# Patient Record
Sex: FEMALE | Race: BLACK OR AFRICAN AMERICAN | Employment: UNEMPLOYED | URBAN - METROPOLITAN AREA
[De-identification: names, ages, dates, MRNs, and addresses within clinical notes are randomized per-mention and may not be internally consistent; named-entity substitution may affect disease eponyms.]

---

## 2017-01-09 ENCOUNTER — TELEPHONE (OUTPATIENT)
Dept: PHARMACY | Facility: CLINIC | Age: 72
End: 2017-01-09

## 2017-01-09 RX ORDER — ESOMEPRAZOLE MAGNESIUM 40 MG/1
40 FOR SUSPENSION ORAL DAILY
COMMUNITY
End: 2017-07-20 | Stop reason: CLARIF

## 2017-01-12 ENCOUNTER — OFFICE VISIT (OUTPATIENT)
Dept: FAMILY MEDICINE CLINIC | Facility: CLINIC | Age: 72
End: 2017-01-12

## 2017-01-12 ENCOUNTER — TELEPHONE (OUTPATIENT)
Dept: FAMILY MEDICINE CLINIC | Facility: CLINIC | Age: 72
End: 2017-01-12

## 2017-01-12 VITALS
HEART RATE: 83 BPM | TEMPERATURE: 99.2 F | WEIGHT: 153 LBS | SYSTOLIC BLOOD PRESSURE: 153 MMHG | BODY MASS INDEX: 24.69 KG/M2 | DIASTOLIC BLOOD PRESSURE: 67 MMHG

## 2017-01-12 DIAGNOSIS — N18.6 ESRD ON DIALYSIS (HCC): ICD-10-CM

## 2017-01-12 DIAGNOSIS — I10 HTN, GOAL BELOW 130/80: ICD-10-CM

## 2017-01-12 DIAGNOSIS — Z99.2 ESRD ON DIALYSIS (HCC): ICD-10-CM

## 2017-01-12 DIAGNOSIS — D63.8 ANEMIA OF CHRONIC DISEASE: Primary | ICD-10-CM

## 2017-01-12 DIAGNOSIS — F17.200 SMOKER: ICD-10-CM

## 2017-01-12 PROCEDURE — 99213 OFFICE O/P EST LOW 20 MIN: CPT | Performed by: STUDENT IN AN ORGANIZED HEALTH CARE EDUCATION/TRAINING PROGRAM

## 2017-01-12 ASSESSMENT — ENCOUNTER SYMPTOMS
VOMITING: 0
NAUSEA: 0
SHORTNESS OF BREATH: 0
ABDOMINAL PAIN: 0
BLOOD IN STOOL: 0
DIARRHEA: 0
COUGH: 1
PHOTOPHOBIA: 0

## 2017-01-24 ENCOUNTER — OFFICE VISIT (OUTPATIENT)
Dept: GASTROENTEROLOGY | Facility: CLINIC | Age: 72
End: 2017-01-24

## 2017-01-24 VITALS
HEIGHT: 66 IN | OXYGEN SATURATION: 100 % | DIASTOLIC BLOOD PRESSURE: 60 MMHG | RESPIRATION RATE: 14 BRPM | TEMPERATURE: 97.9 F | HEART RATE: 77 BPM | SYSTOLIC BLOOD PRESSURE: 140 MMHG | WEIGHT: 153.6 LBS | BODY MASS INDEX: 24.68 KG/M2

## 2017-01-24 DIAGNOSIS — D63.8 ANEMIA OF CHRONIC DISEASE: Primary | ICD-10-CM

## 2017-01-24 DIAGNOSIS — K92.2 GASTROINTESTINAL HEMORRHAGE, UNSPECIFIED GASTROINTESTINAL HEMORRHAGE TYPE: ICD-10-CM

## 2017-01-24 DIAGNOSIS — K29.60 EROSIVE GASTRITIS: ICD-10-CM

## 2017-01-24 PROCEDURE — 99214 OFFICE O/P EST MOD 30 MIN: CPT | Performed by: INTERNAL MEDICINE

## 2017-01-24 ASSESSMENT — ENCOUNTER SYMPTOMS
ABDOMINAL PAIN: 0
TROUBLE SWALLOWING: 0
DIARRHEA: 0
ABDOMINAL DISTENTION: 0
VOMITING: 0
SHORTNESS OF BREATH: 1
ALLERGIC/IMMUNOLOGIC NEGATIVE: 1
NAUSEA: 0
RECTAL PAIN: 0
BACK PAIN: 1
BLOOD IN STOOL: 0
ANAL BLEEDING: 0
GASTROINTESTINAL NEGATIVE: 1
SINUS PRESSURE: 1
COUGH: 1
CONSTIPATION: 0
SORE THROAT: 0

## 2017-01-26 ENCOUNTER — TELEPHONE (OUTPATIENT)
Dept: GASTROENTEROLOGY | Facility: CLINIC | Age: 72
End: 2017-01-26

## 2017-02-14 ENCOUNTER — APPOINTMENT (OUTPATIENT)
Dept: GENERAL RADIOLOGY | Age: 72
DRG: 811 | End: 2017-02-14
Payer: COMMERCIAL

## 2017-02-14 ENCOUNTER — HOSPITAL ENCOUNTER (INPATIENT)
Age: 72
LOS: 9 days | Discharge: HOME | DRG: 811 | End: 2017-02-23
Attending: EMERGENCY MEDICINE | Admitting: FAMILY MEDICINE
Payer: COMMERCIAL

## 2017-02-14 DIAGNOSIS — D64.9 ANEMIA, UNSPECIFIED TYPE: Primary | ICD-10-CM

## 2017-02-14 DIAGNOSIS — R50.9 FEVER, UNSPECIFIED FEVER CAUSE: ICD-10-CM

## 2017-02-14 LAB
-: ABNORMAL
ABSOLUTE EOS #: 0 K/UL (ref 0–0.4)
ABSOLUTE LYMPH #: 0.75 K/UL (ref 1–4.8)
ABSOLUTE MONO #: 0.75 K/UL (ref 0.1–0.8)
AMORPHOUS: ABNORMAL
ANION GAP SERPL CALCULATED.3IONS-SCNC: 13 MMOL/L (ref 9–17)
BACTERIA: ABNORMAL
BASOPHILS # BLD: 0 % (ref 0–2)
BASOPHILS ABSOLUTE: 0 K/UL (ref 0–0.2)
BILIRUBIN URINE: NEGATIVE
BUN BLDV-MCNC: 15 MG/DL (ref 8–23)
BUN/CREAT BLD: ABNORMAL (ref 9–20)
CALCIUM SERPL-MCNC: 9.6 MG/DL (ref 8.6–10.4)
CASTS UA: ABNORMAL /LPF (ref 0–8)
CHLORIDE BLD-SCNC: 93 MMOL/L (ref 98–107)
CO2: 36 MMOL/L (ref 20–31)
COLOR: YELLOW
COMMENT UA: ABNORMAL
CREAT SERPL-MCNC: 2.24 MG/DL (ref 0.5–0.9)
CRYSTALS, UA: ABNORMAL /HPF
DIFFERENTIAL TYPE: ABNORMAL
DIRECT EXAM: NORMAL
EOSINOPHILS RELATIVE PERCENT: 0 % (ref 1–4)
EPITHELIAL CELLS UA: ABNORMAL /HPF (ref 0–5)
GFR AFRICAN AMERICAN: 26 ML/MIN
GFR NON-AFRICAN AMERICAN: 22 ML/MIN
GFR SERPL CREATININE-BSD FRML MDRD: ABNORMAL ML/MIN/{1.73_M2}
GFR SERPL CREATININE-BSD FRML MDRD: ABNORMAL ML/MIN/{1.73_M2}
GLUCOSE BLD-MCNC: 138 MG/DL (ref 70–99)
GLUCOSE URINE: NEGATIVE
HCT VFR BLD CALC: 22.4 % (ref 36–46)
HEMOGLOBIN: 7.1 G/DL (ref 12–16)
HEMOGLOBIN: 7.9 G/DL (ref 12–16)
KETONES, URINE: NEGATIVE
LACTIC ACID, WHOLE BLOOD: 1.5 MMOL/L (ref 0.7–2.1)
LACTIC ACID: NORMAL MMOL/L
LEUKOCYTE ESTERASE, URINE: NEGATIVE
LYMPHOCYTES # BLD: 6 % (ref 24–44)
Lab: NORMAL
MCH RBC QN AUTO: 28.1 PG (ref 26–34)
MCHC RBC AUTO-ENTMCNC: 31.5 G/DL (ref 31–37)
MCV RBC AUTO: 89.3 FL (ref 80–100)
MONOCYTES # BLD: 6 % (ref 1–7)
MORPHOLOGY: ABNORMAL
MUCUS: ABNORMAL
NITRITE, URINE: NEGATIVE
OTHER OBSERVATIONS UA: ABNORMAL
PDW BLD-RTO: 19.5 % (ref 12.5–15.4)
PH UA: >9 (ref 5–8)
PLATELET # BLD: 414 K/UL (ref 140–450)
PLATELET ESTIMATE: ABNORMAL
PMV BLD AUTO: 9.3 FL (ref 6–12)
POC TROPONIN I: 0 NG/ML (ref 0–0.1)
POC TROPONIN I: 0.01 NG/ML (ref 0–0.1)
POC TROPONIN INTERP: NORMAL
POC TROPONIN INTERP: NORMAL
POTASSIUM SERPL-SCNC: 4.2 MMOL/L (ref 3.7–5.3)
PROTEIN UA: ABNORMAL
RBC # BLD: 2.51 M/UL (ref 4–5.2)
RBC # BLD: ABNORMAL 10*6/UL
RBC UA: ABNORMAL /HPF (ref 0–4)
RENAL EPITHELIAL, UA: ABNORMAL /HPF
SEG NEUTROPHILS: 88 % (ref 36–66)
SEGMENTED NEUTROPHILS ABSOLUTE COUNT: 11 K/UL (ref 1.8–7.7)
SODIUM BLD-SCNC: 142 MMOL/L (ref 135–144)
SPECIFIC GRAVITY UA: 1.01 (ref 1–1.03)
SPECIMEN DESCRIPTION: NORMAL
STATUS: NORMAL
TRICHOMONAS: ABNORMAL
TURBIDITY: CLEAR
URINE HGB: NEGATIVE
UROBILINOGEN, URINE: NORMAL
WBC # BLD: 12.5 K/UL (ref 3.5–11)
WBC # BLD: ABNORMAL 10*3/UL
WBC UA: ABNORMAL /HPF (ref 0–5)
YEAST: ABNORMAL

## 2017-02-14 PROCEDURE — 99284 EMERGENCY DEPT VISIT MOD MDM: CPT

## 2017-02-14 PROCEDURE — 86900 BLOOD TYPING SEROLOGIC ABO: CPT

## 2017-02-14 PROCEDURE — 36415 COLL VENOUS BLD VENIPUNCTURE: CPT

## 2017-02-14 PROCEDURE — 2580000003 HC RX 258: Performed by: EMERGENCY MEDICINE

## 2017-02-14 PROCEDURE — 87070 CULTURE OTHR SPECIMN AEROBIC: CPT

## 2017-02-14 PROCEDURE — 6370000000 HC RX 637 (ALT 250 FOR IP): Performed by: EMERGENCY MEDICINE

## 2017-02-14 PROCEDURE — P9040 RBC LEUKOREDUCED IRRADIATED: HCPCS

## 2017-02-14 PROCEDURE — 86880 COOMBS TEST DIRECT: CPT

## 2017-02-14 PROCEDURE — 86850 RBC ANTIBODY SCREEN: CPT

## 2017-02-14 PROCEDURE — 71020 XR CHEST STANDARD TWO VW: CPT

## 2017-02-14 PROCEDURE — 6360000002 HC RX W HCPCS: Performed by: FAMILY MEDICINE

## 2017-02-14 PROCEDURE — 36430 TRANSFUSION BLD/BLD COMPNT: CPT

## 2017-02-14 PROCEDURE — 86920 COMPATIBILITY TEST SPIN: CPT

## 2017-02-14 PROCEDURE — 86902 BLOOD TYPE ANTIGEN DONOR EA: CPT

## 2017-02-14 PROCEDURE — 2060000000 HC ICU INTERMEDIATE R&B

## 2017-02-14 PROCEDURE — 87040 BLOOD CULTURE FOR BACTERIA: CPT

## 2017-02-14 PROCEDURE — 6360000002 HC RX W HCPCS: Performed by: EMERGENCY MEDICINE

## 2017-02-14 PROCEDURE — 80048 BASIC METABOLIC PNL TOTAL CA: CPT

## 2017-02-14 PROCEDURE — 2500000003 HC RX 250 WO HCPCS: Performed by: EMERGENCY MEDICINE

## 2017-02-14 PROCEDURE — 6370000000 HC RX 637 (ALT 250 FOR IP): Performed by: FAMILY MEDICINE

## 2017-02-14 PROCEDURE — 84484 ASSAY OF TROPONIN QUANT: CPT

## 2017-02-14 PROCEDURE — 86901 BLOOD TYPING SEROLOGIC RH(D): CPT

## 2017-02-14 PROCEDURE — 2580000003 HC RX 258: Performed by: FAMILY MEDICINE

## 2017-02-14 PROCEDURE — 87804 INFLUENZA ASSAY W/OPTIC: CPT

## 2017-02-14 PROCEDURE — 85025 COMPLETE CBC W/AUTO DIFF WBC: CPT

## 2017-02-14 PROCEDURE — 83605 ASSAY OF LACTIC ACID: CPT

## 2017-02-14 PROCEDURE — 81001 URINALYSIS AUTO W/SCOPE: CPT

## 2017-02-14 PROCEDURE — 87205 SMEAR GRAM STAIN: CPT

## 2017-02-14 PROCEDURE — 86870 RBC ANTIBODY IDENTIFICATION: CPT

## 2017-02-14 PROCEDURE — 85018 HEMOGLOBIN: CPT

## 2017-02-14 PROCEDURE — 93005 ELECTROCARDIOGRAM TRACING: CPT

## 2017-02-14 PROCEDURE — C9113 INJ PANTOPRAZOLE SODIUM, VIA: HCPCS | Performed by: FAMILY MEDICINE

## 2017-02-14 RX ORDER — ACETAMINOPHEN 325 MG/1
650 TABLET ORAL EVERY 4 HOURS PRN
Status: DISCONTINUED | OUTPATIENT
Start: 2017-02-14 | End: 2017-02-14

## 2017-02-14 RX ORDER — LEVOFLOXACIN 5 MG/ML
500 INJECTION, SOLUTION INTRAVENOUS EVERY 24 HOURS
Status: COMPLETED | OUTPATIENT
Start: 2017-02-14 | End: 2017-02-14

## 2017-02-14 RX ORDER — LEVOFLOXACIN 5 MG/ML
250 INJECTION, SOLUTION INTRAVENOUS EVERY 24 HOURS
Status: DISCONTINUED | OUTPATIENT
Start: 2017-02-15 | End: 2017-02-16

## 2017-02-14 RX ORDER — ACETAMINOPHEN 325 MG/1
650 TABLET ORAL EVERY 4 HOURS PRN
Status: DISCONTINUED | OUTPATIENT
Start: 2017-02-14 | End: 2017-02-23 | Stop reason: HOSPADM

## 2017-02-14 RX ORDER — PANTOPRAZOLE SODIUM 40 MG/10ML
40 INJECTION, POWDER, LYOPHILIZED, FOR SOLUTION INTRAVENOUS DAILY
Status: DISCONTINUED | OUTPATIENT
Start: 2017-02-14 | End: 2017-02-16

## 2017-02-14 RX ORDER — SODIUM CHLORIDE 0.9 % (FLUSH) 0.9 %
10 SYRINGE (ML) INJECTION PRN
Status: DISCONTINUED | OUTPATIENT
Start: 2017-02-14 | End: 2017-02-14

## 2017-02-14 RX ORDER — DIPHENHYDRAMINE HCL 25 MG
25 TABLET ORAL ONCE
Status: COMPLETED | OUTPATIENT
Start: 2017-02-14 | End: 2017-02-14

## 2017-02-14 RX ORDER — 0.9 % SODIUM CHLORIDE 0.9 %
10 VIAL (ML) INJECTION DAILY
Status: DISCONTINUED | OUTPATIENT
Start: 2017-02-14 | End: 2017-02-16

## 2017-02-14 RX ORDER — SODIUM CHLORIDE 0.9 % (FLUSH) 0.9 %
10 SYRINGE (ML) INJECTION PRN
Status: DISCONTINUED | OUTPATIENT
Start: 2017-02-14 | End: 2017-02-23 | Stop reason: HOSPADM

## 2017-02-14 RX ORDER — LANOLIN ALCOHOL/MO/W.PET/CERES
325 CREAM (GRAM) TOPICAL
Status: DISCONTINUED | OUTPATIENT
Start: 2017-02-14 | End: 2017-02-20

## 2017-02-14 RX ORDER — VANCOMYCIN HYDROCHLORIDE 1 G/200ML
1000 INJECTION, SOLUTION INTRAVENOUS ONCE
Status: COMPLETED | OUTPATIENT
Start: 2017-02-14 | End: 2017-02-14

## 2017-02-14 RX ORDER — BISACODYL 10 MG
10 SUPPOSITORY, RECTAL RECTAL DAILY PRN
Status: DISCONTINUED | OUTPATIENT
Start: 2017-02-14 | End: 2017-02-23 | Stop reason: HOSPADM

## 2017-02-14 RX ORDER — CLONIDINE HYDROCHLORIDE 0.2 MG/1
0.4 TABLET ORAL 2 TIMES DAILY
Status: DISCONTINUED | OUTPATIENT
Start: 2017-02-14 | End: 2017-02-15

## 2017-02-14 RX ORDER — 0.9 % SODIUM CHLORIDE 0.9 %
250 INTRAVENOUS SOLUTION INTRAVENOUS ONCE
Status: DISCONTINUED | OUTPATIENT
Start: 2017-02-14 | End: 2017-02-23 | Stop reason: HOSPADM

## 2017-02-14 RX ORDER — LOSARTAN POTASSIUM 50 MG/1
50 TABLET ORAL 2 TIMES DAILY
Status: DISCONTINUED | OUTPATIENT
Start: 2017-02-14 | End: 2017-02-23 | Stop reason: HOSPADM

## 2017-02-14 RX ORDER — ATORVASTATIN CALCIUM 20 MG/1
20 TABLET, FILM COATED ORAL NIGHTLY
Status: DISCONTINUED | OUTPATIENT
Start: 2017-02-14 | End: 2017-02-23 | Stop reason: HOSPADM

## 2017-02-14 RX ORDER — ACETAMINOPHEN 500 MG
1000 TABLET ORAL ONCE
Status: COMPLETED | OUTPATIENT
Start: 2017-02-14 | End: 2017-02-14

## 2017-02-14 RX ORDER — ONDANSETRON 2 MG/ML
4 INJECTION INTRAMUSCULAR; INTRAVENOUS EVERY 6 HOURS PRN
Status: DISCONTINUED | OUTPATIENT
Start: 2017-02-14 | End: 2017-02-23 | Stop reason: HOSPADM

## 2017-02-14 RX ORDER — SODIUM CHLORIDE 0.9 % (FLUSH) 0.9 %
10 SYRINGE (ML) INJECTION EVERY 12 HOURS SCHEDULED
Status: DISCONTINUED | OUTPATIENT
Start: 2017-02-14 | End: 2017-02-23 | Stop reason: HOSPADM

## 2017-02-14 RX ORDER — SODIUM CHLORIDE 0.9 % (FLUSH) 0.9 %
10 SYRINGE (ML) INJECTION EVERY 12 HOURS SCHEDULED
Status: DISCONTINUED | OUTPATIENT
Start: 2017-02-14 | End: 2017-02-14

## 2017-02-14 RX ADMIN — ATORVASTATIN CALCIUM 20 MG: 20 TABLET, FILM COATED ORAL at 20:17

## 2017-02-14 RX ADMIN — Medication 10 ML: at 21:00

## 2017-02-14 RX ADMIN — ACETAMINOPHEN 650 MG: 325 TABLET ORAL at 21:04

## 2017-02-14 RX ADMIN — AZTREONAM 2 G: 2 INJECTION, POWDER, LYOPHILIZED, FOR SOLUTION INTRAMUSCULAR; INTRAVENOUS at 15:13

## 2017-02-14 RX ADMIN — LOSARTAN POTASSIUM 50 MG: 50 TABLET, FILM COATED ORAL at 20:17

## 2017-02-14 RX ADMIN — PANTOPRAZOLE SODIUM 40 MG: 40 INJECTION, POWDER, FOR SOLUTION INTRAVENOUS at 18:35

## 2017-02-14 RX ADMIN — VANCOMYCIN HYDROCHLORIDE 1000 MG: 1 INJECTION, SOLUTION INTRAVENOUS at 15:54

## 2017-02-14 RX ADMIN — MOMETASONE FUROATE AND FORMOTEROL FUMARATE DIHYDRATE 2 PUFF: 100; 5 AEROSOL RESPIRATORY (INHALATION) at 18:50

## 2017-02-14 RX ADMIN — DIPHENHYDRAMINE HCL 25 MG: 25 TABLET ORAL at 20:17

## 2017-02-14 RX ADMIN — LEVOFLOXACIN 500 MG: 5 INJECTION, SOLUTION INTRAVENOUS at 18:35

## 2017-02-14 RX ADMIN — CLONIDINE HYDROCHLORIDE 0.4 MG: 0.2 TABLET ORAL at 20:17

## 2017-02-14 RX ADMIN — ACETAMINOPHEN 1000 MG: 500 TABLET ORAL at 15:12

## 2017-02-14 RX ADMIN — FERROUS SULFATE TAB EC 325 MG (65 MG FE EQUIVALENT) 325 MG: 325 (65 FE) TABLET DELAYED RESPONSE at 18:36

## 2017-02-14 ASSESSMENT — PAIN DESCRIPTION - PAIN TYPE
TYPE: ACUTE PAIN
TYPE: ACUTE PAIN

## 2017-02-14 ASSESSMENT — PAIN SCALES - GENERAL
PAINLEVEL_OUTOF10: 2
PAINLEVEL_OUTOF10: 8
PAINLEVEL_OUTOF10: 0
PAINLEVEL_OUTOF10: 0

## 2017-02-14 ASSESSMENT — PAIN DESCRIPTION - FREQUENCY: FREQUENCY: CONTINUOUS

## 2017-02-14 ASSESSMENT — ENCOUNTER SYMPTOMS
SHORTNESS OF BREATH: 0
EYE DISCHARGE: 0
CONSTIPATION: 0
SORE THROAT: 0
NAUSEA: 0
COUGH: 1
VOMITING: 0
COUGH: 0
ABDOMINAL PAIN: 0
BACK PAIN: 0
SPUTUM PRODUCTION: 0
COLOR CHANGE: 0
EYE PAIN: 0
RHINORRHEA: 0
DIARRHEA: 0

## 2017-02-14 ASSESSMENT — PAIN DESCRIPTION - ORIENTATION: ORIENTATION: RIGHT;MID

## 2017-02-14 ASSESSMENT — PAIN DESCRIPTION - LOCATION
LOCATION: CHEST
LOCATION: GENERALIZED

## 2017-02-14 ASSESSMENT — PAIN DESCRIPTION - DESCRIPTORS
DESCRIPTORS: ACHING
DESCRIPTORS: PRESSURE;TIGHTNESS

## 2017-02-15 LAB
ABSOLUTE EOS #: 0 K/UL (ref 0–0.4)
ABSOLUTE LYMPH #: 1.21 K/UL (ref 1–4.8)
ABSOLUTE MONO #: 0.65 K/UL (ref 0.1–0.8)
ANION GAP SERPL CALCULATED.3IONS-SCNC: 12 MMOL/L (ref 9–17)
BASOPHILS # BLD: 0 % (ref 0–2)
BASOPHILS ABSOLUTE: 0 K/UL (ref 0–0.2)
BUN BLDV-MCNC: 25 MG/DL (ref 8–23)
BUN/CREAT BLD: ABNORMAL (ref 9–20)
CALCIUM SERPL-MCNC: 9.7 MG/DL (ref 8.6–10.4)
CHLORIDE BLD-SCNC: 95 MMOL/L (ref 98–107)
CO2: 32 MMOL/L (ref 20–31)
CREAT SERPL-MCNC: 3.61 MG/DL (ref 0.5–0.9)
CULTURE: ABNORMAL
DIFFERENTIAL TYPE: ABNORMAL
DIRECT EXAM: ABNORMAL
EKG ATRIAL RATE: 92 BPM
EKG P AXIS: 76 DEGREES
EKG P-R INTERVAL: 126 MS
EKG Q-T INTERVAL: 364 MS
EKG QRS DURATION: 74 MS
EKG QTC CALCULATION (BAZETT): 450 MS
EKG R AXIS: 56 DEGREES
EKG T AXIS: 111 DEGREES
EKG VENTRICULAR RATE: 92 BPM
EOSINOPHILS RELATIVE PERCENT: 0 % (ref 1–4)
FERRITIN: 2422 UG/L (ref 13–150)
GFR AFRICAN AMERICAN: 15 ML/MIN
GFR NON-AFRICAN AMERICAN: 12 ML/MIN
GFR SERPL CREATININE-BSD FRML MDRD: ABNORMAL ML/MIN/{1.73_M2}
GFR SERPL CREATININE-BSD FRML MDRD: ABNORMAL ML/MIN/{1.73_M2}
GLUCOSE BLD-MCNC: 90 MG/DL (ref 70–99)
HAPTOGLOBIN: 136 MG/DL (ref 30–200)
HCT VFR BLD CALC: 20.2 % (ref 36–46)
HCT VFR BLD CALC: 21.2 % (ref 36–46)
HCT VFR BLD CALC: 26.3 % (ref 36–46)
HEMOGLOBIN: 6.5 G/DL (ref 12–16)
HEMOGLOBIN: 7.1 G/DL (ref 12–16)
HEMOGLOBIN: 8.7 G/DL (ref 12–16)
INR BLD: 1.1
IRON SATURATION: 35 % (ref 20–55)
IRON: 38 UG/DL (ref 37–145)
LACTATE DEHYDROGENASE: 313 U/L (ref 135–214)
LYMPHOCYTES # BLD: 13 % (ref 24–44)
Lab: ABNORMAL
MCH RBC QN AUTO: 29.2 PG (ref 26–34)
MCHC RBC AUTO-ENTMCNC: 33.3 G/DL (ref 31–37)
MCV RBC AUTO: 87.5 FL (ref 80–100)
MONOCYTES # BLD: 7 % (ref 1–7)
MORPHOLOGY: ABNORMAL
PARTIAL THROMBOPLASTIN TIME: 30.1 SEC (ref 21.3–31.3)
PDW BLD-RTO: 19.1 % (ref 12.5–15.4)
PLATELET # BLD: 430 K/UL (ref 140–450)
PLATELET ESTIMATE: ABNORMAL
PMV BLD AUTO: 9.5 FL (ref 6–12)
POTASSIUM SERPL-SCNC: 4.5 MMOL/L (ref 3.7–5.3)
PROTHROMBIN TIME: 12.1 SEC (ref 9.4–12.6)
RBC # BLD: 2.42 M/UL (ref 4–5.2)
RBC # BLD: ABNORMAL 10*6/UL
SEG NEUTROPHILS: 80 % (ref 36–66)
SEGMENTED NEUTROPHILS ABSOLUTE COUNT: 7.44 K/UL (ref 1.8–7.7)
SODIUM BLD-SCNC: 139 MMOL/L (ref 135–144)
SPECIMEN DESCRIPTION: ABNORMAL
STATUS: ABNORMAL
TOTAL IRON BINDING CAPACITY: 110 UG/DL (ref 250–450)
UNSATURATED IRON BINDING CAPACITY: 72 UG/DL (ref 112–347)
WBC # BLD: 9.3 K/UL (ref 3.5–11)
WBC # BLD: ABNORMAL 10*3/UL

## 2017-02-15 PROCEDURE — 85018 HEMOGLOBIN: CPT

## 2017-02-15 PROCEDURE — P9016 RBC LEUKOCYTES REDUCED: HCPCS

## 2017-02-15 PROCEDURE — 80048 BASIC METABOLIC PNL TOTAL CA: CPT

## 2017-02-15 PROCEDURE — 36416 COLLJ CAPILLARY BLOOD SPEC: CPT

## 2017-02-15 PROCEDURE — 83010 ASSAY OF HAPTOGLOBIN QUANT: CPT

## 2017-02-15 PROCEDURE — 94640 AIRWAY INHALATION TREATMENT: CPT

## 2017-02-15 PROCEDURE — 85014 HEMATOCRIT: CPT

## 2017-02-15 PROCEDURE — 6360000002 HC RX W HCPCS: Performed by: FAMILY MEDICINE

## 2017-02-15 PROCEDURE — 97530 THERAPEUTIC ACTIVITIES: CPT

## 2017-02-15 PROCEDURE — 83540 ASSAY OF IRON: CPT

## 2017-02-15 PROCEDURE — 36415 COLL VENOUS BLD VENIPUNCTURE: CPT

## 2017-02-15 PROCEDURE — 97161 PT EVAL LOW COMPLEX 20 MIN: CPT

## 2017-02-15 PROCEDURE — 85730 THROMBOPLASTIN TIME PARTIAL: CPT

## 2017-02-15 PROCEDURE — C9113 INJ PANTOPRAZOLE SODIUM, VIA: HCPCS | Performed by: FAMILY MEDICINE

## 2017-02-15 PROCEDURE — 85610 PROTHROMBIN TIME: CPT

## 2017-02-15 PROCEDURE — 6370000000 HC RX 637 (ALT 250 FOR IP): Performed by: FAMILY MEDICINE

## 2017-02-15 PROCEDURE — 2060000000 HC ICU INTERMEDIATE R&B

## 2017-02-15 PROCEDURE — 85025 COMPLETE CBC W/AUTO DIFF WBC: CPT

## 2017-02-15 PROCEDURE — 86900 BLOOD TYPING SEROLOGIC ABO: CPT

## 2017-02-15 PROCEDURE — 2580000003 HC RX 258: Performed by: FAMILY MEDICINE

## 2017-02-15 PROCEDURE — 83550 IRON BINDING TEST: CPT

## 2017-02-15 PROCEDURE — G8978 MOBILITY CURRENT STATUS: HCPCS

## 2017-02-15 PROCEDURE — G8979 MOBILITY GOAL STATUS: HCPCS

## 2017-02-15 PROCEDURE — 99223 1ST HOSP IP/OBS HIGH 75: CPT | Performed by: FAMILY MEDICINE

## 2017-02-15 PROCEDURE — 82728 ASSAY OF FERRITIN: CPT

## 2017-02-15 PROCEDURE — 83615 LACTATE (LD) (LDH) ENZYME: CPT

## 2017-02-15 RX ORDER — LACTULOSE 10 G/15ML
20 SOLUTION ORAL 3 TIMES DAILY
Status: DISCONTINUED | OUTPATIENT
Start: 2017-02-15 | End: 2017-02-23 | Stop reason: HOSPADM

## 2017-02-15 RX ORDER — CLONIDINE HYDROCHLORIDE 0.2 MG/1
0.2 TABLET ORAL 2 TIMES DAILY
Status: DISCONTINUED | OUTPATIENT
Start: 2017-02-15 | End: 2017-02-21

## 2017-02-15 RX ADMIN — Medication 10 ML: at 08:37

## 2017-02-15 RX ADMIN — LACTULOSE 20 G: 10 SOLUTION ORAL at 20:17

## 2017-02-15 RX ADMIN — LOSARTAN POTASSIUM 50 MG: 50 TABLET, FILM COATED ORAL at 08:37

## 2017-02-15 RX ADMIN — LACTULOSE 20 G: 10 SOLUTION ORAL at 10:08

## 2017-02-15 RX ADMIN — CLONIDINE HYDROCHLORIDE 0.2 MG: 0.2 TABLET ORAL at 08:36

## 2017-02-15 RX ADMIN — LEVOFLOXACIN 250 MG: 5 INJECTION, SOLUTION INTRAVENOUS at 20:15

## 2017-02-15 RX ADMIN — ACETAMINOPHEN 650 MG: 325 TABLET ORAL at 19:42

## 2017-02-15 RX ADMIN — MOMETASONE FUROATE AND FORMOTEROL FUMARATE DIHYDRATE 2 PUFF: 100; 5 AEROSOL RESPIRATORY (INHALATION) at 18:38

## 2017-02-15 RX ADMIN — ATORVASTATIN CALCIUM 20 MG: 20 TABLET, FILM COATED ORAL at 20:17

## 2017-02-15 RX ADMIN — PANTOPRAZOLE SODIUM 40 MG: 40 INJECTION, POWDER, FOR SOLUTION INTRAVENOUS at 08:36

## 2017-02-15 RX ADMIN — LOSARTAN POTASSIUM 50 MG: 50 TABLET, FILM COATED ORAL at 20:17

## 2017-02-15 RX ADMIN — FERROUS SULFATE TAB EC 325 MG (65 MG FE EQUIVALENT) 325 MG: 325 (65 FE) TABLET DELAYED RESPONSE at 06:47

## 2017-02-15 RX ADMIN — MOMETASONE FUROATE AND FORMOTEROL FUMARATE DIHYDRATE 2 PUFF: 100; 5 AEROSOL RESPIRATORY (INHALATION) at 08:33

## 2017-02-15 RX ADMIN — CLONIDINE HYDROCHLORIDE 0.2 MG: 0.2 TABLET ORAL at 20:17

## 2017-02-15 ASSESSMENT — PAIN SCALES - GENERAL
PAINLEVEL_OUTOF10: 3
PAINLEVEL_OUTOF10: 2
PAINLEVEL_OUTOF10: 0
PAINLEVEL_OUTOF10: 4
PAINLEVEL_OUTOF10: 0
PAINLEVEL_OUTOF10: 0
PAINLEVEL_OUTOF10: 4

## 2017-02-15 ASSESSMENT — PAIN DESCRIPTION - PROGRESSION: CLINICAL_PROGRESSION: GRADUALLY IMPROVING

## 2017-02-15 ASSESSMENT — PAIN DESCRIPTION - LOCATION
LOCATION: SHOULDER
LOCATION: NECK
LOCATION: GENERALIZED

## 2017-02-15 ASSESSMENT — PAIN DESCRIPTION - DESCRIPTORS
DESCRIPTORS: SORE
DESCRIPTORS: ACHING
DESCRIPTORS: ACHING

## 2017-02-15 ASSESSMENT — PAIN DESCRIPTION - PAIN TYPE: TYPE: ACUTE PAIN

## 2017-02-15 ASSESSMENT — ENCOUNTER SYMPTOMS
NAUSEA: 0
SHORTNESS OF BREATH: 0

## 2017-02-15 ASSESSMENT — PAIN DESCRIPTION - ORIENTATION
ORIENTATION: RIGHT
ORIENTATION: RIGHT;LEFT

## 2017-02-16 ENCOUNTER — CARE COORDINATOR VISIT (OUTPATIENT)
Dept: CASE MANAGEMENT | Age: 72
End: 2017-02-16

## 2017-02-16 LAB
ABO/RH: NORMAL
ABSOLUTE EOS #: 0 K/UL (ref 0–0.4)
ABSOLUTE LYMPH #: 1.06 K/UL (ref 1–4.8)
ABSOLUTE MONO #: 0.32 K/UL (ref 0.1–0.8)
ANION GAP SERPL CALCULATED.3IONS-SCNC: 12 MMOL/L (ref 9–17)
ANTIBODY IDENTIFICATION: NORMAL
ANTIBODY SCREEN: NEGATIVE
ARM BAND NUMBER: NORMAL
BASOPHILS # BLD: 1 % (ref 0–2)
BASOPHILS ABSOLUTE: 0.11 K/UL (ref 0–0.2)
BLD PROD TYP BPU: NORMAL
BLD PROD TYP BPU: NORMAL
BUN BLDV-MCNC: 33 MG/DL (ref 8–23)
BUN/CREAT BLD: ABNORMAL (ref 9–20)
CALCIUM SERPL-MCNC: 9.7 MG/DL (ref 8.6–10.4)
CHLORIDE BLD-SCNC: 97 MMOL/L (ref 98–107)
CO2: 27 MMOL/L (ref 20–31)
CREAT SERPL-MCNC: 4.51 MG/DL (ref 0.5–0.9)
CROSSMATCH RESULT: NORMAL
CROSSMATCH RESULT: NORMAL
DAT IGG: NEGATIVE
DATE, STOOL #1: ABNORMAL
DATE, STOOL #2: ABNORMAL
DATE, STOOL #3: ABNORMAL
DIFFERENTIAL TYPE: ABNORMAL
DISPENSE STATUS BLOOD BANK: NORMAL
DISPENSE STATUS BLOOD BANK: NORMAL
EOSINOPHILS RELATIVE PERCENT: 0 % (ref 1–4)
EXPIRATION DATE: NORMAL
GFR AFRICAN AMERICAN: 12 ML/MIN
GFR NON-AFRICAN AMERICAN: 10 ML/MIN
GFR SERPL CREATININE-BSD FRML MDRD: ABNORMAL ML/MIN/{1.73_M2}
GFR SERPL CREATININE-BSD FRML MDRD: ABNORMAL ML/MIN/{1.73_M2}
GLUCOSE BLD-MCNC: 101 MG/DL (ref 70–99)
HCT VFR BLD CALC: 25.5 % (ref 36–46)
HCT VFR BLD CALC: 27.4 % (ref 36–46)
HEMOCCULT SP1 STL QL: POSITIVE
HEMOCCULT SP2 STL QL: ABNORMAL
HEMOCCULT SP3 STL QL: ABNORMAL
HEMOGLOBIN: 8.3 G/DL (ref 12–16)
HEMOGLOBIN: 9 G/DL (ref 12–16)
LYMPHOCYTES # BLD: 10 % (ref 24–44)
MCH RBC QN AUTO: 28.5 PG (ref 26–34)
MCHC RBC AUTO-ENTMCNC: 32.5 G/DL (ref 31–37)
MCV RBC AUTO: 87.7 FL (ref 80–100)
MONOCYTES # BLD: 3 % (ref 1–7)
MORPHOLOGY: ABNORMAL
PDW BLD-RTO: 18.8 % (ref 12.5–15.4)
PLATELET # BLD: 370 K/UL (ref 140–450)
PLATELET ESTIMATE: ABNORMAL
PMV BLD AUTO: 9.5 FL (ref 6–12)
POTASSIUM SERPL-SCNC: 4.5 MMOL/L (ref 3.7–5.3)
RBC # BLD: 2.91 M/UL (ref 4–5.2)
RBC # BLD: ABNORMAL 10*6/UL
SEG NEUTROPHILS: 86 % (ref 36–66)
SEGMENTED NEUTROPHILS ABSOLUTE COUNT: 9.11 K/UL (ref 1.8–7.7)
SODIUM BLD-SCNC: 136 MMOL/L (ref 135–144)
TIME, STOOL #1: ABNORMAL
TIME, STOOL #2: ABNORMAL
TIME, STOOL #3: ABNORMAL
TRANSFUSION STATUS: NORMAL
TRANSFUSION STATUS: NORMAL
UNIT DIVISION: 0
UNIT DIVISION: 0
UNIT NUMBER: NORMAL
UNIT NUMBER: NORMAL
WBC # BLD: 10.6 K/UL (ref 3.5–11)
WBC # BLD: ABNORMAL 10*3/UL

## 2017-02-16 PROCEDURE — 6370000000 HC RX 637 (ALT 250 FOR IP): Performed by: FAMILY MEDICINE

## 2017-02-16 PROCEDURE — 80048 BASIC METABOLIC PNL TOTAL CA: CPT

## 2017-02-16 PROCEDURE — 99232 SBSQ HOSP IP/OBS MODERATE 35: CPT | Performed by: FAMILY MEDICINE

## 2017-02-16 PROCEDURE — 36415 COLL VENOUS BLD VENIPUNCTURE: CPT

## 2017-02-16 PROCEDURE — 85025 COMPLETE CBC W/AUTO DIFF WBC: CPT

## 2017-02-16 PROCEDURE — 94640 AIRWAY INHALATION TREATMENT: CPT

## 2017-02-16 PROCEDURE — 97110 THERAPEUTIC EXERCISES: CPT

## 2017-02-16 PROCEDURE — 85014 HEMATOCRIT: CPT

## 2017-02-16 PROCEDURE — C9113 INJ PANTOPRAZOLE SODIUM, VIA: HCPCS | Performed by: FAMILY MEDICINE

## 2017-02-16 PROCEDURE — 2580000003 HC RX 258: Performed by: FAMILY MEDICINE

## 2017-02-16 PROCEDURE — 97530 THERAPEUTIC ACTIVITIES: CPT

## 2017-02-16 PROCEDURE — G0328 FECAL BLOOD SCRN IMMUNOASSAY: HCPCS

## 2017-02-16 PROCEDURE — 6360000002 HC RX W HCPCS: Performed by: FAMILY MEDICINE

## 2017-02-16 PROCEDURE — 2060000000 HC ICU INTERMEDIATE R&B

## 2017-02-16 PROCEDURE — 85018 HEMOGLOBIN: CPT

## 2017-02-16 RX ORDER — 0.9 % SODIUM CHLORIDE 0.9 %
10 VIAL (ML) INJECTION 2 TIMES DAILY
Status: DISCONTINUED | OUTPATIENT
Start: 2017-02-16 | End: 2017-02-23 | Stop reason: HOSPADM

## 2017-02-16 RX ORDER — PANTOPRAZOLE SODIUM 40 MG/10ML
40 INJECTION, POWDER, LYOPHILIZED, FOR SOLUTION INTRAVENOUS 2 TIMES DAILY
Status: DISCONTINUED | OUTPATIENT
Start: 2017-02-16 | End: 2017-02-23 | Stop reason: HOSPADM

## 2017-02-16 RX ADMIN — ATORVASTATIN CALCIUM 20 MG: 20 TABLET, FILM COATED ORAL at 20:46

## 2017-02-16 RX ADMIN — ACETAMINOPHEN 650 MG: 325 TABLET ORAL at 20:46

## 2017-02-16 RX ADMIN — MOMETASONE FUROATE AND FORMOTEROL FUMARATE DIHYDRATE 2 PUFF: 100; 5 AEROSOL RESPIRATORY (INHALATION) at 20:25

## 2017-02-16 RX ADMIN — Medication 10 ML: at 20:48

## 2017-02-16 RX ADMIN — MOMETASONE FUROATE AND FORMOTEROL FUMARATE DIHYDRATE 2 PUFF: 100; 5 AEROSOL RESPIRATORY (INHALATION) at 07:55

## 2017-02-16 RX ADMIN — ACETAMINOPHEN 650 MG: 325 TABLET ORAL at 01:22

## 2017-02-16 RX ADMIN — FERROUS SULFATE TAB EC 325 MG (65 MG FE EQUIVALENT) 325 MG: 325 (65 FE) TABLET DELAYED RESPONSE at 17:41

## 2017-02-16 RX ADMIN — LOSARTAN POTASSIUM 50 MG: 50 TABLET, FILM COATED ORAL at 09:41

## 2017-02-16 RX ADMIN — LACTULOSE 20 G: 10 SOLUTION ORAL at 09:41

## 2017-02-16 RX ADMIN — CLONIDINE HYDROCHLORIDE 0.2 MG: 0.2 TABLET ORAL at 09:41

## 2017-02-16 RX ADMIN — PANTOPRAZOLE SODIUM 40 MG: 40 INJECTION, POWDER, FOR SOLUTION INTRAVENOUS at 10:26

## 2017-02-16 RX ADMIN — FERROUS SULFATE TAB EC 325 MG (65 MG FE EQUIVALENT) 325 MG: 325 (65 FE) TABLET DELAYED RESPONSE at 06:00

## 2017-02-16 RX ADMIN — CLONIDINE HYDROCHLORIDE 0.2 MG: 0.2 TABLET ORAL at 20:46

## 2017-02-16 RX ADMIN — LACTULOSE 20 G: 10 SOLUTION ORAL at 13:55

## 2017-02-16 RX ADMIN — PANTOPRAZOLE SODIUM 40 MG: 40 INJECTION, POWDER, FOR SOLUTION INTRAVENOUS at 20:46

## 2017-02-16 RX ADMIN — ACETAMINOPHEN 650 MG: 325 TABLET ORAL at 06:00

## 2017-02-16 RX ADMIN — Medication 10 ML: at 09:42

## 2017-02-16 ASSESSMENT — PAIN SCALES - GENERAL
PAINLEVEL_OUTOF10: 2
PAINLEVEL_OUTOF10: 3
PAINLEVEL_OUTOF10: 5
PAINLEVEL_OUTOF10: 4
PAINLEVEL_OUTOF10: 2
PAINLEVEL_OUTOF10: 0
PAINLEVEL_OUTOF10: 4
PAINLEVEL_OUTOF10: 0

## 2017-02-16 ASSESSMENT — ENCOUNTER SYMPTOMS: SHORTNESS OF BREATH: 0

## 2017-02-17 ENCOUNTER — APPOINTMENT (OUTPATIENT)
Dept: CT IMAGING | Age: 72
DRG: 811 | End: 2017-02-17
Payer: COMMERCIAL

## 2017-02-17 ENCOUNTER — APPOINTMENT (OUTPATIENT)
Dept: NUCLEAR MEDICINE | Age: 72
DRG: 811 | End: 2017-02-17
Payer: COMMERCIAL

## 2017-02-17 ENCOUNTER — APPOINTMENT (OUTPATIENT)
Dept: GENERAL RADIOLOGY | Age: 72
DRG: 811 | End: 2017-02-17
Payer: COMMERCIAL

## 2017-02-17 LAB
AMPHETAMINE SCREEN URINE: NEGATIVE
ANION GAP SERPL CALCULATED.3IONS-SCNC: 18 MMOL/L (ref 9–17)
BARBITURATE SCREEN URINE: NEGATIVE
BENZODIAZEPINE SCREEN, URINE: NEGATIVE
BUN BLDV-MCNC: 43 MG/DL (ref 8–23)
BUN/CREAT BLD: ABNORMAL (ref 9–20)
BUPRENORPHINE URINE: ABNORMAL
CALCIUM SERPL-MCNC: 10.3 MG/DL (ref 8.6–10.4)
CANNABINOID SCREEN URINE: NEGATIVE
CHLORIDE BLD-SCNC: 95 MMOL/L (ref 98–107)
CO2: 18 MMOL/L (ref 20–31)
COCAINE METABOLITE, URINE: POSITIVE
CREAT SERPL-MCNC: 4.82 MG/DL (ref 0.5–0.9)
EKG ATRIAL RATE: 88 BPM
EKG P AXIS: 69 DEGREES
EKG P-R INTERVAL: 130 MS
EKG Q-T INTERVAL: 372 MS
EKG QRS DURATION: 80 MS
EKG QTC CALCULATION (BAZETT): 450 MS
EKG R AXIS: 39 DEGREES
EKG T AXIS: 106 DEGREES
EKG VENTRICULAR RATE: 88 BPM
GFR AFRICAN AMERICAN: 11 ML/MIN
GFR NON-AFRICAN AMERICAN: 9 ML/MIN
GFR SERPL CREATININE-BSD FRML MDRD: ABNORMAL ML/MIN/{1.73_M2}
GFR SERPL CREATININE-BSD FRML MDRD: ABNORMAL ML/MIN/{1.73_M2}
GLUCOSE BLD-MCNC: 113 MG/DL (ref 70–99)
HCT VFR BLD CALC: 24.8 % (ref 36–46)
HCT VFR BLD CALC: 27.3 % (ref 36–46)
HCT VFR BLD CALC: 29.3 % (ref 36–46)
HEMOGLOBIN: 8.1 G/DL (ref 12–16)
HEMOGLOBIN: 8.9 G/DL (ref 12–16)
HEMOGLOBIN: 9.3 G/DL (ref 12–16)
LV EF: 65 %
LVEF MODALITY: NORMAL
MAGNESIUM: 2.4 MG/DL (ref 1.6–2.6)
MCH RBC QN AUTO: 28.3 PG (ref 26–34)
MCHC RBC AUTO-ENTMCNC: 31.6 G/DL (ref 31–37)
MCV RBC AUTO: 89.5 FL (ref 80–100)
MDMA URINE: ABNORMAL
METHADONE SCREEN, URINE: NEGATIVE
METHAMPHETAMINE, URINE: ABNORMAL
MYOGLOBIN: 63 NG/ML (ref 25–58)
MYOGLOBIN: 69 NG/ML (ref 25–58)
OPIATES, URINE: POSITIVE
OXYCODONE SCREEN URINE: POSITIVE
PDW BLD-RTO: 18.6 % (ref 12.5–15.4)
PHENCYCLIDINE, URINE: NEGATIVE
PHOSPHORUS: 3.2 MG/DL (ref 2.6–4.5)
PLATELET # BLD: 415 K/UL (ref 140–450)
PMV BLD AUTO: 8.7 FL (ref 6–12)
POTASSIUM SERPL-SCNC: 5 MMOL/L (ref 3.7–5.3)
PROPOXYPHENE, URINE: ABNORMAL
RBC # BLD: 3.27 M/UL (ref 4–5.2)
SODIUM BLD-SCNC: 131 MMOL/L (ref 135–144)
TEST INFORMATION: ABNORMAL
TRICYCLIC ANTIDEPRESSANTS, UR: ABNORMAL
TROPONIN INTERP: ABNORMAL
TROPONIN INTERP: ABNORMAL
TROPONIN INTERP: NORMAL
TROPONIN INTERP: NORMAL
TROPONIN T: <0.03 NG/ML
WBC # BLD: 15.3 K/UL (ref 3.5–11)

## 2017-02-17 PROCEDURE — 36415 COLL VENOUS BLD VENIPUNCTURE: CPT

## 2017-02-17 PROCEDURE — 71010 XR CHEST PORTABLE: CPT | Performed by: RADIOLOGY

## 2017-02-17 PROCEDURE — 85027 COMPLETE CBC AUTOMATED: CPT

## 2017-02-17 PROCEDURE — 71275 CT ANGIOGRAPHY CHEST: CPT

## 2017-02-17 PROCEDURE — 85014 HEMATOCRIT: CPT

## 2017-02-17 PROCEDURE — 80048 BASIC METABOLIC PNL TOTAL CA: CPT

## 2017-02-17 PROCEDURE — 2060000000 HC ICU INTERMEDIATE R&B

## 2017-02-17 PROCEDURE — 2500000003 HC RX 250 WO HCPCS: Performed by: INTERNAL MEDICINE

## 2017-02-17 PROCEDURE — 94640 AIRWAY INHALATION TREATMENT: CPT

## 2017-02-17 PROCEDURE — 83735 ASSAY OF MAGNESIUM: CPT

## 2017-02-17 PROCEDURE — 6370000000 HC RX 637 (ALT 250 FOR IP): Performed by: FAMILY MEDICINE

## 2017-02-17 PROCEDURE — 6360000004 HC RX CONTRAST MEDICATION: Performed by: INTERNAL MEDICINE

## 2017-02-17 PROCEDURE — 78452 HT MUSCLE IMAGE SPECT MULT: CPT | Performed by: RADIOLOGY

## 2017-02-17 PROCEDURE — 84484 ASSAY OF TROPONIN QUANT: CPT

## 2017-02-17 PROCEDURE — 6360000002 HC RX W HCPCS

## 2017-02-17 PROCEDURE — 71010 XR CHEST PORTABLE: CPT

## 2017-02-17 PROCEDURE — 6360000002 HC RX W HCPCS: Performed by: INTERNAL MEDICINE

## 2017-02-17 PROCEDURE — 80307 DRUG TEST PRSMV CHEM ANLYZR: CPT

## 2017-02-17 PROCEDURE — 6360000002 HC RX W HCPCS: Performed by: FAMILY MEDICINE

## 2017-02-17 PROCEDURE — 83874 ASSAY OF MYOGLOBIN: CPT

## 2017-02-17 PROCEDURE — 2580000003 HC RX 258: Performed by: FAMILY MEDICINE

## 2017-02-17 PROCEDURE — 6370000000 HC RX 637 (ALT 250 FOR IP): Performed by: HOSPITALIST

## 2017-02-17 PROCEDURE — 99232 SBSQ HOSP IP/OBS MODERATE 35: CPT | Performed by: FAMILY MEDICINE

## 2017-02-17 PROCEDURE — 93306 TTE W/DOPPLER COMPLETE: CPT

## 2017-02-17 PROCEDURE — 93005 ELECTROCARDIOGRAM TRACING: CPT

## 2017-02-17 PROCEDURE — 85018 HEMOGLOBIN: CPT

## 2017-02-17 PROCEDURE — C9113 INJ PANTOPRAZOLE SODIUM, VIA: HCPCS | Performed by: FAMILY MEDICINE

## 2017-02-17 PROCEDURE — 6370000000 HC RX 637 (ALT 250 FOR IP): Performed by: INTERNAL MEDICINE

## 2017-02-17 PROCEDURE — 2580000003 HC RX 258: Performed by: INTERNAL MEDICINE

## 2017-02-17 PROCEDURE — 84100 ASSAY OF PHOSPHORUS: CPT

## 2017-02-17 PROCEDURE — 71275 CT ANGIOGRAPHY CHEST: CPT | Performed by: RADIOLOGY

## 2017-02-17 PROCEDURE — 78452 HT MUSCLE IMAGE SPECT MULT: CPT

## 2017-02-17 RX ORDER — LABETALOL HYDROCHLORIDE 5 MG/ML
10 INJECTION, SOLUTION INTRAVENOUS ONCE
Status: COMPLETED | OUTPATIENT
Start: 2017-02-17 | End: 2017-02-17

## 2017-02-17 RX ORDER — SODIUM CHLORIDE 0.9 % (FLUSH) 0.9 %
10 SYRINGE (ML) INJECTION PRN
Status: ACTIVE | OUTPATIENT
Start: 2017-02-17 | End: 2017-02-17

## 2017-02-17 RX ORDER — NITROGLYCERIN 0.4 MG/1
0.4 TABLET SUBLINGUAL EVERY 5 MIN PRN
Status: ACTIVE | OUTPATIENT
Start: 2017-02-17 | End: 2017-02-17

## 2017-02-17 RX ORDER — HYDRALAZINE HYDROCHLORIDE 20 MG/ML
INJECTION INTRAMUSCULAR; INTRAVENOUS
Status: COMPLETED
Start: 2017-02-17 | End: 2017-02-17

## 2017-02-17 RX ORDER — AMLODIPINE BESYLATE 10 MG/1
10 TABLET ORAL DAILY
Status: DISCONTINUED | OUTPATIENT
Start: 2017-02-17 | End: 2017-02-23 | Stop reason: HOSPADM

## 2017-02-17 RX ORDER — MORPHINE SULFATE 2 MG/ML
INJECTION, SOLUTION INTRAMUSCULAR; INTRAVENOUS
Status: DISPENSED
Start: 2017-02-17 | End: 2017-02-17

## 2017-02-17 RX ORDER — MORPHINE SULFATE 2 MG/ML
2 INJECTION, SOLUTION INTRAMUSCULAR; INTRAVENOUS ONCE
Status: COMPLETED | OUTPATIENT
Start: 2017-02-17 | End: 2017-02-17

## 2017-02-17 RX ORDER — ISOSORBIDE MONONITRATE 30 MG/1
30 TABLET, EXTENDED RELEASE ORAL DAILY
Status: DISCONTINUED | OUTPATIENT
Start: 2017-02-17 | End: 2017-02-23 | Stop reason: HOSPADM

## 2017-02-17 RX ORDER — SODIUM CHLORIDE 9 MG/ML
INJECTION, SOLUTION INTRAVENOUS ONCE
Status: DISCONTINUED | OUTPATIENT
Start: 2017-02-17 | End: 2017-02-18 | Stop reason: SDUPTHER

## 2017-02-17 RX ORDER — OXYCODONE HYDROCHLORIDE AND ACETAMINOPHEN 5; 325 MG/1; MG/1
1 TABLET ORAL EVERY 8 HOURS PRN
Status: DISCONTINUED | OUTPATIENT
Start: 2017-02-17 | End: 2017-02-23 | Stop reason: HOSPADM

## 2017-02-17 RX ORDER — AMINOPHYLLINE DIHYDRATE 25 MG/ML
100 INJECTION, SOLUTION INTRAVENOUS
Status: DISPENSED | OUTPATIENT
Start: 2017-02-17 | End: 2017-02-17

## 2017-02-17 RX ORDER — ASPIRIN 81 MG/1
81 TABLET, CHEWABLE ORAL DAILY
Status: DISCONTINUED | OUTPATIENT
Start: 2017-02-17 | End: 2017-02-23 | Stop reason: HOSPADM

## 2017-02-17 RX ORDER — HYDRALAZINE HYDROCHLORIDE 20 MG/ML
5 INJECTION INTRAMUSCULAR; INTRAVENOUS ONCE
Status: COMPLETED | OUTPATIENT
Start: 2017-02-17 | End: 2017-02-17

## 2017-02-17 RX ORDER — ALBUTEROL SULFATE 2.5 MG/3ML
2.5 SOLUTION RESPIRATORY (INHALATION) EVERY 6 HOURS PRN
Status: DISCONTINUED | OUTPATIENT
Start: 2017-02-17 | End: 2017-02-23 | Stop reason: HOSPADM

## 2017-02-17 RX ADMIN — LACTULOSE 20 G: 10 SOLUTION ORAL at 15:13

## 2017-02-17 RX ADMIN — NICARDIPINE HYDROCHLORIDE 12.5 MG/HR: 0.1 INJECTION, SOLUTION INTRAVENOUS at 20:07

## 2017-02-17 RX ADMIN — LACTULOSE 20 G: 10 SOLUTION ORAL at 20:08

## 2017-02-17 RX ADMIN — LABETALOL HYDROCHLORIDE 10 MG: 5 INJECTION, SOLUTION INTRAVENOUS at 06:25

## 2017-02-17 RX ADMIN — OXYCODONE HYDROCHLORIDE AND ACETAMINOPHEN 1 TABLET: 5; 325 TABLET ORAL at 00:41

## 2017-02-17 RX ADMIN — NICARDIPINE HYDROCHLORIDE 12.5 MG/HR: 0.1 INJECTION, SOLUTION INTRAVENOUS at 21:53

## 2017-02-17 RX ADMIN — MORPHINE SULFATE 2 MG: 2 INJECTION, SOLUTION INTRAMUSCULAR; INTRAVENOUS at 22:40

## 2017-02-17 RX ADMIN — CLONIDINE HYDROCHLORIDE 0.2 MG: 0.2 TABLET ORAL at 08:47

## 2017-02-17 RX ADMIN — NICARDIPINE HYDROCHLORIDE 7.5 MG/HR: 0.1 INJECTION, SOLUTION INTRAVENOUS at 10:20

## 2017-02-17 RX ADMIN — HYDRALAZINE HYDROCHLORIDE 5 MG: 20 INJECTION INTRAMUSCULAR; INTRAVENOUS at 05:55

## 2017-02-17 RX ADMIN — IOHEXOL 100 ML: 350 INJECTION, SOLUTION INTRAVENOUS at 15:51

## 2017-02-17 RX ADMIN — ASPIRIN 81 MG: 81 TABLET, CHEWABLE ORAL at 08:54

## 2017-02-17 RX ADMIN — PANTOPRAZOLE SODIUM 40 MG: 40 INJECTION, POWDER, FOR SOLUTION INTRAVENOUS at 21:24

## 2017-02-17 RX ADMIN — FERROUS SULFATE TAB EC 325 MG (65 MG FE EQUIVALENT) 325 MG: 325 (65 FE) TABLET DELAYED RESPONSE at 08:49

## 2017-02-17 RX ADMIN — FERROUS SULFATE TAB EC 325 MG (65 MG FE EQUIVALENT) 325 MG: 325 (65 FE) TABLET DELAYED RESPONSE at 15:13

## 2017-02-17 RX ADMIN — ONDANSETRON 4 MG: 2 INJECTION, SOLUTION INTRAMUSCULAR; INTRAVENOUS at 21:57

## 2017-02-17 RX ADMIN — AMLODIPINE BESYLATE 10 MG: 10 TABLET ORAL at 15:13

## 2017-02-17 RX ADMIN — LOSARTAN POTASSIUM 50 MG: 50 TABLET, FILM COATED ORAL at 08:48

## 2017-02-17 RX ADMIN — Medication 10 ML: at 08:57

## 2017-02-17 RX ADMIN — ATORVASTATIN CALCIUM 20 MG: 20 TABLET, FILM COATED ORAL at 20:07

## 2017-02-17 RX ADMIN — LACTULOSE 20 G: 10 SOLUTION ORAL at 08:50

## 2017-02-17 RX ADMIN — Medication 2 MG: at 06:30

## 2017-02-17 RX ADMIN — MOMETASONE FUROATE AND FORMOTEROL FUMARATE DIHYDRATE 2 PUFF: 100; 5 AEROSOL RESPIRATORY (INHALATION) at 08:22

## 2017-02-17 RX ADMIN — NICARDIPINE HYDROCHLORIDE 5 MG/HR: 0.1 INJECTION, SOLUTION INTRAVENOUS at 06:51

## 2017-02-17 RX ADMIN — CLONIDINE HYDROCHLORIDE 0.2 MG: 0.2 TABLET ORAL at 20:07

## 2017-02-17 RX ADMIN — Medication 10 ML: at 20:08

## 2017-02-17 RX ADMIN — PANTOPRAZOLE SODIUM 40 MG: 40 INJECTION, POWDER, FOR SOLUTION INTRAVENOUS at 08:50

## 2017-02-17 RX ADMIN — MOMETASONE FUROATE AND FORMOTEROL FUMARATE DIHYDRATE 2 PUFF: 100; 5 AEROSOL RESPIRATORY (INHALATION) at 20:33

## 2017-02-17 RX ADMIN — Medication 10 ML: at 08:54

## 2017-02-17 RX ADMIN — DILTIAZEM HYDROCHLORIDE 5 MG/HR: 5 INJECTION INTRAVENOUS at 23:20

## 2017-02-17 RX ADMIN — NICARDIPINE HYDROCHLORIDE 15 MG/HR: 0.1 INJECTION, SOLUTION INTRAVENOUS at 22:17

## 2017-02-17 RX ADMIN — ISOSORBIDE MONONITRATE 30 MG: 30 TABLET ORAL at 12:38

## 2017-02-17 RX ADMIN — LOSARTAN POTASSIUM 50 MG: 50 TABLET, FILM COATED ORAL at 20:07

## 2017-02-17 ASSESSMENT — PAIN DESCRIPTION - LOCATION: LOCATION: CHEST

## 2017-02-17 ASSESSMENT — PAIN SCALES - GENERAL
PAINLEVEL_OUTOF10: 7
PAINLEVEL_OUTOF10: 6
PAINLEVEL_OUTOF10: 5

## 2017-02-18 ENCOUNTER — APPOINTMENT (OUTPATIENT)
Dept: GENERAL RADIOLOGY | Age: 72
DRG: 811 | End: 2017-02-18
Payer: COMMERCIAL

## 2017-02-18 ENCOUNTER — HOSPITAL ENCOUNTER (INPATIENT)
Dept: DIALYSIS | Age: 72
Discharge: HOME | DRG: 811 | End: 2017-02-18
Payer: COMMERCIAL

## 2017-02-18 VITALS
TEMPERATURE: 97.3 F | SYSTOLIC BLOOD PRESSURE: 131 MMHG | WEIGHT: 150.13 LBS | BODY MASS INDEX: 24.23 KG/M2 | DIASTOLIC BLOOD PRESSURE: 83 MMHG | OXYGEN SATURATION: 99 % | HEART RATE: 113 BPM

## 2017-02-18 PROBLEM — I48.91 ATRIAL FIBRILLATION (HCC): Status: ACTIVE | Noted: 2017-02-18

## 2017-02-18 LAB
ANION GAP SERPL CALCULATED.3IONS-SCNC: 16 MMOL/L (ref 9–17)
BUN BLDV-MCNC: 45 MG/DL (ref 8–23)
BUN/CREAT BLD: ABNORMAL (ref 9–20)
CALCIUM SERPL-MCNC: 10.4 MG/DL (ref 8.6–10.4)
CHLORIDE BLD-SCNC: 104 MMOL/L (ref 98–107)
CO2: 18 MMOL/L (ref 20–31)
CREAT SERPL-MCNC: 4.99 MG/DL (ref 0.5–0.9)
EKG ATRIAL RATE: 115 BPM
EKG ATRIAL RATE: 300 BPM
EKG Q-T INTERVAL: 316 MS
EKG Q-T INTERVAL: 368 MS
EKG QRS DURATION: 84 MS
EKG QRS DURATION: 94 MS
EKG QTC CALCULATION (BAZETT): 467 MS
EKG QTC CALCULATION (BAZETT): 470 MS
EKG R AXIS: 48 DEGREES
EKG R AXIS: 57 DEGREES
EKG T AXIS: -166 DEGREES
EKG T AXIS: 145 DEGREES
EKG VENTRICULAR RATE: 133 BPM
EKG VENTRICULAR RATE: 97 BPM
GFR AFRICAN AMERICAN: 10 ML/MIN
GFR NON-AFRICAN AMERICAN: 9 ML/MIN
GFR SERPL CREATININE-BSD FRML MDRD: ABNORMAL ML/MIN/{1.73_M2}
GFR SERPL CREATININE-BSD FRML MDRD: ABNORMAL ML/MIN/{1.73_M2}
GLUCOSE BLD-MCNC: 114 MG/DL (ref 70–99)
HCT VFR BLD CALC: 25.4 % (ref 36–46)
HCT VFR BLD CALC: 25.6 % (ref 36–46)
HCT VFR BLD CALC: 26.6 % (ref 36–46)
HEMOGLOBIN: 8 G/DL (ref 12–16)
HEMOGLOBIN: 8.3 G/DL (ref 12–16)
HEMOGLOBIN: 8.4 G/DL (ref 12–16)
MYOGLOBIN: 75 NG/ML (ref 25–58)
POTASSIUM SERPL-SCNC: 5 MMOL/L (ref 3.7–5.3)
SODIUM BLD-SCNC: 138 MMOL/L (ref 135–144)
TROPONIN INTERP: ABNORMAL
TROPONIN T: <0.03 NG/ML
TSH SERPL DL<=0.05 MIU/L-ACNC: 3.07 MIU/L (ref 0.3–5)

## 2017-02-18 PROCEDURE — 2580000003 HC RX 258: Performed by: FAMILY MEDICINE

## 2017-02-18 PROCEDURE — 71010 XR CHEST PORTABLE: CPT | Performed by: RADIOLOGY

## 2017-02-18 PROCEDURE — 2500000003 HC RX 250 WO HCPCS: Performed by: INTERNAL MEDICINE

## 2017-02-18 PROCEDURE — 6360000002 HC RX W HCPCS: Performed by: FAMILY MEDICINE

## 2017-02-18 PROCEDURE — 99232 SBSQ HOSP IP/OBS MODERATE 35: CPT | Performed by: FAMILY MEDICINE

## 2017-02-18 PROCEDURE — 3430000000 HC RX DIAGNOSTIC RADIOPHARMACEUTICAL: Performed by: INTERNAL MEDICINE

## 2017-02-18 PROCEDURE — 90937 HEMODIALYSIS REPEATED EVAL: CPT

## 2017-02-18 PROCEDURE — 36415 COLL VENOUS BLD VENIPUNCTURE: CPT

## 2017-02-18 PROCEDURE — 71010 XR CHEST PORTABLE: CPT

## 2017-02-18 PROCEDURE — 5A1D60Z PERFORMANCE OF URINARY FILTRATION, MULTIPLE: ICD-10-PCS | Performed by: INTERNAL MEDICINE

## 2017-02-18 PROCEDURE — 2580000003 HC RX 258: Performed by: INTERNAL MEDICINE

## 2017-02-18 PROCEDURE — A9500 TC99M SESTAMIBI: HCPCS | Performed by: INTERNAL MEDICINE

## 2017-02-18 PROCEDURE — 6370000000 HC RX 637 (ALT 250 FOR IP): Performed by: FAMILY MEDICINE

## 2017-02-18 PROCEDURE — C9113 INJ PANTOPRAZOLE SODIUM, VIA: HCPCS | Performed by: FAMILY MEDICINE

## 2017-02-18 PROCEDURE — 84443 ASSAY THYROID STIM HORMONE: CPT

## 2017-02-18 PROCEDURE — 6370000000 HC RX 637 (ALT 250 FOR IP): Performed by: HOSPITALIST

## 2017-02-18 PROCEDURE — 6370000000 HC RX 637 (ALT 250 FOR IP): Performed by: INTERNAL MEDICINE

## 2017-02-18 PROCEDURE — 85014 HEMATOCRIT: CPT

## 2017-02-18 PROCEDURE — 85018 HEMOGLOBIN: CPT

## 2017-02-18 PROCEDURE — 93005 ELECTROCARDIOGRAM TRACING: CPT

## 2017-02-18 PROCEDURE — 80048 BASIC METABOLIC PNL TOTAL CA: CPT

## 2017-02-18 PROCEDURE — 94640 AIRWAY INHALATION TREATMENT: CPT

## 2017-02-18 PROCEDURE — 2060000000 HC ICU INTERMEDIATE R&B

## 2017-02-18 RX ORDER — 0.9 % SODIUM CHLORIDE 0.9 %
250 INTRAVENOUS SOLUTION INTRAVENOUS PRN
Status: DISCONTINUED | OUTPATIENT
Start: 2017-02-18 | End: 2017-02-23 | Stop reason: HOSPADM

## 2017-02-18 RX ORDER — 0.9 % SODIUM CHLORIDE 0.9 %
150 INTRAVENOUS SOLUTION INTRAVENOUS PRN
Status: DISCONTINUED | OUTPATIENT
Start: 2017-02-18 | End: 2017-02-23 | Stop reason: HOSPADM

## 2017-02-18 RX ADMIN — LOSARTAN POTASSIUM 50 MG: 50 TABLET, FILM COATED ORAL at 20:44

## 2017-02-18 RX ADMIN — METOPROLOL TARTRATE 25 MG: 25 TABLET ORAL at 20:44

## 2017-02-18 RX ADMIN — DILTIAZEM HYDROCHLORIDE 15 MG/HR: 5 INJECTION INTRAVENOUS at 14:59

## 2017-02-18 RX ADMIN — METOPROLOL TARTRATE 25 MG: 25 TABLET ORAL at 13:23

## 2017-02-18 RX ADMIN — Medication 10 ML: at 07:57

## 2017-02-18 RX ADMIN — Medication 10 ML: at 20:45

## 2017-02-18 RX ADMIN — OXYCODONE HYDROCHLORIDE AND ACETAMINOPHEN 1 TABLET: 5; 325 TABLET ORAL at 21:02

## 2017-02-18 RX ADMIN — PANTOPRAZOLE SODIUM 40 MG: 40 INJECTION, POWDER, FOR SOLUTION INTRAVENOUS at 20:44

## 2017-02-18 RX ADMIN — FERROUS SULFATE TAB EC 325 MG (65 MG FE EQUIVALENT) 325 MG: 325 (65 FE) TABLET DELAYED RESPONSE at 15:25

## 2017-02-18 RX ADMIN — OXYCODONE HYDROCHLORIDE AND ACETAMINOPHEN 1 TABLET: 5; 325 TABLET ORAL at 14:07

## 2017-02-18 RX ADMIN — LACTULOSE 20 G: 10 SOLUTION ORAL at 14:10

## 2017-02-18 RX ADMIN — OXYCODONE HYDROCHLORIDE AND ACETAMINOPHEN 1 TABLET: 5; 325 TABLET ORAL at 00:33

## 2017-02-18 RX ADMIN — Medication 10 ML: at 20:44

## 2017-02-18 RX ADMIN — TIOTROPIUM BROMIDE 18 MCG: 18 CAPSULE ORAL; RESPIRATORY (INHALATION) at 12:01

## 2017-02-18 RX ADMIN — MOMETASONE FUROATE AND FORMOTEROL FUMARATE DIHYDRATE 2 PUFF: 100; 5 AEROSOL RESPIRATORY (INHALATION) at 12:01

## 2017-02-18 RX ADMIN — TETRAKIS(2-METHOXYISOBUTYLISOCYANIDE)COPPER(I) TETRAFLUOROBORATE 30.9 MILLICURIE: 1 INJECTION, POWDER, LYOPHILIZED, FOR SOLUTION INTRAVENOUS at 12:15

## 2017-02-18 RX ADMIN — ATORVASTATIN CALCIUM 20 MG: 20 TABLET, FILM COATED ORAL at 20:44

## 2017-02-18 RX ADMIN — CLONIDINE HYDROCHLORIDE 0.2 MG: 0.2 TABLET ORAL at 20:44

## 2017-02-18 RX ADMIN — DILTIAZEM HYDROCHLORIDE 15 MG/HR: 5 INJECTION INTRAVENOUS at 06:56

## 2017-02-18 RX ADMIN — ASPIRIN 81 MG: 81 TABLET, CHEWABLE ORAL at 07:36

## 2017-02-18 RX ADMIN — LACTULOSE 20 G: 10 SOLUTION ORAL at 20:44

## 2017-02-18 ASSESSMENT — PAIN SCALES - GENERAL
PAINLEVEL_OUTOF10: 2
PAINLEVEL_OUTOF10: 6
PAINLEVEL_OUTOF10: 6
PAINLEVEL_OUTOF10: 8
PAINLEVEL_OUTOF10: 0
PAINLEVEL_OUTOF10: 6
PAINLEVEL_OUTOF10: 3

## 2017-02-18 ASSESSMENT — PAIN SCALES - WONG BAKER: WONGBAKER_NUMERICALRESPONSE: 2

## 2017-02-19 LAB
ANION GAP SERPL CALCULATED.3IONS-SCNC: 12 MMOL/L (ref 9–17)
BUN BLDV-MCNC: 30 MG/DL (ref 8–23)
BUN/CREAT BLD: ABNORMAL (ref 9–20)
CALCIUM SERPL-MCNC: 10.2 MG/DL (ref 8.6–10.4)
CHLORIDE BLD-SCNC: 96 MMOL/L (ref 98–107)
CO2: 27 MMOL/L (ref 20–31)
CREAT SERPL-MCNC: 3.39 MG/DL (ref 0.5–0.9)
GFR AFRICAN AMERICAN: 16 ML/MIN
GFR NON-AFRICAN AMERICAN: 13 ML/MIN
GFR SERPL CREATININE-BSD FRML MDRD: ABNORMAL ML/MIN/{1.73_M2}
GFR SERPL CREATININE-BSD FRML MDRD: ABNORMAL ML/MIN/{1.73_M2}
GLUCOSE BLD-MCNC: 96 MG/DL (ref 70–99)
HCT VFR BLD CALC: 23.9 % (ref 36–46)
HCT VFR BLD CALC: 25.4 % (ref 36–46)
HEMOGLOBIN: 7.8 G/DL (ref 12–16)
HEMOGLOBIN: 8.5 G/DL (ref 12–16)
POTASSIUM SERPL-SCNC: 4.7 MMOL/L (ref 3.7–5.3)
SODIUM BLD-SCNC: 135 MMOL/L (ref 135–144)

## 2017-02-19 PROCEDURE — C9113 INJ PANTOPRAZOLE SODIUM, VIA: HCPCS | Performed by: FAMILY MEDICINE

## 2017-02-19 PROCEDURE — 6360000002 HC RX W HCPCS: Performed by: FAMILY MEDICINE

## 2017-02-19 PROCEDURE — 6370000000 HC RX 637 (ALT 250 FOR IP): Performed by: FAMILY MEDICINE

## 2017-02-19 PROCEDURE — 2060000000 HC ICU INTERMEDIATE R&B

## 2017-02-19 PROCEDURE — 99232 SBSQ HOSP IP/OBS MODERATE 35: CPT | Performed by: FAMILY MEDICINE

## 2017-02-19 PROCEDURE — 6370000000 HC RX 637 (ALT 250 FOR IP): Performed by: INTERNAL MEDICINE

## 2017-02-19 PROCEDURE — 94640 AIRWAY INHALATION TREATMENT: CPT

## 2017-02-19 PROCEDURE — 85018 HEMOGLOBIN: CPT

## 2017-02-19 PROCEDURE — 85014 HEMATOCRIT: CPT

## 2017-02-19 PROCEDURE — 80048 BASIC METABOLIC PNL TOTAL CA: CPT

## 2017-02-19 PROCEDURE — 2580000003 HC RX 258: Performed by: FAMILY MEDICINE

## 2017-02-19 PROCEDURE — 6370000000 HC RX 637 (ALT 250 FOR IP): Performed by: HOSPITALIST

## 2017-02-19 PROCEDURE — 36415 COLL VENOUS BLD VENIPUNCTURE: CPT

## 2017-02-19 RX ADMIN — LACTULOSE 20 G: 10 SOLUTION ORAL at 20:32

## 2017-02-19 RX ADMIN — LACTULOSE 20 G: 10 SOLUTION ORAL at 14:09

## 2017-02-19 RX ADMIN — CLONIDINE HYDROCHLORIDE 0.2 MG: 0.2 TABLET ORAL at 20:26

## 2017-02-19 RX ADMIN — METOPROLOL TARTRATE 25 MG: 25 TABLET ORAL at 08:25

## 2017-02-19 RX ADMIN — ISOSORBIDE MONONITRATE 30 MG: 30 TABLET ORAL at 08:25

## 2017-02-19 RX ADMIN — ASPIRIN 81 MG: 81 TABLET, CHEWABLE ORAL at 08:26

## 2017-02-19 RX ADMIN — Medication 10 ML: at 08:26

## 2017-02-19 RX ADMIN — FERROUS SULFATE TAB EC 325 MG (65 MG FE EQUIVALENT) 325 MG: 325 (65 FE) TABLET DELAYED RESPONSE at 16:16

## 2017-02-19 RX ADMIN — ATORVASTATIN CALCIUM 20 MG: 20 TABLET, FILM COATED ORAL at 20:26

## 2017-02-19 RX ADMIN — MOMETASONE FUROATE AND FORMOTEROL FUMARATE DIHYDRATE 2 PUFF: 100; 5 AEROSOL RESPIRATORY (INHALATION) at 08:20

## 2017-02-19 RX ADMIN — PANTOPRAZOLE SODIUM 40 MG: 40 INJECTION, POWDER, FOR SOLUTION INTRAVENOUS at 08:26

## 2017-02-19 RX ADMIN — LOSARTAN POTASSIUM 50 MG: 50 TABLET, FILM COATED ORAL at 08:26

## 2017-02-19 RX ADMIN — LACTULOSE 20 G: 10 SOLUTION ORAL at 08:25

## 2017-02-19 RX ADMIN — LOSARTAN POTASSIUM 50 MG: 50 TABLET, FILM COATED ORAL at 20:26

## 2017-02-19 RX ADMIN — PANTOPRAZOLE SODIUM 40 MG: 40 INJECTION, POWDER, FOR SOLUTION INTRAVENOUS at 20:27

## 2017-02-19 RX ADMIN — OXYCODONE HYDROCHLORIDE AND ACETAMINOPHEN 1 TABLET: 5; 325 TABLET ORAL at 20:39

## 2017-02-19 RX ADMIN — AMLODIPINE BESYLATE 10 MG: 10 TABLET ORAL at 08:25

## 2017-02-19 RX ADMIN — TIOTROPIUM BROMIDE 18 MCG: 18 CAPSULE ORAL; RESPIRATORY (INHALATION) at 08:20

## 2017-02-19 RX ADMIN — CLONIDINE HYDROCHLORIDE 0.2 MG: 0.2 TABLET ORAL at 08:26

## 2017-02-19 RX ADMIN — Medication 10 ML: at 21:00

## 2017-02-19 RX ADMIN — METOPROLOL TARTRATE 25 MG: 25 TABLET ORAL at 20:26

## 2017-02-19 RX ADMIN — Medication 10 ML: at 20:32

## 2017-02-19 RX ADMIN — MOMETASONE FUROATE AND FORMOTEROL FUMARATE DIHYDRATE 2 PUFF: 100; 5 AEROSOL RESPIRATORY (INHALATION) at 18:07

## 2017-02-19 RX ADMIN — FERROUS SULFATE TAB EC 325 MG (65 MG FE EQUIVALENT) 325 MG: 325 (65 FE) TABLET DELAYED RESPONSE at 08:26

## 2017-02-19 ASSESSMENT — PAIN DESCRIPTION - PAIN TYPE
TYPE: CHRONIC PAIN
TYPE: CHRONIC PAIN

## 2017-02-19 ASSESSMENT — PAIN SCALES - GENERAL
PAINLEVEL_OUTOF10: 2
PAINLEVEL_OUTOF10: 3
PAINLEVEL_OUTOF10: 5

## 2017-02-19 ASSESSMENT — PAIN DESCRIPTION - LOCATION
LOCATION: ARM
LOCATION: ARM

## 2017-02-20 LAB
ABSOLUTE EOS #: 0.1 K/UL (ref 0–0.4)
ABSOLUTE LYMPH #: 1.1 K/UL (ref 1–4.8)
ABSOLUTE MONO #: 0.6 K/UL (ref 0.1–1.2)
ANION GAP SERPL CALCULATED.3IONS-SCNC: 16 MMOL/L (ref 9–17)
BASOPHILS # BLD: 0 % (ref 0–2)
BASOPHILS ABSOLUTE: 0 K/UL (ref 0–0.2)
BUN BLDV-MCNC: 42 MG/DL (ref 8–23)
BUN/CREAT BLD: ABNORMAL (ref 9–20)
CALCIUM SERPL-MCNC: 10.3 MG/DL (ref 8.6–10.4)
CHLORIDE BLD-SCNC: 98 MMOL/L (ref 98–107)
CO2: 26 MMOL/L (ref 20–31)
CREAT SERPL-MCNC: 4.29 MG/DL (ref 0.5–0.9)
CULTURE: NORMAL
DIFFERENTIAL TYPE: ABNORMAL
EOSINOPHILS RELATIVE PERCENT: 1 % (ref 1–4)
GFR AFRICAN AMERICAN: 12 ML/MIN
GFR NON-AFRICAN AMERICAN: 10 ML/MIN
GFR SERPL CREATININE-BSD FRML MDRD: ABNORMAL ML/MIN/{1.73_M2}
GFR SERPL CREATININE-BSD FRML MDRD: ABNORMAL ML/MIN/{1.73_M2}
GLUCOSE BLD-MCNC: 94 MG/DL (ref 70–99)
HCT VFR BLD CALC: 25.6 % (ref 36–46)
HCT VFR BLD CALC: 25.9 % (ref 36–46)
HCT VFR BLD CALC: 26 % (ref 36–46)
HEMOGLOBIN: 8.4 G/DL (ref 12–16)
HEMOGLOBIN: 8.5 G/DL (ref 12–16)
HEMOGLOBIN: 8.5 G/DL (ref 12–16)
LV EF: 63 %
LVEF MODALITY: NORMAL
LYMPHOCYTES # BLD: 10 % (ref 24–44)
Lab: NORMAL
Lab: NORMAL
MCH RBC QN AUTO: 28.7 PG (ref 26–34)
MCHC RBC AUTO-ENTMCNC: 33.1 G/DL (ref 31–37)
MCV RBC AUTO: 86.9 FL (ref 80–100)
MONOCYTES # BLD: 6 % (ref 2–11)
PDW BLD-RTO: 18.4 % (ref 12.5–15.4)
PLATELET # BLD: 379 K/UL (ref 140–450)
PLATELET ESTIMATE: ABNORMAL
PMV BLD AUTO: 9 FL (ref 6–12)
POTASSIUM SERPL-SCNC: 4.5 MMOL/L (ref 3.7–5.3)
RBC # BLD: 2.95 M/UL (ref 4–5.2)
RBC # BLD: ABNORMAL 10*6/UL
SEG NEUTROPHILS: 83 % (ref 36–66)
SEGMENTED NEUTROPHILS ABSOLUTE COUNT: 8.8 K/UL (ref 1.8–7.7)
SODIUM BLD-SCNC: 140 MMOL/L (ref 135–144)
SPECIMEN DESCRIPTION: NORMAL
SPECIMEN DESCRIPTION: NORMAL
STATUS: NORMAL
STATUS: NORMAL
WBC # BLD: 10.6 K/UL (ref 3.5–11)
WBC # BLD: ABNORMAL 10*3/UL

## 2017-02-20 PROCEDURE — 97116 GAIT TRAINING THERAPY: CPT

## 2017-02-20 PROCEDURE — 80048 BASIC METABOLIC PNL TOTAL CA: CPT

## 2017-02-20 PROCEDURE — 36415 COLL VENOUS BLD VENIPUNCTURE: CPT

## 2017-02-20 PROCEDURE — 99232 SBSQ HOSP IP/OBS MODERATE 35: CPT | Performed by: FAMILY MEDICINE

## 2017-02-20 PROCEDURE — 6370000000 HC RX 637 (ALT 250 FOR IP): Performed by: HOSPITALIST

## 2017-02-20 PROCEDURE — 93017 CV STRESS TEST TRACING ONLY: CPT

## 2017-02-20 PROCEDURE — 97110 THERAPEUTIC EXERCISES: CPT

## 2017-02-20 PROCEDURE — 85018 HEMOGLOBIN: CPT

## 2017-02-20 PROCEDURE — 2580000003 HC RX 258: Performed by: FAMILY MEDICINE

## 2017-02-20 PROCEDURE — 6370000000 HC RX 637 (ALT 250 FOR IP): Performed by: FAMILY MEDICINE

## 2017-02-20 PROCEDURE — 85025 COMPLETE CBC W/AUTO DIFF WBC: CPT

## 2017-02-20 PROCEDURE — 3430000000 HC RX DIAGNOSTIC RADIOPHARMACEUTICAL: Performed by: INTERNAL MEDICINE

## 2017-02-20 PROCEDURE — 85014 HEMATOCRIT: CPT

## 2017-02-20 PROCEDURE — 6370000000 HC RX 637 (ALT 250 FOR IP): Performed by: INTERNAL MEDICINE

## 2017-02-20 PROCEDURE — 6360000002 HC RX W HCPCS: Performed by: INTERNAL MEDICINE

## 2017-02-20 PROCEDURE — C9113 INJ PANTOPRAZOLE SODIUM, VIA: HCPCS | Performed by: FAMILY MEDICINE

## 2017-02-20 PROCEDURE — A9500 TC99M SESTAMIBI: HCPCS | Performed by: INTERNAL MEDICINE

## 2017-02-20 PROCEDURE — 97530 THERAPEUTIC ACTIVITIES: CPT

## 2017-02-20 PROCEDURE — 94640 AIRWAY INHALATION TREATMENT: CPT

## 2017-02-20 PROCEDURE — 2060000000 HC ICU INTERMEDIATE R&B

## 2017-02-20 PROCEDURE — 6360000002 HC RX W HCPCS: Performed by: FAMILY MEDICINE

## 2017-02-20 RX ORDER — SODIUM CHLORIDE 9 MG/ML
INJECTION, SOLUTION INTRAVENOUS ONCE
Status: COMPLETED | OUTPATIENT
Start: 2017-02-20 | End: 2017-02-20

## 2017-02-20 RX ORDER — NITROGLYCERIN 0.4 MG/1
0.4 TABLET SUBLINGUAL EVERY 5 MIN PRN
Status: DISCONTINUED | OUTPATIENT
Start: 2017-02-20 | End: 2017-02-20 | Stop reason: ALTCHOICE

## 2017-02-20 RX ORDER — SODIUM CHLORIDE 0.9 % (FLUSH) 0.9 %
10 SYRINGE (ML) INJECTION PRN
Status: DISCONTINUED | OUTPATIENT
Start: 2017-02-20 | End: 2017-02-20 | Stop reason: ALTCHOICE

## 2017-02-20 RX ORDER — AMINOPHYLLINE DIHYDRATE 25 MG/ML
100 INJECTION, SOLUTION INTRAVENOUS
Status: COMPLETED | OUTPATIENT
Start: 2017-02-20 | End: 2017-02-20

## 2017-02-20 RX ORDER — HYDRALAZINE HYDROCHLORIDE 20 MG/ML
5 INJECTION INTRAMUSCULAR; INTRAVENOUS ONCE
Status: DISCONTINUED | OUTPATIENT
Start: 2017-02-20 | End: 2017-02-20

## 2017-02-20 RX ADMIN — Medication 10 ML: at 11:53

## 2017-02-20 RX ADMIN — SODIUM CHLORIDE: 9 INJECTION, SOLUTION INTRAVENOUS at 11:53

## 2017-02-20 RX ADMIN — TETRAKIS(2-METHOXYISOBUTYLISOCYANIDE)COPPER(I) TETRAFLUOROBORATE 20.8 MILLICURIE: 1 INJECTION, POWDER, LYOPHILIZED, FOR SOLUTION INTRAVENOUS at 11:58

## 2017-02-20 RX ADMIN — REGADENOSON 0.4 MG: 0.08 INJECTION, SOLUTION INTRAVENOUS at 11:57

## 2017-02-20 RX ADMIN — LACTULOSE 20 G: 10 SOLUTION ORAL at 14:17

## 2017-02-20 RX ADMIN — Medication 10 ML: at 08:49

## 2017-02-20 RX ADMIN — ATORVASTATIN CALCIUM 20 MG: 20 TABLET, FILM COATED ORAL at 20:11

## 2017-02-20 RX ADMIN — LOSARTAN POTASSIUM 50 MG: 50 TABLET, FILM COATED ORAL at 20:11

## 2017-02-20 RX ADMIN — FERROUS SULFATE TAB EC 325 MG (65 MG FE EQUIVALENT) 325 MG: 325 (65 FE) TABLET DELAYED RESPONSE at 16:03

## 2017-02-20 RX ADMIN — MOMETASONE FUROATE AND FORMOTEROL FUMARATE DIHYDRATE 2 PUFF: 100; 5 AEROSOL RESPIRATORY (INHALATION) at 08:11

## 2017-02-20 RX ADMIN — CLONIDINE HYDROCHLORIDE 0.2 MG: 0.2 TABLET ORAL at 20:11

## 2017-02-20 RX ADMIN — METOPROLOL TARTRATE 25 MG: 25 TABLET ORAL at 20:11

## 2017-02-20 RX ADMIN — Medication 10 ML: at 20:13

## 2017-02-20 RX ADMIN — Medication 10 ML: at 20:11

## 2017-02-20 RX ADMIN — TIOTROPIUM BROMIDE 18 MCG: 18 CAPSULE ORAL; RESPIRATORY (INHALATION) at 08:11

## 2017-02-20 RX ADMIN — ACETAMINOPHEN 650 MG: 325 TABLET ORAL at 19:56

## 2017-02-20 RX ADMIN — PANTOPRAZOLE SODIUM 40 MG: 40 INJECTION, POWDER, FOR SOLUTION INTRAVENOUS at 08:49

## 2017-02-20 RX ADMIN — PANTOPRAZOLE SODIUM 40 MG: 40 INJECTION, POWDER, FOR SOLUTION INTRAVENOUS at 20:11

## 2017-02-20 RX ADMIN — AMINOPHYLLINE 100 MG: 25 INJECTION, SOLUTION INTRAVENOUS at 11:59

## 2017-02-20 RX ADMIN — MOMETASONE FUROATE AND FORMOTEROL FUMARATE DIHYDRATE 2 PUFF: 100; 5 AEROSOL RESPIRATORY (INHALATION) at 16:04

## 2017-02-20 ASSESSMENT — PAIN SCALES - GENERAL: PAINLEVEL_OUTOF10: 3

## 2017-02-21 ENCOUNTER — HOSPITAL ENCOUNTER (INPATIENT)
Dept: DIALYSIS | Age: 72
Discharge: HOME | DRG: 811 | End: 2017-02-21
Payer: COMMERCIAL

## 2017-02-21 LAB
ABSOLUTE EOS #: 0 K/UL (ref 0–0.4)
ABSOLUTE LYMPH #: 0.7 K/UL (ref 1–4.8)
ABSOLUTE MONO #: 0.6 K/UL (ref 0.1–1.2)
ANION GAP SERPL CALCULATED.3IONS-SCNC: 16 MMOL/L (ref 9–17)
BASOPHILS # BLD: 0 % (ref 0–2)
BASOPHILS ABSOLUTE: 0 K/UL (ref 0–0.2)
BUN BLDV-MCNC: 48 MG/DL (ref 8–23)
BUN/CREAT BLD: ABNORMAL (ref 9–20)
CALCIUM SERPL-MCNC: 10.2 MG/DL (ref 8.6–10.4)
CHLORIDE BLD-SCNC: 100 MMOL/L (ref 98–107)
CO2: 20 MMOL/L (ref 20–31)
CREAT SERPL-MCNC: 4.45 MG/DL (ref 0.5–0.9)
DIFFERENTIAL TYPE: ABNORMAL
EOSINOPHILS RELATIVE PERCENT: 0 % (ref 1–4)
GFR AFRICAN AMERICAN: 12 ML/MIN
GFR NON-AFRICAN AMERICAN: 10 ML/MIN
GFR SERPL CREATININE-BSD FRML MDRD: ABNORMAL ML/MIN/{1.73_M2}
GFR SERPL CREATININE-BSD FRML MDRD: ABNORMAL ML/MIN/{1.73_M2}
GLUCOSE BLD-MCNC: 97 MG/DL (ref 70–99)
HCT VFR BLD CALC: 25.8 % (ref 36–46)
HCT VFR BLD CALC: 25.9 % (ref 36–46)
HEMOGLOBIN: 8.2 G/DL (ref 12–16)
HEMOGLOBIN: 8.3 G/DL (ref 12–16)
LYMPHOCYTES # BLD: 6 % (ref 24–44)
MCH RBC QN AUTO: 28.8 PG (ref 26–34)
MCHC RBC AUTO-ENTMCNC: 32.2 G/DL (ref 31–37)
MCV RBC AUTO: 89.2 FL (ref 80–100)
MONOCYTES # BLD: 5 % (ref 2–11)
PDW BLD-RTO: 18.9 % (ref 12.5–15.4)
PLATELET # BLD: 397 K/UL (ref 140–450)
PLATELET ESTIMATE: ABNORMAL
PMV BLD AUTO: 8.6 FL (ref 6–12)
POTASSIUM SERPL-SCNC: 4.2 MMOL/L (ref 3.7–5.3)
RBC # BLD: 2.9 M/UL (ref 4–5.2)
RBC # BLD: ABNORMAL 10*6/UL
SEG NEUTROPHILS: 89 % (ref 36–66)
SEGMENTED NEUTROPHILS ABSOLUTE COUNT: 10.2 K/UL (ref 1.8–7.7)
SODIUM BLD-SCNC: 136 MMOL/L (ref 135–144)
WBC # BLD: 11.6 K/UL (ref 3.5–11)
WBC # BLD: ABNORMAL 10*3/UL

## 2017-02-21 PROCEDURE — 6360000002 HC RX W HCPCS: Performed by: INTERNAL MEDICINE

## 2017-02-21 PROCEDURE — 6370000000 HC RX 637 (ALT 250 FOR IP): Performed by: INTERNAL MEDICINE

## 2017-02-21 PROCEDURE — 85014 HEMATOCRIT: CPT

## 2017-02-21 PROCEDURE — 85018 HEMOGLOBIN: CPT

## 2017-02-21 PROCEDURE — 6360000002 HC RX W HCPCS: Performed by: FAMILY MEDICINE

## 2017-02-21 PROCEDURE — 36415 COLL VENOUS BLD VENIPUNCTURE: CPT

## 2017-02-21 PROCEDURE — 99232 SBSQ HOSP IP/OBS MODERATE 35: CPT | Performed by: FAMILY MEDICINE

## 2017-02-21 PROCEDURE — 80048 BASIC METABOLIC PNL TOTAL CA: CPT

## 2017-02-21 PROCEDURE — 2060000000 HC ICU INTERMEDIATE R&B

## 2017-02-21 PROCEDURE — 94640 AIRWAY INHALATION TREATMENT: CPT

## 2017-02-21 PROCEDURE — 6370000000 HC RX 637 (ALT 250 FOR IP): Performed by: FAMILY MEDICINE

## 2017-02-21 PROCEDURE — 6370000000 HC RX 637 (ALT 250 FOR IP): Performed by: HOSPITALIST

## 2017-02-21 PROCEDURE — 90937 HEMODIALYSIS REPEATED EVAL: CPT

## 2017-02-21 PROCEDURE — C9113 INJ PANTOPRAZOLE SODIUM, VIA: HCPCS | Performed by: FAMILY MEDICINE

## 2017-02-21 PROCEDURE — 2580000003 HC RX 258: Performed by: FAMILY MEDICINE

## 2017-02-21 PROCEDURE — 85025 COMPLETE CBC W/AUTO DIFF WBC: CPT

## 2017-02-21 RX ORDER — HYDRALAZINE HYDROCHLORIDE 20 MG/ML
10 INJECTION INTRAMUSCULAR; INTRAVENOUS EVERY 6 HOURS PRN
Status: DISCONTINUED | OUTPATIENT
Start: 2017-02-21 | End: 2017-02-23 | Stop reason: HOSPADM

## 2017-02-21 RX ORDER — CLONIDINE HYDROCHLORIDE 0.2 MG/1
0.4 TABLET ORAL 2 TIMES DAILY
Status: DISCONTINUED | OUTPATIENT
Start: 2017-02-21 | End: 2017-02-23 | Stop reason: HOSPADM

## 2017-02-21 RX ADMIN — LOSARTAN POTASSIUM 50 MG: 50 TABLET, FILM COATED ORAL at 12:51

## 2017-02-21 RX ADMIN — OXYCODONE HYDROCHLORIDE AND ACETAMINOPHEN 1 TABLET: 5; 325 TABLET ORAL at 07:16

## 2017-02-21 RX ADMIN — AMLODIPINE BESYLATE 10 MG: 10 TABLET ORAL at 12:52

## 2017-02-21 RX ADMIN — LOSARTAN POTASSIUM 50 MG: 50 TABLET, FILM COATED ORAL at 22:05

## 2017-02-21 RX ADMIN — LACTULOSE 20 G: 10 SOLUTION ORAL at 12:53

## 2017-02-21 RX ADMIN — Medication 10 ML: at 22:04

## 2017-02-21 RX ADMIN — OXYCODONE HYDROCHLORIDE AND ACETAMINOPHEN 1 TABLET: 5; 325 TABLET ORAL at 16:24

## 2017-02-21 RX ADMIN — METOPROLOL TARTRATE 25 MG: 25 TABLET ORAL at 12:51

## 2017-02-21 RX ADMIN — METOPROLOL TARTRATE 25 MG: 25 TABLET ORAL at 22:05

## 2017-02-21 RX ADMIN — CLONIDINE HYDROCHLORIDE 0.4 MG: 0.2 TABLET ORAL at 22:18

## 2017-02-21 RX ADMIN — ACETAMINOPHEN 650 MG: 325 TABLET ORAL at 02:29

## 2017-02-21 RX ADMIN — MOMETASONE FUROATE AND FORMOTEROL FUMARATE DIHYDRATE 2 PUFF: 100; 5 AEROSOL RESPIRATORY (INHALATION) at 18:16

## 2017-02-21 RX ADMIN — PANTOPRAZOLE SODIUM 40 MG: 40 INJECTION, POWDER, FOR SOLUTION INTRAVENOUS at 22:04

## 2017-02-21 RX ADMIN — HYDRALAZINE HYDROCHLORIDE 10 MG: 20 INJECTION INTRAMUSCULAR; INTRAVENOUS at 03:13

## 2017-02-21 RX ADMIN — PANTOPRAZOLE SODIUM 40 MG: 40 INJECTION, POWDER, FOR SOLUTION INTRAVENOUS at 12:51

## 2017-02-21 RX ADMIN — DOXERCALCIFEROL 2 MCG: 2 INJECTION, SOLUTION INTRAVENOUS at 11:30

## 2017-02-21 RX ADMIN — Medication 10 ML: at 12:48

## 2017-02-21 RX ADMIN — ATORVASTATIN CALCIUM 20 MG: 20 TABLET, FILM COATED ORAL at 22:05

## 2017-02-21 RX ADMIN — POLYETHYLENE GLYCOL 3350, SODIUM SULFATE ANHYDROUS, SODIUM BICARBONATE, SODIUM CHLORIDE, POTASSIUM CHLORIDE 2000 ML: 236; 22.74; 6.74; 5.86; 2.97 POWDER, FOR SOLUTION ORAL at 18:13

## 2017-02-21 RX ADMIN — LACTULOSE 20 G: 10 SOLUTION ORAL at 22:05

## 2017-02-21 RX ADMIN — Medication 10 ML: at 12:51

## 2017-02-21 RX ADMIN — CLONIDINE HYDROCHLORIDE 0.4 MG: 0.2 TABLET ORAL at 12:52

## 2017-02-21 RX ADMIN — ISOSORBIDE MONONITRATE 30 MG: 30 TABLET ORAL at 12:52

## 2017-02-21 ASSESSMENT — PAIN SCALES - GENERAL
PAINLEVEL_OUTOF10: 2
PAINLEVEL_OUTOF10: 3
PAINLEVEL_OUTOF10: 0
PAINLEVEL_OUTOF10: 3
PAINLEVEL_OUTOF10: 8
PAINLEVEL_OUTOF10: 6
PAINLEVEL_OUTOF10: 5
PAINLEVEL_OUTOF10: 6
PAINLEVEL_OUTOF10: 6

## 2017-02-21 ASSESSMENT — PAIN DESCRIPTION - PAIN TYPE
TYPE: ACUTE PAIN
TYPE: ACUTE PAIN

## 2017-02-21 ASSESSMENT — PAIN DESCRIPTION - LOCATION
LOCATION: BREAST
LOCATION: CHEST

## 2017-02-21 ASSESSMENT — PAIN DESCRIPTION - ORIENTATION: ORIENTATION: LEFT

## 2017-02-21 ASSESSMENT — PAIN DESCRIPTION - DESCRIPTORS: DESCRIPTORS: ACHING

## 2017-02-21 ASSESSMENT — PAIN DESCRIPTION - FREQUENCY: FREQUENCY: INTERMITTENT

## 2017-02-22 ENCOUNTER — ANESTHESIA EVENT (OUTPATIENT)
Dept: ENDOSCOPY | Age: 72
DRG: 811 | End: 2017-02-22
Payer: COMMERCIAL

## 2017-02-22 ENCOUNTER — ANESTHESIA (OUTPATIENT)
Dept: ENDOSCOPY | Age: 72
DRG: 811 | End: 2017-02-22
Payer: COMMERCIAL

## 2017-02-22 VITALS
DIASTOLIC BLOOD PRESSURE: 42 MMHG | OXYGEN SATURATION: 100 % | RESPIRATION RATE: 17 BRPM | SYSTOLIC BLOOD PRESSURE: 92 MMHG

## 2017-02-22 LAB
ABSOLUTE EOS #: 0.08 K/UL (ref 0–0.4)
ABSOLUTE LYMPH #: 0.82 K/UL (ref 1–4.8)
ABSOLUTE MONO #: 0.57 K/UL (ref 0.1–1.2)
ANION GAP SERPL CALCULATED.3IONS-SCNC: 17 MMOL/L (ref 9–17)
BASOPHILS # BLD: 0 % (ref 0–2)
BASOPHILS ABSOLUTE: 0 K/UL (ref 0–0.2)
BLOOD BANK SPECIMEN: NORMAL
BUN BLDV-MCNC: 25 MG/DL (ref 8–23)
BUN/CREAT BLD: ABNORMAL (ref 9–20)
CALCIUM SERPL-MCNC: 9.5 MG/DL (ref 8.6–10.4)
CHLORIDE BLD-SCNC: 94 MMOL/L (ref 98–107)
CO2: 27 MMOL/L (ref 20–31)
CREAT SERPL-MCNC: 3.44 MG/DL (ref 0.5–0.9)
DIFFERENTIAL TYPE: ABNORMAL
EOSINOPHILS RELATIVE PERCENT: 1 % (ref 1–4)
GFR AFRICAN AMERICAN: 16 ML/MIN
GFR NON-AFRICAN AMERICAN: 13 ML/MIN
GFR SERPL CREATININE-BSD FRML MDRD: ABNORMAL ML/MIN/{1.73_M2}
GFR SERPL CREATININE-BSD FRML MDRD: ABNORMAL ML/MIN/{1.73_M2}
GLUCOSE BLD-MCNC: 101 MG/DL (ref 70–99)
HCT VFR BLD CALC: 20.3 % (ref 36–46)
HCT VFR BLD CALC: 24.9 % (ref 36–46)
HEMOGLOBIN: 6.6 G/DL (ref 12–16)
HEMOGLOBIN: 8.4 G/DL (ref 12–16)
LYMPHOCYTES # BLD: 10 % (ref 24–44)
MCH RBC QN AUTO: 28.2 PG (ref 26–34)
MCHC RBC AUTO-ENTMCNC: 32.2 G/DL (ref 31–37)
MCV RBC AUTO: 87.5 FL (ref 80–100)
MONOCYTES # BLD: 7 % (ref 2–11)
MORPHOLOGY: NORMAL
PDW BLD-RTO: 18.2 % (ref 12.5–15.4)
PLATELET # BLD: 331 K/UL (ref 140–450)
PLATELET ESTIMATE: ABNORMAL
PMV BLD AUTO: 8.6 FL (ref 6–12)
POTASSIUM SERPL-SCNC: 3.6 MMOL/L (ref 3.7–5.3)
RBC # BLD: 2.32 M/UL (ref 4–5.2)
RBC # BLD: ABNORMAL 10*6/UL
SEG NEUTROPHILS: 82 % (ref 36–66)
SEGMENTED NEUTROPHILS ABSOLUTE COUNT: 6.73 K/UL (ref 1.8–7.7)
SODIUM BLD-SCNC: 138 MMOL/L (ref 135–144)
WBC # BLD: 8.2 K/UL (ref 3.5–11)
WBC # BLD: ABNORMAL 10*3/UL

## 2017-02-22 PROCEDURE — 3609017100 HC EGD: Performed by: INTERNAL MEDICINE

## 2017-02-22 PROCEDURE — 76937 US GUIDE VASCULAR ACCESS: CPT

## 2017-02-22 PROCEDURE — 36415 COLL VENOUS BLD VENIPUNCTURE: CPT

## 2017-02-22 PROCEDURE — 86850 RBC ANTIBODY SCREEN: CPT

## 2017-02-22 PROCEDURE — 2500000003 HC RX 250 WO HCPCS: Performed by: NURSE ANESTHETIST, CERTIFIED REGISTERED

## 2017-02-22 PROCEDURE — 2580000003 HC RX 258: Performed by: FAMILY MEDICINE

## 2017-02-22 PROCEDURE — 1200000000 HC SEMI PRIVATE

## 2017-02-22 PROCEDURE — 97110 THERAPEUTIC EXERCISES: CPT

## 2017-02-22 PROCEDURE — 85018 HEMOGLOBIN: CPT

## 2017-02-22 PROCEDURE — 0DBN8ZZ EXCISION OF SIGMOID COLON, VIA NATURAL OR ARTIFICIAL OPENING ENDOSCOPIC: ICD-10-PCS | Performed by: INTERNAL MEDICINE

## 2017-02-22 PROCEDURE — 3700000000 HC ANESTHESIA ATTENDED CARE: Performed by: INTERNAL MEDICINE

## 2017-02-22 PROCEDURE — 6370000000 HC RX 637 (ALT 250 FOR IP): Performed by: HOSPITALIST

## 2017-02-22 PROCEDURE — 88305 TISSUE EXAM BY PATHOLOGIST: CPT

## 2017-02-22 PROCEDURE — 6370000000 HC RX 637 (ALT 250 FOR IP): Performed by: FAMILY MEDICINE

## 2017-02-22 PROCEDURE — 97530 THERAPEUTIC ACTIVITIES: CPT

## 2017-02-22 PROCEDURE — P9016 RBC LEUKOCYTES REDUCED: HCPCS

## 2017-02-22 PROCEDURE — 6370000000 HC RX 637 (ALT 250 FOR IP): Performed by: INTERNAL MEDICINE

## 2017-02-22 PROCEDURE — 86902 BLOOD TYPE ANTIGEN DONOR EA: CPT

## 2017-02-22 PROCEDURE — 6360000002 HC RX W HCPCS: Performed by: NURSE ANESTHETIST, CERTIFIED REGISTERED

## 2017-02-22 PROCEDURE — C9113 INJ PANTOPRAZOLE SODIUM, VIA: HCPCS | Performed by: FAMILY MEDICINE

## 2017-02-22 PROCEDURE — 86901 BLOOD TYPING SEROLOGIC RH(D): CPT

## 2017-02-22 PROCEDURE — 85025 COMPLETE CBC W/AUTO DIFF WBC: CPT

## 2017-02-22 PROCEDURE — 88342 IMHCHEM/IMCYTCHM 1ST ANTB: CPT

## 2017-02-22 PROCEDURE — 86880 COOMBS TEST DIRECT: CPT

## 2017-02-22 PROCEDURE — 85014 HEMATOCRIT: CPT

## 2017-02-22 PROCEDURE — 7100000010 HC PHASE II RECOVERY - FIRST 15 MIN: Performed by: INTERNAL MEDICINE

## 2017-02-22 PROCEDURE — 2060000000 HC ICU INTERMEDIATE R&B

## 2017-02-22 PROCEDURE — 0DB98ZX EXCISION OF DUODENUM, VIA NATURAL OR ARTIFICIAL OPENING ENDOSCOPIC, DIAGNOSTIC: ICD-10-PCS | Performed by: INTERNAL MEDICINE

## 2017-02-22 PROCEDURE — 7100000011 HC PHASE II RECOVERY - ADDTL 15 MIN: Performed by: INTERNAL MEDICINE

## 2017-02-22 PROCEDURE — 94640 AIRWAY INHALATION TREATMENT: CPT

## 2017-02-22 PROCEDURE — 3609027000 HC COLONOSCOPY: Performed by: INTERNAL MEDICINE

## 2017-02-22 PROCEDURE — 80048 BASIC METABOLIC PNL TOTAL CA: CPT

## 2017-02-22 PROCEDURE — 6360000002 HC RX W HCPCS: Performed by: FAMILY MEDICINE

## 2017-02-22 PROCEDURE — 86900 BLOOD TYPING SEROLOGIC ABO: CPT

## 2017-02-22 PROCEDURE — 99232 SBSQ HOSP IP/OBS MODERATE 35: CPT | Performed by: FAMILY MEDICINE

## 2017-02-22 PROCEDURE — 3700000001 HC ADD 15 MINUTES (ANESTHESIA): Performed by: INTERNAL MEDICINE

## 2017-02-22 PROCEDURE — 86920 COMPATIBILITY TEST SPIN: CPT

## 2017-02-22 PROCEDURE — 36600 WITHDRAWAL OF ARTERIAL BLOOD: CPT

## 2017-02-22 PROCEDURE — 2580000003 HC RX 258: Performed by: NURSE ANESTHETIST, CERTIFIED REGISTERED

## 2017-02-22 RX ORDER — 0.9 % SODIUM CHLORIDE 0.9 %
250 INTRAVENOUS SOLUTION INTRAVENOUS ONCE
Status: DISCONTINUED | OUTPATIENT
Start: 2017-02-22 | End: 2017-02-23 | Stop reason: HOSPADM

## 2017-02-22 RX ORDER — PROPOFOL 10 MG/ML
INJECTION, EMULSION INTRAVENOUS PRN
Status: DISCONTINUED | OUTPATIENT
Start: 2017-02-22 | End: 2017-02-22 | Stop reason: SDUPTHER

## 2017-02-22 RX ORDER — LIDOCAINE HYDROCHLORIDE 10 MG/ML
INJECTION, SOLUTION INFILTRATION; PERINEURAL PRN
Status: DISCONTINUED | OUTPATIENT
Start: 2017-02-22 | End: 2017-02-22 | Stop reason: SDUPTHER

## 2017-02-22 RX ORDER — SODIUM CHLORIDE 9 MG/ML
INJECTION, SOLUTION INTRAVENOUS CONTINUOUS PRN
Status: DISCONTINUED | OUTPATIENT
Start: 2017-02-22 | End: 2017-02-22 | Stop reason: SDUPTHER

## 2017-02-22 RX ADMIN — OXYCODONE HYDROCHLORIDE AND ACETAMINOPHEN 1 TABLET: 5; 325 TABLET ORAL at 18:42

## 2017-02-22 RX ADMIN — CLONIDINE HYDROCHLORIDE 0.4 MG: 0.2 TABLET ORAL at 20:48

## 2017-02-22 RX ADMIN — OXYCODONE HYDROCHLORIDE AND ACETAMINOPHEN 1 TABLET: 5; 325 TABLET ORAL at 09:19

## 2017-02-22 RX ADMIN — METOPROLOL TARTRATE 25 MG: 25 TABLET ORAL at 20:48

## 2017-02-22 RX ADMIN — PANTOPRAZOLE SODIUM 40 MG: 40 INJECTION, POWDER, FOR SOLUTION INTRAVENOUS at 20:48

## 2017-02-22 RX ADMIN — METOPROLOL TARTRATE 25 MG: 25 TABLET ORAL at 09:20

## 2017-02-22 RX ADMIN — PROPOFOL 50 MG: 10 INJECTION, EMULSION INTRAVENOUS at 16:30

## 2017-02-22 RX ADMIN — MOMETASONE FUROATE AND FORMOTEROL FUMARATE DIHYDRATE 2 PUFF: 100; 5 AEROSOL RESPIRATORY (INHALATION) at 08:15

## 2017-02-22 RX ADMIN — SODIUM CHLORIDE: 9 INJECTION, SOLUTION INTRAVENOUS at 16:15

## 2017-02-22 RX ADMIN — LOSARTAN POTASSIUM 50 MG: 50 TABLET, FILM COATED ORAL at 09:20

## 2017-02-22 RX ADMIN — LIDOCAINE HYDROCHLORIDE 20 MG: 10 INJECTION, SOLUTION INFILTRATION; PERINEURAL at 16:20

## 2017-02-22 RX ADMIN — LOSARTAN POTASSIUM 50 MG: 50 TABLET, FILM COATED ORAL at 20:48

## 2017-02-22 RX ADMIN — Medication 10 ML: at 20:49

## 2017-02-22 RX ADMIN — ISOSORBIDE MONONITRATE 30 MG: 30 TABLET ORAL at 09:20

## 2017-02-22 RX ADMIN — TIOTROPIUM BROMIDE 18 MCG: 18 CAPSULE ORAL; RESPIRATORY (INHALATION) at 08:15

## 2017-02-22 RX ADMIN — POLYETHYLENE GLYCOL 3350, SODIUM SULFATE ANHYDROUS, SODIUM BICARBONATE, SODIUM CHLORIDE, POTASSIUM CHLORIDE 2000 ML: 236; 22.74; 6.74; 5.86; 2.97 POWDER, FOR SOLUTION ORAL at 05:07

## 2017-02-22 RX ADMIN — Medication 10 ML: at 09:22

## 2017-02-22 RX ADMIN — PANTOPRAZOLE SODIUM 40 MG: 40 INJECTION, POWDER, FOR SOLUTION INTRAVENOUS at 09:21

## 2017-02-22 RX ADMIN — PROPOFOL 100 MG: 10 INJECTION, EMULSION INTRAVENOUS at 16:20

## 2017-02-22 RX ADMIN — ATORVASTATIN CALCIUM 20 MG: 20 TABLET, FILM COATED ORAL at 20:48

## 2017-02-22 RX ADMIN — PROPOFOL 50 MG: 10 INJECTION, EMULSION INTRAVENOUS at 16:45

## 2017-02-22 RX ADMIN — PROPOFOL 50 MG: 10 INJECTION, EMULSION INTRAVENOUS at 16:32

## 2017-02-22 RX ADMIN — AMLODIPINE BESYLATE 10 MG: 10 TABLET ORAL at 09:20

## 2017-02-22 RX ADMIN — PROPOFOL 50 MG: 10 INJECTION, EMULSION INTRAVENOUS at 16:53

## 2017-02-22 RX ADMIN — CLONIDINE HYDROCHLORIDE 0.4 MG: 0.2 TABLET ORAL at 09:20

## 2017-02-22 ASSESSMENT — PAIN SCALES - GENERAL
PAINLEVEL_OUTOF10: 0
PAINLEVEL_OUTOF10: 6
PAINLEVEL_OUTOF10: 6
PAINLEVEL_OUTOF10: 0
PAINLEVEL_OUTOF10: 0
PAINLEVEL_OUTOF10: 7
PAINLEVEL_OUTOF10: 2
PAINLEVEL_OUTOF10: 0
PAINLEVEL_OUTOF10: 0

## 2017-02-22 ASSESSMENT — PAIN DESCRIPTION - DESCRIPTORS
DESCRIPTORS: NAGGING
DESCRIPTORS: ACHING

## 2017-02-22 ASSESSMENT — PAIN DESCRIPTION - ORIENTATION
ORIENTATION: LEFT

## 2017-02-22 ASSESSMENT — PAIN DESCRIPTION - PAIN TYPE
TYPE: ACUTE PAIN

## 2017-02-22 ASSESSMENT — PAIN DESCRIPTION - LOCATION
LOCATION: ABDOMEN
LOCATION: CHEST
LOCATION: CHEST

## 2017-02-22 ASSESSMENT — PAIN - FUNCTIONAL ASSESSMENT: PAIN_FUNCTIONAL_ASSESSMENT: 0-10

## 2017-02-22 ASSESSMENT — PAIN DESCRIPTION - FREQUENCY
FREQUENCY: INTERMITTENT
FREQUENCY: INTERMITTENT

## 2017-02-22 ASSESSMENT — PAIN DESCRIPTION - ONSET
ONSET: GRADUAL
ONSET: GRADUAL

## 2017-02-23 VITALS
OXYGEN SATURATION: 97 % | SYSTOLIC BLOOD PRESSURE: 143 MMHG | TEMPERATURE: 98.3 F | HEIGHT: 66 IN | BODY MASS INDEX: 23.31 KG/M2 | DIASTOLIC BLOOD PRESSURE: 58 MMHG | RESPIRATION RATE: 18 BRPM | HEART RATE: 63 BPM | WEIGHT: 145.06 LBS

## 2017-02-23 LAB
ABO/RH: NORMAL
ABSOLUTE EOS #: 0.1 K/UL (ref 0–0.4)
ABSOLUTE LYMPH #: 1 K/UL (ref 1–4.8)
ABSOLUTE MONO #: 0.7 K/UL (ref 0.1–1.2)
ANION GAP SERPL CALCULATED.3IONS-SCNC: 19 MMOL/L (ref 9–17)
ANTIBODY SCREEN: NEGATIVE
ARM BAND NUMBER: NORMAL
BASOPHILS # BLD: 0 % (ref 0–2)
BASOPHILS ABSOLUTE: 0 K/UL (ref 0–0.2)
BLD PROD TYP BPU: NORMAL
BLD PROD TYP BPU: NORMAL
BUN BLDV-MCNC: 30 MG/DL (ref 8–23)
BUN/CREAT BLD: ABNORMAL (ref 9–20)
CALCIUM SERPL-MCNC: 10 MG/DL (ref 8.6–10.4)
CHLORIDE BLD-SCNC: 93 MMOL/L (ref 98–107)
CO2: 26 MMOL/L (ref 20–31)
CREAT SERPL-MCNC: 3.93 MG/DL (ref 0.5–0.9)
CROSSMATCH RESULT: NORMAL
CROSSMATCH RESULT: NORMAL
DAT IGG: NEGATIVE
DIFFERENTIAL TYPE: ABNORMAL
DISPENSE STATUS BLOOD BANK: NORMAL
DISPENSE STATUS BLOOD BANK: NORMAL
EOSINOPHILS RELATIVE PERCENT: 1 % (ref 1–4)
EXPIRATION DATE: NORMAL
GFR AFRICAN AMERICAN: 14 ML/MIN
GFR NON-AFRICAN AMERICAN: 11 ML/MIN
GFR SERPL CREATININE-BSD FRML MDRD: ABNORMAL ML/MIN/{1.73_M2}
GFR SERPL CREATININE-BSD FRML MDRD: ABNORMAL ML/MIN/{1.73_M2}
GLUCOSE BLD-MCNC: 89 MG/DL (ref 70–99)
HCT VFR BLD CALC: 24.6 % (ref 36–46)
HCT VFR BLD CALC: 26 % (ref 36–46)
HEMOGLOBIN: 8 G/DL (ref 12–16)
HEMOGLOBIN: 8.9 G/DL (ref 12–16)
LYMPHOCYTES # BLD: 10 % (ref 24–44)
MCH RBC QN AUTO: 28.4 PG (ref 26–34)
MCHC RBC AUTO-ENTMCNC: 32.7 G/DL (ref 31–37)
MCV RBC AUTO: 87 FL (ref 80–100)
MONOCYTES # BLD: 7 % (ref 2–11)
PDW BLD-RTO: 17.5 % (ref 12.5–15.4)
PLATELET # BLD: 348 K/UL (ref 140–450)
PLATELET ESTIMATE: ABNORMAL
PMV BLD AUTO: 8.7 FL (ref 6–12)
POTASSIUM SERPL-SCNC: 3.6 MMOL/L (ref 3.7–5.3)
RBC # BLD: 2.83 M/UL (ref 4–5.2)
RBC # BLD: ABNORMAL 10*6/UL
SEG NEUTROPHILS: 82 % (ref 36–66)
SEGMENTED NEUTROPHILS ABSOLUTE COUNT: 8.2 K/UL (ref 1.8–7.7)
SODIUM BLD-SCNC: 138 MMOL/L (ref 135–144)
TRANSFUSION STATUS: NORMAL
TRANSFUSION STATUS: NORMAL
UNIT DIVISION: 0
UNIT DIVISION: 0
UNIT NUMBER: NORMAL
UNIT NUMBER: NORMAL
WBC # BLD: 10 K/UL (ref 3.5–11)
WBC # BLD: ABNORMAL 10*3/UL

## 2017-02-23 PROCEDURE — 36415 COLL VENOUS BLD VENIPUNCTURE: CPT

## 2017-02-23 PROCEDURE — 6370000000 HC RX 637 (ALT 250 FOR IP): Performed by: HOSPITALIST

## 2017-02-23 PROCEDURE — 2580000003 HC RX 258: Performed by: FAMILY MEDICINE

## 2017-02-23 PROCEDURE — 6370000000 HC RX 637 (ALT 250 FOR IP): Performed by: INTERNAL MEDICINE

## 2017-02-23 PROCEDURE — 80048 BASIC METABOLIC PNL TOTAL CA: CPT

## 2017-02-23 PROCEDURE — C9113 INJ PANTOPRAZOLE SODIUM, VIA: HCPCS | Performed by: FAMILY MEDICINE

## 2017-02-23 PROCEDURE — 94640 AIRWAY INHALATION TREATMENT: CPT

## 2017-02-23 PROCEDURE — 6370000000 HC RX 637 (ALT 250 FOR IP): Performed by: FAMILY MEDICINE

## 2017-02-23 PROCEDURE — 85025 COMPLETE CBC W/AUTO DIFF WBC: CPT

## 2017-02-23 PROCEDURE — 6360000002 HC RX W HCPCS: Performed by: FAMILY MEDICINE

## 2017-02-23 PROCEDURE — 85018 HEMOGLOBIN: CPT

## 2017-02-23 PROCEDURE — 99239 HOSP IP/OBS DSCHRG MGMT >30: CPT | Performed by: FAMILY MEDICINE

## 2017-02-23 PROCEDURE — 85014 HEMATOCRIT: CPT

## 2017-02-23 RX ORDER — ASPIRIN 81 MG/1
81 TABLET, CHEWABLE ORAL DAILY
Qty: 30 TABLET | Refills: 3 | Status: ON HOLD | OUTPATIENT
Start: 2017-02-23 | End: 2017-03-09

## 2017-02-23 RX ORDER — CLONIDINE HYDROCHLORIDE 0.2 MG/1
0.4 TABLET ORAL 2 TIMES DAILY
Qty: 60 TABLET | Refills: 3 | Status: SHIPPED | OUTPATIENT
Start: 2017-02-23 | End: 2017-11-25 | Stop reason: SDUPTHER

## 2017-02-23 RX ORDER — ISOSORBIDE MONONITRATE 30 MG/1
30 TABLET, EXTENDED RELEASE ORAL DAILY
Qty: 30 TABLET | Refills: 3 | Status: SHIPPED | OUTPATIENT
Start: 2017-02-23 | End: 2018-06-28

## 2017-02-23 RX ADMIN — ISOSORBIDE MONONITRATE 30 MG: 30 TABLET ORAL at 09:09

## 2017-02-23 RX ADMIN — Medication 10 ML: at 09:38

## 2017-02-23 RX ADMIN — METOPROLOL TARTRATE 25 MG: 25 TABLET ORAL at 09:09

## 2017-02-23 RX ADMIN — LACTULOSE 20 G: 10 SOLUTION ORAL at 09:10

## 2017-02-23 RX ADMIN — CLONIDINE HYDROCHLORIDE 0.4 MG: 0.2 TABLET ORAL at 09:09

## 2017-02-23 RX ADMIN — MOMETASONE FUROATE AND FORMOTEROL FUMARATE DIHYDRATE 2 PUFF: 100; 5 AEROSOL RESPIRATORY (INHALATION) at 09:12

## 2017-02-23 RX ADMIN — OXYCODONE HYDROCHLORIDE AND ACETAMINOPHEN 1 TABLET: 5; 325 TABLET ORAL at 04:15

## 2017-02-23 RX ADMIN — Medication 10 ML: at 09:12

## 2017-02-23 RX ADMIN — AMLODIPINE BESYLATE 10 MG: 10 TABLET ORAL at 09:10

## 2017-02-23 RX ADMIN — PANTOPRAZOLE SODIUM 40 MG: 40 INJECTION, POWDER, FOR SOLUTION INTRAVENOUS at 09:10

## 2017-02-23 RX ADMIN — LOSARTAN POTASSIUM 50 MG: 50 TABLET, FILM COATED ORAL at 09:09

## 2017-02-23 RX ADMIN — ASPIRIN 81 MG: 81 TABLET, CHEWABLE ORAL at 09:09

## 2017-02-23 RX ADMIN — TIOTROPIUM BROMIDE 18 MCG: 18 CAPSULE ORAL; RESPIRATORY (INHALATION) at 09:12

## 2017-02-23 ASSESSMENT — PAIN SCALES - GENERAL
PAINLEVEL_OUTOF10: 0
PAINLEVEL_OUTOF10: 6

## 2017-02-24 ENCOUNTER — TELEPHONE (OUTPATIENT)
Dept: PHARMACY | Age: 72
End: 2017-02-24

## 2017-02-24 ENCOUNTER — CARE COORDINATION (OUTPATIENT)
Dept: CASE MANAGEMENT | Age: 72
End: 2017-02-24

## 2017-02-24 LAB — SURGICAL PATHOLOGY REPORT: NORMAL

## 2017-03-02 ENCOUNTER — OFFICE VISIT (OUTPATIENT)
Dept: FAMILY MEDICINE CLINIC | Facility: CLINIC | Age: 72
End: 2017-03-02

## 2017-03-02 VITALS
BODY MASS INDEX: 24.21 KG/M2 | HEART RATE: 64 BPM | TEMPERATURE: 98.2 F | DIASTOLIC BLOOD PRESSURE: 48 MMHG | WEIGHT: 150 LBS | SYSTOLIC BLOOD PRESSURE: 101 MMHG

## 2017-03-02 DIAGNOSIS — I48.91 ATRIAL FIBRILLATION, UNSPECIFIED TYPE (HCC): ICD-10-CM

## 2017-03-02 DIAGNOSIS — I15.1 HYPERTENSION SECONDARY TO OTHER RENAL DISORDERS: ICD-10-CM

## 2017-03-02 DIAGNOSIS — I10 HTN, GOAL BELOW 130/80: ICD-10-CM

## 2017-03-02 DIAGNOSIS — Z99.2 ESRD ON DIALYSIS (HCC): ICD-10-CM

## 2017-03-02 DIAGNOSIS — D63.8 ANEMIA OF CHRONIC DISEASE: Primary | ICD-10-CM

## 2017-03-02 DIAGNOSIS — N28.89 HYPERTENSION SECONDARY TO OTHER RENAL DISORDERS: ICD-10-CM

## 2017-03-02 DIAGNOSIS — N18.6 ESRD ON DIALYSIS (HCC): ICD-10-CM

## 2017-03-02 PROCEDURE — 99214 OFFICE O/P EST MOD 30 MIN: CPT | Performed by: FAMILY MEDICINE

## 2017-03-02 ASSESSMENT — ENCOUNTER SYMPTOMS
NAUSEA: 0
ABDOMINAL PAIN: 0
SHORTNESS OF BREATH: 0
SORE THROAT: 0
VOMITING: 0
WHEEZING: 0

## 2017-03-03 ENCOUNTER — CARE COORDINATION (OUTPATIENT)
Dept: CASE MANAGEMENT | Age: 72
End: 2017-03-03

## 2017-03-11 ENCOUNTER — TELEPHONE (OUTPATIENT)
Dept: PHARMACY | Age: 72
End: 2017-03-11

## 2017-03-13 ENCOUNTER — CARE COORDINATION (OUTPATIENT)
Dept: CASE MANAGEMENT | Age: 72
End: 2017-03-13

## 2017-03-16 ENCOUNTER — TELEPHONE (OUTPATIENT)
Dept: FAMILY MEDICINE CLINIC | Age: 72
End: 2017-03-16

## 2017-03-16 ENCOUNTER — OFFICE VISIT (OUTPATIENT)
Dept: FAMILY MEDICINE CLINIC | Age: 72
End: 2017-03-16
Payer: COMMERCIAL

## 2017-03-16 VITALS
WEIGHT: 145 LBS | HEART RATE: 60 BPM | TEMPERATURE: 97.1 F | SYSTOLIC BLOOD PRESSURE: 113 MMHG | BODY MASS INDEX: 23.4 KG/M2 | DIASTOLIC BLOOD PRESSURE: 60 MMHG

## 2017-03-16 DIAGNOSIS — D64.9 ANEMIA, UNSPECIFIED TYPE: Primary | ICD-10-CM

## 2017-03-16 DIAGNOSIS — N18.6 END STAGE RENAL DISEASE (HCC): ICD-10-CM

## 2017-03-16 DIAGNOSIS — Z12.31 ENCOUNTER FOR SCREENING MAMMOGRAM FOR MALIGNANT NEOPLASM OF BREAST: ICD-10-CM

## 2017-03-16 PROCEDURE — 99214 OFFICE O/P EST MOD 30 MIN: CPT | Performed by: STUDENT IN AN ORGANIZED HEALTH CARE EDUCATION/TRAINING PROGRAM

## 2017-03-17 ENCOUNTER — CARE COORDINATION (OUTPATIENT)
Dept: CASE MANAGEMENT | Age: 72
End: 2017-03-17

## 2017-03-20 ENCOUNTER — APPOINTMENT (OUTPATIENT)
Dept: GENERAL RADIOLOGY | Age: 72
DRG: 202 | End: 2017-03-20
Payer: COMMERCIAL

## 2017-03-20 ENCOUNTER — HOSPITAL ENCOUNTER (OUTPATIENT)
Age: 72
Setting detail: OBSERVATION
Discharge: HOME HEALTH | DRG: 202 | End: 2017-03-21
Attending: EMERGENCY MEDICINE | Admitting: INTERNAL MEDICINE
Payer: COMMERCIAL

## 2017-03-20 ENCOUNTER — CARE COORDINATION (OUTPATIENT)
Dept: CASE MANAGEMENT | Age: 72
End: 2017-03-20

## 2017-03-20 ENCOUNTER — APPOINTMENT (OUTPATIENT)
Dept: CT IMAGING | Age: 72
DRG: 202 | End: 2017-03-20
Payer: COMMERCIAL

## 2017-03-20 DIAGNOSIS — Z99.2 DIALYSIS PATIENT (HCC): ICD-10-CM

## 2017-03-20 DIAGNOSIS — R50.9 FEBRILE ILLNESS, ACUTE: Primary | ICD-10-CM

## 2017-03-20 LAB
-: NORMAL
ABSOLUTE EOS #: 0 K/UL (ref 0–0.4)
ABSOLUTE LYMPH #: 0.4 K/UL (ref 1–4.8)
ABSOLUTE MONO #: 0.9 K/UL (ref 0.2–0.8)
ALBUMIN SERPL-MCNC: 3.5 G/DL (ref 3.5–5.2)
ALBUMIN/GLOBULIN RATIO: ABNORMAL (ref 1–2.5)
ALP BLD-CCNC: 95 U/L (ref 35–104)
ALT SERPL-CCNC: 22 U/L (ref 5–33)
AMMONIA: 33 UMOL/L (ref 11–51)
AMORPHOUS: NORMAL
ANION GAP SERPL CALCULATED.3IONS-SCNC: 15 MMOL/L (ref 9–17)
AST SERPL-CCNC: 26 U/L
BACTERIA: NORMAL
BASOPHILS # BLD: 1 % (ref 0–2)
BASOPHILS ABSOLUTE: 0.1 K/UL (ref 0–0.2)
BILIRUB SERPL-MCNC: 0.24 MG/DL (ref 0.3–1.2)
BILIRUBIN DIRECT: <0.08 MG/DL
BILIRUBIN URINE: NEGATIVE
BILIRUBIN, INDIRECT: ABNORMAL MG/DL (ref 0–1)
BUN BLDV-MCNC: 35 MG/DL (ref 8–23)
BUN/CREAT BLD: 8 (ref 9–20)
CALCIUM SERPL-MCNC: 9.4 MG/DL (ref 8.6–10.4)
CASTS UA: NORMAL /LPF
CHLORIDE BLD-SCNC: 99 MMOL/L (ref 98–107)
CO2: 25 MMOL/L (ref 20–31)
COLOR: YELLOW
COMMENT UA: ABNORMAL
CREAT SERPL-MCNC: 4.52 MG/DL (ref 0.5–0.9)
CRYSTALS, UA: NORMAL /HPF
DIFFERENTIAL TYPE: ABNORMAL
DIRECT EXAM: NORMAL
EOSINOPHILS RELATIVE PERCENT: 0 % (ref 1–4)
EPITHELIAL CELLS UA: NORMAL /HPF
GFR AFRICAN AMERICAN: 12 ML/MIN
GFR NON-AFRICAN AMERICAN: 10 ML/MIN
GFR SERPL CREATININE-BSD FRML MDRD: ABNORMAL ML/MIN/{1.73_M2}
GFR SERPL CREATININE-BSD FRML MDRD: ABNORMAL ML/MIN/{1.73_M2}
GLOBULIN: ABNORMAL G/DL (ref 1.5–3.8)
GLUCOSE BLD-MCNC: 101 MG/DL (ref 70–99)
GLUCOSE URINE: NEGATIVE
HCT VFR BLD CALC: 27.5 % (ref 36–46)
HEMOGLOBIN: 9 G/DL (ref 12–16)
KETONES, URINE: NEGATIVE
LACTIC ACID: 0.9 MMOL/L (ref 0.5–2.2)
LEUKOCYTE ESTERASE, URINE: NEGATIVE
LYMPHOCYTES # BLD: 6 % (ref 24–44)
Lab: NORMAL
MAGNESIUM: 2.1 MG/DL (ref 1.6–2.6)
MCH RBC QN AUTO: 30.7 PG (ref 26–34)
MCHC RBC AUTO-ENTMCNC: 32.8 G/DL (ref 31–37)
MCV RBC AUTO: 93.7 FL (ref 80–100)
MONOCYTES # BLD: 12 % (ref 1–7)
MUCUS: NORMAL
MYOGLOBIN: 158 NG/ML (ref 25–58)
NITRITE, URINE: NEGATIVE
OTHER OBSERVATIONS UA: NORMAL
PDW BLD-RTO: 19.6 % (ref 11.5–14.5)
PH UA: 8.5 (ref 5–8)
PHOSPHORUS: 1.8 MG/DL (ref 2.6–4.5)
PLATELET # BLD: 194 K/UL (ref 130–400)
PLATELET ESTIMATE: ABNORMAL
PMV BLD AUTO: ABNORMAL FL (ref 6–12)
POTASSIUM SERPL-SCNC: 4.4 MMOL/L (ref 3.7–5.3)
PROTEIN UA: ABNORMAL
RBC # BLD: 2.94 M/UL (ref 4–5.2)
RBC # BLD: ABNORMAL 10*6/UL
RBC UA: NORMAL /HPF (ref 0–2)
RENAL EPITHELIAL, UA: NORMAL /HPF
SEG NEUTROPHILS: 81 % (ref 36–66)
SEGMENTED NEUTROPHILS ABSOLUTE COUNT: 6.1 K/UL (ref 1.8–7.7)
SODIUM BLD-SCNC: 139 MMOL/L (ref 135–144)
SPECIFIC GRAVITY UA: 1.01 (ref 1–1.03)
SPECIMEN DESCRIPTION: NORMAL
STATUS: NORMAL
TOTAL PROTEIN: 7.7 G/DL (ref 6.4–8.3)
TRICHOMONAS: NORMAL
TROPONIN INTERP: ABNORMAL
TROPONIN T: 0.07 NG/ML
TURBIDITY: CLEAR
URINE HGB: NEGATIVE
UROBILINOGEN, URINE: NORMAL
WBC # BLD: 7.5 K/UL (ref 3.5–11)
WBC # BLD: ABNORMAL 10*3/UL
WBC UA: NORMAL /HPF (ref 0–5)
YEAST: NORMAL

## 2017-03-20 PROCEDURE — 85025 COMPLETE CBC W/AUTO DIFF WBC: CPT

## 2017-03-20 PROCEDURE — 80048 BASIC METABOLIC PNL TOTAL CA: CPT

## 2017-03-20 PROCEDURE — 83874 ASSAY OF MYOGLOBIN: CPT

## 2017-03-20 PROCEDURE — 82140 ASSAY OF AMMONIA: CPT

## 2017-03-20 PROCEDURE — G0378 HOSPITAL OBSERVATION PER HR: HCPCS

## 2017-03-20 PROCEDURE — 94640 AIRWAY INHALATION TREATMENT: CPT

## 2017-03-20 PROCEDURE — 96365 THER/PROPH/DIAG IV INF INIT: CPT

## 2017-03-20 PROCEDURE — 84100 ASSAY OF PHOSPHORUS: CPT

## 2017-03-20 PROCEDURE — 81001 URINALYSIS AUTO W/SCOPE: CPT

## 2017-03-20 PROCEDURE — 6360000002 HC RX W HCPCS: Performed by: PHYSICIAN ASSISTANT

## 2017-03-20 PROCEDURE — 83605 ASSAY OF LACTIC ACID: CPT

## 2017-03-20 PROCEDURE — 6360000002 HC RX W HCPCS: Performed by: EMERGENCY MEDICINE

## 2017-03-20 PROCEDURE — 94760 N-INVAS EAR/PLS OXIMETRY 1: CPT

## 2017-03-20 PROCEDURE — 2060000000 HC ICU INTERMEDIATE R&B

## 2017-03-20 PROCEDURE — 71020 XR CHEST STANDARD TWO VW: CPT

## 2017-03-20 PROCEDURE — 87040 BLOOD CULTURE FOR BACTERIA: CPT

## 2017-03-20 PROCEDURE — 84484 ASSAY OF TROPONIN QUANT: CPT

## 2017-03-20 PROCEDURE — 83735 ASSAY OF MAGNESIUM: CPT

## 2017-03-20 PROCEDURE — 6370000000 HC RX 637 (ALT 250 FOR IP): Performed by: PHYSICIAN ASSISTANT

## 2017-03-20 PROCEDURE — 2580000003 HC RX 258: Performed by: EMERGENCY MEDICINE

## 2017-03-20 PROCEDURE — 80076 HEPATIC FUNCTION PANEL: CPT

## 2017-03-20 PROCEDURE — 87086 URINE CULTURE/COLONY COUNT: CPT

## 2017-03-20 PROCEDURE — 71250 CT THORAX DX C-: CPT

## 2017-03-20 PROCEDURE — 93005 ELECTROCARDIOGRAM TRACING: CPT

## 2017-03-20 PROCEDURE — 87804 INFLUENZA ASSAY W/OPTIC: CPT

## 2017-03-20 PROCEDURE — 99285 EMERGENCY DEPT VISIT HI MDM: CPT

## 2017-03-20 RX ORDER — IPRATROPIUM BROMIDE AND ALBUTEROL SULFATE 2.5; .5 MG/3ML; MG/3ML
1 SOLUTION RESPIRATORY (INHALATION)
Status: DISCONTINUED | OUTPATIENT
Start: 2017-03-20 | End: 2017-03-20

## 2017-03-20 RX ORDER — ALBUTEROL SULFATE 2.5 MG/3ML
5 SOLUTION RESPIRATORY (INHALATION)
Status: DISCONTINUED | OUTPATIENT
Start: 2017-03-20 | End: 2017-03-21

## 2017-03-20 RX ORDER — ACETAMINOPHEN 500 MG
1000 TABLET ORAL ONCE
Status: COMPLETED | OUTPATIENT
Start: 2017-03-20 | End: 2017-03-20

## 2017-03-20 RX ORDER — ALBUTEROL SULFATE 90 UG/1
2 AEROSOL, METERED RESPIRATORY (INHALATION)
Status: DISCONTINUED | OUTPATIENT
Start: 2017-03-20 | End: 2017-03-20

## 2017-03-20 RX ORDER — IBUPROFEN 800 MG/1
800 TABLET ORAL ONCE
Status: COMPLETED | OUTPATIENT
Start: 2017-03-20 | End: 2017-03-20

## 2017-03-20 RX ADMIN — IBUPROFEN 800 MG: 800 TABLET, FILM COATED ORAL at 19:28

## 2017-03-20 RX ADMIN — VANCOMYCIN HYDROCHLORIDE 1200 MG: 1 INJECTION, POWDER, LYOPHILIZED, FOR SOLUTION INTRAVENOUS at 23:20

## 2017-03-20 RX ADMIN — ALBUTEROL SULFATE 2.5 MG: 2.5 SOLUTION RESPIRATORY (INHALATION) at 19:39

## 2017-03-20 RX ADMIN — ACETAMINOPHEN 1000 MG: 500 TABLET ORAL at 23:20

## 2017-03-20 RX ADMIN — ALBUTEROL SULFATE 2.5 MG: 2.5 SOLUTION RESPIRATORY (INHALATION) at 19:46

## 2017-03-20 ASSESSMENT — PAIN SCALES - GENERAL
PAINLEVEL_OUTOF10: 1
PAINLEVEL_OUTOF10: 0
PAINLEVEL_OUTOF10: 0

## 2017-03-21 VITALS
SYSTOLIC BLOOD PRESSURE: 148 MMHG | DIASTOLIC BLOOD PRESSURE: 47 MMHG | RESPIRATION RATE: 18 BRPM | WEIGHT: 135.58 LBS | HEART RATE: 94 BPM | HEIGHT: 68 IN | TEMPERATURE: 98.4 F | BODY MASS INDEX: 20.55 KG/M2 | OXYGEN SATURATION: 99 %

## 2017-03-21 PROBLEM — J20.9 ACUTE BRONCHITIS DUE TO INFECTION: Status: ACTIVE | Noted: 2017-03-21

## 2017-03-21 LAB
ANION GAP SERPL CALCULATED.3IONS-SCNC: 17 MMOL/L (ref 9–17)
BUN BLDV-MCNC: 42 MG/DL (ref 8–23)
BUN/CREAT BLD: 9 (ref 9–20)
CALCIUM SERPL-MCNC: 8.6 MG/DL (ref 8.6–10.4)
CHLORIDE BLD-SCNC: 100 MMOL/L (ref 98–107)
CO2: 22 MMOL/L (ref 20–31)
CREAT SERPL-MCNC: 4.93 MG/DL (ref 0.5–0.9)
CULTURE: NORMAL
CULTURE: NORMAL
GFR AFRICAN AMERICAN: 11 ML/MIN
GFR NON-AFRICAN AMERICAN: 9 ML/MIN
GFR SERPL CREATININE-BSD FRML MDRD: ABNORMAL ML/MIN/{1.73_M2}
GFR SERPL CREATININE-BSD FRML MDRD: ABNORMAL ML/MIN/{1.73_M2}
GLUCOSE BLD-MCNC: 112 MG/DL (ref 65–105)
GLUCOSE BLD-MCNC: 118 MG/DL (ref 70–99)
GLUCOSE BLD-MCNC: 177 MG/DL (ref 65–105)
GLUCOSE BLD-MCNC: 228 MG/DL (ref 65–105)
GLUCOSE BLD-MCNC: 50 MG/DL (ref 65–105)
GLUCOSE BLD-MCNC: 76 MG/DL (ref 65–105)
GLUCOSE BLD-MCNC: 98 MG/DL (ref 65–105)
Lab: NORMAL
MAGNESIUM: 2.1 MG/DL (ref 1.6–2.6)
POTASSIUM SERPL-SCNC: 4.3 MMOL/L (ref 3.7–5.3)
SODIUM BLD-SCNC: 139 MMOL/L (ref 135–144)
SPECIMEN DESCRIPTION: NORMAL
SPECIMEN DESCRIPTION: NORMAL
STATUS: NORMAL

## 2017-03-21 PROCEDURE — 96366 THER/PROPH/DIAG IV INF ADDON: CPT

## 2017-03-21 PROCEDURE — 6360000002 HC RX W HCPCS: Performed by: INTERNAL MEDICINE

## 2017-03-21 PROCEDURE — 96375 TX/PRO/DX INJ NEW DRUG ADDON: CPT

## 2017-03-21 PROCEDURE — 6370000000 HC RX 637 (ALT 250 FOR IP): Performed by: INTERNAL MEDICINE

## 2017-03-21 PROCEDURE — 83735 ASSAY OF MAGNESIUM: CPT

## 2017-03-21 PROCEDURE — 94640 AIRWAY INHALATION TREATMENT: CPT

## 2017-03-21 PROCEDURE — 96376 TX/PRO/DX INJ SAME DRUG ADON: CPT

## 2017-03-21 PROCEDURE — G0378 HOSPITAL OBSERVATION PER HR: HCPCS

## 2017-03-21 PROCEDURE — 2580000003 HC RX 258: Performed by: INTERNAL MEDICINE

## 2017-03-21 PROCEDURE — 96367 TX/PROPH/DG ADDL SEQ IV INF: CPT

## 2017-03-21 PROCEDURE — 2500000003 HC RX 250 WO HCPCS: Performed by: INTERNAL MEDICINE

## 2017-03-21 PROCEDURE — 94761 N-INVAS EAR/PLS OXIMETRY MLT: CPT

## 2017-03-21 PROCEDURE — 2700000000 HC OXYGEN THERAPY PER DAY

## 2017-03-21 PROCEDURE — 82947 ASSAY GLUCOSE BLOOD QUANT: CPT

## 2017-03-21 PROCEDURE — 99222 1ST HOSP IP/OBS MODERATE 55: CPT | Performed by: INTERNAL MEDICINE

## 2017-03-21 PROCEDURE — 80048 BASIC METABOLIC PNL TOTAL CA: CPT

## 2017-03-21 PROCEDURE — 8010000000 HC HEMODIALYSIS ACUTE INPT

## 2017-03-21 PROCEDURE — 2060000000 HC ICU INTERMEDIATE R&B

## 2017-03-21 RX ORDER — SODIUM CHLORIDE 0.9 % (FLUSH) 0.9 %
10 SYRINGE (ML) INJECTION EVERY 12 HOURS SCHEDULED
Status: DISCONTINUED | OUTPATIENT
Start: 2017-03-21 | End: 2017-03-22 | Stop reason: HOSPADM

## 2017-03-21 RX ORDER — SODIUM BICARBONATE 650 MG/1
1300 TABLET ORAL 3 TIMES DAILY
Status: DISCONTINUED | OUTPATIENT
Start: 2017-03-21 | End: 2017-03-22 | Stop reason: HOSPADM

## 2017-03-21 RX ORDER — FAMOTIDINE 20 MG/1
20 TABLET, FILM COATED ORAL NIGHTLY
Status: DISCONTINUED | OUTPATIENT
Start: 2017-03-21 | End: 2017-03-22 | Stop reason: HOSPADM

## 2017-03-21 RX ORDER — ISOSORBIDE MONONITRATE 30 MG/1
30 TABLET, EXTENDED RELEASE ORAL
Status: DISCONTINUED | OUTPATIENT
Start: 2017-03-21 | End: 2017-03-22 | Stop reason: HOSPADM

## 2017-03-21 RX ORDER — ONDANSETRON 2 MG/ML
4 INJECTION INTRAMUSCULAR; INTRAVENOUS EVERY 6 HOURS PRN
Status: DISCONTINUED | OUTPATIENT
Start: 2017-03-21 | End: 2017-03-22 | Stop reason: HOSPADM

## 2017-03-21 RX ORDER — IPRATROPIUM BROMIDE AND ALBUTEROL SULFATE 2.5; .5 MG/3ML; MG/3ML
1 SOLUTION RESPIRATORY (INHALATION)
Status: DISCONTINUED | OUTPATIENT
Start: 2017-03-21 | End: 2017-03-22 | Stop reason: HOSPADM

## 2017-03-21 RX ORDER — ALBUTEROL SULFATE 2.5 MG/3ML
2.5 SOLUTION RESPIRATORY (INHALATION) EVERY 4 HOURS
Status: DISCONTINUED | OUTPATIENT
Start: 2017-03-21 | End: 2017-03-21

## 2017-03-21 RX ORDER — MORPHINE SULFATE 4 MG/ML
4 INJECTION, SOLUTION INTRAMUSCULAR; INTRAVENOUS
Status: DISCONTINUED | OUTPATIENT
Start: 2017-03-21 | End: 2017-03-22 | Stop reason: HOSPADM

## 2017-03-21 RX ORDER — ACETAMINOPHEN 325 MG/1
650 TABLET ORAL EVERY 6 HOURS PRN
Status: DISCONTINUED | OUTPATIENT
Start: 2017-03-21 | End: 2017-03-22 | Stop reason: HOSPADM

## 2017-03-21 RX ORDER — NICOTINE POLACRILEX 4 MG
15 LOZENGE BUCCAL PRN
Status: CANCELLED | OUTPATIENT
Start: 2017-03-21

## 2017-03-21 RX ORDER — DEXTROSE MONOHYDRATE 50 MG/ML
100 INJECTION, SOLUTION INTRAVENOUS PRN
Status: CANCELLED | OUTPATIENT
Start: 2017-03-21

## 2017-03-21 RX ORDER — AMLODIPINE BESYLATE 10 MG/1
10 TABLET ORAL DAILY
Status: DISCONTINUED | OUTPATIENT
Start: 2017-03-21 | End: 2017-03-22 | Stop reason: HOSPADM

## 2017-03-21 RX ORDER — CLONIDINE HYDROCHLORIDE 0.2 MG/1
0.4 TABLET ORAL 2 TIMES DAILY
Status: DISCONTINUED | OUTPATIENT
Start: 2017-03-21 | End: 2017-03-22 | Stop reason: HOSPADM

## 2017-03-21 RX ORDER — FOLIC ACID/VIT B COMPLEX AND C 0.8 MG
1 TABLET ORAL DAILY
Status: DISCONTINUED | OUTPATIENT
Start: 2017-03-21 | End: 2017-03-22 | Stop reason: HOSPADM

## 2017-03-21 RX ORDER — LEVOFLOXACIN 250 MG/1
250 TABLET ORAL EVERY OTHER DAY
Qty: 4 TABLET | Refills: 0 | Status: SHIPPED | OUTPATIENT
Start: 2017-03-21 | End: 2017-03-28

## 2017-03-21 RX ORDER — MORPHINE SULFATE 2 MG/ML
2 INJECTION, SOLUTION INTRAMUSCULAR; INTRAVENOUS
Status: DISCONTINUED | OUTPATIENT
Start: 2017-03-21 | End: 2017-03-22 | Stop reason: HOSPADM

## 2017-03-21 RX ORDER — SODIUM CHLORIDE 0.9 % (FLUSH) 0.9 %
10 SYRINGE (ML) INJECTION PRN
Status: DISCONTINUED | OUTPATIENT
Start: 2017-03-21 | End: 2017-03-22 | Stop reason: HOSPADM

## 2017-03-21 RX ORDER — CINACALCET 30 MG/1
120 TABLET, FILM COATED ORAL DAILY
Status: DISCONTINUED | OUTPATIENT
Start: 2017-03-21 | End: 2017-03-22 | Stop reason: HOSPADM

## 2017-03-21 RX ORDER — ALBUTEROL SULFATE 2.5 MG/3ML
2.5 SOLUTION RESPIRATORY (INHALATION) 2 TIMES DAILY
Status: DISCONTINUED | OUTPATIENT
Start: 2017-03-22 | End: 2017-03-22 | Stop reason: HOSPADM

## 2017-03-21 RX ORDER — DEXTROSE MONOHYDRATE 25 G/50ML
12.5 INJECTION, SOLUTION INTRAVENOUS PRN
Status: CANCELLED | OUTPATIENT
Start: 2017-03-21

## 2017-03-21 RX ORDER — ISOSORBIDE MONONITRATE 30 MG/1
30 TABLET, EXTENDED RELEASE ORAL
Status: DISCONTINUED | OUTPATIENT
Start: 2017-03-22 | End: 2017-03-22 | Stop reason: HOSPADM

## 2017-03-21 RX ORDER — HEPARIN SODIUM 5000 [USP'U]/ML
5000 INJECTION, SOLUTION INTRAVENOUS; SUBCUTANEOUS EVERY 8 HOURS SCHEDULED
Status: DISCONTINUED | OUTPATIENT
Start: 2017-03-21 | End: 2017-03-22 | Stop reason: HOSPADM

## 2017-03-21 RX ORDER — ATORVASTATIN CALCIUM 20 MG/1
20 TABLET, FILM COATED ORAL NIGHTLY
Status: DISCONTINUED | OUTPATIENT
Start: 2017-03-21 | End: 2017-03-22 | Stop reason: HOSPADM

## 2017-03-21 RX ORDER — LOSARTAN POTASSIUM 50 MG/1
50 TABLET ORAL 2 TIMES DAILY
Status: DISCONTINUED | OUTPATIENT
Start: 2017-03-21 | End: 2017-03-22 | Stop reason: HOSPADM

## 2017-03-21 RX ADMIN — AZTREONAM 1 G: 1 INJECTION, POWDER, LYOPHILIZED, FOR SOLUTION INTRAMUSCULAR; INTRAVENOUS at 01:26

## 2017-03-21 RX ADMIN — ASPIRIN 325 MG: 325 TABLET, DELAYED RELEASE ORAL at 09:31

## 2017-03-21 RX ADMIN — ATORVASTATIN CALCIUM 20 MG: 20 TABLET, FILM COATED ORAL at 20:49

## 2017-03-21 RX ADMIN — CLONIDINE HYDROCHLORIDE 0.4 MG: 0.2 TABLET ORAL at 01:28

## 2017-03-21 RX ADMIN — AZTREONAM 1 G: 1 INJECTION, POWDER, LYOPHILIZED, FOR SOLUTION INTRAMUSCULAR; INTRAVENOUS at 18:11

## 2017-03-21 RX ADMIN — FAMOTIDINE 20 MG: 20 TABLET ORAL at 01:28

## 2017-03-21 RX ADMIN — SODIUM BICARBONATE 1300 MG: 650 TABLET, ORALLY DISINTEGRATING ORAL at 20:49

## 2017-03-21 RX ADMIN — LOSARTAN POTASSIUM 50 MG: 50 TABLET, FILM COATED ORAL at 01:28

## 2017-03-21 RX ADMIN — AZTREONAM 1 G: 1 INJECTION, POWDER, LYOPHILIZED, FOR SOLUTION INTRAMUSCULAR; INTRAVENOUS at 09:45

## 2017-03-21 RX ADMIN — SODIUM BICARBONATE 1300 MG: 650 TABLET, ORALLY DISINTEGRATING ORAL at 09:32

## 2017-03-21 RX ADMIN — METOPROLOL TARTRATE 25 MG: 25 TABLET ORAL at 01:28

## 2017-03-21 RX ADMIN — INSULIN LISPRO 2 UNITS: 100 INJECTION, SOLUTION INTRAVENOUS; SUBCUTANEOUS at 12:51

## 2017-03-21 RX ADMIN — FAMOTIDINE 20 MG: 20 TABLET ORAL at 20:49

## 2017-03-21 RX ADMIN — HEPARIN SODIUM 5000 UNITS: 5000 INJECTION, SOLUTION INTRAVENOUS; SUBCUTANEOUS at 01:29

## 2017-03-21 RX ADMIN — LOSARTAN POTASSIUM 50 MG: 50 TABLET, FILM COATED ORAL at 20:49

## 2017-03-21 RX ADMIN — Medication 1 TABLET: at 09:32

## 2017-03-21 RX ADMIN — Medication 10 ML: at 09:29

## 2017-03-21 RX ADMIN — IPRATROPIUM BROMIDE AND ALBUTEROL SULFATE 1 AMPULE: .5; 3 SOLUTION RESPIRATORY (INHALATION) at 11:20

## 2017-03-21 RX ADMIN — SODIUM BICARBONATE 1300 MG: 650 TABLET, ORALLY DISINTEGRATING ORAL at 13:27

## 2017-03-21 RX ADMIN — METOPROLOL TARTRATE 25 MG: 25 TABLET ORAL at 20:49

## 2017-03-21 RX ADMIN — ACETAMINOPHEN 650 MG: 325 TABLET ORAL at 18:18

## 2017-03-21 RX ADMIN — CLONIDINE HYDROCHLORIDE 0.4 MG: 0.2 TABLET ORAL at 20:49

## 2017-03-21 RX ADMIN — CINACALCET HYDROCHLORIDE 120 MG: 30 TABLET, COATED ORAL at 09:32

## 2017-03-21 RX ADMIN — ISOSORBIDE MONONITRATE 30 MG: 30 TABLET ORAL at 18:12

## 2017-03-21 RX ADMIN — HEPARIN SODIUM 5000 UNITS: 5000 INJECTION, SOLUTION INTRAVENOUS; SUBCUTANEOUS at 09:31

## 2017-03-21 ASSESSMENT — PAIN SCALES - GENERAL
PAINLEVEL_OUTOF10: 6
PAINLEVEL_OUTOF10: 5

## 2017-03-22 ENCOUNTER — EPISODE CHANGES (OUTPATIENT)
Dept: CASE MANAGEMENT | Age: 72
End: 2017-03-22

## 2017-03-22 ENCOUNTER — CARE COORDINATION (OUTPATIENT)
Dept: CASE MANAGEMENT | Age: 72
End: 2017-03-22

## 2017-03-22 ENCOUNTER — HOSPITAL ENCOUNTER (OUTPATIENT)
Facility: MEDICAL CENTER | Age: 72
End: 2017-03-22
Payer: COMMERCIAL

## 2017-03-23 ENCOUNTER — HOSPITAL ENCOUNTER (OUTPATIENT)
Age: 72
Discharge: HOME | End: 2017-03-23
Payer: COMMERCIAL

## 2017-03-23 ENCOUNTER — OFFICE VISIT (OUTPATIENT)
Dept: ONCOLOGY | Age: 72
End: 2017-03-23
Payer: COMMERCIAL

## 2017-03-23 ENCOUNTER — TELEPHONE (OUTPATIENT)
Dept: ONCOLOGY | Age: 72
End: 2017-03-23

## 2017-03-23 ENCOUNTER — TELEPHONE (OUTPATIENT)
Dept: PHARMACY | Age: 72
End: 2017-03-23

## 2017-03-23 VITALS
BODY MASS INDEX: 21.9 KG/M2 | TEMPERATURE: 98 F | WEIGHT: 144 LBS | RESPIRATION RATE: 18 BRPM | HEART RATE: 58 BPM | SYSTOLIC BLOOD PRESSURE: 112 MMHG | DIASTOLIC BLOOD PRESSURE: 61 MMHG

## 2017-03-23 DIAGNOSIS — D50.9 IRON DEFICIENCY ANEMIA, UNSPECIFIED IRON DEFICIENCY ANEMIA TYPE: ICD-10-CM

## 2017-03-23 DIAGNOSIS — D63.8 ANEMIA OF CHRONIC DISEASE: Primary | ICD-10-CM

## 2017-03-23 LAB
EKG ATRIAL RATE: 102 BPM
EKG P AXIS: 74 DEGREES
EKG P-R INTERVAL: 130 MS
EKG Q-T INTERVAL: 410 MS
EKG QRS DURATION: 70 MS
EKG QTC CALCULATION (BAZETT): 534 MS
EKG R AXIS: 50 DEGREES
EKG T AXIS: 82 DEGREES
EKG VENTRICULAR RATE: 102 BPM

## 2017-03-23 PROCEDURE — 99211 OFF/OP EST MAY X REQ PHY/QHP: CPT | Performed by: INTERNAL MEDICINE

## 2017-03-23 PROCEDURE — 99214 OFFICE O/P EST MOD 30 MIN: CPT | Performed by: INTERNAL MEDICINE

## 2017-03-24 ENCOUNTER — HOSPITAL ENCOUNTER (OUTPATIENT)
Age: 72
Discharge: HOME | End: 2017-03-24
Payer: COMMERCIAL

## 2017-03-24 ENCOUNTER — HOSPITAL ENCOUNTER (OUTPATIENT)
Dept: MAMMOGRAPHY | Age: 72
Discharge: HOME | End: 2017-03-24
Payer: COMMERCIAL

## 2017-03-24 DIAGNOSIS — Z12.31 ENCOUNTER FOR SCREENING MAMMOGRAM FOR MALIGNANT NEOPLASM OF BREAST: ICD-10-CM

## 2017-03-24 DIAGNOSIS — D63.8 ANEMIA OF CHRONIC DISEASE: ICD-10-CM

## 2017-03-24 DIAGNOSIS — D50.9 IRON DEFICIENCY ANEMIA, UNSPECIFIED IRON DEFICIENCY ANEMIA TYPE: ICD-10-CM

## 2017-03-24 LAB
ABSOLUTE EOS #: 0.1 K/UL (ref 0–0.4)
ABSOLUTE LYMPH #: 0.9 K/UL (ref 1–4.8)
ABSOLUTE MONO #: 0.4 K/UL (ref 0.2–0.8)
BASOPHILS # BLD: 0 % (ref 0–2)
BASOPHILS ABSOLUTE: 0 K/UL (ref 0–0.2)
DIFFERENTIAL TYPE: ABNORMAL
EOSINOPHILS RELATIVE PERCENT: 1 % (ref 1–4)
HCT VFR BLD CALC: 27 % (ref 36–46)
HEMOGLOBIN: 8.8 G/DL (ref 12–16)
IRON SATURATION: 23 % (ref 20–55)
IRON: 40 UG/DL (ref 37–145)
LYMPHOCYTES # BLD: 16 % (ref 24–44)
MCH RBC QN AUTO: 30.7 PG (ref 26–34)
MCHC RBC AUTO-ENTMCNC: 32.5 G/DL (ref 31–37)
MCV RBC AUTO: 94.4 FL (ref 80–100)
MONOCYTES # BLD: 6 % (ref 1–7)
PDW BLD-RTO: 22.1 % (ref 11.5–14.5)
PLATELET # BLD: 185 K/UL (ref 130–400)
PLATELET ESTIMATE: ABNORMAL
PMV BLD AUTO: 9 FL (ref 6–12)
RBC # BLD: 2.86 M/UL (ref 4–5.2)
RBC # BLD: ABNORMAL 10*6/UL
SEG NEUTROPHILS: 77 % (ref 36–66)
SEGMENTED NEUTROPHILS ABSOLUTE COUNT: 4.7 K/UL (ref 1.8–7.7)
TOTAL IRON BINDING CAPACITY: 176 UG/DL (ref 250–450)
UNSATURATED IRON BINDING CAPACITY: 136 UG/DL (ref 112–347)
WBC # BLD: 6.1 K/UL (ref 3.5–11)
WBC # BLD: ABNORMAL 10*3/UL

## 2017-03-24 PROCEDURE — 85025 COMPLETE CBC W/AUTO DIFF WBC: CPT

## 2017-03-24 PROCEDURE — 83550 IRON BINDING TEST: CPT

## 2017-03-24 PROCEDURE — 36415 COLL VENOUS BLD VENIPUNCTURE: CPT

## 2017-03-24 PROCEDURE — 83540 ASSAY OF IRON: CPT

## 2017-03-24 PROCEDURE — G0202 SCR MAMMO BI INCL CAD: HCPCS

## 2017-03-26 LAB
CULTURE: NORMAL
Lab: NORMAL
Lab: NORMAL
SPECIMEN DESCRIPTION: NORMAL
STATUS: NORMAL
STATUS: NORMAL

## 2017-03-27 ENCOUNTER — CARE COORDINATION (OUTPATIENT)
Dept: CASE MANAGEMENT | Age: 72
End: 2017-03-27

## 2017-03-31 ENCOUNTER — OFFICE VISIT (OUTPATIENT)
Dept: FAMILY MEDICINE CLINIC | Age: 72
End: 2017-03-31
Payer: COMMERCIAL

## 2017-03-31 ENCOUNTER — CARE COORDINATION (OUTPATIENT)
Dept: CASE MANAGEMENT | Age: 72
End: 2017-03-31

## 2017-03-31 VITALS
TEMPERATURE: 98.4 F | HEIGHT: 68 IN | HEART RATE: 85 BPM | WEIGHT: 140.2 LBS | BODY MASS INDEX: 21.25 KG/M2 | SYSTOLIC BLOOD PRESSURE: 134 MMHG | DIASTOLIC BLOOD PRESSURE: 63 MMHG

## 2017-03-31 DIAGNOSIS — Z71.6 TOBACCO ABUSE COUNSELING: Primary | ICD-10-CM

## 2017-03-31 PROCEDURE — 99495 TRANSJ CARE MGMT MOD F2F 14D: CPT | Performed by: STUDENT IN AN ORGANIZED HEALTH CARE EDUCATION/TRAINING PROGRAM

## 2017-03-31 RX ORDER — ZINC SULFATE 50(220)MG
220 CAPSULE ORAL DAILY
COMMUNITY
End: 2018-04-10

## 2017-03-31 ASSESSMENT — ENCOUNTER SYMPTOMS
RHINORRHEA: 0
DIARRHEA: 0
SHORTNESS OF BREATH: 0
COUGH: 1
VOMITING: 0
CONSTIPATION: 0
SINUS PRESSURE: 0
ANAL BLEEDING: 0

## 2017-04-03 ENCOUNTER — CARE COORDINATION (OUTPATIENT)
Dept: CASE MANAGEMENT | Age: 72
End: 2017-04-03

## 2017-04-03 RX ORDER — NICOTINE 21 MG/24HR
1 PATCH, TRANSDERMAL 24 HOURS TRANSDERMAL EVERY 24 HOURS
Qty: 14 PATCH | Refills: 0 | Status: SHIPPED | OUTPATIENT
Start: 2017-04-03 | End: 2018-02-01

## 2017-04-07 ENCOUNTER — CARE COORDINATION (OUTPATIENT)
Dept: CASE MANAGEMENT | Age: 72
End: 2017-04-07

## 2017-04-10 ENCOUNTER — OFFICE VISIT (OUTPATIENT)
Dept: GASTROENTEROLOGY | Age: 72
End: 2017-04-10
Payer: COMMERCIAL

## 2017-04-10 ENCOUNTER — TELEPHONE (OUTPATIENT)
Dept: GASTROENTEROLOGY | Age: 72
End: 2017-04-10

## 2017-04-10 VITALS
HEIGHT: 66 IN | OXYGEN SATURATION: 96 % | WEIGHT: 137.6 LBS | TEMPERATURE: 98.1 F | BODY MASS INDEX: 22.11 KG/M2 | RESPIRATION RATE: 12 BRPM | DIASTOLIC BLOOD PRESSURE: 67 MMHG | SYSTOLIC BLOOD PRESSURE: 105 MMHG | HEART RATE: 67 BPM

## 2017-04-10 DIAGNOSIS — D50.8 OTHER IRON DEFICIENCY ANEMIA: Primary | ICD-10-CM

## 2017-04-10 DIAGNOSIS — K29.00 OTHER ACUTE GASTRITIS: ICD-10-CM

## 2017-04-10 DIAGNOSIS — K63.5 COLON POLYP: ICD-10-CM

## 2017-04-10 PROCEDURE — 99214 OFFICE O/P EST MOD 30 MIN: CPT | Performed by: INTERNAL MEDICINE

## 2017-04-10 RX ORDER — OMEPRAZOLE 20 MG/1
20 CAPSULE, DELAYED RELEASE ORAL DAILY
COMMUNITY
End: 2017-05-30 | Stop reason: SDUPTHER

## 2017-04-10 ASSESSMENT — ENCOUNTER SYMPTOMS
BACK PAIN: 1
NAUSEA: 0
CONSTIPATION: 0
SHORTNESS OF BREATH: 0
SORE THROAT: 0
BLOOD IN STOOL: 0
VOMITING: 0
GASTROINTESTINAL NEGATIVE: 1
CHOKING: 0
SINUS PRESSURE: 1
ALLERGIC/IMMUNOLOGIC NEGATIVE: 1
COUGH: 1
RECTAL PAIN: 0
ABDOMINAL PAIN: 0
ANAL BLEEDING: 0
TROUBLE SWALLOWING: 0
ABDOMINAL DISTENTION: 0
DIARRHEA: 0

## 2017-05-02 RX ORDER — ATORVASTATIN CALCIUM 20 MG/1
TABLET, FILM COATED ORAL
Qty: 30 TABLET | Refills: 0 | Status: SHIPPED | OUTPATIENT
Start: 2017-05-02 | End: 2018-01-02 | Stop reason: SDUPTHER

## 2017-05-25 PROBLEM — J20.9 ACUTE BRONCHITIS DUE TO INFECTION: Status: RESOLVED | Noted: 2017-03-21 | Resolved: 2017-05-25

## 2017-05-31 RX ORDER — OMEPRAZOLE 20 MG/1
CAPSULE, DELAYED RELEASE ORAL
Qty: 30 CAPSULE | Refills: 0 | Status: SHIPPED | OUTPATIENT
Start: 2017-05-31 | End: 2017-07-20 | Stop reason: SDUPTHER

## 2017-06-09 ENCOUNTER — OFFICE VISIT (OUTPATIENT)
Dept: FAMILY MEDICINE CLINIC | Age: 72
End: 2017-06-09
Payer: COMMERCIAL

## 2017-06-09 VITALS
TEMPERATURE: 96.3 F | HEART RATE: 60 BPM | OXYGEN SATURATION: 100 % | WEIGHT: 146 LBS | DIASTOLIC BLOOD PRESSURE: 80 MMHG | SYSTOLIC BLOOD PRESSURE: 180 MMHG | BODY MASS INDEX: 23.57 KG/M2

## 2017-06-09 DIAGNOSIS — J43.9 PULMONARY EMPHYSEMA, UNSPECIFIED EMPHYSEMA TYPE (HCC): Primary | ICD-10-CM

## 2017-06-09 DIAGNOSIS — Z72.0 TOBACCO ABUSE: ICD-10-CM

## 2017-06-09 DIAGNOSIS — D50.8 OTHER IRON DEFICIENCY ANEMIA: ICD-10-CM

## 2017-06-09 DIAGNOSIS — I65.22 STENOSIS OF LEFT CAROTID ARTERY: ICD-10-CM

## 2017-06-09 PROCEDURE — 99213 OFFICE O/P EST LOW 20 MIN: CPT | Performed by: STUDENT IN AN ORGANIZED HEALTH CARE EDUCATION/TRAINING PROGRAM

## 2017-06-09 RX ORDER — FLUTICASONE FUROATE AND VILANTEROL 100; 25 UG/1; UG/1
1 POWDER RESPIRATORY (INHALATION) DAILY
Qty: 60 EACH | Refills: 2 | Status: SHIPPED | OUTPATIENT
Start: 2017-06-09 | End: 2017-11-25 | Stop reason: SDUPTHER

## 2017-06-09 ASSESSMENT — ENCOUNTER SYMPTOMS
PHOTOPHOBIA: 0
SHORTNESS OF BREATH: 1
COUGH: 1
NAUSEA: 0
VOMITING: 0
EYE PAIN: 0
WHEEZING: 0

## 2017-06-14 ENCOUNTER — HOSPITAL ENCOUNTER (OUTPATIENT)
Facility: MEDICAL CENTER | Age: 72
End: 2017-06-14
Payer: COMMERCIAL

## 2017-06-15 ENCOUNTER — OFFICE VISIT (OUTPATIENT)
Dept: ONCOLOGY | Age: 72
End: 2017-06-15
Payer: COMMERCIAL

## 2017-06-15 ENCOUNTER — TELEPHONE (OUTPATIENT)
Dept: ONCOLOGY | Age: 72
End: 2017-06-15

## 2017-06-15 VITALS
BODY MASS INDEX: 23.29 KG/M2 | RESPIRATION RATE: 20 BRPM | SYSTOLIC BLOOD PRESSURE: 184 MMHG | WEIGHT: 144.3 LBS | HEART RATE: 60 BPM | DIASTOLIC BLOOD PRESSURE: 88 MMHG | TEMPERATURE: 98.2 F

## 2017-06-15 DIAGNOSIS — D50.9 IRON DEFICIENCY ANEMIA, UNSPECIFIED IRON DEFICIENCY ANEMIA TYPE: Primary | ICD-10-CM

## 2017-06-15 PROCEDURE — 99214 OFFICE O/P EST MOD 30 MIN: CPT | Performed by: INTERNAL MEDICINE

## 2017-06-15 PROCEDURE — 99211 OFF/OP EST MAY X REQ PHY/QHP: CPT

## 2017-06-15 PROCEDURE — G0463 HOSPITAL OUTPT CLINIC VISIT: HCPCS

## 2017-07-20 DIAGNOSIS — M17.0 PRIMARY OSTEOARTHRITIS OF BOTH KNEES: ICD-10-CM

## 2017-07-20 RX ORDER — OMEPRAZOLE 20 MG/1
CAPSULE, DELAYED RELEASE ORAL
Qty: 30 CAPSULE | Refills: 0 | Status: SHIPPED | OUTPATIENT
Start: 2017-07-20 | End: 2017-10-27 | Stop reason: SDUPTHER

## 2017-07-20 RX ORDER — ACETAMINOPHEN 500 MG
TABLET ORAL
Qty: 90 TABLET | Refills: 0 | Status: SHIPPED | OUTPATIENT
Start: 2017-07-20 | End: 2017-08-15 | Stop reason: SDUPTHER

## 2017-08-15 ENCOUNTER — OFFICE VISIT (OUTPATIENT)
Dept: FAMILY MEDICINE CLINIC | Age: 72
End: 2017-08-15
Payer: COMMERCIAL

## 2017-08-15 ENCOUNTER — TELEPHONE (OUTPATIENT)
Dept: PHARMACY | Age: 72
End: 2017-08-15

## 2017-08-15 VITALS
TEMPERATURE: 98 F | WEIGHT: 145 LBS | DIASTOLIC BLOOD PRESSURE: 69 MMHG | BODY MASS INDEX: 23.4 KG/M2 | HEART RATE: 69 BPM | SYSTOLIC BLOOD PRESSURE: 139 MMHG

## 2017-08-15 DIAGNOSIS — I10 ESSENTIAL HYPERTENSION: Primary | ICD-10-CM

## 2017-08-15 DIAGNOSIS — Z76.0 MEDICATION REFILL: ICD-10-CM

## 2017-08-15 DIAGNOSIS — D63.8 ANEMIA OF CHRONIC DISEASE: ICD-10-CM

## 2017-08-15 DIAGNOSIS — J43.9 PULMONARY EMPHYSEMA, UNSPECIFIED EMPHYSEMA TYPE (HCC): ICD-10-CM

## 2017-08-15 PROCEDURE — 99213 OFFICE O/P EST LOW 20 MIN: CPT | Performed by: STUDENT IN AN ORGANIZED HEALTH CARE EDUCATION/TRAINING PROGRAM

## 2017-08-15 RX ORDER — ACETAMINOPHEN 500 MG
TABLET ORAL
Qty: 90 TABLET | Refills: 0 | Status: SHIPPED | OUTPATIENT
Start: 2017-08-15 | End: 2017-10-27 | Stop reason: SDUPTHER

## 2017-08-15 ASSESSMENT — ENCOUNTER SYMPTOMS
NAUSEA: 0
VOMITING: 0
PHOTOPHOBIA: 0
WHEEZING: 0
SHORTNESS OF BREATH: 0
ABDOMINAL PAIN: 0

## 2017-08-15 ASSESSMENT — PATIENT HEALTH QUESTIONNAIRE - PHQ9
2. FEELING DOWN, DEPRESSED OR HOPELESS: 0
1. LITTLE INTEREST OR PLEASURE IN DOING THINGS: 0
SUM OF ALL RESPONSES TO PHQ9 QUESTIONS 1 & 2: 0
SUM OF ALL RESPONSES TO PHQ QUESTIONS 1-9: 0

## 2017-08-17 ENCOUNTER — TELEPHONE (OUTPATIENT)
Dept: PHARMACY | Age: 72
End: 2017-08-17

## 2017-09-26 ENCOUNTER — TELEPHONE (OUTPATIENT)
Dept: ADMINISTRATIVE | Age: 72
End: 2017-09-26

## 2017-10-27 DIAGNOSIS — Z76.0 MEDICATION REFILL: ICD-10-CM

## 2017-10-31 RX ORDER — ACETAMINOPHEN 500 MG
TABLET ORAL
Qty: 90 TABLET | Refills: 0 | Status: SHIPPED | OUTPATIENT
Start: 2017-10-31 | End: 2018-03-02 | Stop reason: SDUPTHER

## 2017-10-31 RX ORDER — OMEPRAZOLE 20 MG/1
CAPSULE, DELAYED RELEASE ORAL
Qty: 30 CAPSULE | Refills: 3 | Status: SHIPPED | OUTPATIENT
Start: 2017-10-31 | End: 2018-03-01 | Stop reason: SDUPTHER

## 2017-11-09 ENCOUNTER — OFFICE VISIT (OUTPATIENT)
Dept: FAMILY MEDICINE CLINIC | Age: 72
End: 2017-11-09
Payer: COMMERCIAL

## 2017-11-09 VITALS
TEMPERATURE: 98.8 F | SYSTOLIC BLOOD PRESSURE: 150 MMHG | BODY MASS INDEX: 23.98 KG/M2 | WEIGHT: 148.6 LBS | HEART RATE: 76 BPM | DIASTOLIC BLOOD PRESSURE: 78 MMHG

## 2017-11-09 DIAGNOSIS — I10 ESSENTIAL HYPERTENSION: Primary | ICD-10-CM

## 2017-11-09 DIAGNOSIS — N18.6 END STAGE RENAL DISEASE (HCC): ICD-10-CM

## 2017-11-09 DIAGNOSIS — D50.8 OTHER IRON DEFICIENCY ANEMIA: ICD-10-CM

## 2017-11-09 DIAGNOSIS — Z72.0 TOBACCO ABUSE: ICD-10-CM

## 2017-11-09 PROCEDURE — 99213 OFFICE O/P EST LOW 20 MIN: CPT | Performed by: STUDENT IN AN ORGANIZED HEALTH CARE EDUCATION/TRAINING PROGRAM

## 2017-11-09 ASSESSMENT — ENCOUNTER SYMPTOMS
BLOOD IN STOOL: 0
COUGH: 0
ABDOMINAL PAIN: 0
SHORTNESS OF BREATH: 0

## 2017-11-09 NOTE — PROGRESS NOTES
Subjective: Amelia Ayers is a 81 y.o. female with  has a past medical history of Anemia; Asthma; Atrial fibrillation ([address] Breast cancer ([address] Bursitis; Carpal tunnel syndrome; Disease of blood and blood forming organ; History of blood transfusion; Hypertension; Irregular heart beat; Osteoarthritis; Pneumonia; Pulmonary emphysema ([address] Renal failure; Stenosis of left carotid artery; and Tobacco abuse. Family History   Problem Relation Age of Onset    Cancer Father     Diabetes Sister        Presented to the office today for:  Chief Complaint   Patient presents with    Hypertension     patient states she take her b/p medication every morning       HPI    HTN: uncontrolled  Patient states compliance with all 5 medications  Follows with nephrology is on dialysis Tuesdays and Saturdays  States that nephrology does not want to change BP medications because she gets hypotensive during dialysis  Asymptomatic    Continues to follow with heme/onc for anemia  Follows with GI for evaluation of possible lower GI bleed history: currently asymptomatic  Follows with vascular surgery: Dr. Braeden Azul for hx of dvt    Review of Systems   Constitutional: Negative for chills, fatigue and fever. Eyes: Negative for visual disturbance. Respiratory: Negative for cough and shortness of breath. Cardiovascular: Negative for chest pain and leg swelling. Gastrointestinal: Negative for abdominal pain and blood in stool. Genitourinary: Negative for dysuria and flank pain. Musculoskeletal: Positive for arthralgias. Neurological: Negative for headaches. Objective:    BP (!) 201/80   Pulse 76   Temp 98.8 °F (37.1 °C) (Temporal)   Wt 148 lb 9.6 oz (67.4 kg)   BMI 23.98 kg/m²    BP Readings from Last 3 Encounters:   11/09/17 (!) 201/80   08/15/17 139/69   06/15/17 (!) 184/88     Physical Exam   Constitutional: She appears well-developed and well-nourished. No distress. HENT:   Head: Normocephalic and atraumatic. Cardiovascular: Normal rate, regular rhythm and normal heart sounds. No murmur heard. Pulses:       Radial pulses are 2+ on the right side, and 2+ on the left side. Femoral pulses are 2+ on the right side, and 2+ on the left side. Dorsalis pedis pulses are 1+ on the right side, and 1+ on the left side. Posterior tibial pulses are 1+ on the right side, and 1+ on the left side. Pulmonary/Chest: Effort normal. She has no wheezes. Abdominal: Soft. There is no tenderness. Musculoskeletal: She exhibits no edema. Lab Results   Component Value Date    WBC 6.1 03/24/2017    HGB 8.8 (L) 03/24/2017    HCT 27.0 (L) 03/24/2017     03/24/2017    CHOL 168 03/30/2015    TRIG 77 03/30/2015    HDL 59 03/30/2015    ALT 22 03/20/2017    AST 26 03/20/2017     03/21/2017    K 4.3 03/21/2017     03/21/2017    CREATININE 4.93 (H) 03/21/2017    BUN 42 (H) 03/21/2017    CO2 22 03/21/2017    TSH 3.07 02/18/2017    INR 1.1 03/08/2017    LABA1C 4.6 08/07/2016     Lab Results   Component Value Date    CALCIUM 8.6 03/21/2017    PHOS 1.8 (L) 03/20/2017     Lab Results   Component Value Date    LDLCHOLESTEROL 94 03/30/2015       Assessment and Plan:    1. Essential hypertension  - Relatively well-controlled  - Management per nephrology    2. Tobacco abuse  - counseled on cessation    3. End stage renal disease ([address]  - Dialysis bi-weekly    4. Other iron deficiency anemia  - Following with heme/onc  - no evidence of active bleed per patient    Received tetanus and flu shot at dialysis      Requested Prescriptions      No prescriptions requested or ordered in this encounter       There are no discontinued medications. No Follow-up on file. Jesusita Carlos received counseling on the following healthy behaviors: nutrition, exercise, medication adherence and tobacco cessation  Reviewed prior labs and health maintenance. Continue current medications, diet and exercise.   Discussed use, benefit, and side effects of prescribed medications. Barriers to medication compliance addressed. Patient given educational materials - see patient instructions. All patient questions answered. Patient voiced understanding.

## 2017-11-25 DIAGNOSIS — J43.9 PULMONARY EMPHYSEMA, UNSPECIFIED EMPHYSEMA TYPE (HCC): ICD-10-CM

## 2017-11-27 NOTE — TELEPHONE ENCOUNTER
Please address the medication refill and close the encounter. If I can be of assistance, please route to the applicable pool. Thank you.   Next Visit Date:  Future Appointments  Date Time Provider Sue Calzada   12/7/2017 11:00 AM SCHEDULE, [facility]   12/14/2017 3:00 PM MD STEVE [facility]   4/12/2018 1:15 PM Nery Moreira MD [facility] Maintenance   Topic Date Due    DTaP/Tdap/Td vaccine (1 - Tdap) 07/15/1966    Flu vaccine (1) 12/29/2017 (Originally 9/1/2017)    Pneumococcal high/highest risk (1 of 2 - PCV13) 12/29/2017 (Originally 12/1/2012)    Hepatitis C screen  08/15/2022 (Originally 1945)    Colon Cancer Screen FIT/FOBT  03/07/2018    Breast cancer screen  03/24/2018    Lipid screen  03/30/2020    Zostavax vaccine  Addressed    DEXA (modify frequency per FRAX score)  Completed       Hemoglobin A1C (%)   Date Value   08/07/2016 4.6   03/30/2015 4.6             ( goal A1C is < 7)   No results found for: LABMICR  LDL Cholesterol (mg/dL)   Date Value   03/30/2015 94       (goal LDL is <100)   AST (U/L)   Date Value   03/20/2017 26     ALT (U/L)   Date Value   03/20/2017 22     BUN (mg/dL)   Date Value   03/21/2017 42 (H)     BP Readings from Last 3 Encounters:   11/09/17 (!) 150/78   08/15/17 139/69   06/15/17 (!) 184/88          (goal 120/80)    All Future Testing planned in [facility]  Lab Frequency Next Occurrence   CBC with Differential Once 01/24/2017   CBC Auto Differential     Iron and TIBC     CBC Auto Differential     Iron and TIBC     Ferritin                 Patient Active Problem List:     End stage renal disease (HCC)     Anemia     Asthma exacerbation     Malignant neoplasm of female breast (HCC)     Tobacco abuse     Elevated troponin     Non-rheumatic mitral regurgitation     Hyperglycemia     Gastrointestinal hemorrhage     NSAID long-term use     Anemia of chronic disease     Erosive gastritis
Anemia of chronic disease     Erosive gastritis     Iron deficiency anemia     Atrial fibrillation (HCC)     Hypertension     Severe anemia     Fatigue     Dark stools     Colon polyp     Pulmonary emphysema (HCC)     Stenosis of left carotid artery     Medication refill

## 2017-11-28 RX ORDER — FLUTICASONE FUROATE AND VILANTEROL TRIFENATATE 100; 25 UG/1; UG/1
POWDER RESPIRATORY (INHALATION)
Qty: 60 EACH | Refills: 3 | Status: SHIPPED | OUTPATIENT
Start: 2017-11-28 | End: 2018-03-30 | Stop reason: SDUPTHER

## 2017-11-28 RX ORDER — CLONIDINE HYDROCHLORIDE 0.2 MG/1
TABLET ORAL
Qty: 60 TABLET | Refills: 11 | Status: SHIPPED | OUTPATIENT
Start: 2017-11-28 | End: 2018-11-30 | Stop reason: SDUPTHER

## 2017-12-07 ENCOUNTER — HOSPITAL ENCOUNTER (OUTPATIENT)
Facility: MEDICAL CENTER | Age: 72
Discharge: HOME | End: 2017-12-07
Payer: COMMERCIAL

## 2017-12-07 DIAGNOSIS — D50.9 IRON DEFICIENCY ANEMIA, UNSPECIFIED IRON DEFICIENCY ANEMIA TYPE: ICD-10-CM

## 2017-12-07 LAB
ABSOLUTE EOS #: 0 K/UL (ref 0–0.4)
ABSOLUTE IMMATURE GRANULOCYTE: ABNORMAL K/UL (ref 0–0.3)
ABSOLUTE LYMPH #: 1 K/UL (ref 1–4.8)
ABSOLUTE MONO #: 0.3 K/UL (ref 0.2–0.8)
BASOPHILS # BLD: 0 % (ref 0–2)
BASOPHILS ABSOLUTE: 0 K/UL (ref 0–0.2)
DIFFERENTIAL TYPE: ABNORMAL
EOSINOPHILS RELATIVE PERCENT: 1 % (ref 1–4)
FERRITIN: 1525 UG/L (ref 13–150)
HCT VFR BLD CALC: 39.1 % (ref 36–46)
HEMOGLOBIN: 12.5 G/DL (ref 12–16)
IMMATURE GRANULOCYTES: ABNORMAL %
IRON SATURATION: 21 % (ref 20–55)
IRON: 41 UG/DL (ref 37–145)
LYMPHOCYTES # BLD: 26 % (ref 24–44)
MCH RBC QN AUTO: 29.5 PG (ref 26–34)
MCHC RBC AUTO-ENTMCNC: 32 G/DL (ref 31–37)
MCV RBC AUTO: 92.3 FL (ref 80–100)
MONOCYTES # BLD: 8 % (ref 1–7)
PDW BLD-RTO: 20.2 % (ref 11.5–14.5)
PLATELET # BLD: 247 K/UL (ref 130–400)
PLATELET ESTIMATE: ABNORMAL
PMV BLD AUTO: 8.2 FL (ref 6–12)
RBC # BLD: 4.23 M/UL (ref 4–5.2)
RBC # BLD: ABNORMAL 10*6/UL
SEG NEUTROPHILS: 65 % (ref 36–66)
SEGMENTED NEUTROPHILS ABSOLUTE COUNT: 2.5 K/UL (ref 1.8–7.7)
TOTAL IRON BINDING CAPACITY: 196 UG/DL (ref 250–450)
UNSATURATED IRON BINDING CAPACITY: 155 UG/DL (ref 112–347)
WBC # BLD: 4 K/UL (ref 3.5–11)
WBC # BLD: ABNORMAL 10*3/UL

## 2017-12-07 PROCEDURE — 83540 ASSAY OF IRON: CPT

## 2017-12-07 PROCEDURE — 36415 COLL VENOUS BLD VENIPUNCTURE: CPT

## 2017-12-07 PROCEDURE — 83550 IRON BINDING TEST: CPT

## 2017-12-07 PROCEDURE — 82728 ASSAY OF FERRITIN: CPT

## 2017-12-07 PROCEDURE — 85025 COMPLETE CBC W/AUTO DIFF WBC: CPT

## 2017-12-13 ENCOUNTER — HOSPITAL ENCOUNTER (OUTPATIENT)
Facility: MEDICAL CENTER | Age: 72
End: 2017-12-13
Payer: COMMERCIAL

## 2017-12-14 ENCOUNTER — TELEPHONE (OUTPATIENT)
Dept: ONCOLOGY | Age: 72
End: 2017-12-14

## 2017-12-14 ENCOUNTER — OFFICE VISIT (OUTPATIENT)
Dept: ONCOLOGY | Age: 72
End: 2017-12-14
Payer: COMMERCIAL

## 2017-12-14 VITALS
RESPIRATION RATE: 20 BRPM | WEIGHT: 153.8 LBS | HEART RATE: 57 BPM | BODY MASS INDEX: 24.82 KG/M2 | TEMPERATURE: 98.1 F | DIASTOLIC BLOOD PRESSURE: 70 MMHG | SYSTOLIC BLOOD PRESSURE: 157 MMHG

## 2017-12-14 DIAGNOSIS — D63.8 ANEMIA OF CHRONIC DISEASE: Primary | ICD-10-CM

## 2017-12-14 DIAGNOSIS — D50.9 IRON DEFICIENCY ANEMIA, UNSPECIFIED IRON DEFICIENCY ANEMIA TYPE: ICD-10-CM

## 2017-12-14 PROCEDURE — 99214 OFFICE O/P EST MOD 30 MIN: CPT | Performed by: INTERNAL MEDICINE

## 2017-12-14 PROCEDURE — 99211 OFF/OP EST MAY X REQ PHY/QHP: CPT

## 2017-12-15 NOTE — PROGRESS NOTES
surgery; Breast surgery; Ectopic pregnancy surgery; Upper gastrointestinal endoscopy (09/30/2016); esophagogastroduodenoscopy transoral diagnostic (N/A, 2/22/2017); colon ca scrn not hi rsk ind (N/A, 2/22/2017); Abdomen surgery; and Colonoscopy (02/22/2017). CURRENT MEDICATIONS:  has a current medication list which includes the following prescription(s): clonidine, breo ellipta, acetaminophen, omeprazole, metoprolol tartrate, atorvastatin, zinc sulfate, aspirin, folbee plus, isosorbide mononitrate, losartan, ethyl chloride, levalbuterol, lidocaine-prilocaine, sensipar, lactulose, sodium bicarbonate, amlodipine, nicotine, and nicotine. ALLERGIES:  is allergic to pcn [penicillins] and sulfa antibiotics. FAMILY HISTORY: Negative for any hematological or oncological conditions. SOCIAL HISTORY:  reports that she has been smoking Cigarettes. She has been smoking about 0.50 packs per day. She has never used smokeless tobacco. She reports that she does not drink alcohol or use drugs. REVIEW OF SYSTEMS:     · General: No weakness no fatigue. No unanticipated weight loss or decreased appetite. No fever or chills. · Eyes: No blurred vision, eye pain or double vision. · Ears: No hearing problems or drainage. No tinnitus. · Throat: No sore throat, problems with swallowing or dysphagia. · Respiratory: No cough, sputum or hemoptysis. No shortness of breath. No pleuritic chest pain. · Cardiovascular: No chest pain, orthopnea or PND. No lower extremity edema. No palpitation. · Gastrointestinal: No problems with swallowing. No abdominal pain or bloating. No nausea or vomiting. No diarrhea or constipation. No GI bleeding. · Genitourinary: No dysuria, hematuria, frequency or urgency. · Musculoskeletal: + muscle aches and pains. No limitation of movement. No back pain. No gait disturbance, No joint complaints. · Dermatologic: No skin rashes or pruritus. No skin lesions or discolorations.

## 2018-01-02 DIAGNOSIS — I10 ESSENTIAL HYPERTENSION: ICD-10-CM

## 2018-01-12 RX ORDER — ATORVASTATIN CALCIUM 20 MG/1
TABLET, FILM COATED ORAL
Qty: 30 TABLET | Refills: 11 | Status: SHIPPED | OUTPATIENT
Start: 2018-01-12 | End: 2018-12-27 | Stop reason: SDUPTHER

## 2018-02-01 ENCOUNTER — OFFICE VISIT (OUTPATIENT)
Dept: FAMILY MEDICINE CLINIC | Age: 73
End: 2018-02-01
Payer: COMMERCIAL

## 2018-02-01 VITALS
BODY MASS INDEX: 25.73 KG/M2 | WEIGHT: 159.4 LBS | HEART RATE: 67 BPM | SYSTOLIC BLOOD PRESSURE: 165 MMHG | DIASTOLIC BLOOD PRESSURE: 75 MMHG | TEMPERATURE: 98.4 F

## 2018-02-01 DIAGNOSIS — N18.6 END STAGE RENAL DISEASE (HCC): ICD-10-CM

## 2018-02-01 DIAGNOSIS — D50.8 OTHER IRON DEFICIENCY ANEMIA: ICD-10-CM

## 2018-02-01 DIAGNOSIS — Z72.0 TOBACCO ABUSE: ICD-10-CM

## 2018-02-01 DIAGNOSIS — I10 ESSENTIAL HYPERTENSION: Primary | ICD-10-CM

## 2018-02-01 DIAGNOSIS — J43.9 PULMONARY EMPHYSEMA, UNSPECIFIED EMPHYSEMA TYPE (HCC): ICD-10-CM

## 2018-02-01 DIAGNOSIS — I48.91 ATRIAL FIBRILLATION, UNSPECIFIED TYPE (HCC): ICD-10-CM

## 2018-02-01 PROCEDURE — 99214 OFFICE O/P EST MOD 30 MIN: CPT | Performed by: STUDENT IN AN ORGANIZED HEALTH CARE EDUCATION/TRAINING PROGRAM

## 2018-02-01 RX ORDER — FOLIC ACID/VIT B COMPLEX AND C 0.8 MG
TABLET ORAL
Qty: 30 TABLET | Refills: 11 | Status: SHIPPED | OUTPATIENT
Start: 2018-02-01 | End: 2019-01-24 | Stop reason: SDUPTHER

## 2018-02-01 RX ORDER — VARENICLINE TARTRATE 25 MG
KIT ORAL
Qty: 1 EACH | Refills: 1 | Status: SHIPPED | OUTPATIENT
Start: 2018-02-01 | End: 2018-05-30 | Stop reason: ALTCHOICE

## 2018-02-01 ASSESSMENT — ENCOUNTER SYMPTOMS
ABDOMINAL PAIN: 0
COUGH: 1
SHORTNESS OF BREATH: 1
WHEEZING: 0

## 2018-02-01 NOTE — PROGRESS NOTES
Subjective: Deven Velázquez is a 81 y.o. female with  has a past medical history of Anemia; Asthma; Atrial fibrillation ([address] Breast cancer ([address] Bursitis; Carpal tunnel syndrome; Disease of blood and blood forming organ; History of blood transfusion; Hypertension; Irregular heart beat; Osteoarthritis; Pneumonia; Pulmonary emphysema ([address] Renal failure; Stenosis of left carotid artery; and Tobacco abuse. Family History   Problem Relation Age of Onset    Cancer Father     Diabetes Sister        Presented to the office today for:  Chief Complaint   Patient presents with    Hypertension     patient statfes she takes her meds everyday       HPI     Anemia  Resolved now on Epogen and Aranesp  ESRD dialysis T,Th,Sat  Following with nephro and hem/onc  HTN management per nephro  Patient gets hypotensive with Dialysis: nephro allowing permissive HTN    Motivated to quit smoking  Currently smoking 1 pack per week  Interested in PO meds  No psych Hx  Pulmonary emphysema  Compliant with breo  Uses albuterol rescue inhaler PRN    Afib  Follows with [facility]  States controlled  Not on A/C because of anemia Hx      Review of Systems   Constitutional: Negative for chills and fever. Eyes: Negative for visual disturbance. Respiratory: Positive for cough and shortness of breath. Negative for wheezing. Cardiovascular: Negative for chest pain, palpitations and leg swelling. Gastrointestinal: Negative for abdominal pain. Genitourinary: Negative for dysuria. Skin: Negative for rash and wound. Neurological: Negative for light-headedness and headaches. Psychiatric/Behavioral: Negative for behavioral problems.        Objective:    BP (!) 165/75 (Site: Left Arm, Position: Sitting, Cuff Size: Medium Adult)   Pulse 67   Temp 98.4 °F (36.9 °C) (Temporal)   Wt 159 lb 6.4 oz (72.3 kg)   BMI 25.73 kg/m²    BP Readings from Last 3 Encounters:   02/01/18 (!) 165/75   12/14/17 (!) 157/70   11/09/17 (!) 150/78     Physical Exam   Constitutional: She appears well-developed and well-nourished. No distress. HENT:   Head: Normocephalic and atraumatic. Eyes: Conjunctivae and EOM are normal.   Neck: Neck supple. Cardiovascular: Normal rate, regular rhythm, normal heart sounds and intact distal pulses. No murmur heard. Pulmonary/Chest: Effort normal. No respiratory distress. She has no wheezes. Abdominal: Soft. She exhibits no distension. Skin: No rash noted. Lab Results   Component Value Date    WBC 4.0 12/07/2017    HGB 12.5 12/07/2017    HCT 39.1 12/07/2017     12/07/2017    CHOL 168 03/30/2015    TRIG 77 03/30/2015    HDL 59 03/30/2015    ALT 22 03/20/2017    AST 26 03/20/2017     03/21/2017    K 4.3 03/21/2017     03/21/2017    CREATININE 4.93 (H) 03/21/2017    BUN 42 (H) 03/21/2017    CO2 22 03/21/2017    TSH 3.07 02/18/2017    INR 1.1 03/08/2017    LABA1C 4.6 08/07/2016     Lab Results   Component Value Date    CALCIUM 8.6 03/21/2017    PHOS 1.8 (L) 03/20/2017     Lab Results   Component Value Date    LDLCHOLESTEROL 94 03/30/2015       Assessment and Plan:    1. Essential hypertension  - uncontrolled  - managed by nephology  - Problem is that patient has hypotensive episodes with dialysis  - Nephro allowing permissive htn    2. Other iron deficiency anemia  - controlled on dual EP therapy    3. Tobacco abuse  - varenicline (CHANTIX LORENA) 0.5 MG X 11 & 1 MG X 42 tablet; Days 1 to 3: 0.5 mg once daily. Days 4 to 7: 0.5 mg twice daily. Day 8 1 mg twice daily for 11 weeks  Dispense: 1 each; Refill: 1    4. End stage renal disease ([address]  - controlled  - following with nephrology  - dialysis T,Th,Sa    5. Atrial fibrillation, unspecified type (HCC)  - rate controlled  - following with [facility]  - not on A/C because of previous bleeding risk    6.  Pulmonary emphysema, unspecified emphysema type ([address]  - smoking cessation  - did not have repeat PFT  - continue maintenance inhalers and will adjust based off of

## 2018-02-01 NOTE — PROGRESS NOTES
Attending Physician Statement  I have discussed the care of Debbie Pritchett, including pertinent history and exam findings,  with the resident. I have seen and examined the patient and the key elements of all parts of the encounter have been performed by me. I agree with the assessment, plan and orders as documented by the resident.   (GC Modifier)

## 2018-02-01 NOTE — PATIENT INSTRUCTIONS
#3  Andrae Chavez MD (Faculty)  Som Smallwood MD (Faculty  Adelaspencer Murdock MD (Resident)  Wilfrid Jaramillo MD (Resident)  Pam Smith MD (Resident)  Sarahi Jesus MD (Resident)  KROY Huffr, ONELIA Hastings, ONELIA  Clintonalie Jalloh ([facility])  Bill Davalos RN, ([address] )  Isabell Mathew, Ph.D., (Behavioral Services)  Robbie Valverde, [address] [place] [address] (Clinical Pharmacist)     Office phone number: [phone]    If you need to get in right away due to illness, please be advised we have \"Same Day\" appointments available Monday-Friday. Please call us at [phone] option #1 to schedule your \"Same Day\" appointment.

## 2018-02-05 ENCOUNTER — TELEPHONE (OUTPATIENT)
Dept: GASTROENTEROLOGY | Age: 73
End: 2018-02-05

## 2018-02-05 NOTE — LETTER
[facility]  [address] [facility] [address] [facility] [address], [address], [address] [facility]  [phone]      2/7/2018      Laurie Marcos [address] [place] [id number]        Dear Skyler Felix,    We have tried to reach you by phone and have not been successful. Your appointment on 4-12-18 has been cancelled because of a change in the providers schedule,  we need you to contact our office at your earliest convenience in order to reschedule. If you have a new phone number or have moved, please update that information with our office when you call. It is our goal to provide on-going care for our patients, both present and past.  Therefore, in the event you are seeing a different physician for follow up care, please inform us so we can record this information in your medical record.     Sincerely,    Dr. Roul[facility] Mello [facility]

## 2018-02-15 ENCOUNTER — HOSPITAL ENCOUNTER (OUTPATIENT)
Dept: NEUROLOGY | Age: 73
Discharge: HOME | End: 2018-02-15
Payer: COMMERCIAL

## 2018-02-15 DIAGNOSIS — J43.9 PULMONARY EMPHYSEMA, UNSPECIFIED EMPHYSEMA TYPE (HCC): ICD-10-CM

## 2018-02-15 PROCEDURE — 94664 DEMO&/EVAL PT USE INHALER: CPT

## 2018-02-15 PROCEDURE — 6370000000 HC RX 637 (ALT 250 FOR IP): Performed by: STUDENT IN AN ORGANIZED HEALTH CARE EDUCATION/TRAINING PROGRAM

## 2018-02-15 PROCEDURE — 94729 DIFFUSING CAPACITY: CPT

## 2018-02-15 PROCEDURE — 94726 PLETHYSMOGRAPHY LUNG VOLUMES: CPT

## 2018-02-15 PROCEDURE — 94060 EVALUATION OF WHEEZING: CPT

## 2018-02-15 RX ORDER — ALBUTEROL SULFATE 90 UG/1
2 AEROSOL, METERED RESPIRATORY (INHALATION) ONCE
Status: COMPLETED | OUTPATIENT
Start: 2018-02-15 | End: 2018-02-15

## 2018-02-15 RX ADMIN — ALBUTEROL SULFATE 2 PUFF: 90 AEROSOL, METERED RESPIRATORY (INHALATION) at 14:49

## 2018-03-02 ENCOUNTER — OFFICE VISIT (OUTPATIENT)
Dept: FAMILY MEDICINE CLINIC | Age: 73
End: 2018-03-02
Payer: COMMERCIAL

## 2018-03-02 VITALS
WEIGHT: 167.2 LBS | HEART RATE: 78 BPM | SYSTOLIC BLOOD PRESSURE: 140 MMHG | BODY MASS INDEX: 26.99 KG/M2 | OXYGEN SATURATION: 98 % | TEMPERATURE: 99.7 F | DIASTOLIC BLOOD PRESSURE: 74 MMHG

## 2018-03-02 DIAGNOSIS — Z76.0 MEDICATION REFILL: ICD-10-CM

## 2018-03-02 DIAGNOSIS — I10 ESSENTIAL HYPERTENSION: ICD-10-CM

## 2018-03-02 DIAGNOSIS — J43.9 PULMONARY EMPHYSEMA, UNSPECIFIED EMPHYSEMA TYPE (HCC): Primary | ICD-10-CM

## 2018-03-02 PROCEDURE — 99213 OFFICE O/P EST LOW 20 MIN: CPT | Performed by: STUDENT IN AN ORGANIZED HEALTH CARE EDUCATION/TRAINING PROGRAM

## 2018-03-02 RX ORDER — HYDRALAZINE HYDROCHLORIDE 100 MG/1
100 TABLET, FILM COATED ORAL 3 TIMES DAILY
Qty: 60 TABLET | Refills: 3 | Status: SHIPPED | OUTPATIENT
Start: 2018-03-02 | End: 2019-05-09 | Stop reason: SDUPTHER

## 2018-03-02 RX ORDER — ACETAMINOPHEN 500 MG
TABLET ORAL
Qty: 90 TABLET | Refills: 0 | Status: SHIPPED | OUTPATIENT
Start: 2018-03-02 | End: 2018-05-02 | Stop reason: SDUPTHER

## 2018-03-02 RX ORDER — OMEPRAZOLE 20 MG/1
CAPSULE, DELAYED RELEASE ORAL
Qty: 30 CAPSULE | Refills: 3 | Status: ON HOLD | OUTPATIENT
Start: 2018-03-02 | End: 2018-09-05 | Stop reason: HOSPADM

## 2018-03-02 ASSESSMENT — ENCOUNTER SYMPTOMS
COUGH: 1
ABDOMINAL PAIN: 0
WHEEZING: 1
SHORTNESS OF BREATH: 1

## 2018-03-02 NOTE — PROGRESS NOTES
Subjective: Radha Dan is a 81 y.o. female with  has a past medical history of Anemia; Asthma; Atrial fibrillation ([address] Breast cancer ([address] Bursitis; Carpal tunnel syndrome; Disease of blood and blood forming organ; History of blood transfusion; Hypertension; Irregular heart beat; Osteoarthritis; Pneumonia; Pulmonary emphysema ([address] Renal failure; Stenosis of left carotid artery; and Tobacco abuse. Family History   Problem Relation Age of Onset    Cancer Father     Diabetes Sister        Presented to the office today for:  Chief Complaint   Patient presents with    COPD     patient states right now her copd is pretty good       HPI     MMRC dyspnea scale:  4 I am too breathless to leave the house or I am breathless when dressing    GOLD:  Group B: Low risk, more symptoms: 0 to 1 exacerbation per year and no prior hospitalization for exacerbation; and CAT score =10 or mMRC grade =2. Using Breo and proair  Still smoking: improved to 1 pack per week  Using rescue inhaler 1-2 times per week  No night time symptoms    PFT: IMPRESSION:  Moderate-to-severe obstructive pulmonary disease with mild  associated restriction and moderate diffusion impairment. Near significant  improvement was noted after bronchodilators in FVC only. Clinical  correlation is recommended. Review of Systems   Constitutional: Negative for chills, fatigue and fever. Eyes: Negative for visual disturbance. Respiratory: Positive for cough, shortness of breath and wheezing. Cardiovascular: Negative for chest pain, palpitations and leg swelling. Gastrointestinal: Negative for abdominal pain. Genitourinary: Negative for dysuria. Musculoskeletal: Positive for arthralgias. Neurological: Negative for light-headedness and headaches.        Objective:    BP (!) 140/74   Pulse 78   Temp 99.7 °F (37.6 °C) (Temporal)   Wt 167 lb 3.2 oz (75.8 kg)   SpO2 98%   BMI 26.99 kg/m²    BP Readings from Last 3 Encounters: by mouth 3 times daily       Medications Discontinued During This Encounter   Medication Reason    acetaminophen (GNP PAIN RELIEF EX-STRENGTH) 500 MG tablet Reorder       Return in about 3 months (around 6/2/2018) for copd. Tivis Prows received counseling on the following healthy behaviors: nutrition, exercise, medication adherence and tobacco cessation  Reviewed prior labs and health maintenance. Continue current medications, diet and exercise. Discussed use, benefit, and side effects of prescribed medications. Barriers to medication compliance addressed. Patient given educational materials - see patient instructions. All patient questions answered. Patient voiced understanding.

## 2018-03-05 RX ORDER — CHLORHEXIDINE GLUCONATE 213 G/1000ML
SOLUTION TOPICAL
Qty: 118 ML | Refills: 5 | Status: ON HOLD | OUTPATIENT
Start: 2018-03-05 | End: 2018-09-05 | Stop reason: HOSPADM

## 2018-03-05 RX ORDER — SODIUM BICARBONATE 650 MG/1
TABLET ORAL
Qty: 180 TABLET | Refills: 11 | Status: SHIPPED | OUTPATIENT
Start: 2018-03-05 | End: 2018-09-13

## 2018-03-07 ENCOUNTER — TELEPHONE (OUTPATIENT)
Dept: FAMILY MEDICINE CLINIC | Age: 73
End: 2018-03-07

## 2018-03-30 DIAGNOSIS — J43.9 PULMONARY EMPHYSEMA, UNSPECIFIED EMPHYSEMA TYPE (HCC): ICD-10-CM

## 2018-04-02 RX ORDER — FLUTICASONE FUROATE AND VILANTEROL TRIFENATATE 100; 25 UG/1; UG/1
POWDER RESPIRATORY (INHALATION)
Qty: 60 EACH | Refills: 1 | Status: SHIPPED | OUTPATIENT
Start: 2018-04-02 | End: 2018-10-22 | Stop reason: ALTCHOICE

## 2018-04-10 ENCOUNTER — OFFICE VISIT (OUTPATIENT)
Dept: GASTROENTEROLOGY | Age: 73
End: 2018-04-10
Payer: COMMERCIAL

## 2018-04-10 VITALS
SYSTOLIC BLOOD PRESSURE: 135 MMHG | DIASTOLIC BLOOD PRESSURE: 66 MMHG | BODY MASS INDEX: 25.82 KG/M2 | HEART RATE: 56 BPM | WEIGHT: 160 LBS

## 2018-04-10 DIAGNOSIS — D63.8 ANEMIA OF CHRONIC DISEASE: Primary | ICD-10-CM

## 2018-04-10 DIAGNOSIS — K21.9 GASTROESOPHAGEAL REFLUX DISEASE, ESOPHAGITIS PRESENCE NOT SPECIFIED: ICD-10-CM

## 2018-04-10 DIAGNOSIS — K63.5 POLYP OF COLON, UNSPECIFIED PART OF COLON, UNSPECIFIED TYPE: ICD-10-CM

## 2018-04-10 PROCEDURE — 99214 OFFICE O/P EST MOD 30 MIN: CPT | Performed by: INTERNAL MEDICINE

## 2018-04-10 ASSESSMENT — ENCOUNTER SYMPTOMS
ANAL BLEEDING: 0
SINUS PRESSURE: 1
TROUBLE SWALLOWING: 0
CHOKING: 0
CONSTIPATION: 0
BACK PAIN: 1
VOMITING: 0
NAUSEA: 0
ALLERGIC/IMMUNOLOGIC NEGATIVE: 1
ABDOMINAL DISTENTION: 0
SHORTNESS OF BREATH: 0
SORE THROAT: 0
BLOOD IN STOOL: 0
COUGH: 1
RECTAL PAIN: 0
ABDOMINAL PAIN: 0
GASTROINTESTINAL NEGATIVE: 1
DIARRHEA: 0

## 2018-05-02 DIAGNOSIS — I10 ESSENTIAL HYPERTENSION: ICD-10-CM

## 2018-05-30 ENCOUNTER — OFFICE VISIT (OUTPATIENT)
Dept: PULMONOLOGY | Age: 73
End: 2018-05-30
Payer: COMMERCIAL

## 2018-05-30 VITALS
DIASTOLIC BLOOD PRESSURE: 68 MMHG | HEIGHT: 66 IN | SYSTOLIC BLOOD PRESSURE: 125 MMHG | WEIGHT: 158 LBS | HEART RATE: 65 BPM | OXYGEN SATURATION: 100 % | BODY MASS INDEX: 25.39 KG/M2

## 2018-05-30 DIAGNOSIS — F17.219 NICOTINE DEPENDENCE, CIGARETTES, WITH UNSPECIFIED NICOTINE-INDUCED DISORDERS: ICD-10-CM

## 2018-05-30 DIAGNOSIS — R91.1 LUNG NODULE: ICD-10-CM

## 2018-05-30 DIAGNOSIS — J44.9 CHRONIC OBSTRUCTIVE PULMONARY DISEASE, UNSPECIFIED COPD TYPE (HCC): Primary | ICD-10-CM

## 2018-05-30 PROCEDURE — 99204 OFFICE O/P NEW MOD 45 MIN: CPT | Performed by: INTERNAL MEDICINE

## 2018-05-30 PROCEDURE — G0296 VISIT TO DETERM LDCT ELIG: HCPCS | Performed by: INTERNAL MEDICINE

## 2018-05-30 RX ORDER — ALBUTEROL SULFATE 90 UG/1
2 AEROSOL, METERED RESPIRATORY (INHALATION) EVERY 6 HOURS PRN
Qty: 1 INHALER | Refills: 11 | Status: ON HOLD | OUTPATIENT
Start: 2018-05-30 | End: 2018-09-05

## 2018-05-31 ENCOUNTER — TELEPHONE (OUTPATIENT)
Dept: ONCOLOGY | Age: 73
End: 2018-05-31

## 2018-06-04 ENCOUNTER — OFFICE VISIT (OUTPATIENT)
Dept: FAMILY MEDICINE CLINIC | Age: 73
End: 2018-06-04
Payer: COMMERCIAL

## 2018-06-04 VITALS
OXYGEN SATURATION: 96 % | HEART RATE: 64 BPM | DIASTOLIC BLOOD PRESSURE: 68 MMHG | BODY MASS INDEX: 25.84 KG/M2 | TEMPERATURE: 98.5 F | SYSTOLIC BLOOD PRESSURE: 138 MMHG | WEIGHT: 160 LBS

## 2018-06-04 DIAGNOSIS — I10 ESSENTIAL HYPERTENSION: Primary | ICD-10-CM

## 2018-06-04 DIAGNOSIS — Z12.39 SCREENING FOR BREAST CANCER: ICD-10-CM

## 2018-06-04 DIAGNOSIS — J43.9 PULMONARY EMPHYSEMA, UNSPECIFIED EMPHYSEMA TYPE (HCC): ICD-10-CM

## 2018-06-04 PROCEDURE — 99213 OFFICE O/P EST LOW 20 MIN: CPT | Performed by: STUDENT IN AN ORGANIZED HEALTH CARE EDUCATION/TRAINING PROGRAM

## 2018-06-04 ASSESSMENT — ENCOUNTER SYMPTOMS
ABDOMINAL PAIN: 0
SHORTNESS OF BREATH: 1

## 2018-06-06 ENCOUNTER — HOSPITAL ENCOUNTER (OUTPATIENT)
Facility: MEDICAL CENTER | Age: 73
Discharge: HOME | End: 2018-06-06
Payer: COMMERCIAL

## 2018-06-13 ENCOUNTER — HOSPITAL ENCOUNTER (OUTPATIENT)
Facility: MEDICAL CENTER | Age: 73
End: 2018-06-13
Payer: COMMERCIAL

## 2018-06-21 ENCOUNTER — HOSPITAL ENCOUNTER (OUTPATIENT)
Facility: MEDICAL CENTER | Age: 73
Discharge: HOME | End: 2018-06-21
Payer: COMMERCIAL

## 2018-06-21 DIAGNOSIS — D50.9 IRON DEFICIENCY ANEMIA, UNSPECIFIED IRON DEFICIENCY ANEMIA TYPE: ICD-10-CM

## 2018-06-22 ENCOUNTER — HOSPITAL ENCOUNTER (OUTPATIENT)
Facility: MEDICAL CENTER | Age: 73
Discharge: HOME | End: 2018-06-22
Payer: COMMERCIAL

## 2018-06-22 ENCOUNTER — HOSPITAL ENCOUNTER (OUTPATIENT)
Dept: CT IMAGING | Age: 73
Discharge: HOME | End: 2018-06-24
Payer: COMMERCIAL

## 2018-06-22 ENCOUNTER — HOSPITAL ENCOUNTER (OUTPATIENT)
Dept: MAMMOGRAPHY | Age: 73
Discharge: HOME | End: 2018-06-24
Payer: COMMERCIAL

## 2018-06-22 DIAGNOSIS — F17.219 NICOTINE DEPENDENCE, CIGARETTES, WITH UNSPECIFIED NICOTINE-INDUCED DISORDERS: ICD-10-CM

## 2018-06-22 DIAGNOSIS — Z12.39 SCREENING FOR BREAST CANCER: ICD-10-CM

## 2018-06-22 LAB
ABSOLUTE EOS #: 0 K/UL (ref 0–0.4)
ABSOLUTE IMMATURE GRANULOCYTE: ABNORMAL K/UL (ref 0–0.3)
ABSOLUTE LYMPH #: 1 K/UL (ref 1–4.8)
ABSOLUTE MONO #: 0.4 K/UL (ref 0.2–0.8)
BASOPHILS # BLD: 0 % (ref 0–2)
BASOPHILS ABSOLUTE: 0 K/UL (ref 0–0.2)
DIFFERENTIAL TYPE: ABNORMAL
EOSINOPHILS RELATIVE PERCENT: 1 % (ref 1–4)
FERRITIN: 1374 UG/L (ref 13–150)
HCT VFR BLD CALC: 37.7 % (ref 36–46)
HEMOGLOBIN: 12.5 G/DL (ref 12–16)
IMMATURE GRANULOCYTES: ABNORMAL %
IRON SATURATION: 20 % (ref 20–55)
IRON: 60 UG/DL (ref 37–145)
LYMPHOCYTES # BLD: 20 % (ref 24–44)
MCH RBC QN AUTO: 29.9 PG (ref 26–34)
MCHC RBC AUTO-ENTMCNC: 33.2 G/DL (ref 31–37)
MCV RBC AUTO: 90.1 FL (ref 80–100)
MONOCYTES # BLD: 8 % (ref 1–7)
NRBC AUTOMATED: ABNORMAL PER 100 WBC
PDW BLD-RTO: 19.9 % (ref 11.5–14.5)
PLATELET # BLD: 201 K/UL (ref 130–400)
PLATELET ESTIMATE: ABNORMAL
PMV BLD AUTO: 10 FL (ref 6–12)
RBC # BLD: 4.18 M/UL (ref 4–5.2)
RBC # BLD: ABNORMAL 10*6/UL
SEG NEUTROPHILS: 71 % (ref 36–66)
SEGMENTED NEUTROPHILS ABSOLUTE COUNT: 3.6 K/UL (ref 1.8–7.7)
TOTAL IRON BINDING CAPACITY: 306 UG/DL (ref 250–450)
UNSATURATED IRON BINDING CAPACITY: 246 UG/DL (ref 112–347)
WBC # BLD: 5 K/UL (ref 3.5–11)
WBC # BLD: ABNORMAL 10*3/UL

## 2018-06-22 PROCEDURE — 77067 SCR MAMMO BI INCL CAD: CPT

## 2018-06-22 PROCEDURE — 83550 IRON BINDING TEST: CPT

## 2018-06-22 PROCEDURE — 83540 ASSAY OF IRON: CPT

## 2018-06-22 PROCEDURE — 36415 COLL VENOUS BLD VENIPUNCTURE: CPT

## 2018-06-22 PROCEDURE — 82728 ASSAY OF FERRITIN: CPT

## 2018-06-22 PROCEDURE — 85025 COMPLETE CBC W/AUTO DIFF WBC: CPT

## 2018-06-22 PROCEDURE — G0297 LDCT FOR LUNG CA SCREEN: HCPCS

## 2018-06-25 DIAGNOSIS — R91.1 LUNG NODULE: Primary | ICD-10-CM

## 2018-06-26 ENCOUNTER — HOSPITAL ENCOUNTER (OUTPATIENT)
Facility: MEDICAL CENTER | Age: 73
End: 2018-06-26
Payer: COMMERCIAL

## 2018-06-27 ENCOUNTER — TELEPHONE (OUTPATIENT)
Dept: CASE MANAGEMENT | Age: 73
End: 2018-06-27

## 2018-06-28 ENCOUNTER — TELEPHONE (OUTPATIENT)
Dept: ONCOLOGY | Age: 73
End: 2018-06-28

## 2018-06-28 ENCOUNTER — OFFICE VISIT (OUTPATIENT)
Dept: ONCOLOGY | Age: 73
End: 2018-06-28
Payer: COMMERCIAL

## 2018-06-28 VITALS
RESPIRATION RATE: 18 BRPM | TEMPERATURE: 98.5 F | HEART RATE: 62 BPM | DIASTOLIC BLOOD PRESSURE: 79 MMHG | BODY MASS INDEX: 26.32 KG/M2 | SYSTOLIC BLOOD PRESSURE: 166 MMHG | WEIGHT: 163 LBS

## 2018-06-28 DIAGNOSIS — N18.6 END STAGE RENAL DISEASE (HCC): ICD-10-CM

## 2018-06-28 DIAGNOSIS — N18.5 ANEMIA OF CHRONIC RENAL FAILURE, STAGE 5 (HCC): Primary | ICD-10-CM

## 2018-06-28 DIAGNOSIS — D63.1 ANEMIA OF CHRONIC RENAL FAILURE, STAGE 5 (HCC): Primary | ICD-10-CM

## 2018-06-28 PROCEDURE — 99214 OFFICE O/P EST MOD 30 MIN: CPT | Performed by: INTERNAL MEDICINE

## 2018-06-28 PROCEDURE — 99211 OFF/OP EST MAY X REQ PHY/QHP: CPT

## 2018-07-05 RX ORDER — LACTULOSE 10 G/15ML
SOLUTION ORAL
Qty: 450 ML | Refills: 3 | Status: SHIPPED | OUTPATIENT
Start: 2018-07-05 | End: 2018-09-13

## 2018-07-08 PROBLEM — N18.5 ANEMIA OF CHRONIC RENAL FAILURE, STAGE 5 (HCC): Status: ACTIVE | Noted: 2018-07-08

## 2018-07-08 PROBLEM — D63.1 ANEMIA OF CHRONIC RENAL FAILURE, STAGE 5 (HCC): Status: ACTIVE | Noted: 2018-07-08

## 2018-07-08 NOTE — PROGRESS NOTES
_           Chief Complaint   Patient presents with    Follow-up     patient would like to review status of disease     DIAGNOSIS:         Severe anemia related to iron deficiency as well as anemia of chronic renal insufficiency     CURRENT THERAPY:         S/P Blood transfusion  S/P IV iron infusion  Aranesp during dialysis    BRIEF CASE HISTORY:      The patient is a 73 y.o.  female who is admitted to the hospital for after syncopal episode in the dialysis. Patient was hospitalized at [facility] last week because of severe anemia. She had GI workup at that time which was negative for active bleeding except for small polyp which was removed. She had multiple blood transfusions. I believe 3 units. Hemoglobin was stabilized and she was discharged. Patient had syncopal episode today in the dialysis. She denies any active bleeding. No hematemesis or melena. She had dark stool but she is on oral iron. No hematochezia. No abdominal pain. No weight loss or decreased appetite. The patient's hemoglobin on admission was 4.7. She is receiving transfusion now. No other complaints. INTERIM HISTORY:   The patient is seen for follow-up for anemia. Patient had end-stage renal disease and she is maintained on Epogen during dialysis. She is doing fine now. No significant complaints at the present time. She has no weakness or fatigue. No active bleeding. No other complaints. PAST MEDICAL HISTORY: has a past medical history of Anemia; Asthma; Atrial fibrillation ([address] Breast cancer ([address] Bursitis; Carpal tunnel syndrome; Disease of blood and blood forming organ; History of blood transfusion; Hypertension; Irregular heart beat; Osteoarthritis; Pneumonia; Pulmonary emphysema ([address] Renal failure; Stenosis of left carotid artery; and Tobacco abuse.     PAST SURGICAL HISTORY: has a past surgical history that discolorations. · Psychiatric: No depression, anxiety, or stress or signs of schizophrenia. No change in mood or affect. · Hematologic: As above. · Infectious disease: No fever, chills or frequent infections. · Endocrine: No problems with opacity. No polydipsia or polyuria. No temperature intolerance. · Neurologic: No headaches or dizziness. No weakness or numbness of the extremities. No changes in balance, coordination, memory, mentation, behavior. · Allergic/Immunologic: No nasal congestion or hives. No repeated infections. PHYSICAL EXAM:  The patient is not in acute distress. Vital signs: Blood pressure (!) 166/79, pulse 62, temperature 98.5 °F (36.9 °C), temperature source Oral, resp. rate 18, weight 163 lb (73.9 kg), not currently breastfeeding.    General appearance - pale, ill appearing, not in pain or distress  Mental status - good mood, alert and oriented  Eyes - pupils equal and reactive, extraocular eye movements intact  Ears - bilateral TM's and external ear canals normal  Nose - normal and patent, no erythema, discharge or polyps  Mouth - mucous membranes moist, pharynx normal without lesions  Neck - supple, no significant adenopathy  Lymphatics - no palpable lymphadenopathy, no hepatosplenomegaly  Chest - clear to auscultation, no wheezes, rales or rhonchi, symmetric air entry  Heart - normal rate, regular rhythm, normal S1, S2, no murmurs, rubs, clicks or gallops  Abdomen - soft, nontender, nondistended, no masses or organomegaly  Neurological - alert, oriented, normal speech, no focal findings or movement disorder noted  Musculoskeletal - no joint tenderness, deformity or swelling  Extremities - peripheral pulses normal, no pedal edema, no clubbing or cyanosis  Skin - normal coloration and turgor, no rashes, no suspicious skin lesions noted       Review of Diagnostic data:   Lab Results   Component Value Date    WBC 5.0 06/22/2018    HGB 12.5 06/22/2018    HCT 37.7 06/22/2018    MCV 90.1 06/22/2018     06/22/2018       Chemistry        Component Value Date/Time     03/21/2017 1414    K 4.3 03/21/2017 1414     03/21/2017 1414    CO2 22 03/21/2017 1414    BUN 42 (H) 03/21/2017 1414    CREATININE 4.93 (H) 03/21/2017 1414        Component Value Date/Time    CALCIUM 8.6 03/21/2017 1414    ALKPHOS 95 03/20/2017 1905    AST 26 03/20/2017 1905    ALT 22 03/20/2017 1905    BILITOT 0.24 (L) 03/20/2017 1905            IMPRESSION:   End-stage renal disease on hemodialysis  Severe anemia. Previous history of iron deficiency. Anemia of end-stage renal disease. History of GI blood loss. PLAN:   Labs were reviewed and explained to the patient. She had severe anemia related to anemia of chronic renal insufficiency and iron deficiency. She received IV iron. She received blood transfusion. She is currently receiving Epogen or Aranesp during dialysis. Repeated lab tests now shows normal hemoglobin. She has normal iron studies. Excellent results. We will repeat labs periodically. We'll give her iron as needed. Would be seen again in about 6 months. Patient's questions were answered to the best of her satisfaction and she verbalized full understanding and agreement.

## 2018-07-09 ENCOUNTER — TELEPHONE (OUTPATIENT)
Dept: CASE MANAGEMENT | Age: 73
End: 2018-07-09

## 2018-07-09 NOTE — TELEPHONE ENCOUNTER
Noted patient did not have PET done that was ordered for 7/6/18. Call placed to patient who states \"I'm not getting that done, last time I did that it swolled my arm up\"  Explained to her that this would not necessarily be the case for future PET scans. Stressed the importance of her having this exam to evaluate the concerning nodule in her lung. She remained adamant that she was not going to have it done. States she will discuss this when she sees Dr. Marcio Henriquez at next visit. Noted f/u appoint with Dr. Marcio Henriquez on 7/11/18. This note routed to Dr. Marcio Henriquez to update.   Will follow

## 2018-07-11 ENCOUNTER — OFFICE VISIT (OUTPATIENT)
Dept: PULMONOLOGY | Age: 73
End: 2018-07-11
Payer: COMMERCIAL

## 2018-07-11 VITALS
SYSTOLIC BLOOD PRESSURE: 154 MMHG | OXYGEN SATURATION: 99 % | HEART RATE: 63 BPM | TEMPERATURE: 97.2 F | DIASTOLIC BLOOD PRESSURE: 69 MMHG | HEIGHT: 66 IN | WEIGHT: 163 LBS | RESPIRATION RATE: 12 BRPM | BODY MASS INDEX: 26.2 KG/M2

## 2018-07-11 DIAGNOSIS — R91.1 LUNG NODULE: Primary | ICD-10-CM

## 2018-07-11 DIAGNOSIS — J44.9 CHRONIC OBSTRUCTIVE PULMONARY DISEASE, UNSPECIFIED COPD TYPE (HCC): ICD-10-CM

## 2018-07-11 PROCEDURE — 99214 OFFICE O/P EST MOD 30 MIN: CPT | Performed by: INTERNAL MEDICINE

## 2018-07-11 NOTE — PROGRESS NOTES
was taking Breo twice daily and on last visit she was told to take once daily   She is on hemodialysis 3 times a week and because of her renal failure is hypertension and she is followed with nephrology and she claimed that she get edema of her legs off and on depending upon her fluid status  She had pulmonary function test done in February 2018 and showed FEV1 58% and DLCO 55%  She had a CT of the chest in 2016 and 2017 which according to radiology report showed a stable small spiculated lung nodule. Long h/o smoking 1 PPD for 55 years and now 3-4 cig a day      Past Medical History:        Diagnosis Date    Anemia     Asthma     Atrial fibrillation ([facility] 2/18/2017    Breast cancer ([facility] 1988     has annual mammograms only, no heme/onc    Bursitis     bilateral    Carpal tunnel syndrome     Disease of blood and blood forming organ     History of blood transfusion     Hypertension     Irregular heart beat     Osteoarthritis     knee and back    Pneumonia     Pulmonary emphysema ([facility] 6/9/2017    Renal failure      end stage renal failure on dialysis    Stenosis of left carotid artery 6/9/2017    Tobacco abuse        Past Surgical History:        Procedure Laterality Date    ABDOMEN SURGERY      BREAST SURGERY      left;  breast cancer    CARPAL TUNNEL RELEASE      right    COLONOSCOPY  02/22/2017    DR Cruzito Souza - adenomatous polyp    ECTOPIC PREGNANCY SURGERY      KNEE SURGERY      left    HI COLON CA SCRN NOT HI RSK IND N/A 2/22/2017    COLONOSCOPY performed by Sheryle Peters, DO at [facility]    HI ESOPHAGOGASTRODUODENOSCOPY TRANSORAL DIAGNOSTIC N/A 2/22/2017    EGD ESOPHAGOGASTRODUODENOSCOPY performed by Sheryle Peters, DO at [address] [place] [address]  09/30/2016       Allergies: Allergies   Allergen Reactions    Pcn [Penicillins] Hives and Itching    Sulfa Antibiotics      difficulty moving???          Home Meds:   Outpatient Encounter Prescriptions as of facility-administered encounter medications on file as of 7/11/2018. Social History:   TOBACCO:   reports that she has been smoking Cigarettes. She has a 55.00 pack-year smoking history. She has never used smokeless tobacco.  ETOH:   reports that she does not drink alcohol.   OCCUPATION:      Family History:   Family History   Problem Relation Age of Onset    Cancer Father     Diabetes Sister        Immunizations:    Immunization History   Administered Date(s) Administered    Influenza Vaccine, unspecified formulation 10/03/2015, 10/01/2016    PPD Test 07/21/2010    Pneumococcal Conjugate 7-valent 03/21/2011    Pneumococcal Polysaccharide ([id number]) 12/01/2011         REVIEW OF SYSTEMS:  CONSTITUTIONAL:  negative for  fevers, chills, sweats, fatigue, malaise, anorexia and weight loss  EYES:  negative for  double vision, blurred vision, dry eyes, eye discharge, visual disturbance, irritation, redness and icterus  HEENT:  negative for  hearing loss, ear drainage, nasal congestion, epistaxis, snoring, sore mouth, sore throat, hoarseness and voice change  RESPIRATORY:  negative for  dry cough, cough with sputum, dyspnea, wheezing, hemoptysis, chest pain, pleuritic pain and cyanosis  CARDIOVASCULAR:  negative for  chest pain, dyspnea, palpitations, orthopnea, PND, exertional chest pressure/discomfort, early saiety, syncope  GASTROINTESTINAL:  negative for vomiting, change in bowel habits, diarrhea, constipation, abdominal pain, abdominal mass, dysphagia, reflux, hematemesis and hemtochezia  GENITOURINARY:  negative  HEMATOLOGIC/LYMPHATIC:  negative for easy bruising, bleeding, lymphadenopathy and petechiae  ALLERGIC/IMMUNOLOGIC:  negative  ENDOCRINE:  negative for heat intolerance, cold intolerance, weight changes and change in bowel habits  MUSCULOSKELETAL:  negative for  myalgias, arthralgias, joint swelling and stiff joints  NEUROLOGICAL:  negative for headaches, dizziness, seizures, memory problems, antiphospholipid syndrome, or myocardial infarction. Performed at [place] [address] [address] [facility] [place], [address] [facility]   [phone]       Thyroid:   TSH   Date Value Ref Range Status   02/18/2017 3.07 0.30 - 5.00 mIU/L Final     Comment:     [employer] [address] [facility], [address] [place] [address] [phone]     Urinalysis:   Bacteria, UA   Date Value Ref Range Status   03/20/2017 NOT REPORTED NONE Final     Color, UA   Date Value Ref Range Status   03/20/2017 YELLOW YEL Final     pH, UA   Date Value Ref Range Status   03/20/2017 8.5 (H) 5.0 - 8.0 Final     Protein, UA   Date Value Ref Range Status   03/20/2017 2+ (A) NEG Final     RBC, UA   Date Value Ref Range Status   03/20/2017 None 0 - 2 /HPF Final     Specific Gravity, UA   Date Value Ref Range Status   03/20/2017 1.015 1.005 - 1.030 Final     Bilirubin Urine   Date Value Ref Range Status   03/20/2017 NEGATIVE NEG Final     Nitrite, Urine   Date Value Ref Range Status   03/20/2017 NEGATIVE NEG Final     WBC, UA   Date Value Ref Range Status   03/20/2017 None 0 - 5 /HPF Final     Leukocyte Esterase, Urine   Date Value Ref Range Status   03/20/2017 NEGATIVE NEG Final     Comment:     Performed at [place] [address] [address] [facility] [place], [address] [facility]   [phone]       Glucose, Ur   Date Value Ref Range Status   03/20/2017 NEGATIVE NEG Final     Cultures:-  -----------------------------------------------------------------    ABGs: No results found for: PHART, PO2ART, YOG4WIA    Pulmonary Functions Testing Results:  DATE OF PROCEDURE:  02/15/2018  RESULTS:  The patient had FEV1 of 1.17, which is moderate to severely  reduced at 58% predicted. FVC was 1.77, which is moderately reduced at 68%  predicted. FEV1/FVC was reduced at 66. The mid flows were reduced at 38%  predicted. Total lung capacity was mildly reduced at 77% predicted. The  residual volume was normal at 85% predicted.   Diffusion was moderately  reduced at 55% continue lung cancer screening effectiveness     Risks associated with radiation from annual LDCT- Radiation exposure is about the same as for a mammogram, which is about 1/3 of the annual background radiation exposure from everyday life. Starting screening at age 54 is not likely to increase cancer risk from radiation exposure. Patients with comorbidities resulting in life expectancy of < 10 years, or that would preclude treatment of an abnormality identified on CT, should not be screened due to lack of benefit. To obtain maximal benefit from this screening, smoking cessation and long-term abstinence from smoking is critical        Please note that this chart was generated using voice recognition Dragon dictation software. Although every effort was made to ensure the accuracy of this automated transcription, some errors in transcription may have occurred.

## 2018-07-12 ENCOUNTER — TELEPHONE (OUTPATIENT)
Dept: CASE MANAGEMENT | Age: 73
End: 2018-07-12

## 2018-08-14 ENCOUNTER — TELEPHONE (OUTPATIENT)
Dept: PULMONOLOGY | Age: 73
End: 2018-08-14

## 2018-08-14 NOTE — TELEPHONE ENCOUNTER
----- Message from Jeremy Knight sent at 7/11/2018  3:10 PM EDT -----  Follow up on if pt wants PET CT scheduling week of 8/13

## 2018-09-03 ENCOUNTER — HOSPITAL ENCOUNTER (INPATIENT)
Age: 73
LOS: 2 days | Discharge: HOME | DRG: 208 | End: 2018-09-05
Attending: EMERGENCY MEDICINE | Admitting: INTERNAL MEDICINE
Payer: COMMERCIAL

## 2018-09-03 ENCOUNTER — APPOINTMENT (OUTPATIENT)
Dept: GENERAL RADIOLOGY | Age: 73
DRG: 208 | End: 2018-09-03
Payer: COMMERCIAL

## 2018-09-03 DIAGNOSIS — J44.1 COPD EXACERBATION (HCC): ICD-10-CM

## 2018-09-03 DIAGNOSIS — I10 ESSENTIAL HYPERTENSION: ICD-10-CM

## 2018-09-03 DIAGNOSIS — J43.9 PULMONARY EMPHYSEMA, UNSPECIFIED EMPHYSEMA TYPE (HCC): ICD-10-CM

## 2018-09-03 DIAGNOSIS — J96.00 ACUTE RESPIRATORY FAILURE, UNSPECIFIED WHETHER WITH HYPOXIA OR HYPERCAPNIA (HCC): Primary | ICD-10-CM

## 2018-09-03 DIAGNOSIS — I10 HYPERTENSION, ISOLATED SYSTOLIC: ICD-10-CM

## 2018-09-03 DIAGNOSIS — N18.9 CHRONIC RENAL FAILURE, UNSPECIFIED CKD STAGE: ICD-10-CM

## 2018-09-03 PROBLEM — J96.90 RESPIRATORY FAILURE (HCC): Status: ACTIVE | Noted: 2018-09-03

## 2018-09-03 LAB
ALBUMIN SERPL-MCNC: 4.4 G/DL (ref 3.9–4.9)
ALP BLD-CCNC: 249 U/L (ref 40–130)
ALT SERPL-CCNC: 16 U/L (ref 0–33)
ANION GAP SERPL CALCULATED.3IONS-SCNC: 25 MEQ/L (ref 7–13)
ANISOCYTOSIS: ABNORMAL
AST SERPL-CCNC: 28 U/L (ref 0–35)
BASE EXCESS ARTERIAL: 2 (ref -3–3)
BASOPHILS ABSOLUTE: 0 K/UL (ref 0–0.2)
BASOPHILS RELATIVE PERCENT: 1.1 %
BILIRUB SERPL-MCNC: 0.3 MG/DL (ref 0–1.2)
BUN BLDV-MCNC: 56 MG/DL (ref 8–23)
CALCIUM IONIZED: 1.05 MMOL/L (ref 1.12–1.32)
CALCIUM SERPL-MCNC: 9.4 MG/DL (ref 8.6–10.2)
CHLORIDE BLD-SCNC: 96 MEQ/L (ref 98–107)
CO2: 21 MEQ/L (ref 22–29)
CREAT SERPL-MCNC: 5.66 MG/DL (ref 0.5–0.9)
EKG ATRIAL RATE: 117 BPM
EKG P AXIS: 77 DEGREES
EKG P-R INTERVAL: 150 MS
EKG Q-T INTERVAL: 360 MS
EKG QRS DURATION: 88 MS
EKG QTC CALCULATION (BAZETT): 491 MS
EKG R AXIS: -39 DEGREES
EKG T AXIS: 113 DEGREES
EKG VENTRICULAR RATE: 112 BPM
EOSINOPHILS ABSOLUTE: 0.2 K/UL (ref 0–0.7)
EOSINOPHILS RELATIVE PERCENT: 2 %
GFR AFRICAN AMERICAN: 8
GFR AFRICAN AMERICAN: 8.9
GFR NON-AFRICAN AMERICAN: 7
GFR NON-AFRICAN AMERICAN: 7.4
GLOBULIN: 4.3 G/DL (ref 2.3–3.5)
GLUCOSE BLD-MCNC: 176 MG/DL (ref 74–109)
GLUCOSE BLD-MCNC: 179 MG/DL (ref 60–115)
HBA1C MFR BLD: 4.3 % (ref 4.8–5.9)
HCO3 ARTERIAL: 28.4 MMOL/L (ref 21–29)
HCT VFR BLD CALC: 38.1 % (ref 37–47)
HEMATOLOGY PATH CONSULT: YES
HEMOGLOBIN: 10.6 GM/DL (ref 12–16)
HEMOGLOBIN: 12.5 G/DL (ref 12–16)
LACTATE: 2.22 MMOL/L (ref 0.4–2)
LACTIC ACID: 1.4 MMOL/L (ref 0.5–2.2)
LYMPHOCYTES ABSOLUTE: 2.8 K/UL (ref 1–4.8)
LYMPHOCYTES RELATIVE PERCENT: 29 %
MCH RBC QN AUTO: 32.3 PG (ref 27–31.3)
MCHC RBC AUTO-ENTMCNC: 32.9 % (ref 33–37)
MCV RBC AUTO: 98.1 FL (ref 82–100)
MONOCYTES ABSOLUTE: 1.5 K/UL (ref 0.2–0.8)
MONOCYTES RELATIVE PERCENT: 10.5 %
NEUTROPHILS ABSOLUTE: 5.1 K/UL (ref 1.4–6.5)
NEUTROPHILS RELATIVE PERCENT: 53 %
O2 SAT, ARTERIAL: 100 % (ref 93–100)
PCO2 ARTERIAL: 59 MM HG (ref 35–45)
PDW BLD-RTO: 18.5 % (ref 11.5–14.5)
PERFORMED ON: ABNORMAL
PH ARTERIAL: 7.29 (ref 7.35–7.45)
PLATELET # BLD: 215 K/UL (ref 130–400)
PLATELET SLIDE REVIEW: ADEQUATE
PO2 ARTERIAL: 222 MM HG (ref 75–108)
POC CHLORIDE: 106 MEQ/L (ref 99–110)
POC CREATININE: 6 MG/DL (ref 0.6–1.2)
POC FIO2: 60
POC HEMATOCRIT: 31 % (ref 36–48)
POC POTASSIUM: 4.2 MEQ/L (ref 3.5–5.1)
POC SAMPLE TYPE: ABNORMAL
POC SODIUM: 140 MEQ/L (ref 136–145)
POLYCHROMASIA: ABNORMAL
POTASSIUM SERPL-SCNC: 4.8 MEQ/L (ref 3.5–5.1)
PROMONOCYTES: 5 %
RBC # BLD: 3.89 M/UL (ref 4.2–5.4)
SLIDE REVIEW: ABNORMAL
SMUDGE CELLS: 1.9
SODIUM BLD-SCNC: 142 MEQ/L (ref 132–144)
TCO2 ARTERIAL: 30 (ref 22–29)
TOTAL PROTEIN: 8.7 G/DL (ref 6.4–8.1)
TROPONIN: 0.02 NG/ML (ref 0–0.01)
TROPONIN: 0.06 NG/ML (ref 0–0.01)
TROPONIN: 0.08 NG/ML (ref 0–0.01)
WBC # BLD: 9.6 K/UL (ref 4.8–10.8)

## 2018-09-03 PROCEDURE — 6370000000 HC RX 637 (ALT 250 FOR IP): Performed by: INTERNAL MEDICINE

## 2018-09-03 PROCEDURE — 96375 TX/PRO/DX INJ NEW DRUG ADDON: CPT

## 2018-09-03 PROCEDURE — 85025 COMPLETE CBC W/AUTO DIFF WBC: CPT

## 2018-09-03 PROCEDURE — 99285 EMERGENCY DEPT VISIT HI MDM: CPT

## 2018-09-03 PROCEDURE — 6360000002 HC RX W HCPCS: Performed by: INTERNAL MEDICINE

## 2018-09-03 PROCEDURE — 93005 ELECTROCARDIOGRAM TRACING: CPT

## 2018-09-03 PROCEDURE — 84484 ASSAY OF TROPONIN QUANT: CPT

## 2018-09-03 PROCEDURE — 87040 BLOOD CULTURE FOR BACTERIA: CPT

## 2018-09-03 PROCEDURE — 84295 ASSAY OF SERUM SODIUM: CPT

## 2018-09-03 PROCEDURE — 80053 COMPREHEN METABOLIC PANEL: CPT

## 2018-09-03 PROCEDURE — 83036 HEMOGLOBIN GLYCOSYLATED A1C: CPT

## 2018-09-03 PROCEDURE — 94640 AIRWAY INHALATION TREATMENT: CPT

## 2018-09-03 PROCEDURE — 84132 ASSAY OF SERUM POTASSIUM: CPT

## 2018-09-03 PROCEDURE — 99223 1ST HOSP IP/OBS HIGH 75: CPT | Performed by: INTERNAL MEDICINE

## 2018-09-03 PROCEDURE — 2500000003 HC RX 250 WO HCPCS: Performed by: INTERNAL MEDICINE

## 2018-09-03 PROCEDURE — 0BH17EZ INSERTION OF ENDOTRACHEAL AIRWAY INTO TRACHEA, VIA NATURAL OR ARTIFICIAL OPENING: ICD-10-PCS | Performed by: EMERGENCY MEDICINE

## 2018-09-03 PROCEDURE — 82435 ASSAY OF BLOOD CHLORIDE: CPT

## 2018-09-03 PROCEDURE — 2500000003 HC RX 250 WO HCPCS: Performed by: EMERGENCY MEDICINE

## 2018-09-03 PROCEDURE — 82565 ASSAY OF CREATININE: CPT

## 2018-09-03 PROCEDURE — 83605 ASSAY OF LACTIC ACID: CPT

## 2018-09-03 PROCEDURE — 36415 COLL VENOUS BLD VENIPUNCTURE: CPT

## 2018-09-03 PROCEDURE — 2000000000 HC ICU R&B

## 2018-09-03 PROCEDURE — 5A1945Z RESPIRATORY VENTILATION, 24-96 CONSECUTIVE HOURS: ICD-10-PCS | Performed by: EMERGENCY MEDICINE

## 2018-09-03 PROCEDURE — 6360000002 HC RX W HCPCS

## 2018-09-03 PROCEDURE — 6360000002 HC RX W HCPCS: Performed by: EMERGENCY MEDICINE

## 2018-09-03 PROCEDURE — 82330 ASSAY OF CALCIUM: CPT

## 2018-09-03 PROCEDURE — 2580000003 HC RX 258: Performed by: INTERNAL MEDICINE

## 2018-09-03 PROCEDURE — 87449 NOS EACH ORGANISM AG IA: CPT

## 2018-09-03 PROCEDURE — 94760 N-INVAS EAR/PLS OXIMETRY 1: CPT

## 2018-09-03 PROCEDURE — 82803 BLOOD GASES ANY COMBINATION: CPT

## 2018-09-03 PROCEDURE — 71045 X-RAY EXAM CHEST 1 VIEW: CPT

## 2018-09-03 PROCEDURE — 94761 N-INVAS EAR/PLS OXIMETRY MLT: CPT

## 2018-09-03 PROCEDURE — 85014 HEMATOCRIT: CPT

## 2018-09-03 PROCEDURE — 96374 THER/PROPH/DIAG INJ IV PUSH: CPT

## 2018-09-03 PROCEDURE — 87899 AGENT NOS ASSAY W/OPTIC: CPT

## 2018-09-03 RX ORDER — METHYLPREDNISOLONE SODIUM SUCCINATE 40 MG/ML
40 INJECTION, POWDER, LYOPHILIZED, FOR SOLUTION INTRAMUSCULAR; INTRAVENOUS EVERY 8 HOURS
Status: DISCONTINUED | OUTPATIENT
Start: 2018-09-03 | End: 2018-09-05

## 2018-09-03 RX ORDER — CLONIDINE HYDROCHLORIDE 0.2 MG/1
0.2 TABLET ORAL 2 TIMES DAILY
Status: DISCONTINUED | OUTPATIENT
Start: 2018-09-03 | End: 2018-09-04

## 2018-09-03 RX ORDER — IPRATROPIUM BROMIDE AND ALBUTEROL SULFATE 2.5; .5 MG/3ML; MG/3ML
1 SOLUTION RESPIRATORY (INHALATION)
Status: DISCONTINUED | OUTPATIENT
Start: 2018-09-03 | End: 2018-09-03

## 2018-09-03 RX ORDER — LORAZEPAM 2 MG/ML
1 INJECTION INTRAMUSCULAR
Status: DISCONTINUED | OUTPATIENT
Start: 2018-09-03 | End: 2018-09-05 | Stop reason: HOSPADM

## 2018-09-03 RX ORDER — PROPOFOL 10 MG/ML
INJECTION, EMULSION INTRAVENOUS
Status: COMPLETED
Start: 2018-09-03 | End: 2018-09-03

## 2018-09-03 RX ORDER — HYDRALAZINE HYDROCHLORIDE 100 MG/1
100 TABLET, FILM COATED ORAL 3 TIMES DAILY
Status: DISCONTINUED | OUTPATIENT
Start: 2018-09-03 | End: 2018-09-05 | Stop reason: HOSPADM

## 2018-09-03 RX ORDER — SODIUM CHLORIDE 0.9 % (FLUSH) 0.9 %
10 SYRINGE (ML) INJECTION EVERY 12 HOURS SCHEDULED
Status: DISCONTINUED | OUTPATIENT
Start: 2018-09-03 | End: 2018-09-05 | Stop reason: HOSPADM

## 2018-09-03 RX ORDER — LORAZEPAM 2 MG/ML
2 INJECTION INTRAMUSCULAR
Status: DISCONTINUED | OUTPATIENT
Start: 2018-09-03 | End: 2018-09-05 | Stop reason: HOSPADM

## 2018-09-03 RX ORDER — METHYLPREDNISOLONE SODIUM SUCCINATE 125 MG/2ML
125 INJECTION, POWDER, LYOPHILIZED, FOR SOLUTION INTRAMUSCULAR; INTRAVENOUS ONCE
Status: COMPLETED | OUTPATIENT
Start: 2018-09-03 | End: 2018-09-03

## 2018-09-03 RX ORDER — ALBUTEROL SULFATE 90 UG/1
4 AEROSOL, METERED RESPIRATORY (INHALATION)
Status: DISCONTINUED | OUTPATIENT
Start: 2018-09-03 | End: 2018-09-04

## 2018-09-03 RX ORDER — CINACALCET 30 MG/1
120 TABLET, FILM COATED ORAL DAILY
Status: DISCONTINUED | OUTPATIENT
Start: 2018-09-03 | End: 2018-09-05 | Stop reason: HOSPADM

## 2018-09-03 RX ORDER — ATORVASTATIN CALCIUM 20 MG/1
20 TABLET, FILM COATED ORAL DAILY
Status: DISCONTINUED | OUTPATIENT
Start: 2018-09-03 | End: 2018-09-05 | Stop reason: HOSPADM

## 2018-09-03 RX ORDER — PROPOFOL 10 MG/ML
10 INJECTION, EMULSION INTRAVENOUS ONCE
Status: COMPLETED | OUTPATIENT
Start: 2018-09-03 | End: 2018-09-03

## 2018-09-03 RX ORDER — SODIUM CHLORIDE 0.9 % (FLUSH) 0.9 %
10 SYRINGE (ML) INJECTION PRN
Status: DISCONTINUED | OUTPATIENT
Start: 2018-09-03 | End: 2018-09-05 | Stop reason: HOSPADM

## 2018-09-03 RX ORDER — IPRATROPIUM BROMIDE AND ALBUTEROL SULFATE 2.5; .5 MG/3ML; MG/3ML
1 SOLUTION RESPIRATORY (INHALATION) PRN
Status: DISCONTINUED | OUTPATIENT
Start: 2018-09-03 | End: 2018-09-03

## 2018-09-03 RX ORDER — ONDANSETRON 2 MG/ML
4 INJECTION INTRAMUSCULAR; INTRAVENOUS EVERY 6 HOURS PRN
Status: DISCONTINUED | OUTPATIENT
Start: 2018-09-03 | End: 2018-09-05 | Stop reason: HOSPADM

## 2018-09-03 RX ORDER — LORATADINE 10 MG/1
10 TABLET ORAL DAILY
Status: ON HOLD | COMMUNITY
End: 2018-09-05 | Stop reason: HOSPADM

## 2018-09-03 RX ORDER — LOSARTAN POTASSIUM 50 MG/1
50 TABLET ORAL 2 TIMES DAILY
Status: DISCONTINUED | OUTPATIENT
Start: 2018-09-03 | End: 2018-09-05 | Stop reason: HOSPADM

## 2018-09-03 RX ORDER — CHLORHEXIDINE GLUCONATE 0.12 MG/ML
15 RINSE ORAL 2 TIMES DAILY
Status: DISCONTINUED | OUTPATIENT
Start: 2018-09-03 | End: 2018-09-04

## 2018-09-03 RX ORDER — AMLODIPINE BESYLATE 10 MG/1
10 TABLET ORAL DAILY
Status: DISCONTINUED | OUTPATIENT
Start: 2018-09-03 | End: 2018-09-05 | Stop reason: HOSPADM

## 2018-09-03 RX ORDER — ETOMIDATE 2 MG/ML
10 INJECTION INTRAVENOUS ONCE
Status: COMPLETED | OUTPATIENT
Start: 2018-09-03 | End: 2018-09-03

## 2018-09-03 RX ORDER — LORAZEPAM 2 MG/ML
1 INJECTION INTRAMUSCULAR ONCE
Status: COMPLETED | OUTPATIENT
Start: 2018-09-03 | End: 2018-09-03

## 2018-09-03 RX ORDER — PROPOFOL 10 MG/ML
10 INJECTION, EMULSION INTRAVENOUS
Status: DISCONTINUED | OUTPATIENT
Start: 2018-09-03 | End: 2018-09-05 | Stop reason: HOSPADM

## 2018-09-03 RX ORDER — FAMOTIDINE 20 MG/1
20 TABLET, FILM COATED ORAL 2 TIMES DAILY
Status: DISCONTINUED | OUTPATIENT
Start: 2018-09-03 | End: 2018-09-04

## 2018-09-03 RX ORDER — SODIUM BICARBONATE 650 MG/1
650 TABLET ORAL 3 TIMES DAILY
Status: DISCONTINUED | OUTPATIENT
Start: 2018-09-03 | End: 2018-09-04

## 2018-09-03 RX ORDER — CHOLECALCIFEROL (VITAMIN D3) 10 MCG
1 TABLET ORAL DAILY
Status: DISCONTINUED | OUTPATIENT
Start: 2018-09-03 | End: 2018-09-05 | Stop reason: HOSPADM

## 2018-09-03 RX ADMIN — PROPOFOL 200 MG: 10 INJECTION, EMULSION INTRAVENOUS at 09:43

## 2018-09-03 RX ADMIN — LORAZEPAM 1 MG: 2 INJECTION INTRAMUSCULAR; INTRAVENOUS at 09:23

## 2018-09-03 RX ADMIN — SODIUM BICARBONATE 650 MG: 650 TABLET ORAL at 16:32

## 2018-09-03 RX ADMIN — HYDRALAZINE HYDROCHLORIDE 100 MG: 100 TABLET, FILM COATED ORAL at 20:42

## 2018-09-03 RX ADMIN — FAMOTIDINE 20 MG: 20 TABLET ORAL at 20:41

## 2018-09-03 RX ADMIN — METHYLPREDNISOLONE SODIUM SUCCINATE 40 MG: 40 INJECTION, POWDER, FOR SOLUTION INTRAMUSCULAR; INTRAVENOUS at 13:45

## 2018-09-03 RX ADMIN — METHYLPREDNISOLONE SODIUM SUCCINATE 40 MG: 40 INJECTION, POWDER, FOR SOLUTION INTRAMUSCULAR; INTRAVENOUS at 20:41

## 2018-09-03 RX ADMIN — PROPOFOL 19.6 MCG/KG/MIN: 10 INJECTION, EMULSION INTRAVENOUS at 14:40

## 2018-09-03 RX ADMIN — DEXTROSE MONOHYDRATE 10 MG/HR: 50 INJECTION, SOLUTION INTRAVENOUS at 22:02

## 2018-09-03 RX ADMIN — METOPROLOL TARTRATE 25 MG: 25 TABLET ORAL at 20:41

## 2018-09-03 RX ADMIN — METHYLPREDNISOLONE SODIUM SUCCINATE 125 MG: 125 INJECTION, POWDER, FOR SOLUTION INTRAMUSCULAR; INTRAVENOUS at 09:23

## 2018-09-03 RX ADMIN — CHLORHEXIDINE GLUCONATE 0.12% ORAL RINSE 15 ML: 1.2 LIQUID ORAL at 13:45

## 2018-09-03 RX ADMIN — CLONIDINE HYDROCHLORIDE 0.2 MG: 0.2 TABLET ORAL at 20:41

## 2018-09-03 RX ADMIN — DEXTROSE MONOHYDRATE 5 MG/HR: 50 INJECTION, SOLUTION INTRAVENOUS at 18:40

## 2018-09-03 RX ADMIN — ETOMIDATE 10 MG: 20 INJECTION, SOLUTION INTRAVENOUS at 09:23

## 2018-09-03 RX ADMIN — Medication 4 PUFF: at 23:21

## 2018-09-03 RX ADMIN — PROPOFOL 50 MCG/KG/MIN: 10 INJECTION, EMULSION INTRAVENOUS at 23:14

## 2018-09-03 RX ADMIN — IPRATROPIUM BROMIDE 4 PUFF: 17 AEROSOL, METERED RESPIRATORY (INHALATION) at 23:21

## 2018-09-03 RX ADMIN — ATORVASTATIN CALCIUM 20 MG: 20 TABLET, FILM COATED ORAL at 20:44

## 2018-09-03 RX ADMIN — PROPOFOL 1000 MG: 10 INJECTION, EMULSION INTRAVENOUS at 09:42

## 2018-09-03 RX ADMIN — PROPOFOL 50 MCG/KG/MIN: 10 INJECTION, EMULSION INTRAVENOUS at 18:33

## 2018-09-03 RX ADMIN — AMLODIPINE BESYLATE 10 MG: 10 TABLET ORAL at 16:32

## 2018-09-03 RX ADMIN — FENTANYL CITRATE 25 MCG/HR: 50 INJECTION, SOLUTION INTRAMUSCULAR; INTRAVENOUS at 20:02

## 2018-09-03 RX ADMIN — HYDRALAZINE HYDROCHLORIDE 100 MG: 100 TABLET, FILM COATED ORAL at 16:32

## 2018-09-03 RX ADMIN — SODIUM BICARBONATE 650 MG: 650 TABLET ORAL at 20:42

## 2018-09-03 RX ADMIN — Medication 10 ML: at 20:43

## 2018-09-03 RX ADMIN — CHLORHEXIDINE GLUCONATE 0.12% ORAL RINSE 15 ML: 1.2 LIQUID ORAL at 20:41

## 2018-09-03 RX ADMIN — FAMOTIDINE 20 MG: 20 TABLET ORAL at 13:45

## 2018-09-03 RX ADMIN — ETOMIDATE 10 MG: 20 INJECTION, SOLUTION INTRAVENOUS at 09:42

## 2018-09-03 RX ADMIN — LOSARTAN POTASSIUM 50 MG: 50 TABLET ORAL at 20:42

## 2018-09-03 ASSESSMENT — PULMONARY FUNCTION TESTS
PIF_VALUE: 20
PIF_VALUE: 20
PIF_VALUE: 25

## 2018-09-03 NOTE — H&P
SPRAYS TO SKIN THREE TIMES A WEEK ON DIALYSIS DAYS  Patient taking differently: APPLY 2 SPRAYS TO SKIN two TIMES A WEEK ON DIALYSIS DAYS (Tu & Th) 9/14/16   Miriam Mejia MD   levalbuterol [facility]) 45 MCG/ACT inhaler Inhale 2 puffs into the lungs 4 times daily as needed for Wheezing    Historical Provider, MD   lidocaine-prilocaine (EMLA) 2.5-2.5 % cream Apply topically as needed for Pain Indications: Applies prior to dialysis Apply topically as needed. Historical Provider, MD   SENSIPAR 60 MG tablet Take 120 mg by mouth daily  12/3/15   Historical Provider, MD   amlodipine (NORVASC) 10 MG tablet Take 10 mg by mouth daily. Historical Provider, MD       Allergies:  Pcn [penicillins] and Sulfa antibiotics    Social History:      The patient currently lives in [facility]     TOBACCO:   reports that she has been smoking Cigarettes. She has a 55.00 pack-year smoking history. She has never used smokeless tobacco.  ETOH:   reports that she does not drink alcohol. Family History:       Reviewed in detail and negative for DM, CAD, Cancer, CVA. Positive as follows:    Family History   Problem Relation Age of Onset    Cancer Father     Diabetes Sister        REVIEW OF SYSTEMS:   Pertinent positives as noted in the HPI. All other systems reviewed and negative. PHYSICAL EXAM:    /70   Pulse 73   Temp 97.6 °F (36.4 °C) (Axillary)   Resp 18   Wt 180 lb (81.6 kg)   SpO2 100%   BMI 29.05 kg/m²     General appearance:  Sedated   HEENT:  Normal cephalic, atraumatic without obvious deformity. Pupils equal, round, and reactive to light. Extra ocular muscles intact. Conjunctivae/corneas clear. Neck: Supple, with full range of motion. No jugular venous distention. Trachea midline. Respiratory:  Vent support, lungs clear to auscultation, bilaterally without Rales/Wheezes/Rhonchi. Cardiovascular:  Regular rate and rhythm with normal S1/S2 without murmurs, rubs or gallops.   Abdomen: Soft, non-tender,

## 2018-09-03 NOTE — ED TRIAGE NOTES
Pt to ER via squad for c/o SOB since last night, per squad pt was 80% RA, they administered duoneb and pt spo2 increased to 90%, squad inserted 18G IV in RAC and obtained labs, pt was working hard to breathe and very confused upon arrival, pt not following directions

## 2018-09-03 NOTE — CONSULTS
or gallop  ABD: Benign. Non-tender. Non-distended. No masses or organmegaly. Normal bowel sounds. EXT: No significant Pitting edema both lower extremities , No Cyanosis No clubbing  Neuro: no focal weakness  Skin: Warm and dry. No erythema or rash on exposed extremities. .      Data Review  Recent Labs      09/03/18 0930 09/03/18   1031   WBC  9.6   --    HGB  12.5  10.6*   HCT  38.1   --    PLT  215   --       Recent Labs      09/03/18 0930 09/03/18   1031   NA  142   --    K  4.8   --    CL  96*   --    CO2  21*   --    BUN  56*   --    CREATININE  5.66*  6.0*   GLUCOSE  176*   --        MV Settings:     Vent Mode: AC/VC  Vt Ordered: 450 mL  Rate Set: 16 bmp  PEEP/CPAP: 5  I:E Ratio: 1:2.9    ABGs:   Recent Labs      09/03/18   1031   PHART  7.291*   [id number]  59*   PO2ART  222*   JTN1JVF  28.4   BEART  2   [id number]  100*   [id number]  30*     O2 Device: Ventilator  Lab Results   Component Value Date    LACTA 1.4 09/03/2018    LACTA 0.9 03/20/2017    LACTA NOT REPORTED 02/14/2017       Radiology  Xr Chest Portable    Result Date: 9/3/2018  EXAMINATION: XR CHEST PORTABLE. DATE AND TIME:9/3/2018 9:30 AM CLINICAL HISTORY: Shortness of breath   sob  COMPARISONS: None  FINDINGS: ET tube well-positioned above the gaby. NG tube coursing into the stomach. Cardiomegaly with vascular congestion and hilar haze. Septal lines. Findings consistent with interstitial pulmonary edema. Tubes in good position. The interstitial pulmonary edema. .       Assessment, plan:   1. Acute on chronic hypoxic and hypercarbic respiratory failure  2. Moderate COPD with acute exacerbation  3. Centrilobular emphysema  4. Left upper lobe lung nodule enlarging suspicious for malignancy, patient declined workup on a recent visit with her pulmonologist in [place]  5.  End-stage kidney disease on hemodialysis, diastolic failure could be a reason for her acute respiratory failure today as well, patient had increased interstitial edema by

## 2018-09-03 NOTE — ED PROVIDER NOTES
CARPAL TUNNEL RELEASE      right    COLONOSCOPY  02/22/2017    DR Deysi Conroy - adenomatous polyp    ECTOPIC PREGNANCY SURGERY      KNEE SURGERY      left    MS COLON CA SCRN NOT HI RSK IND N/A 2/22/2017    COLONOSCOPY performed by Marcello Angel DO at [facility]    MS ESOPHAGOGASTRODUODENOSCOPY TRANSORAL DIAGNOSTIC N/A 2/22/2017    EGD ESOPHAGOGASTRODUODENOSCOPY performed by Marcello Angel DO at [facility]    UPPER GASTROINTESTINAL ENDOSCOPY  09/30/2016         CURRENT MEDICATIONS       Previous Medications    ALBUTEROL SULFATE  (90 BASE) MCG/ACT INHALER    Inhale 2 puffs into the lungs every 6 hours as needed for Wheezing or Shortness of Breath    AMLODIPINE (NORVASC) 10 MG TABLET    Take 10 mg by mouth daily. ATORVASTATIN (LIPITOR) 20 MG TABLET    TAKE 1 TABLET BY MOUTH DAILY    B COMPLEX-C-FOLIC ACID (MADAI-AYAN) TABS    TAKE 1 TABLET BY MOUTH DAILY    BREO ELLIPTA 100-25 MCG/INH AEPB INHALER    INHALE 1 PUFF INTO THE LUNGS ONCE DAILY AS DIRECTED PER INSTRUCTIONS **RINSE MOUTH AFTER EACH USE    CLONIDINE (CATAPRES) 0.2 MG TABLET    TAKE 1 TABLET BY MOUTH TWICE DAILY    ETHYL CHLORIDE SPRAY    APPLY 2 SPRAYS TO SKIN THREE TIMES A WEEK ON DIALYSIS DAYS    HIBICLENS 4 % EXTERNAL LIQUID    USE DAILY AS DIRECTED    HYDRALAZINE (APRESOLINE) 100 MG TABLET    Take 1 tablet by mouth 3 times daily    LACTULOSE (CHRONULAC) 10 GM/15ML SOLUTION    TAKE 30 ML BY MOUTH DAILY AS NEEDED    LEVALBUTEROL (XOPENEX HFA) 45 MCG/ACT INHALER    Inhale 2 puffs into the lungs 4 times daily as needed for Wheezing    LIDOCAINE-PRILOCAINE (EMLA) 2.5-2.5 % CREAM    Apply topically as needed for Pain Indications: Applies prior to dialysis Apply topically as needed.     LOSARTAN (COZAAR) 50 MG TABLET    Take 1 tablet by mouth 2 times daily    METOPROLOL TARTRATE (LOPRESSOR) 25 MG TABLET    TAKE 1 TABLET BY MOUTH TWICE DAILY    OMEPRAZOLE (PRILOSEC) 20 MG DELAYED RELEASE CAPSULE    TAKE 1 CAPSULE BY MOUTH DAILY    SENSIPAR 60 MG TABLET    Take 120 mg by mouth daily     SODIUM BICARBONATE 650 MG TABLET    TAKE 2 TABLETS BY MOUTH THREE TIMES DAILY    UMECLIDINIUM BROMIDE (INCRUSE ELLIPTA) 62.5 MCG/INH AEPB    Inhale 1 puff into the lungs daily       ALLERGIES     Pcn [penicillins] and Sulfa antibiotics    FAMILY HISTORY       Family History   Problem Relation Age of Onset    Cancer Father     Diabetes Sister           SOCIAL HISTORY       Social History     Social History    Marital status:      Spouse name: N/A    Number of children: N/A    Years of education: N/A     Social History Main Topics    Smoking status: Current Every Day Smoker     Packs/day: 1.00     Years: 55.00     Types: Cigarettes     Last attempt to quit: 12/25/2005    Smokeless tobacco: Never Used    Alcohol use No    Drug use: No    Sexual activity: Not Asked     Other Topics Concern    None     Social History Narrative    None       SCREENINGS             PHYSICAL EXAM    (up to 7 for level 4, 8 or more for level 5)     ED Triage Vitals   BP Temp Temp src Pulse Resp SpO2 Height Weight   09/03/18 0912 -- -- 09/03/18 0912 09/03/18 0928 09/03/18 0912 -- 09/03/18 0933   (!) 186/128   113 27 96 %  180 lb (81.6 kg)       Physical Exam   Constitutional: She appears well-developed and well-nourished. She appears distressed. HENT:   Head: Normocephalic and atraumatic. Right Ear: External ear normal.   Left Ear: External ear normal.   Mouth/Throat: No oropharyngeal exudate. Eyes: Pupils are equal, round, and reactive to light. Conjunctivae are normal.   Neck: Normal range of motion. Neck supple. No JVD present. No tracheal deviation present. No thyromegaly present. Cardiovascular: Normal rate and normal heart sounds. No murmur heard. Pulmonary/Chest: She is in respiratory distress. She has wheezes. Breath sounds diminished   Abdominal: Soft. Bowel sounds are normal. There is no tenderness. There is no guarding.    Musculoskeletal: Normal range of respiratory failure. Patient has severe COPD and was having issues for over 12 hours. [facility]      REASSESSMENT          CRITICAL CARE TIME   Total Critical Care time was 45 minutes, excluding separately reportable procedures. There was a high probability of clinically significant/life threatening deterioration in the patient's condition which required my urgent intervention. CONSULTS:  None    PROCEDURES:  Unless otherwise noted below, none     Intubation  Date/Time: 9/3/2018 10:19 AM  Performed by: Mario King  Authorized by: Mario King     Consent:     Consent obtained:  Emergent situation  Pre-procedure details:     Patient status:  Altered mental status    Mallampati score:  II    Pretreatment meds: Etomidate. Procedure details:     Preoxygenation:  Bag valve mask    CPR in progress: no      Intubation method:  Oral    Laryngoscope blade:  Freire 3    Tube size (mm):  7.0    Tube type:  Cuffed    Number of attempts:  2    Ventilation between attempts: yes      Tube visualized through cords: no    Placement assessment:     ETT to lip:  23    Tube secured with:  ETT lu    Breath sounds:  Equal    Placement verification: ETCO2 detector      CXR findings:  ETT in proper place  Post-procedure details:     Patient tolerance of procedure: Tolerated well, no immediate complications        FINAL IMPRESSION      1. Acute respiratory failure, unspecified whether with hypoxia or hypercapnia ([address]    2. COPD exacerbation ([address]    3. Chronic renal failure, unspecified CKD stage          DISPOSITION/PLAN   DISPOSITION    Admit ICU      PATIENT REFERRED TO:  No follow-up provider specified.     DISCHARGE MEDICATIONS:  New Prescriptions    No medications on file          (Please note that portions of this note were completed with a voice recognition program.  Efforts were made to edit the dictations but occasionally words are mis-transcribed.)    Yin Hart DO (electronically signed)  Attending

## 2018-09-04 ENCOUNTER — APPOINTMENT (OUTPATIENT)
Dept: GENERAL RADIOLOGY | Age: 73
DRG: 208 | End: 2018-09-04
Payer: COMMERCIAL

## 2018-09-04 LAB
AMPHETAMINE SCREEN, URINE: ABNORMAL
ANION GAP SERPL CALCULATED.3IONS-SCNC: 17 MEQ/L (ref 7–13)
BARBITURATE SCREEN URINE: ABNORMAL
BASE EXCESS ARTERIAL: 2 (ref -3–3)
BENZODIAZEPINE SCREEN, URINE: ABNORMAL
BUN BLDV-MCNC: 67 MG/DL (ref 8–23)
CALCIUM IONIZED: 1.13 MMOL/L (ref 1.12–1.32)
CALCIUM SERPL-MCNC: 9.2 MG/DL (ref 8.6–10.2)
CANNABINOID SCREEN URINE: ABNORMAL
CHLORIDE BLD-SCNC: 95 MEQ/L (ref 98–107)
CO2: 23 MEQ/L (ref 22–29)
COCAINE METABOLITE SCREEN URINE: POSITIVE
CREAT SERPL-MCNC: 7.08 MG/DL (ref 0.5–0.9)
GFR AFRICAN AMERICAN: 6
GFR AFRICAN AMERICAN: 6.9
GFR NON-AFRICAN AMERICAN: 5
GFR NON-AFRICAN AMERICAN: 5.7
GLUCOSE BLD-MCNC: 144 MG/DL (ref 74–109)
GLUCOSE BLD-MCNC: 148 MG/DL (ref 60–115)
GRAM STAIN RESULT: NORMAL
HCO3 ARTERIAL: 25.9 MMOL/L (ref 21–29)
HCT VFR BLD CALC: 31.5 % (ref 37–47)
HEMATOLOGY PATH CONSULT: NORMAL
HEMOGLOBIN: 10.5 G/DL (ref 12–16)
HEMOGLOBIN: 11.4 GM/DL (ref 12–16)
LACTATE: 1.42 MMOL/L (ref 0.4–2)
LACTIC ACID: 1.5 MMOL/L (ref 0.5–2.2)
Lab: ABNORMAL
MCH RBC QN AUTO: 31.4 PG (ref 27–31.3)
MCHC RBC AUTO-ENTMCNC: 33.3 % (ref 33–37)
MCV RBC AUTO: 94.5 FL (ref 82–100)
O2 SAT, ARTERIAL: 99 % (ref 93–100)
OPIATE SCREEN URINE: ABNORMAL
PCO2 ARTERIAL: 35 MM HG (ref 35–45)
PDW BLD-RTO: 18.5 % (ref 11.5–14.5)
PERFORMED ON: ABNORMAL
PH ARTERIAL: 7.47 (ref 7.35–7.45)
PHENCYCLIDINE SCREEN URINE: ABNORMAL
PLATELET # BLD: 190 K/UL (ref 130–400)
PO2 ARTERIAL: 120 MM HG (ref 75–108)
POC CHLORIDE: 102 MEQ/L (ref 99–110)
POC CREATININE: 7.7 MG/DL (ref 0.6–1.2)
POC FIO2: 30
POC HEMATOCRIT: 33 % (ref 36–48)
POC POTASSIUM: 5.3 MEQ/L (ref 3.5–5.1)
POC SAMPLE TYPE: ABNORMAL
POC SODIUM: 136 MEQ/L (ref 136–145)
POTASSIUM REFLEX MAGNESIUM: 6 MEQ/L (ref 3.5–5.1)
PROCALCITONIN: 2.2 NG/ML (ref 0–0.15)
RBC # BLD: 3.33 M/UL (ref 4.2–5.4)
SODIUM BLD-SCNC: 135 MEQ/L (ref 132–144)
TCO2 ARTERIAL: 27 (ref 22–29)
WBC # BLD: 7.6 K/UL (ref 4.8–10.8)

## 2018-09-04 PROCEDURE — 36415 COLL VENOUS BLD VENIPUNCTURE: CPT

## 2018-09-04 PROCEDURE — 82330 ASSAY OF CALCIUM: CPT

## 2018-09-04 PROCEDURE — 84295 ASSAY OF SERUM SODIUM: CPT

## 2018-09-04 PROCEDURE — 2500000003 HC RX 250 WO HCPCS: Performed by: INTERNAL MEDICINE

## 2018-09-04 PROCEDURE — 2580000003 HC RX 258: Performed by: INTERNAL MEDICINE

## 2018-09-04 PROCEDURE — 2000000000 HC ICU R&B

## 2018-09-04 PROCEDURE — 82435 ASSAY OF BLOOD CHLORIDE: CPT

## 2018-09-04 PROCEDURE — 94640 AIRWAY INHALATION TREATMENT: CPT

## 2018-09-04 PROCEDURE — 36600 WITHDRAWAL OF ARTERIAL BLOOD: CPT

## 2018-09-04 PROCEDURE — 83605 ASSAY OF LACTIC ACID: CPT

## 2018-09-04 PROCEDURE — 6370000000 HC RX 637 (ALT 250 FOR IP): Performed by: INTERNAL MEDICINE

## 2018-09-04 PROCEDURE — 80048 BASIC METABOLIC PNL TOTAL CA: CPT

## 2018-09-04 PROCEDURE — 85014 HEMATOCRIT: CPT

## 2018-09-04 PROCEDURE — 85027 COMPLETE CBC AUTOMATED: CPT

## 2018-09-04 PROCEDURE — 94003 VENT MGMT INPAT SUBQ DAY: CPT

## 2018-09-04 PROCEDURE — 6360000002 HC RX W HCPCS: Performed by: INTERNAL MEDICINE

## 2018-09-04 PROCEDURE — 99233 SBSQ HOSP IP/OBS HIGH 50: CPT | Performed by: INTERNAL MEDICINE

## 2018-09-04 PROCEDURE — 82803 BLOOD GASES ANY COMBINATION: CPT

## 2018-09-04 PROCEDURE — 94760 N-INVAS EAR/PLS OXIMETRY 1: CPT

## 2018-09-04 PROCEDURE — 2700000000 HC OXYGEN THERAPY PER DAY

## 2018-09-04 PROCEDURE — 94761 N-INVAS EAR/PLS OXIMETRY MLT: CPT

## 2018-09-04 PROCEDURE — 80307 DRUG TEST PRSMV CHEM ANLYZR: CPT

## 2018-09-04 PROCEDURE — 71045 X-RAY EXAM CHEST 1 VIEW: CPT

## 2018-09-04 PROCEDURE — 87070 CULTURE OTHR SPECIMN AEROBIC: CPT

## 2018-09-04 PROCEDURE — 84145 PROCALCITONIN (PCT): CPT

## 2018-09-04 PROCEDURE — 84132 ASSAY OF SERUM POTASSIUM: CPT

## 2018-09-04 PROCEDURE — 82565 ASSAY OF CREATININE: CPT

## 2018-09-04 PROCEDURE — 94799 UNLISTED PULMONARY SVC/PX: CPT

## 2018-09-04 PROCEDURE — 93010 ELECTROCARDIOGRAM REPORT: CPT | Performed by: INTERNAL MEDICINE

## 2018-09-04 PROCEDURE — 94002 VENT MGMT INPAT INIT DAY: CPT

## 2018-09-04 PROCEDURE — 87205 SMEAR GRAM STAIN: CPT

## 2018-09-04 RX ORDER — CLONIDINE HYDROCHLORIDE 0.2 MG/1
0.2 TABLET ORAL 3 TIMES DAILY
Status: DISCONTINUED | OUTPATIENT
Start: 2018-09-04 | End: 2018-09-05 | Stop reason: HOSPADM

## 2018-09-04 RX ORDER — IPRATROPIUM BROMIDE AND ALBUTEROL SULFATE 2.5; .5 MG/3ML; MG/3ML
1 SOLUTION RESPIRATORY (INHALATION) 4 TIMES DAILY PRN
Status: DISCONTINUED | OUTPATIENT
Start: 2018-09-04 | End: 2018-09-05 | Stop reason: HOSPADM

## 2018-09-04 RX ORDER — HEPARIN SODIUM 5000 [USP'U]/ML
5000 INJECTION, SOLUTION INTRAVENOUS; SUBCUTANEOUS EVERY 8 HOURS SCHEDULED
Status: DISCONTINUED | OUTPATIENT
Start: 2018-09-04 | End: 2018-09-05 | Stop reason: HOSPADM

## 2018-09-04 RX ORDER — FAMOTIDINE 20 MG/1
20 TABLET, FILM COATED ORAL DAILY
Status: DISCONTINUED | OUTPATIENT
Start: 2018-09-05 | End: 2018-09-05 | Stop reason: HOSPADM

## 2018-09-04 RX ORDER — LIDOCAINE AND PRILOCAINE 25; 25 MG/G; MG/G
CREAM TOPICAL PRN
Status: DISCONTINUED | OUTPATIENT
Start: 2018-09-04 | End: 2018-09-05 | Stop reason: HOSPADM

## 2018-09-04 RX ORDER — IPRATROPIUM BROMIDE AND ALBUTEROL SULFATE 2.5; .5 MG/3ML; MG/3ML
1 SOLUTION RESPIRATORY (INHALATION)
Status: DISCONTINUED | OUTPATIENT
Start: 2018-09-04 | End: 2018-09-04

## 2018-09-04 RX ADMIN — HEPARIN SODIUM 5000 UNITS: 5000 INJECTION, SOLUTION INTRAVENOUS; SUBCUTANEOUS at 22:34

## 2018-09-04 RX ADMIN — Medication 4 PUFF: at 08:23

## 2018-09-04 RX ADMIN — IPRATROPIUM BROMIDE 4 PUFF: 17 AEROSOL, METERED RESPIRATORY (INHALATION) at 08:24

## 2018-09-04 RX ADMIN — CLONIDINE HYDROCHLORIDE 0.2 MG: 0.2 TABLET ORAL at 13:40

## 2018-09-04 RX ADMIN — Medication 4 PUFF: at 03:42

## 2018-09-04 RX ADMIN — Medication 10 ML: at 07:50

## 2018-09-04 RX ADMIN — SODIUM BICARBONATE 650 MG: 650 TABLET ORAL at 07:46

## 2018-09-04 RX ADMIN — METHYLPREDNISOLONE SODIUM SUCCINATE 40 MG: 40 INJECTION, POWDER, FOR SOLUTION INTRAMUSCULAR; INTRAVENOUS at 04:04

## 2018-09-04 RX ADMIN — ATORVASTATIN CALCIUM 20 MG: 20 TABLET, FILM COATED ORAL at 07:46

## 2018-09-04 RX ADMIN — METHYLPREDNISOLONE SODIUM SUCCINATE 40 MG: 40 INJECTION, POWDER, FOR SOLUTION INTRAMUSCULAR; INTRAVENOUS at 22:33

## 2018-09-04 RX ADMIN — DEXTROSE MONOHYDRATE 10 MG/HR: 50 INJECTION, SOLUTION INTRAVENOUS at 20:28

## 2018-09-04 RX ADMIN — HYDRALAZINE HYDROCHLORIDE 100 MG: 100 TABLET, FILM COATED ORAL at 13:40

## 2018-09-04 RX ADMIN — DEXTROSE MONOHYDRATE 10 MG/HR: 50 INJECTION, SOLUTION INTRAVENOUS at 12:30

## 2018-09-04 RX ADMIN — DEXTROSE MONOHYDRATE 10 MG/HR: 50 INJECTION, SOLUTION INTRAVENOUS at 15:56

## 2018-09-04 RX ADMIN — DEXTROSE MONOHYDRATE 10 MG/HR: 50 INJECTION, SOLUTION INTRAVENOUS at 08:04

## 2018-09-04 RX ADMIN — Medication 10 ML: at 04:03

## 2018-09-04 RX ADMIN — PROPOFOL 50 MCG/KG/MIN: 10 INJECTION, EMULSION INTRAVENOUS at 07:22

## 2018-09-04 RX ADMIN — PROPOFOL 50 MCG/KG/MIN: 10 INJECTION, EMULSION INTRAVENOUS at 04:03

## 2018-09-04 RX ADMIN — CLONIDINE HYDROCHLORIDE 0.2 MG: 0.2 TABLET ORAL at 07:48

## 2018-09-04 RX ADMIN — CHLORHEXIDINE GLUCONATE 0.12% ORAL RINSE 15 ML: 1.2 LIQUID ORAL at 07:45

## 2018-09-04 RX ADMIN — LOSARTAN POTASSIUM 50 MG: 50 TABLET ORAL at 07:47

## 2018-09-04 RX ADMIN — METHYLPREDNISOLONE SODIUM SUCCINATE 40 MG: 40 INJECTION, POWDER, FOR SOLUTION INTRAMUSCULAR; INTRAVENOUS at 13:40

## 2018-09-04 RX ADMIN — HEPARIN SODIUM 5000 UNITS: 5000 INJECTION, SOLUTION INTRAVENOUS; SUBCUTANEOUS at 08:39

## 2018-09-04 RX ADMIN — DEXTROSE MONOHYDRATE 10 MG/HR: 50 INJECTION, SOLUTION INTRAVENOUS at 04:03

## 2018-09-04 RX ADMIN — HYDRALAZINE HYDROCHLORIDE 100 MG: 100 TABLET, FILM COATED ORAL at 22:33

## 2018-09-04 RX ADMIN — DEXTROSE MONOHYDRATE 10 MG/HR: 50 INJECTION, SOLUTION INTRAVENOUS at 00:31

## 2018-09-04 RX ADMIN — METOPROLOL TARTRATE 25 MG: 25 TABLET ORAL at 07:46

## 2018-09-04 RX ADMIN — IPRATROPIUM BROMIDE 4 PUFF: 17 AEROSOL, METERED RESPIRATORY (INHALATION) at 03:42

## 2018-09-04 RX ADMIN — HYDRALAZINE HYDROCHLORIDE 100 MG: 100 TABLET, FILM COATED ORAL at 07:46

## 2018-09-04 RX ADMIN — FAMOTIDINE 20 MG: 20 TABLET ORAL at 07:49

## 2018-09-04 RX ADMIN — LOSARTAN POTASSIUM 50 MG: 50 TABLET ORAL at 22:33

## 2018-09-04 RX ADMIN — CLONIDINE HYDROCHLORIDE 0.2 MG: 0.2 TABLET ORAL at 22:33

## 2018-09-04 RX ADMIN — DEXTROSE MONOHYDRATE 7.5 MG/HR: 50 INJECTION, SOLUTION INTRAVENOUS at 22:34

## 2018-09-04 RX ADMIN — METOPROLOL TARTRATE 25 MG: 25 TABLET ORAL at 22:34

## 2018-09-04 RX ADMIN — AMLODIPINE BESYLATE 10 MG: 10 TABLET ORAL at 07:46

## 2018-09-04 RX ADMIN — HEPARIN SODIUM 5000 UNITS: 5000 INJECTION, SOLUTION INTRAVENOUS; SUBCUTANEOUS at 13:41

## 2018-09-04 ASSESSMENT — PAIN SCALES - WONG BAKER
WONGBAKER_NUMERICALRESPONSE: 0

## 2018-09-04 ASSESSMENT — PULMONARY FUNCTION TESTS
PIF_VALUE: 20
PIF_VALUE: 22
PIF_VALUE: 21
PIF_VALUE: 20
PIF_VALUE: 20

## 2018-09-04 NOTE — CARE COORDINATION
Care Transition Nurse met with patient at bedside. Provided and reviewed COPD booklet and zone sheet information. Pt states she has an rescue inhaler at home that she uses for increased sob, tightness with breathing or increased phlegm/coughing. She states she does not have a nebulizer or oxygen. Irritants to avoid discussed with patient. Pt states she was smoking a lot the day prior to admission. She states she usually smokes 3 cigarettes a day and was smoking one after another. Pt states she plans to quit smoking and has the support of her family and friends. CTN encouraged pt to ask for nicotine patch prescription prior to discharge if she feels she needs it to help her abstain from smoking after discharge. Benefits of quitting reviewed with patient. Pt states she will be in [facility] until Sunday then plans to return back to the [facility]. Pt states she will be following up with her physicians back home after discharge. Pt is agreeable to CTN making a follow up phone call after discharge to review discharge instructions and reinforce education provided.

## 2018-09-04 NOTE — CARE COORDINATION
Spoke to patient. States staying with friend but from [place]. Patient has HD here when visiting, drives, uses a cane. Daughter drove patient here and with transport back. States no home O2, or nebulizers, uses MDI only. Patient follows with a pulmonologist in [place] and was scheduled to see him tomorrow and plans to re-schedule. Patient denies needs @ discharge. Patient extubated yesterday and on currently on Cardene gtt and O2.

## 2018-09-04 NOTE — PLAN OF CARE
Problem: Restraint Use - Nonviolent/Non-Self-Destructive Behavior:  Goal: Absence of restraint indications  Absence of restraint indications   Outcome: Not Met This Shift    Goal: Absence of restraint-related injury  Absence of restraint-related injury   Outcome: Ongoing      Problem: Risk for Impaired Skin Integrity  Goal: Tissue integrity - skin and mucous membranes  Structural intactness and normal physiological function of skin and  mucous membranes.    Outcome: Ongoing      Problem: Falls - Risk of:  Goal: Will remain free from falls  Will remain free from falls   Outcome: Ongoing    Goal: Absence of physical injury  Absence of physical injury   Outcome: Met This Shift

## 2018-09-04 NOTE — PROGRESS NOTES
CRITICAL CARE PROGRESS NOTES    PATIENT NAME: Sloan Ramos  MRN: [id number]  SERVICE DATE:  September 4, 2018   SERVICE TIME:  12:03 PM      PRIMARY SERVICE: Critical care medicine    CHIEF COMPLAINTS: Intubated    INTERVAL HPI: Patient seen and examined at bedside, Interval Notes, orders reviewed. Discussed on multidisciplinary rounds  Patient did very well since yesterday with no respiratory issues or compromise. She remains on 30% FiO2 with excellent oxygenation. Blood gases were very adequate today on 30% FiO2. Chest x-ray was unremarkable. She has been afebrile with stable hemodynamic status. We stop sedation, patient woke up rapidly from propofol, and was extubated with no issues. She is only on 2 L nasal cannula oxygenating well reports no breathing difficulties. \"Wants to go home\". She is scheduled for her dialysis today      OBJECTIVE    Body mass index is 26.24 kg/m². PHYSICAL EXAM:  Vitals:  BP (!) 170/49   Pulse 73   Temp 97.7 °F (36.5 °C) (Axillary)   Resp 19   Ht 5' 7\" (1.702 m)   Wt 167 lb 8.8 oz (76 kg)   SpO2 100%   BMI 26.24 kg/m²   General: Patient is  Alert, awake, since extubation . comfortable in bed, No distress. Head: Atraumatic , Normocephalic   Eyes: PERRL. No icteric sclera. No conjunctival injection. No discharge   ENT: No nasal  discharge. Pharynx clear. Neck:  Trachea midline. No thyromegaly, no JVD, No cervical adenopathy. Chest : Adequate spontaneous effort, symmetric bilateral excursions  Lung : Decreased breath sounds bilaterally, clear otherwise  Heart[de-identified] Regular rhythm and rate. No mumur ,  Rub or gallop  ABD: Benign. Non-tender. Non-distended. No masses or organmegaly. Normal bowel sounds. EXT: No significant Pitting edema both lower extremities , No Cyanosis No clubbing  Neuro: no focal weakness  Skin: Warm and dry. No erythema or rash on exposed extremities.       DATA:   Recent Labs      09/03/18   0930   09/04/18   0503  09/04/18   0508   WBC  9.6   -- 9/4/2018 at 12:03 PM

## 2018-09-04 NOTE — CONSULTS
Take 120 mg by mouth daily       amlodipine (NORVASC) 10 MG tablet Take 10 mg by mouth daily. Allergies:  Pcn [penicillins] and Sulfa antibiotics    Social History:    Social History     Social History    Marital status:      Spouse name: N/A    Number of children: N/A    Years of education: N/A     Occupational History    Not on file.      Social History Main Topics    Smoking status: Current Every Day Smoker     Packs/day: 1.00     Years: 55.00     Types: Cigarettes     Last attempt to quit: 12/25/2005    Smokeless tobacco: Never Used    Alcohol use No    Drug use: No    Sexual activity: Not on file     Other Topics Concern    Not on file     Social History Narrative    No narrative on file       Family History:   Family History   Problem Relation Age of Onset    Cancer Father     Diabetes Sister        Review of Systems:   Positives in bold  Unable to obtain due to intubation and sedation    Physical exam:   Constitutional:  VITALS:  BP (!) 174/56   Pulse 80   Temp 97.1 °F (36.2 °C) (Axillary)   Resp 18   Ht 5' 7\" (1.702 m)   Wt 167 lb 8.8 oz (76 kg)   SpO2 100%   BMI 26.24 kg/m²     General: intubated, sedated  HEENT: normocephalic, atraumatic, ETT in place  Neck: supple, no mass  Lungs: intubated, on vent, decreased breath sounds, scattered crackles  Heart: regular rate and rhythm, no murmurs or rubs  Abdomen: soft, non-tender, non-distended  MSK: no joint swelling or tenderness  Ext: no cyanosis, + peripheral edema  Neuro: intubated but alert and able to follow commands  Psych: unable to assess due to intubation   Vascular access: LUE AVG, + thrill      Data/  CBC:   Lab Results   Component Value Date    WBC 7.6 09/04/2018    RBC 3.33 09/04/2018    HGB 11.4 09/04/2018    HCT 31.5 09/04/2018    MCV 94.5 09/04/2018    MCH 31.4 09/04/2018    MCHC 33.3 09/04/2018    RDW 18.5 09/04/2018     09/04/2018    MPV 10.0 06/22/2018     BMP:    Lab Results   Component Value Date

## 2018-09-04 NOTE — PROGRESS NOTES
Following commands. HEENT: Pupils equal, round, and reactive to light. Conjunctivae/corneas clear. Neck: Supple, with full range of motion. No jugular venous distention. Trachea midline. Respiratory:  Normal respiratory effort. Clear to auscultation, bilaterally without Rales/Wheezes/Rhonchi. Cardiovascular: Regular rate and rhythm with normal S1/S2 without murmurs, rubs or gallops. Abdomen: Soft, non-tender, non-distended with normal bowel sounds. Musculoskeletal: No clubbing, cyanosis or edema bilaterally. Full range of motion without deformity. Skin: Skin color, texture, turgor normal.  No rashes or lesions. Neuro: Non Focal. Symetrical motor and tone. Nl Comprehension, intubated and sedated but following commands  Psychiatric: intubated and sedated, but arousable and following commands. Peripheral Pulses: +2 palpable, equal bilaterally       Labs:   Recent Labs      09/03/18   0930 09/03/18   1031  09/04/18   0503  09/04/18   0508   WBC  9.6   --   7.6   --    HGB  12.5  10.6*  10.5*  11.4*   HCT  38.1   --   31.5*   --    PLT  215   --   190   --      Recent Labs      09/03/18   0930  09/03/18   1031  09/04/18   0503  09/04/18   0508   NA  142   --   135   --    K  4.8   --   6.0*   --    CL  96*   --   95*   --    CO2  21*   --   23   --    BUN  56*   --   67*   --    CREATININE  5.66*  6.0*  7.08*  7.7*   CALCIUM  9.4   --   9.2   --      Recent Labs      09/03/18   0930   AST  28   ALT  16   BILITOT  0.3   ALKPHOS  249*     No results for input(s): INR in the last 72 hours. Recent Labs      09/03/18   0930  09/03/18   1436  09/03/18 2024   TROPONINI  0.020*  0.057*  0.079*       Urinalysis:    Lab Results   Component Value Date    NITRU NEGATIVE 03/20/2017    WBCUA None 03/20/2017    BACTERIA NOT REPORTED 03/20/2017    RBCUA None 03/20/2017    SPECGRAV 1.015 03/20/2017    GLUCOSEU NEGATIVE 03/20/2017       Radiology:  XR CHEST PORTABLE   Final Result   Tubes in good position.  The interstitial

## 2018-09-04 NOTE — PROGRESS NOTES
2000- Report received and care of pt assumed at this time. Pt in bed; restless. Assessment complete. See flowsheets for details. No acute distress noted at this time. Call bell in reach.

## 2018-09-05 VITALS
HEART RATE: 62 BPM | HEIGHT: 67 IN | TEMPERATURE: 98 F | BODY MASS INDEX: 25.54 KG/M2 | RESPIRATION RATE: 12 BRPM | WEIGHT: 162.7 LBS | DIASTOLIC BLOOD PRESSURE: 53 MMHG | SYSTOLIC BLOOD PRESSURE: 139 MMHG | OXYGEN SATURATION: 100 %

## 2018-09-05 LAB
ANION GAP SERPL CALCULATED.3IONS-SCNC: 14 MEQ/L (ref 7–13)
BUN BLDV-MCNC: 32 MG/DL (ref 8–23)
CALCIUM SERPL-MCNC: 9 MG/DL (ref 8.6–10.2)
CHLORIDE BLD-SCNC: 91 MEQ/L (ref 98–107)
CO2: 27 MEQ/L (ref 22–29)
CREAT SERPL-MCNC: 3.86 MG/DL (ref 0.5–0.9)
GFR AFRICAN AMERICAN: 13.9
GFR NON-AFRICAN AMERICAN: 11.5
GLUCOSE BLD-MCNC: 130 MG/DL (ref 74–109)
HCT VFR BLD CALC: 30.5 % (ref 37–47)
HEMOGLOBIN: 10.2 G/DL (ref 12–16)
L. PNEUMOPHILA SEROGP 1 UR AG: NEGATIVE
MCH RBC QN AUTO: 31.1 PG (ref 27–31.3)
MCHC RBC AUTO-ENTMCNC: 33.3 % (ref 33–37)
MCV RBC AUTO: 93.3 FL (ref 82–100)
PARATHYROID HORMONE INTACT: 1035 PG/ML (ref 15–65)
PDW BLD-RTO: 17.9 % (ref 11.5–14.5)
PHOSPHORUS: 5 MG/DL (ref 2.5–4.5)
PLATELET # BLD: 168 K/UL (ref 130–400)
POTASSIUM REFLEX MAGNESIUM: 5.3 MEQ/L (ref 3.5–5.1)
RBC # BLD: 3.27 M/UL (ref 4.2–5.4)
SODIUM BLD-SCNC: 132 MEQ/L (ref 132–144)
STREP PNEUMO AG, UR: NEGATIVE
WBC # BLD: 8.4 K/UL (ref 4.8–10.8)

## 2018-09-05 PROCEDURE — 2700000000 HC OXYGEN THERAPY PER DAY

## 2018-09-05 PROCEDURE — 80048 BASIC METABOLIC PNL TOTAL CA: CPT

## 2018-09-05 PROCEDURE — 83970 ASSAY OF PARATHORMONE: CPT

## 2018-09-05 PROCEDURE — 36415 COLL VENOUS BLD VENIPUNCTURE: CPT

## 2018-09-05 PROCEDURE — 2580000003 HC RX 258: Performed by: INTERNAL MEDICINE

## 2018-09-05 PROCEDURE — 84100 ASSAY OF PHOSPHORUS: CPT

## 2018-09-05 PROCEDURE — 2500000003 HC RX 250 WO HCPCS: Performed by: INTERNAL MEDICINE

## 2018-09-05 PROCEDURE — 6370000000 HC RX 637 (ALT 250 FOR IP): Performed by: INTERNAL MEDICINE

## 2018-09-05 PROCEDURE — 85027 COMPLETE CBC AUTOMATED: CPT

## 2018-09-05 PROCEDURE — 6360000002 HC RX W HCPCS: Performed by: INTERNAL MEDICINE

## 2018-09-05 RX ORDER — GUAIFENESIN 600 MG/1
600 TABLET, EXTENDED RELEASE ORAL 2 TIMES DAILY
Status: DISCONTINUED | OUTPATIENT
Start: 2018-09-05 | End: 2018-09-05 | Stop reason: HOSPADM

## 2018-09-05 RX ORDER — OMEPRAZOLE 20 MG/1
20 CAPSULE, DELAYED RELEASE ORAL DAILY
Qty: 30 CAPSULE | Refills: 3 | Status: SHIPPED | OUTPATIENT
Start: 2018-09-05 | End: 2019-02-20 | Stop reason: SDUPTHER

## 2018-09-05 RX ORDER — PREDNISONE 10 MG/1
TABLET ORAL
Qty: 30 TABLET | Refills: 0 | Status: SHIPPED | OUTPATIENT
Start: 2018-09-05 | End: 2018-09-13

## 2018-09-05 RX ORDER — GUAIFENESIN 600 MG/1
600 TABLET, EXTENDED RELEASE ORAL 2 TIMES DAILY
Qty: 60 TABLET | Refills: 0 | Status: SHIPPED | OUTPATIENT
Start: 2018-09-05 | End: 2018-09-13

## 2018-09-05 RX ORDER — PREDNISONE 20 MG/1
40 TABLET ORAL DAILY
Status: DISCONTINUED | OUTPATIENT
Start: 2018-09-05 | End: 2018-09-05 | Stop reason: HOSPADM

## 2018-09-05 RX ORDER — ALBUTEROL SULFATE 90 UG/1
2 AEROSOL, METERED RESPIRATORY (INHALATION) EVERY 6 HOURS PRN
Qty: 1 INHALER | Refills: 11 | Status: SHIPPED | OUTPATIENT
Start: 2018-09-05 | End: 2019-05-23

## 2018-09-05 RX ADMIN — METOPROLOL TARTRATE 25 MG: 25 TABLET ORAL at 09:09

## 2018-09-05 RX ADMIN — FAMOTIDINE 20 MG: 20 TABLET ORAL at 09:09

## 2018-09-05 RX ADMIN — AMLODIPINE BESYLATE 10 MG: 10 TABLET ORAL at 09:22

## 2018-09-05 RX ADMIN — DEXTROSE MONOHYDRATE 7.5 MG/HR: 50 INJECTION, SOLUTION INTRAVENOUS at 00:10

## 2018-09-05 RX ADMIN — CLONIDINE HYDROCHLORIDE 0.2 MG: 0.2 TABLET ORAL at 09:10

## 2018-09-05 RX ADMIN — HEPARIN SODIUM 5000 UNITS: 5000 INJECTION, SOLUTION INTRAVENOUS; SUBCUTANEOUS at 06:21

## 2018-09-05 RX ADMIN — METHYLPREDNISOLONE SODIUM SUCCINATE 40 MG: 40 INJECTION, POWDER, FOR SOLUTION INTRAMUSCULAR; INTRAVENOUS at 06:20

## 2018-09-05 RX ADMIN — HYDRALAZINE HYDROCHLORIDE 100 MG: 100 TABLET, FILM COATED ORAL at 09:09

## 2018-09-05 RX ADMIN — GUAIFENESIN 600 MG: 600 TABLET, EXTENDED RELEASE ORAL at 12:11

## 2018-09-05 RX ADMIN — Medication 1 MG: at 09:10

## 2018-09-05 RX ADMIN — PREDNISONE 40 MG: 20 TABLET ORAL at 12:11

## 2018-09-05 RX ADMIN — CINACALCET HYDROCHLORIDE 120 MG: 30 TABLET, COATED ORAL at 09:14

## 2018-09-05 RX ADMIN — LOSARTAN POTASSIUM 50 MG: 50 TABLET ORAL at 09:14

## 2018-09-05 ASSESSMENT — ENCOUNTER SYMPTOMS
NAUSEA: 0
SHORTNESS OF BREATH: 0
WHEEZING: 0
CONSTIPATION: 0
VOMITING: 0
DIARRHEA: 0

## 2018-09-05 ASSESSMENT — PAIN SCALES - GENERAL: PAINLEVEL_OUTOF10: 0

## 2018-09-05 NOTE — DISCHARGE INSTR - DIET

## 2018-09-05 NOTE — PROGRESS NOTES
Pt. Anxious to be discharged awaiting friend from [facility]. Pt. Joni Dias understanding of follow up , this nurse stressed importance. D/C via w/c to lobby where friend was waiting ambulatory to car in stable condition.

## 2018-09-05 NOTE — PROGRESS NOTES
1171 ml   Output              250 ml   Net              921 ml     General: alert, in no apparent distress  HEENT: normocephalic, atraumatic, anicteric  Neck: supple, no mass  Lungs: non-labored respirations, clear to auscultation bilaterally  Heart: regular rate and rhythm, no murmurs or rubs  Abdomen: soft, non-tender, non-distended  MSK: no joint swelling or tenderness  Ext: no cyanosis, no peripheral edema  Neuro: alert and oriented, no gross abnormalities  Psych: normal mood and affect  Skin: no rash  Vascular access: LUE AVG, +thrill       Electronically signed by Destiney Verma MD

## 2018-09-05 NOTE — PROGRESS NOTES
kg/m²     General appearance: No apparent distress, appears stated age and cooperative. Following commands. HEENT: Pupils equal, round, and reactive to light. Conjunctivae/corneas clear. Neck: Supple, with full range of motion. No jugular venous distention. Trachea midline. Respiratory:  Normal respiratory effort. Clear to auscultation, bilaterally without Rales/Wheezes/Rhonchi. Cardiovascular: Regular rate and rhythm with normal S1/S2 without murmurs, rubs or gallops. Abdomen: Soft, non-tender, non-distended with normal bowel sounds. Musculoskeletal: No clubbing, cyanosis or edema bilaterally. Full range of motion without deformity. Skin: Skin color, texture, turgor normal.  No rashes or lesions. Neuro: Non Focal. Symetrical motor and tone. Nl Comprehension, intubated and sedated but following commands  Psychiatric: intubated and sedated, but arousable and following commands. Peripheral Pulses: +2 palpable, equal bilaterally       Labs:   Recent Labs      09/03/18 0930 09/04/18   0503  09/04/18   0508  09/05/18   0503   WBC  9.6   --   7.6   --   8.4   HGB  12.5   < >  10.5*  11.4*  10.2*   HCT  38.1   --   31.5*   --   30.5*   PLT  215   --   190   --   168    < > = values in this interval not displayed. Recent Labs      09/03/18 0930 09/04/18   0503  09/04/18   0508  09/05/18   0503   NA  142   --   135   --   132   K  4.8   --   6.0*   --   5.3*   CL  96*   --   95*   --   91*   CO2  21*   --   23   --   27   BUN  56*   --   67*   --   32*   CREATININE  5.66*   < >  7.08*  7.7*  3.86*   CALCIUM  9.4   --   9.2   --   9.0   PHOS   --    --    --    --   5.0*    < > = values in this interval not displayed. Recent Labs      09/03/18 0930   AST  28   ALT  16   BILITOT  0.3   ALKPHOS  249*     No results for input(s): INR in the last 72 hours.   Recent Labs      09/03/18   0930 09/03/18   1436  09/03/18 2024   TROPONINI  0.020*  0.057*  0.079*       Urinalysis:      Lab Results Component Value Date    NITRU NEGATIVE 03/20/2017    WBCUA None 03/20/2017    BACTERIA NOT REPORTED 03/20/2017    RBCUA None 03/20/2017    SPECGRAV 1.015 03/20/2017    GLUCOSEU NEGATIVE 03/20/2017       Radiology:  XR CHEST PORTABLE   Final Result   TUBES REMAIN IN SATISFACTORY POSITION. DECREASED VASCULAR CONGESTION COMPARED TO YESTERDAY. XR CHEST PORTABLE   Final Result   Tubes in good position. The interstitial pulmonary edema. .                          Assessment/Plan:    Active Hospital Problems    Diagnosis Date Noted    Respiratory failure ([facility] [address] [J96.90] 09/03/2018        1. VDRF: unknown cause for respiratory failure. Likely related to cocaine use. Encouraged to stop using cocaine, extubated, pulmonary following. Solumedrol to PO prednisone. Continue nebs. Antigens resolved. 2. ESRD on HD: HD yesterday, K improved to 5.3 plan to follow up with nephrology in [place]. continue binders, BP control     3. HTN urgency: D/C nicardipine drip,  Continued on norvasc 10, clonidine 0.2 BID, increase to TID, hydralazine 100 TID, losartan 50 BID. D/C metoprolol due to cocaine use. 4. Noted GOYO nodule: pt refused previous work up for previous PET due to contrast reaction. follow up with pulm in [place]    DVT PPX Heparin. GI PPX pepcid. Additional work up or/and treatment plan may be added today or then after based on clinical progression. I am managing a portion of pt care. Some medical issues are handled by other specialists. Additional work up and treatment should be done in out pt setting by pt PCP and other out pt providers. In addition to examining and evaluating pt, I spent additional time explaining care, normal and abnormal findings, and treatment plan. All of pt questions were answered. Counseling, diet and education were  provided. Case will be discussed with nursing staff when appropriate. Family will be updated if and when appropriate.       Diet: DIET RENAL;    Code Status: Full Code    PT/OT Eval once extubated        Electronically signed by Hayes Shah DO on 9/5/2018 at 9:08 AM

## 2018-09-05 NOTE — CARE COORDINATION
LSW  Spoke to Pt, her plan is to return back home to [facility] on Sunday. Pt uses [facility]  [address]  635am time. Pt is up in the hallway walking and asking to be DC  Today. Niru Da Silva Electronically signed by REBEL Fowler on 9/5/2018 at 12:27 PM

## 2018-09-05 NOTE — PLAN OF CARE
Problem: Risk for Impaired Skin Integrity  Goal: Tissue integrity - skin and mucous membranes  Structural intactness and normal physiological function of skin and  mucous membranes.    Outcome: Completed Date Met: 09/05/18

## 2018-09-06 LAB — CULTURE, RESPIRATORY: NORMAL

## 2018-09-06 NOTE — FLOWSHEET NOTE
[address] to find home medications pharmacy called and states they have them and will sent to ICU  Bottles of home medications  were given to family  Member [id number]

## 2018-09-08 ENCOUNTER — HOSPITAL ENCOUNTER (EMERGENCY)
Age: 73
Discharge: HOME | End: 2018-09-08
Attending: EMERGENCY MEDICINE
Payer: COMMERCIAL

## 2018-09-08 VITALS
HEIGHT: 66 IN | HEART RATE: 64 BPM | OXYGEN SATURATION: 99 % | RESPIRATION RATE: 18 BRPM | WEIGHT: 169.9 LBS | DIASTOLIC BLOOD PRESSURE: 42 MMHG | TEMPERATURE: 98.3 F | SYSTOLIC BLOOD PRESSURE: 141 MMHG | BODY MASS INDEX: 27.31 KG/M2

## 2018-09-08 DIAGNOSIS — H10.9 CONJUNCTIVITIS OF LEFT EYE, UNSPECIFIED CONJUNCTIVITIS TYPE: Primary | ICD-10-CM

## 2018-09-08 LAB
BLOOD CULTURE, ROUTINE: NORMAL
CULTURE, BLOOD 2: NORMAL

## 2018-09-08 PROCEDURE — 99283 EMERGENCY DEPT VISIT LOW MDM: CPT

## 2018-09-08 RX ORDER — CIPROFLOXACIN HYDROCHLORIDE 3.5 MG/ML
2 SOLUTION/ DROPS TOPICAL
Qty: 1 BOTTLE | Refills: 0 | Status: SHIPPED | OUTPATIENT
Start: 2018-09-08 | End: 2018-09-10

## 2018-09-08 ASSESSMENT — ENCOUNTER SYMPTOMS
EYE DISCHARGE: 1
FACIAL SWELLING: 0
EYE REDNESS: 1
CONSTIPATION: 0
COUGH: 0
SHORTNESS OF BREATH: 0
COLOR CHANGE: 0
VOMITING: 0
DIARRHEA: 0
ABDOMINAL PAIN: 0

## 2018-09-08 ASSESSMENT — PAIN DESCRIPTION - LOCATION: LOCATION: EYE

## 2018-09-08 ASSESSMENT — PAIN DESCRIPTION - DESCRIPTORS: DESCRIPTORS: THROBBING

## 2018-09-08 ASSESSMENT — PAIN DESCRIPTION - ONSET: ONSET: ON-GOING

## 2018-09-08 ASSESSMENT — PAIN SCALES - GENERAL: PAINLEVEL_OUTOF10: 8

## 2018-09-08 ASSESSMENT — PAIN DESCRIPTION - PROGRESSION: CLINICAL_PROGRESSION: NOT CHANGED

## 2018-09-08 ASSESSMENT — PAIN DESCRIPTION - PAIN TYPE: TYPE: ACUTE PAIN

## 2018-09-08 ASSESSMENT — PAIN DESCRIPTION - FREQUENCY: FREQUENCY: CONTINUOUS

## 2018-09-08 ASSESSMENT — PAIN DESCRIPTION - ORIENTATION: ORIENTATION: LEFT

## 2018-09-08 NOTE — ED PROVIDER NOTES
3 tablets daily for 3 days then take 2 tablets daily for 3 days then take  1 tablet daily for 3 days. SENSIPAR 60 MG TABLET    Take 120 mg by mouth daily     SODIUM BICARBONATE 650 MG TABLET    TAKE 2 TABLETS BY MOUTH THREE TIMES DAILY       PAST MEDICAL HISTORY         Diagnosis Date    Anemia     Asthma     Atrial fibrillation ([facility] [address] 2017    Breast cancer ([facility] [address] 1988     has annual mammograms only, no heme/onc    Bursitis     bilateral    Carpal tunnel syndrome     Disease of blood and blood forming organ     History of blood transfusion     Hypertension     Irregular heart beat     Osteoarthritis     knee and back    Pneumonia     Pulmonary emphysema ([facility] [address] 2017    Renal failure      end stage renal failure on dialysis    Stenosis of left carotid artery 2017    Tobacco abuse        SURGICAL HISTORY           Procedure Laterality Date    ABDOMEN SURGERY      BREAST SURGERY      left;  breast cancer    CARPAL TUNNEL RELEASE      right    COLONOSCOPY  2017    DR Lucia Truong - adenomatous polyp    ECTOPIC PREGNANCY SURGERY      KNEE SURGERY      left    [place] COLON CA SCRN NOT HI RSK IND N/A 2017    COLONOSCOPY performed [facility] Erica [facility] DO at [facility]    [place] ESOPHAGOGASTRODUODENOSCOPY TRANSORAL DIAGNOSTIC N/A 2017    EGD ESOPHAGOGASTRODUODENOSCOPY performed by Erica Leo DO at [facility]    UPPER GASTROINTESTINAL ENDOSCOPY  2016         FAMILY HISTORY       Family History   Problem Relation Age of Onset    Cancer Father     Diabetes Sister      Family Status   Relation Status    Father     Sister         SOCIAL HISTORY      reports that she has been smoking Cigarettes. She has a 13.75 pack-year smoking history. She has never used smokeless tobacco. She reports that she does not drink alcohol or use drugs.     REVIEW OF SYSTEMS    (2-9 systems for level 4, 10 or more for level 5)     Review of Systems   Constitutional: Negative for chills, fatigue and fever. HENT: Negative for congestion, ear discharge and facial swelling. Eyes: Positive for discharge and redness. Respiratory: Negative for cough and shortness of breath. Cardiovascular: Negative for chest pain. Gastrointestinal: Negative for abdominal pain, constipation, diarrhea and vomiting. Genitourinary: Negative for dysuria and hematuria. Musculoskeletal: Negative for arthralgias. Skin: Negative for color change and rash. Neurological: Negative for syncope, numbness and headaches. Hematological: Negative for adenopathy. Psychiatric/Behavioral: Negative for confusion. The patient is not nervous/anxious. Except as noted above the remainder of the review of systems was reviewed and negative. PHYSICAL EXAM    (up to 7 for level 4, 8 or more for level 5)     Vitals:    09/08/18 1455   BP: (!) 141/42   Pulse: 64   Resp: 18   Temp: 98.3 °F (36.8 °C)   TempSrc: Oral   SpO2: 99%   Weight: 169 lb 14.4 oz (77.1 kg)   Height: 5' 6\" (1.676 m)       Physical Exam   Constitutional: She is oriented to person, place, and time. She appears well-developed and well-nourished. No distress. HENT:   Head: Normocephalic and atraumatic. Eyes: Right eye exhibits no discharge. Left eye exhibits no discharge. No scleral icterus. Left conjunctiva is injected. No periorbital swelling or erythema. Neck: Neck supple. Cardiovascular: Normal rate and regular rhythm. Pulmonary/Chest: Effort normal and breath sounds normal. No stridor. No respiratory distress. She has no wheezes. She has no rales. Abdominal: Soft. She exhibits no distension. There is no tenderness. Musculoskeletal: Normal range of motion. Lymphadenopathy:     She has no cervical adenopathy. Neurological: She is alert and oriented to person, place, and time. Skin: Skin is warm and dry. No rash noted. She is not diaphoretic. No erythema. Psychiatric: She has a normal mood and affect.  Her behavior is normal.   Vitals reviewed. DIAGNOSTIC RESULTS     EKG: All EKG's are interpreted by the Emergency Department Physician who either signs or Co-signs this chart in the absence of a cardiologist.    Not indicated    RADIOLOGY:   Non-plain film images such as CT, Ultrasound and MRI are read by the radiologist. Plain radiographic images are visualized and preliminarily interpreted by the emergency physician with the below findings:    Not indicated    Interpretation per the Radiologist below, if available at the time of this note:        LABS:  Labs Reviewed - No data to display    All other labs were within normal range or not returned as of this dictation. EMERGENCY DEPARTMENT COURSE and DIFFERENTIAL DIAGNOSIS/MDM:   Vitals:    Vitals:    09/08/18 1455   BP: (!) 141/42   Pulse: 64   Resp: 18   Temp: 98.3 °F (36.8 °C)   TempSrc: Oral   SpO2: 99%   Weight: 169 lb 14.4 oz (77.1 kg)   Height: 5' 6\" (1.676 m)       Orders Placed This Encounter   Medications    ciprofloxacin (CILOXAN) 0.3 % ophthalmic solution     Sig: Apply 2 drops to eye every 2 hours for 2 days Then 2 drops in affected eye every 4 hours for 5 days     Dispense:  1 Bottle     Refill:  0       Medical Decision Making: My clinical impression is that she has left conjunctivitis. Treatment diagnosis and follow-up were discussed with the patient. CONSULTS:  None    PROCEDURES:  None    FINAL IMPRESSION      1.  Conjunctivitis of left eye, unspecified conjunctivitis type          DISPOSITION/PLAN   DISPOSITION Decision To Discharge 09/08/2018 03:04:00 PM      PATIENT REFERRED TO:   Tete Bernal MD  [address] [facility]  [phone]      As needed    [facility]  [address] [facility]  [phone]    If symptoms worsen      DISCHARGE MEDICATIONS:     New Prescriptions    CIPROFLOXACIN (CILOXAN) 0.3 % OPHTHALMIC SOLUTION    Apply 2 drops to eye every 2 hours for 2 days Then 2 drops in affected eye every 4

## 2018-09-10 ENCOUNTER — CARE COORDINATION (OUTPATIENT)
Dept: CASE MANAGEMENT | Age: 73
End: 2018-09-10

## 2018-09-10 NOTE — CARE COORDINATION
Date of discharge: 9/5/18  Facility: [facility]  Scheduled appointment with PCP-9/13/18 Dr Rufina Colon  Scheduled appointment with Specialist  Obtained and reviewed discharge summary yes  Reviewed and followed up on pending diagnostic tests and treatments-n/a  Education of patient/family/caregiver/guardian to support self-management-COPD booklet and zone sheet provided and reviewed with patient during hospitalization  Assessment and support for treatment adherence and medication management-pt states she is doing fine, she is trying to cut done on smoking. Smoking cessation encouraged by CTN. Pt is coughing occasionally productive of sm amt whitle phlegm   Pt states she has the zone sheet and will call for a prn visit if she has any yellow zone symptoms    Reason for Hospital Visit:  COPD    Date of first visit after discharge:  9/10/18, phone follow up    Did you receive a discharge summary with list of medication from the hospital? Yes  Review of Instructions:     Understands what to report/when to return?:  Yes   Understands discharge instructions?:  Yes   Following discharge instructions?:  Yes   If not why? Is there any lingering symptoms? Yes occasional cough, productive of white phlegm  How is your appetite-good  How much are you drinking-following restrictions  What are you eating-renal diet  Any other problems i.e. Constipation, other symptoms? No  Are there any new complaints of pain? No      New Medications at discharge?:     Yes                      Medication Reconciliation by phone -    Yes    Were there discrepancies in medications? No  If YES, were discrepancies addressed? Not Applicable    Understands Medications? Yes   Questions about medications from pt or caregiver? no   Questions addressed? Not Applicable                Has all of medication and/or prescriptions   Yes  Taking Medications? Yes  Can you swallow your pills?   Yes    Disease specific zones sheet reviewed with patient/caregiver-

## 2018-09-13 ENCOUNTER — TELEPHONE (OUTPATIENT)
Dept: FAMILY MEDICINE CLINIC | Age: 73
End: 2018-09-13

## 2018-09-13 ENCOUNTER — OFFICE VISIT (OUTPATIENT)
Dept: FAMILY MEDICINE CLINIC | Age: 73
End: 2018-09-13
Payer: COMMERCIAL

## 2018-09-13 VITALS
BODY MASS INDEX: 27.8 KG/M2 | HEIGHT: 66 IN | TEMPERATURE: 98 F | SYSTOLIC BLOOD PRESSURE: 134 MMHG | HEART RATE: 69 BPM | DIASTOLIC BLOOD PRESSURE: 62 MMHG | WEIGHT: 173 LBS

## 2018-09-13 DIAGNOSIS — Z23 NEED FOR VACCINATION: ICD-10-CM

## 2018-09-13 DIAGNOSIS — I10 ESSENTIAL HYPERTENSION: ICD-10-CM

## 2018-09-13 DIAGNOSIS — J44.1 COPD EXACERBATION (HCC): Primary | ICD-10-CM

## 2018-09-13 PROBLEM — Z76.0 MEDICATION REFILL: Status: RESOLVED | Noted: 2017-08-15 | Resolved: 2018-09-13

## 2018-09-13 PROBLEM — J96.90 RESPIRATORY FAILURE (HCC): Status: RESOLVED | Noted: 2018-09-03 | Resolved: 2018-09-13

## 2018-09-13 PROCEDURE — 99496 TRANSJ CARE MGMT HIGH F2F 7D: CPT | Performed by: FAMILY MEDICINE

## 2018-09-13 PROCEDURE — 1111F DSCHRG MED/CURRENT MED MERGE: CPT | Performed by: FAMILY MEDICINE

## 2018-09-13 ASSESSMENT — PATIENT HEALTH QUESTIONNAIRE - PHQ9
SUM OF ALL RESPONSES TO PHQ QUESTIONS 1-9: 0
SUM OF ALL RESPONSES TO PHQ9 QUESTIONS 1 & 2: 0
SUM OF ALL RESPONSES TO PHQ QUESTIONS 1-9: 0
2. FEELING DOWN, DEPRESSED OR HOPELESS: 0
1. LITTLE INTEREST OR PLEASURE IN DOING THINGS: 0

## 2018-09-13 NOTE — TELEPHONE ENCOUNTER
Medication Management Service  Sue Pedroza is a 72 y.o. female who met with pharmacy for inhaler education for COPD. Smoking Status Current < 1/2 ppd smoker    Symptom History Patient complained of frequent purulent producing cough, chest congestion, occasional difficulty sleeping, and lack of energy. COPD Assessment Test - CAT  (Score 0 to 5, good to worse)  1  I never cough   I cough all the time 4   2 I have no phlegm in my chest My chest is completely full of phlegm 4   3 My chest does not feel tight at all   My chest feels  very tight 0   4 When I walk up a hill or one flight of stairs I am not breathless. When I walk up a hill or one flight of stairs I am very breathless 0   5 I am not limited doing any activities at home I am very limited doing activities  at home 0   6  I am confident leaving my home despite my condition I am not at all confident leaving  my home because of my lung condition 0   7  I sleep soundly I dont sleep soundly because of my  lung condition 2   8 I have lots of  Energy I have no energy at all 4     Total CAT Score 14   Score less than 10 is good, higher scores indicate need for treatment improvement. Inhaler   Directions for Use Patient Reported Use Comments    Rescue: Albuterol HFA 2 puffs Q6H PRN 1 puff daily    Breo Ellipta 100-25mcg 1 puff daily daily                                Spacer? No  If yes, was a spacer used during education session? N/A    Recommendations/Interventions: Patient was instructed on the use of Breo Ellipta and Ventolin HFA. Patient was educated about exhaling completely and holding her breath for 10 seconds after each puff along with rinsing her mouth with water after using Breo Ellipta. Patient was encouraged to continue to try and quit smoking. Patient does not appear to have a good understanding of proper inhaler technique prior to this visit. Patient verbalized understanding.  All questions were answered. Next Follow-Up: Per Dr. David Dye patient is also taking Incruse. Incruse is not on medication list and patient is unsure if she is using it. Will follow up in 1 month to determine inhaler access and assess current inhaler technique. Patient verbalized understanding of care plan.  Patient advised to call [facility] with any questions, concerns, or changes prior to next appointment

## 2018-09-13 NOTE — PROGRESS NOTES
Visit Information    Have you changed or started any medications since your last visit including any over-the-counter medicines, vitamins, or herbal medicines? no   Have you stopped taking any of your medications? Is so, why? -  no  Are you having any side effects from any of your medications? - no    Have you seen any other physician or provider since your last visit?  no   Have you had any other diagnostic tests since your last visit? yes - labs, ekg, xrays, and ct scan   Have you been seen in the emergency room and/or had an admission in a hospital since we last saw you?  yes - [facility]   Have you had your routine dental cleaning in the past 6 months?  no     Do you have an active MyChart account? If no, what is the barrier?   No: declined    Patient Care Team:  Sonali Dunne MD as PCP - General (Family Medicine)  Emmanuelle Savage MD as Consulting Physician (Nephrology)  Sara Hickman MD (Inactive) (Vascular Surgery)  Home Gaspar MD as Consulting Physician (Gastroenterology)  Sheyla Cortez MD as Consulting Physician (Pulmonology)  Xin Blevins RN as Nurse Navigator    Medical History Review  Past Medical, Family, and Social History reviewed and does not contribute to the patient presenting condition    Health Maintenance   Topic Date Due    DTaP/Tdap/Td vaccine (1 - Tdap) 07/15/1966    Shingles Vaccine (1 of 2 - 2 Dose Series) 07/15/1997    Flu vaccine (1) 02/01/2019 (Originally 9/1/2018)    Pneumococcal high/highest risk (1 of 2 - PCV13) 02/01/2019 (Originally 12/1/2012)    Hepatitis C screen  08/15/2022 (Originally 1945)    Colon Cancer Screen FIT/FOBT  06/12/2025 (Originally 3/7/2018)    Breast cancer screen  06/22/2019    Potassium monitoring  09/05/2019    Creatinine monitoring  09/05/2019    Lipid screen  03/30/2020    DEXA (modify frequency per FRAX score)  Completed

## 2018-09-13 NOTE — PATIENT INSTRUCTIONS
healthy    · Do not smoke. This is the most important step you can take to prevent more damage to your lungs. If you need help quitting, talk to your doctor about stop-smoking programs and medicines. These can increase your chances of quitting for good.     · Avoid colds and flu. Get a pneumococcal vaccine shot. If you have had one before, ask your doctor whether you need a second dose. Get the flu vaccine every fall. If you must be around people with colds or the flu, wash your hands often.     · Avoid secondhand smoke, air pollution, and high altitudes. Also avoid cold, dry air and hot, humid air. Stay at home with your windows closed when air pollution is bad.    Medicines and oxygen therapy    · Take your medicines exactly as prescribed. Call your doctor if you think you are having a problem with your medicine.     · You may be taking medicines such as:  ¨ Bronchodilators. These help open your airways and make breathing easier. Bronchodilators are either short-acting (work for 6 to 9 hours) or long-acting (work for 24 hours). You inhale most bronchodilators, so they start to act quickly. Always carry your quick-relief inhaler with you in case you need it while you are away from home. ¨ Corticosteroids (prednisone, budesonide). These reduce airway inflammation. They come in pill or inhaled form. You must take these medicines every day for them to work well.     · A spacer may help you get more inhaled medicine to your lungs. Ask your doctor or pharmacist if a spacer is right for you. If it is, ask how to use it properly.     · Do not take any vitamins, over-the-counter medicine, or herbal products without talking to your doctor first.     · If your doctor prescribed antibiotics, take them as directed. Do not stop taking them just because you feel better. You need to take the full course of antibiotics.     · Oxygen therapy boosts the amount of oxygen in your blood and helps you breathe easier.  Use the flow rate change in the color of your mucus.     · You have new or worse swelling in your legs or belly.     · You are not getting better as expected. Where can you learn more? Go to [email or web address] org and sign in to your [employer] account. Enter M255 in the dloHaiti box to learn more about \"Chronic Obstructive Pulmonary Disease (COPD): Care Instructions. \"     If you do not have an account, please click on the \"Sign Up Now\" link. Current as of: December 6, 2017  Content Version: 11.7  © 9764-7741 [employer]. Care instructions adapted under license by [facility] ([facility]). If you have questions about a medical condition or this instruction, always ask your healthcare professional. [address] any warranty or liability for your use of this information.

## 2018-09-13 NOTE — PROGRESS NOTES
Attending Physician Statement  I have discussed the care of Hernan Rutherford, including pertinent history and exam findings,  with the resident. I have seen and examined the patient and the key elements of all parts of the encounter have been performed by me. I agree with the assessment, plan and orders as documented by the resident.   ([place])    Stanley Chao MD

## 2018-10-12 ENCOUNTER — OFFICE VISIT (OUTPATIENT)
Dept: FAMILY MEDICINE CLINIC | Age: 73
End: 2018-10-12
Payer: COMMERCIAL

## 2018-10-12 VITALS
SYSTOLIC BLOOD PRESSURE: 150 MMHG | HEART RATE: 66 BPM | BODY MASS INDEX: 28 KG/M2 | HEIGHT: 66 IN | DIASTOLIC BLOOD PRESSURE: 61 MMHG | TEMPERATURE: 97.1 F | WEIGHT: 174.2 LBS

## 2018-10-12 DIAGNOSIS — S86.912A MUSCLE STRAIN OF LEFT LOWER EXTREMITY, INITIAL ENCOUNTER: Primary | ICD-10-CM

## 2018-10-12 PROCEDURE — 99213 OFFICE O/P EST LOW 20 MIN: CPT | Performed by: STUDENT IN AN ORGANIZED HEALTH CARE EDUCATION/TRAINING PROGRAM

## 2018-10-12 ASSESSMENT — ENCOUNTER SYMPTOMS: BACK PAIN: 0

## 2018-10-12 NOTE — PATIENT INSTRUCTIONS
Thank you for letting us take care of you today. We hope all your questions were addressed. If a question was overlooked or something else comes to mind after you return home, please contact a member of your Care Team listed below. Please make sure you have a routine office visit set up to follow-up on [address]. Your Care Team at [place] [address] is Team #3  Chrissy Glass MD (Faculty)  Perlita Pinedo MD (Faculty  Laura Johnson MD (Resident)  Brian Michelle MD (Resident)   Kike Leigh MD (Resident)  Marly Ardon MD (Resident)  Jose Awan MD (Resident)  KORY Keane, KANE Valle, Shantell Navarro ([facility])  Maura Carranza RN, ([address] [facility])  Tracey Ferro, Ph.D., (Behavioral Services)  Tamra Klinefelter, [facility] (Clinical Pharmacist)     Office phone number: [phone]    If you need to get in right away due to illness, please be advised we have \"Same Day\" appointments available Monday-Friday. Please call us at [phone] option #3 to schedule your \"Same Day\" appointment.

## 2018-10-12 NOTE — PROGRESS NOTES
educational materials - see patient instructions  6. Was a self-tracking handout given in paper form or via [employer]? No  If yes, see orders or list here. PHQ Scores 9/13/2018 8/15/2017 5/7/2014   PHQ2 Score 0 0 0   PHQ9 Score 0 0 0     Interpretation of Total Score Depression Severity: 1-4 = Minimal depression, 5-9 = Mild depression, 10-14 = Moderate depression, 15-19 = Moderately severe depression, 20-27 = Severe depression  Normal    Completed Refills   Requested Prescriptions      No prescriptions requested or ordered in this encounter       No Follow-up on file.                 Adam Granado MD

## 2018-10-18 ENCOUNTER — TELEPHONE (OUTPATIENT)
Dept: PHARMACY | Age: 73
End: 2018-10-18

## 2018-10-22 ENCOUNTER — OFFICE VISIT (OUTPATIENT)
Dept: FAMILY MEDICINE CLINIC | Age: 73
End: 2018-10-22
Payer: COMMERCIAL

## 2018-10-22 ENCOUNTER — TELEPHONE (OUTPATIENT)
Dept: PHARMACY | Age: 73
End: 2018-10-22

## 2018-10-22 VITALS
WEIGHT: 172 LBS | HEART RATE: 57 BPM | SYSTOLIC BLOOD PRESSURE: 178 MMHG | HEIGHT: 66 IN | TEMPERATURE: 96.8 F | BODY MASS INDEX: 27.64 KG/M2 | DIASTOLIC BLOOD PRESSURE: 80 MMHG

## 2018-10-22 DIAGNOSIS — J43.9 PULMONARY EMPHYSEMA, UNSPECIFIED EMPHYSEMA TYPE (HCC): ICD-10-CM

## 2018-10-22 DIAGNOSIS — I10 ESSENTIAL HYPERTENSION: Primary | ICD-10-CM

## 2018-10-22 PROCEDURE — 99213 OFFICE O/P EST LOW 20 MIN: CPT | Performed by: FAMILY MEDICINE

## 2018-10-22 ASSESSMENT — ENCOUNTER SYMPTOMS
SORE THROAT: 0
SHORTNESS OF BREATH: 0
COUGH: 0
VOMITING: 0
DIARRHEA: 0
ABDOMINAL PAIN: 0
CONSTIPATION: 0
BACK PAIN: 0
NAUSEA: 0

## 2018-10-22 ASSESSMENT — PATIENT HEALTH QUESTIONNAIRE - PHQ9
1. LITTLE INTEREST OR PLEASURE IN DOING THINGS: 0
2. FEELING DOWN, DEPRESSED OR HOPELESS: 0
SUM OF ALL RESPONSES TO PHQ QUESTIONS 1-9: 0
SUM OF ALL RESPONSES TO PHQ QUESTIONS 1-9: 0
SUM OF ALL RESPONSES TO PHQ9 QUESTIONS 1 & 2: 0

## 2018-10-22 NOTE — PROGRESS NOTES
Subjective: Kalyani Wayne is a 72 y.o. female with  has a past medical history of Anemia; Asthma; Atrial fibrillation ([address] Breast cancer ([address] Bursitis; Carpal tunnel syndrome; Disease of blood and blood forming organ; History of blood transfusion; Hypertension; Irregular heart beat; Osteoarthritis; Pneumonia; Pulmonary emphysema ([address] Renal failure; Stenosis of left carotid artery; and Tobacco abuse. HPI  Patient presents for HTN and COPD follow up. BP is elevated on both readings today but her previous readings have been normal. Patient is asymptomatic. For COPD, patient claims symptoms are under control. She uses Breo daily and is unsure how often she uses her rescue inhaler. She has appointment with Pulm in November per patient. Review of Systems   Constitutional: Negative for chills and fever. HENT: Negative for sore throat. Eyes: Negative for visual disturbance. Respiratory: Negative for cough and shortness of breath. Cardiovascular: Negative for chest pain. Gastrointestinal: Negative for abdominal pain, constipation, diarrhea, nausea and vomiting. Genitourinary: Negative for dysuria. Musculoskeletal: Negative for back pain. Neurological: Negative for dizziness and headaches. Objective:    BP (!) 178/80   Pulse 57   Temp 96.8 °F (36 °C) (Oral)   Ht 5' 6\" (1.676 m)   Wt 172 lb (78 kg)   BMI 27.76 kg/m²    BP Readings from Last 3 Encounters:   10/22/18 (!) 178/80   10/12/18 (!) 150/61   09/13/18 134/62     Physical Exam   Constitutional: She is oriented to person, place, and time. She appears well-developed and well-nourished. No distress. HENT:   Head: Normocephalic and atraumatic. Cardiovascular: Normal rate, regular rhythm and normal heart sounds. No murmur heard. Pulmonary/Chest: Effort normal and breath sounds normal. She has no wheezes. Lymphadenopathy:     She has no cervical adenopathy.    Neurological: She is alert and oriented to person, place, and

## 2018-10-22 NOTE — PATIENT INSTRUCTIONS
Losing even 10 pounds can help you lower your blood pressure. · If your doctor recommends it, get more exercise. Walking is a good choice. Bit by bit, increase the amount you walk every day. Try for at least 30 minutes on most days of the week. You also may want to swim, bike, or do other activities. · Avoid or limit alcohol. Talk to your doctor about whether you can drink any alcohol. · Try to limit how much sodium you eat to less than 2,300 milligrams (mg) a day. Your doctor may ask you to try to eat less than 1,500 mg a day. · Eat plenty of fruits (such as bananas and oranges), vegetables, legumes, whole grains, and low-fat dairy products. · Lower the amount of saturated fat in your diet. Saturated fat is found in animal products such as milk, cheese, and meat. Limiting these foods may help you lose weight and also lower your risk for heart disease. · Do not smoke. Smoking increases your risk for heart attack and stroke. If you need help quitting, talk to your doctor about stop-smoking programs and medicines. These can increase your chances of quitting for good. When should you call for help? Call 911 anytime you think you may need emergency care. This may mean having symptoms that suggest that your blood pressure is causing a serious heart or blood vessel problem. Your blood pressure may be over 180/110.   For example, call 911 if:    · You have symptoms of a heart attack. These may include:  ¨ Chest pain or pressure, or a strange feeling in the chest.  ¨ Sweating. ¨ Shortness of breath. ¨ Nausea or vomiting. ¨ Pain, pressure, or a strange feeling in the back, neck, jaw, or upper belly or in one or both shoulders or arms. ¨ Lightheadedness or sudden weakness. ¨ A fast or irregular heartbeat.     · You have symptoms of a stroke. These may include:  ¨ Sudden numbness, tingling, weakness, or loss of movement in your face, arm, or leg, especially on only one side of your body.   ¨ Sudden vision changes. ¨ Sudden trouble speaking. ¨ Sudden confusion or trouble understanding simple statements. ¨ Sudden problems with walking or balance. ¨ A sudden, severe headache that is different from past headaches.     · You have severe back or belly pain.    Do not wait until your blood pressure comes down on its own. Get help right away.   Call your doctor now or seek immediate care if:    · Your blood pressure is much higher than normal (such as 180/110 or higher), but you don't have symptoms.     · You think high blood pressure is causing symptoms, such as:  ¨ Severe headache. ¨ Blurry vision.    Watch closely for changes in your health, and be sure to contact your doctor if:    · Your blood pressure measures 140/90 or higher at least 2 times. That means the top number is 140 or higher or the bottom number is 90 or higher, or both.     · You think you may be having side effects from your blood pressure medicine.     · Your blood pressure is usually normal, but it goes above normal at least 2 times. Where can you learn more? Go to https://[employer] org and sign in to your [employer] account. Enter T537 in the [employer] box to learn more about \"High Blood Pressure: Care Instructions. \"     If you do not have an account, please click on the \"Sign Up Now\" link. Current as of: December 6, 2017  Content Version: 11.7  © 8571-8670 [employer]. Care instructions adapted under license by [address] If you have questions about a medical condition or this instruction, always ask your healthcare professional. [place] [address] any warranty or liability for your use of this information.

## 2018-10-22 NOTE — PROGRESS NOTES
Attending Physician Statement    Wt Readings from Last 3 Encounters:   10/22/18 172 lb (78 kg)   10/12/18 174 lb 3.2 oz (79 kg)   09/13/18 173 lb (78.5 kg)     Temp Readings from Last 3 Encounters:   10/22/18 96.8 °F (36 °C) (Oral)   10/12/18 97.1 °F (36.2 °C) (Oral)   09/13/18 98 °F (36.7 °C) (Oral)     BP Readings from Last 3 Encounters:   10/22/18 (!) 178/80   10/12/18 (!) 150/61   09/13/18 134/62     Pulse Readings from Last 3 Encounters:   10/22/18 57   10/12/18 66   09/13/18 69         I have discussed the care of Jayant Alatorre, including pertinent history and exam findings,  with the resident. I have reviewed the key elements of all parts of the encounter with the resident. I agree with the assessment, plan and orders as documented by the resident. (GE Modifier)    Abnormal ct lung screening followed by pulmonology.

## 2018-12-14 ENCOUNTER — HOSPITAL ENCOUNTER (OUTPATIENT)
Facility: MEDICAL CENTER | Age: 73
End: 2018-12-14
Payer: COMMERCIAL

## 2018-12-26 ENCOUNTER — HOSPITAL ENCOUNTER (OUTPATIENT)
Facility: MEDICAL CENTER | Age: 73
End: 2018-12-26
Payer: COMMERCIAL

## 2018-12-27 DIAGNOSIS — D50.9 IRON DEFICIENCY ANEMIA, UNSPECIFIED IRON DEFICIENCY ANEMIA TYPE: Primary | ICD-10-CM

## 2018-12-31 RX ORDER — LACTULOSE 10 G/15ML
SOLUTION ORAL
Qty: 450 ML | Refills: 11 | Status: SHIPPED | OUTPATIENT
Start: 2018-12-31 | End: 2019-05-09 | Stop reason: SDUPTHER

## 2018-12-31 RX ORDER — ATORVASTATIN CALCIUM 20 MG/1
TABLET, FILM COATED ORAL
Qty: 30 TABLET | Refills: 11 | Status: SHIPPED | OUTPATIENT
Start: 2018-12-31 | End: 2019-05-09 | Stop reason: SDUPTHER

## 2018-12-31 RX ORDER — CHLORHEXIDINE GLUCONATE 213 G/1000ML
SOLUTION TOPICAL
Qty: 118 ML | Refills: 11 | Status: SHIPPED | OUTPATIENT
Start: 2018-12-31 | End: 2019-05-09 | Stop reason: SDUPTHER

## 2018-12-31 RX ORDER — CLONIDINE HYDROCHLORIDE 0.2 MG/1
TABLET ORAL
Qty: 60 TABLET | Refills: 11 | Status: SHIPPED | OUTPATIENT
Start: 2018-12-31 | End: 2019-05-09 | Stop reason: SDUPTHER

## 2019-01-02 ENCOUNTER — TELEPHONE (OUTPATIENT)
Dept: ONCOLOGY | Age: 74
End: 2019-01-02

## 2019-01-02 ENCOUNTER — OFFICE VISIT (OUTPATIENT)
Dept: FAMILY MEDICINE CLINIC | Age: 74
End: 2019-01-02
Payer: COMMERCIAL

## 2019-01-02 VITALS
SYSTOLIC BLOOD PRESSURE: 140 MMHG | TEMPERATURE: 98 F | DIASTOLIC BLOOD PRESSURE: 60 MMHG | BODY MASS INDEX: 27.44 KG/M2 | HEART RATE: 65 BPM | WEIGHT: 170 LBS

## 2019-01-02 DIAGNOSIS — I10 ESSENTIAL HYPERTENSION: Primary | ICD-10-CM

## 2019-01-02 PROCEDURE — 99213 OFFICE O/P EST LOW 20 MIN: CPT | Performed by: FAMILY MEDICINE

## 2019-01-02 ASSESSMENT — ENCOUNTER SYMPTOMS
BACK PAIN: 0
DIARRHEA: 0
COUGH: 0
NAUSEA: 0
VOMITING: 0
SORE THROAT: 0
ABDOMINAL PAIN: 0
SHORTNESS OF BREATH: 0
CONSTIPATION: 0

## 2019-01-03 ENCOUNTER — TELEPHONE (OUTPATIENT)
Dept: ONCOLOGY | Age: 74
End: 2019-01-03

## 2019-01-03 ENCOUNTER — TELEPHONE (OUTPATIENT)
Dept: FAMILY MEDICINE CLINIC | Age: 74
End: 2019-01-03

## 2019-01-03 DIAGNOSIS — R32 URINARY INCONTINENCE, UNSPECIFIED TYPE: Primary | ICD-10-CM

## 2019-01-08 RX ORDER — UNDERPADS 23" X 36"
1 EACH MISCELLANEOUS 2 TIMES DAILY PRN
Qty: 22 EACH | Refills: 5 | Status: SHIPPED | OUTPATIENT
Start: 2019-01-08

## 2019-01-14 ENCOUNTER — NURSE ONLY (OUTPATIENT)
Dept: FAMILY MEDICINE CLINIC | Age: 74
End: 2019-01-14
Payer: COMMERCIAL

## 2019-01-14 DIAGNOSIS — Z23 NEED FOR VACCINATION: Primary | ICD-10-CM

## 2019-01-14 PROCEDURE — 90670 PCV13 VACCINE IM: CPT

## 2019-02-04 RX ORDER — FOLIC ACID/VIT B COMPLEX AND C 0.8 MG
TABLET ORAL
Qty: 30 TABLET | Refills: 11 | Status: SHIPPED | OUTPATIENT
Start: 2019-02-04 | End: 2019-05-09 | Stop reason: SDUPTHER

## 2019-02-20 RX ORDER — OMEPRAZOLE 20 MG/1
20 CAPSULE, DELAYED RELEASE ORAL DAILY
Qty: 30 CAPSULE | Refills: 5 | Status: SHIPPED | OUTPATIENT
Start: 2019-02-20

## 2019-02-22 ENCOUNTER — OFFICE VISIT (OUTPATIENT)
Dept: FAMILY MEDICINE CLINIC | Age: 74
End: 2019-02-22
Payer: COMMERCIAL

## 2019-02-22 VITALS
DIASTOLIC BLOOD PRESSURE: 68 MMHG | WEIGHT: 167.4 LBS | HEIGHT: 66 IN | BODY MASS INDEX: 26.9 KG/M2 | TEMPERATURE: 97.4 F | HEART RATE: 61 BPM | SYSTOLIC BLOOD PRESSURE: 129 MMHG

## 2019-02-22 DIAGNOSIS — Z23 NEED FOR VACCINATION: ICD-10-CM

## 2019-02-22 DIAGNOSIS — Z00.00 ANNUAL PHYSICAL EXAM: ICD-10-CM

## 2019-02-22 DIAGNOSIS — I10 ESSENTIAL HYPERTENSION: Primary | ICD-10-CM

## 2019-02-22 PROCEDURE — 99213 OFFICE O/P EST LOW 20 MIN: CPT | Performed by: FAMILY MEDICINE

## 2019-02-22 PROCEDURE — 99211 OFF/OP EST MAY X REQ PHY/QHP: CPT | Performed by: FAMILY MEDICINE

## 2019-02-22 ASSESSMENT — ENCOUNTER SYMPTOMS
VOMITING: 0
ABDOMINAL PAIN: 0
COUGH: 0
SORE THROAT: 0
CONSTIPATION: 0
BACK PAIN: 0
NAUSEA: 0
DIARRHEA: 0
SHORTNESS OF BREATH: 0

## 2019-03-04 RX ORDER — SODIUM BICARBONATE 650 MG/1
TABLET ORAL
Qty: 180 TABLET | Refills: 11 | Status: SHIPPED | OUTPATIENT
Start: 2019-03-04 | End: 2019-05-09 | Stop reason: SDUPTHER

## 2019-04-17 RX ORDER — UMECLIDINIUM 62.5 UG/1
AEROSOL, POWDER ORAL
Qty: 30 EACH | Refills: 1 | Status: SHIPPED | OUTPATIENT
Start: 2019-04-17 | End: 2019-05-09 | Stop reason: SDUPTHER

## 2019-04-17 NOTE — TELEPHONE ENCOUNTER
Dr Isma Freedman, patient is current but not scheduled for an appointment at this time. She cancelled her appointments with you on 7/11/18 and 11/21/18. I sent a note to pharmacy asking that patient call for an appointment. Per your last dictation patient to continue Incruse. Please sign for refill if ok. Thank you.

## 2019-05-03 ENCOUNTER — OFFICE VISIT (OUTPATIENT)
Dept: FAMILY MEDICINE CLINIC | Age: 74
End: 2019-05-03
Payer: COMMERCIAL

## 2019-05-03 VITALS
HEIGHT: 66 IN | TEMPERATURE: 97.5 F | HEART RATE: 68 BPM | SYSTOLIC BLOOD PRESSURE: 113 MMHG | WEIGHT: 160 LBS | BODY MASS INDEX: 25.71 KG/M2 | DIASTOLIC BLOOD PRESSURE: 60 MMHG

## 2019-05-03 DIAGNOSIS — R29.898 RIGHT LEG WEAKNESS: ICD-10-CM

## 2019-05-03 DIAGNOSIS — S29.012A STRAIN OF MUSCLE AND TENDON OF BACK WALL OF THORAX, INITIAL ENCOUNTER: ICD-10-CM

## 2019-05-03 DIAGNOSIS — M79.604 RIGHT LEG PAIN: Primary | ICD-10-CM

## 2019-05-03 PROCEDURE — 99211 OFF/OP EST MAY X REQ PHY/QHP: CPT | Performed by: STUDENT IN AN ORGANIZED HEALTH CARE EDUCATION/TRAINING PROGRAM

## 2019-05-03 PROCEDURE — 99203 OFFICE O/P NEW LOW 30 MIN: CPT | Performed by: STUDENT IN AN ORGANIZED HEALTH CARE EDUCATION/TRAINING PROGRAM

## 2019-05-03 NOTE — PATIENT INSTRUCTIONS
Thank you for letting us take care of you today. We hope all your questions were addressed. If a question was overlooked or something else comes to mind after you return home, please contact a member of your Care Team listed below. Please make sure you have a routine office visit set up to follow-up on [address]. Your Care Team at [facility] is Team #3  Tina Lantigua MD (Faculty)  Dustin Gao MD (Faculty  Terralexandria Palacio MD (Resident)  Kun Rico MD (Resident)   Dinah Millard MD (Resident)  Ewdige Mata MD (Resident)  Xander Patel MD (Resident)  KORY Wayne., ONELIA Mao., [facility] office)  [facility] office)  Dhiraj Hdez ([facility])  [place], [place] ([address] [facility])  Cody Fournier, Ph.D., (Behavioral Services)  Lasha Marquez [facility] (Clinical Pharmacist)     Office phone number: [phone]    If you need to get in right away due to illness, please be advised we have \"Same Day\" appointments available Monday-Friday. Please call us at [phone] option #3 to schedule your \"Same Day\" appointment.

## 2019-05-03 NOTE — PROGRESS NOTES
Attending Physician Statement  I have discussed the care of Sintia Barry, 72 y.o. female,including pertinent history and exam findings,  with the resident Dr. Diamante Rowland MD.  History:  Chief Complaint   Patient presents with    Back Pain     here for back pain X 2 weeks     Patient is here with complaints of thigh and paraspinals muscle pain for two weeks. I have reviewed the key elements of the encounter with the resident. Examination was done by resident as documented in residents note. BP Readings from Last 3 Encounters:   05/03/19 113/60   02/22/19 129/68   01/02/19 (!) 140/60     /60 (Site: Right Upper Arm, Position: Sitting, Cuff Size: Medium Adult) Comment: machine  Pulse 68   Temp 97.5 °F (36.4 °C) (Oral)   Ht 5' 5.98\" (1.676 m)   Wt 160 lb (72.6 kg)   BMI 25.84 kg/m²   Lab Results   Component Value Date    WBC 8.4 09/05/2018    HGB 10.2 (L) 09/05/2018    HCT 30.5 (L) 09/05/2018     09/05/2018    CHOL 168 03/30/2015    TRIG 77 03/30/2015    HDL 59 03/30/2015    ALT 16 09/03/2018    AST 28 09/03/2018     09/05/2018    K 5.3 (H) 09/05/2018    CL 91 (L) 09/05/2018    CREATININE 3.86 (H) 09/05/2018    BUN 32 (H) 09/05/2018    CO2 27 09/05/2018    TSH 3.07 02/18/2017    INR 1.1 03/08/2017    LABA1C 4.3 (L) 09/03/2018     Lab Results   Component Value Date    CALCIUM 9.0 09/05/2018    PHOS 5.0 (H) 09/05/2018     Lab Results   Component Value Date    LDLCHOLESTEROL 94 03/30/2015     I agree with the assessment, plan and diagnosis of    Diagnosis Orders   1. Right leg [facility]  [facility]   2. Right leg weakness  [facility] - [facility]   3. Strain of muscle and tendon of back wall of thorax, initial encounter       I agree with  orders as documented by the resident. Recommendations:   Patient's exam is benign, she denies symptoms suggestive of red flags, patient was counseled regarding supportive treatment with Tylenol and PT.  Follow up planned to decide on further course of care. Return in about 1 month (around 6/3/2019) for r thigh pain.    ([facility] ) Dr. Maureen De La Garza MD

## 2019-05-03 NOTE — PROGRESS NOTES
Subjective:      Patient ID: Tracy Cuevas is a 72 y.o. female presents with right thigh pain for the past 2 weeks and left thoracic paraspinal muscle pain for the past 2 weeks. Patient has intermittent right thigh pain. She uses a cane. She says it has been weak for a few years at least.  No history of stroke. No falls. She denies trauma to her left back. Pain is constant. Tylenol helps a little bit with the pain. HPI    Review of Systems   Constitutional: Negative for fatigue, fever and unexpected weight change. Neurological: Negative for dizziness, tremors, seizures, syncope, speech difficulty, light-headedness, numbness and headaches. Objective:   Physical Exam   Constitutional: She is oriented to person, place, and time. She appears well-developed and well-nourished. No distress. Musculoskeletal:   Right thigh nontender week compared to left thigh and flexion and extension. Gait slow with using a cane in her right hand. Internal rotation of right hip reproduces thigh pain. Mild tenderness in her left thoracic paraspinal muscle area. No erythema. Area under her bra   Neurological: She is alert and oriented to person, place, and time. Skin: She is not diaphoretic. Nursing note and vitals reviewed. /60 (Site: Right Upper Arm, Position: Sitting, Cuff Size: Medium Adult) Comment: machine  Pulse 68   Temp 97.5 °F (36.4 °C) (Oral)   Ht 5' 5.98\" (1.676 m)   Wt 160 lb (72.6 kg)   BMI 25.84 kg/m²       Assessment:      1. Right leg pain    - [facility]    2. Right leg weakness    - [facility] Therapy - [facility]    3. Strain of muscle and tendon of back wall of thorax, initial encounter  Could also be related to her bra being too tight advised patient to try and new breath or loosen it up. 1.  Kevin Reid received counseling on the following healthy behaviors: medication adherence  2. Reviewed prior labs and health maintenance  3.   Continue current medications, diet and exercise. 4.  Discussed use, benefit, and side effects of prescribed medications. Barriers to medication compliance addressed. All patient questions answered. Pt voiced understanding. 5.  Patient given educational materials - see patient instructions  6. Was a self-tracking handout given in paper form or via [employer]? No  If yes, see orders or list here. PHQ Scores 10/22/2018 9/13/2018 8/15/2017 5/7/2014   PHQ2 Score 0 0 0 0   PHQ9 Score 0 0 0 0     Interpretation of Total Score Depression Severity: 1-4 = Minimal depression, 5-9 = Mild depression, 10-14 = Moderate depression, 15-19 = Moderately severe depression, 20-27 = Severe depression  Normal    Completed Refills   Requested Prescriptions      No prescriptions requested or ordered in this encounter       Return in about 1 month (around 6/3/2019) for r thigh pain.               Devonte Aranda MD

## 2019-05-03 NOTE — PROGRESS NOTES
Visit Information    Have you changed or started any medications since your last visit including any over-the-counter medicines, vitamins, or herbal medicines? no   Have you stopped taking any of your medications? Is so, why? -  no  Are you having any side effects from any of your medications? - no    Have you seen any other physician or provider since your last visit?  no   Have you had any other diagnostic tests since your last visit?  no   Have you been seen in the emergency room and/or had an admission in a hospital since we last saw you?  no   Have you had your routine dental cleaning in the past 6 months?  no     Do you have an active MyChart account? If no, what is the barrier?   No:     Patient Care Team:  Brenna Lynne MD as PCP - General (Family Medicine)  Jenny Tracy MD as Consulting Physician (Nephrology)  Russ Gottlieb MD (Inactive) (Vascular Surgery)  Mahnaz Contreras MD as Consulting Physician (Gastroenterology)  Rolando Anna MD as Consulting Physician (Pulmonology)    Medical History Review  Past Medical, Family, and Social History reviewed and does contribute to the patient presenting condition    Health Maintenance   Topic Date Due    Hepatitis B Vaccine (1 of 3 - Risk Recombivax 3-dose series) 07/15/1966    Shingles Vaccine (1 of 2) 07/15/1997    DTaP/Tdap/Td vaccine (1 - Tdap) 02/18/2020 (Originally 7/15/1964)    Flu vaccine (Season Ended) 02/19/2020 (Originally 9/1/2019)    Hepatitis C screen  08/15/2022 (Originally 1945)    Breast cancer screen  06/22/2019    Potassium monitoring  09/05/2019    Creatinine monitoring  09/05/2019    Pneumococcal 65+ years Vaccine (2 of 2 - PPSV23) 01/14/2020    Lipid screen  03/30/2020    Colon cancer screen colonoscopy  02/22/2027    DEXA (modify frequency per FRAX score)  Completed    HPV vaccine  Aged Out

## 2019-05-08 ENCOUNTER — HOSPITAL ENCOUNTER (OUTPATIENT)
Dept: PHYSICAL THERAPY | Age: 74
Setting detail: THERAPIES SERIES
Discharge: HOME | End: 2019-05-08
Payer: COMMERCIAL

## 2019-05-08 PROCEDURE — 97162 PT EVAL MOD COMPLEX 30 MIN: CPT

## 2019-05-08 PROCEDURE — 97110 THERAPEUTIC EXERCISES: CPT

## 2019-05-08 NOTE — CONSULTS
[x] Critical access hospital &  Therapy  [address]  [phone]  F: [phone] [] [address]  [facility]   [address]  P: [phone]  F: [phone] [] [place]  [address]  P: [phone]  F: [phone] [] [address]  [facility]   [address]   [place]: [phone]  F: [phone]     Physical Therapy Lower Extremity Evaluation    Date:  2019  Patient: Lucilla Opitz  : [birth year]  MRN: [id number]  Physician: Orlando Worrell MD  Insurance: [facility] Diagnosis: Right leg weakness (R29.898 [ICD-10-CM]); Right leg pain (M79.604 [ICD-10-CM]) ADD Thoracic pain M54.6    Rehab Codes: M54.6, M62.81, M25.60  Onset date: Right LE weakness 2018; Upper back 2019  Next Dr's appt. Subjective:   CC: Pt c/o back pain and right leg pain/weakness (pt's order only includes her leg, however, MD note does address the pt's back. Will get the back pain added to her order). HPI: (onset date): Pt reports insidious onset of left-sided upper back/scapular pain three weeks ago. States that doctor told her she must have moved the wrong way. Pt also reports right thigh/knee weakness with insidious onset for the last six months. Pt reports no imaging has been done of the leg or the back. Pt with intermittent pain of the right LE as well.       *Pt lives alone in a condo     PMHx: [] Unremarkable [] Diabetes [] HTN  [] Pacemaker   [] MI/Heart Problems [] Cancer [] Arthritis [x] Other: Dialysis T, Th, Sat              [x] Refer to full medical chart  In Epic     Tests: [] X-Ray: [] MRI:  [] Other:     Medications: [x] Refer to full medical record [] None [] Other:  Allergies:      [x] Refer to full medical record [] None [] Other:    Function:  Hand Dominance  [x] [] []    Patellar Orientation [] [] []    Gait [] [x] [] Analysis: Forward flexed posture, antalgic gait on the right LE         FUNCTION Normal Difficult Unable   Sitting [] [x] []   Standing [] [x] []   Ambulation [] [x] []   Groom/Dress [] [x] []   Lift/Carry [] [x] []   Stairs [] [x] []   Bending [] [x] []   Squat [] [] [x]   Kneel [] [] [x]       Comments:  Assessment: Pt presents in wheelchair from the [facility], has a straight cane with her. Pt states she is not feeling well, is in a lot of pain and feels she is \"falling apart\". Pt c/o acute onset of left scapular pain as well as more chronic right leg weakness. Pt presents with very poor sitting posture; very rounded shoulders and increased kyphosis. Pt able to correct posture with verbal cues. Pt with limited shoulder ROM on the left as well as weakness of the scapular muscles. Pt also has weakness of the right hip flexors. Pt would benefit from formal physical therapy in order to address these impairments. Problems:    [x] ? Pain:     [x] ? ROM:    [x] ? Strength:    [x] ? Function: LEFS=62% functional impairment; Currently only able to ambulate 1 block at most   [x] ? Balance  [] Edema:  [x] Postural Deviations  [x] Gait Deviations  [] Other:      STG: (to be met in 8 treatments)  1. ? Pain: Decrease left scapular pain to 6/10  2. ? ROM: Increase left shoulder flexion and abduction to 120° actively; Increase right hip flexion to 105°, abduction to 20°  3. ? Strength: Increase right hip flexion to 4/5; Increase scapular strength as demonstrated by progression of PRE's  4. ? Function: Improve LEFS to 50% impairment  5. Independent with Home Exercise Programs  6. Demonstrate Knowledge of fall prevention  LTG: (to be met in 12 treatments)  1. Pt will improve her ambulation distance to at least three blocks with little difficulty  2. Pt will be able to roll over in bed without difficulty  3.  Pt will be able to reach overhead bilaterally with little difficulty                   Patient goals: Feel better. Rehab Potential:  [] Good  [x] Fair  [] Poor   Suggested Professional Referral:  [x] No  [] Yes:  Barriers to Goal Achievement:  [x] No  [] Yes:  Domestic Concerns:  [x] No  [] Yes:    Pt. Education:  [x] Plans/Goals, Risks/Benefits discussed  [x] Home exercise program    Method of Education: [x] Verbal  [x] Demo  [x] Written  Comprehension of Education:  [x] Verbalizes understanding. [x] Demonstrates understanding. [] Needs Review. [] Demonstrates/verbalizes understanding of HEP/Ed previously given. Treatment Plan:  [x] Therapeutic Exercise    [x] Modalities:  [] Dry Needling  [x] Therapeutic Activity    [] Ultrasound  [] Electrical Stimulation  [x] Gait Training     [] Massage       [] Lumbar/Cervical Traction  [] Neuromuscular Re-education [x] Cold/hotpack [] Iontophoresis: 4 mg/mL  [x] Instruction in HEP             Dexamethasone Sodium  [x] Manual Therapy             Phosphate  mAmin  [] Aquatic Therapy  [] Vasocompression/    [] Other:       Game Ready    []  Medication allergies reviewed for use of    Dexamethasone Sodium Phosphate 4mg/ml     with iontophoresis treatments. Pt is not allergic.     Frequency:  2 x/week for 12 visits        Todays Treatment:  Modalities:   Precautions:  Exercises:  Exercise Reps/ Time Weight/ Level Comments   Quad sets 10x     Glute sets 10x     Abd draw-ins 10x     Marching 5xea     Supine cane shd flex 10x     Supine cane shd horiz abd/add 5xea     Seated scapular retraction 5x     Other:    Specific Instructions for next treatment: Focus on upright posture and postural exercises; right hip/quad strengthening, gait training with cane      Evaluation Complexity:  History (Personal factors, comorbidities) [] 0 [] 1-2 [x] 3+   Exam (limitations, restrictions) [] 1-2 [] 3 [x] 4+   Clinical presentation (progression) [] Stable [x] Evolving  [] Unstable   Decision Making [] Low [x] Moderate [] High    [] Low Complexity [x] Moderate Complexity [] High Complexity       Treatment Charges: Mins Units   [x] Evaluation       []  Low       [x]  Moderate       []  High 35 1   []  Modalities     [x]  Ther Exercise 15 1   []  Manual Therapy     []  Ther Activities     []  Aquatics     []  Vasocompression     []  Other       TOTAL TREATMENT TIME: 50    Time in: 8:15am   Time Out: 9:05am    Electronically signed by: Damaso Cast PT    **By co-signing note you are agreeing to the addition of the diagnosis Thoracic spine pain**    Physician Signature:________________________________Date:__________________  By signing above or cosigning this note, I have reviewed this plan of care and certify a need for medically necessary rehabilitation services.      *PLEASE SIGN ABOVE AND FAX BACK ALL PAGES*

## 2019-05-09 ENCOUNTER — APPOINTMENT (OUTPATIENT)
Dept: NUCLEAR MEDICINE | Age: 74
DRG: 180 | End: 2019-05-09
Payer: COMMERCIAL

## 2019-05-09 ENCOUNTER — APPOINTMENT (OUTPATIENT)
Dept: GENERAL RADIOLOGY | Age: 74
DRG: 180 | End: 2019-05-09
Payer: COMMERCIAL

## 2019-05-09 ENCOUNTER — APPOINTMENT (OUTPATIENT)
Dept: CT IMAGING | Age: 74
DRG: 180 | End: 2019-05-09
Payer: COMMERCIAL

## 2019-05-09 ENCOUNTER — HOSPITAL ENCOUNTER (INPATIENT)
Age: 74
LOS: 8 days | Discharge: HOME HEALTH | DRG: 180 | End: 2019-05-17
Attending: EMERGENCY MEDICINE | Admitting: INTERNAL MEDICINE
Payer: COMMERCIAL

## 2019-05-09 DIAGNOSIS — I26.99 OTHER ACUTE PULMONARY EMBOLISM WITHOUT ACUTE COR PULMONALE (HCC): Primary | ICD-10-CM

## 2019-05-09 DIAGNOSIS — R91.8 LUNG MASS: ICD-10-CM

## 2019-05-09 DIAGNOSIS — Z99.2 DIALYSIS PATIENT (HCC): ICD-10-CM

## 2019-05-09 DIAGNOSIS — C34.92 PRIMARY MALIGNANT NEOPLASM OF LEFT LUNG METASTATIC TO OTHER SITE (HCC): ICD-10-CM

## 2019-05-09 PROBLEM — I48.0 PAROXYSMAL ATRIAL FIBRILLATION (HCC): Status: ACTIVE | Noted: 2017-02-18

## 2019-05-09 PROBLEM — C79.9 METASTATIC CANCER (HCC): Status: ACTIVE | Noted: 2019-05-09

## 2019-05-09 LAB
ABSOLUTE EOS #: <0.03 K/UL (ref 0–0.44)
ABSOLUTE IMMATURE GRANULOCYTE: 0.01 K/UL (ref 0–0.3)
ABSOLUTE LYMPH #: 0.83 K/UL (ref 1.1–3.7)
ABSOLUTE MONO #: 0.79 K/UL (ref 0.1–1.2)
ANION GAP SERPL CALCULATED.3IONS-SCNC: 14 MMOL/L (ref 9–17)
BASOPHILS # BLD: 0 % (ref 0–2)
BASOPHILS ABSOLUTE: <0.03 K/UL (ref 0–0.2)
BNP INTERPRETATION: ABNORMAL
BUN BLDV-MCNC: 10 MG/DL (ref 8–23)
BUN/CREAT BLD: 3 (ref 9–20)
CALCIUM SERPL-MCNC: 9 MG/DL (ref 8.6–10.4)
CHLORIDE BLD-SCNC: 95 MMOL/L (ref 98–107)
CO2: 31 MMOL/L (ref 20–31)
CREAT SERPL-MCNC: 2.87 MG/DL (ref 0.5–0.9)
DIFFERENTIAL TYPE: ABNORMAL
EOSINOPHILS RELATIVE PERCENT: 0 % (ref 1–4)
GFR AFRICAN AMERICAN: 20 ML/MIN
GFR NON-AFRICAN AMERICAN: 16 ML/MIN
GFR SERPL CREATININE-BSD FRML MDRD: ABNORMAL ML/MIN/{1.73_M2}
GFR SERPL CREATININE-BSD FRML MDRD: ABNORMAL ML/MIN/{1.73_M2}
GLUCOSE BLD-MCNC: 94 MG/DL (ref 70–99)
HCT VFR BLD CALC: 35.5 % (ref 36.3–47.1)
HEMOGLOBIN: 10.6 G/DL (ref 11.9–15.1)
IMMATURE GRANULOCYTES: 0 %
INR BLD: 1
LYMPHOCYTES # BLD: 14 % (ref 24–43)
MCH RBC QN AUTO: 27.7 PG (ref 25.2–33.5)
MCHC RBC AUTO-ENTMCNC: 29.9 G/DL (ref 28.4–34.8)
MCV RBC AUTO: 92.9 FL (ref 82.6–102.9)
MONOCYTES # BLD: 13 % (ref 3–12)
NRBC AUTOMATED: 0 PER 100 WBC
PARTIAL THROMBOPLASTIN TIME: 30.8 SEC (ref 23–31)
PDW BLD-RTO: 18.4 % (ref 11.8–14.4)
PLATELET # BLD: 284 K/UL (ref 138–453)
PLATELET ESTIMATE: ABNORMAL
PMV BLD AUTO: 10.7 FL (ref 8.1–13.5)
POTASSIUM SERPL-SCNC: 3.7 MMOL/L (ref 3.7–5.3)
PRO-BNP: 9403 PG/ML
PROTHROMBIN TIME: 10.5 SEC (ref 9.7–11.6)
RBC # BLD: 3.82 M/UL (ref 3.95–5.11)
RBC # BLD: ABNORMAL 10*6/UL
SEG NEUTROPHILS: 73 % (ref 36–65)
SEGMENTED NEUTROPHILS ABSOLUTE COUNT: 4.27 K/UL (ref 1.5–8.1)
SODIUM BLD-SCNC: 140 MMOL/L (ref 135–144)
WBC # BLD: 5.9 K/UL (ref 3.5–11.3)
WBC # BLD: ABNORMAL 10*3/UL

## 2019-05-09 PROCEDURE — 3430000000 HC RX DIAGNOSTIC RADIOPHARMACEUTICAL: Performed by: NURSE PRACTITIONER

## 2019-05-09 PROCEDURE — A9540 TC99M MAA: HCPCS | Performed by: NURSE PRACTITIONER

## 2019-05-09 PROCEDURE — 85610 PROTHROMBIN TIME: CPT

## 2019-05-09 PROCEDURE — 85025 COMPLETE CBC W/AUTO DIFF WBC: CPT

## 2019-05-09 PROCEDURE — 78582 LUNG VENTILAT&PERFUS IMAGING: CPT

## 2019-05-09 PROCEDURE — 6370000000 HC RX 637 (ALT 250 FOR IP): Performed by: INTERNAL MEDICINE

## 2019-05-09 PROCEDURE — 83880 ASSAY OF NATRIURETIC PEPTIDE: CPT

## 2019-05-09 PROCEDURE — 36415 COLL VENOUS BLD VENIPUNCTURE: CPT

## 2019-05-09 PROCEDURE — 99223 1ST HOSP IP/OBS HIGH 75: CPT | Performed by: INTERNAL MEDICINE

## 2019-05-09 PROCEDURE — 71250 CT THORAX DX C-: CPT

## 2019-05-09 PROCEDURE — A9538 TC99M PYROPHOSPHATE: HCPCS | Performed by: NURSE PRACTITIONER

## 2019-05-09 PROCEDURE — 71046 X-RAY EXAM CHEST 2 VIEWS: CPT

## 2019-05-09 PROCEDURE — 85730 THROMBOPLASTIN TIME PARTIAL: CPT

## 2019-05-09 PROCEDURE — 6360000002 HC RX W HCPCS: Performed by: EMERGENCY MEDICINE

## 2019-05-09 PROCEDURE — 80048 BASIC METABOLIC PNL TOTAL CA: CPT

## 2019-05-09 PROCEDURE — 99285 EMERGENCY DEPT VISIT HI MDM: CPT

## 2019-05-09 PROCEDURE — 1200000000 HC SEMI PRIVATE

## 2019-05-09 RX ORDER — HYDRALAZINE HYDROCHLORIDE 50 MG/1
100 TABLET, FILM COATED ORAL 3 TIMES DAILY
Status: DISCONTINUED | OUTPATIENT
Start: 2019-05-09 | End: 2019-05-16

## 2019-05-09 RX ORDER — OXYCODONE HYDROCHLORIDE AND ACETAMINOPHEN 5; 325 MG/1; MG/1
2 TABLET ORAL EVERY 4 HOURS PRN
Status: DISCONTINUED | OUTPATIENT
Start: 2019-05-09 | End: 2019-05-17 | Stop reason: HOSPADM

## 2019-05-09 RX ORDER — ONDANSETRON 2 MG/ML
4 INJECTION INTRAMUSCULAR; INTRAVENOUS EVERY 6 HOURS PRN
Status: DISCONTINUED | OUTPATIENT
Start: 2019-05-09 | End: 2019-05-09 | Stop reason: CLARIF

## 2019-05-09 RX ORDER — SODIUM CHLORIDE 0.9 % (FLUSH) 0.9 %
10 SYRINGE (ML) INJECTION PRN
Status: DISCONTINUED | OUTPATIENT
Start: 2019-05-09 | End: 2019-05-17 | Stop reason: HOSPADM

## 2019-05-09 RX ORDER — CHLORHEXIDINE GLUCONATE 4 G/100ML
SOLUTION TOPICAL DAILY
Status: DISCONTINUED | OUTPATIENT
Start: 2019-05-09 | End: 2019-05-17 | Stop reason: HOSPADM

## 2019-05-09 RX ORDER — SODIUM BICARBONATE 650 MG/1
1300 TABLET ORAL 3 TIMES DAILY
Status: DISCONTINUED | OUTPATIENT
Start: 2019-05-09 | End: 2019-05-10

## 2019-05-09 RX ORDER — OXYCODONE HYDROCHLORIDE AND ACETAMINOPHEN 5; 325 MG/1; MG/1
1 TABLET ORAL EVERY 4 HOURS PRN
Status: DISCONTINUED | OUTPATIENT
Start: 2019-05-09 | End: 2019-05-17 | Stop reason: HOSPADM

## 2019-05-09 RX ORDER — LACTULOSE 10 G/15ML
30 SOLUTION ORAL DAILY PRN
Status: DISCONTINUED | OUTPATIENT
Start: 2019-05-09 | End: 2019-05-17 | Stop reason: HOSPADM

## 2019-05-09 RX ORDER — ONDANSETRON 2 MG/ML
4 INJECTION INTRAMUSCULAR; INTRAVENOUS EVERY 6 HOURS PRN
Status: DISCONTINUED | OUTPATIENT
Start: 2019-05-09 | End: 2019-05-17 | Stop reason: HOSPADM

## 2019-05-09 RX ORDER — HYDRALAZINE HYDROCHLORIDE 100 MG/1
100 TABLET, FILM COATED ORAL 3 TIMES DAILY
Status: ON HOLD | COMMUNITY
End: 2019-06-13 | Stop reason: HOSPADM

## 2019-05-09 RX ORDER — FOLIC ACID/VIT B COMPLEX AND C 0.8 MG
1 TABLET ORAL DAILY
Status: DISCONTINUED | OUTPATIENT
Start: 2019-05-09 | End: 2019-05-17 | Stop reason: HOSPADM

## 2019-05-09 RX ORDER — CLONIDINE HYDROCHLORIDE 0.2 MG/1
0.2 TABLET ORAL 2 TIMES DAILY
Status: DISCONTINUED | OUTPATIENT
Start: 2019-05-09 | End: 2019-05-16

## 2019-05-09 RX ORDER — PANTOPRAZOLE SODIUM 40 MG/1
40 TABLET, DELAYED RELEASE ORAL
Status: DISCONTINUED | OUTPATIENT
Start: 2019-05-10 | End: 2019-05-17 | Stop reason: HOSPADM

## 2019-05-09 RX ORDER — ONDANSETRON 4 MG/1
4 TABLET, ORALLY DISINTEGRATING ORAL EVERY 6 HOURS PRN
Status: DISCONTINUED | OUTPATIENT
Start: 2019-05-09 | End: 2019-05-17 | Stop reason: HOSPADM

## 2019-05-09 RX ORDER — HEPARIN SODIUM 1000 [USP'U]/ML
40 INJECTION, SOLUTION INTRAVENOUS; SUBCUTANEOUS PRN
Status: DISCONTINUED | OUTPATIENT
Start: 2019-05-09 | End: 2019-05-11

## 2019-05-09 RX ORDER — AMLODIPINE BESYLATE 10 MG/1
10 TABLET ORAL DAILY
Status: ON HOLD | COMMUNITY
End: 2019-06-13 | Stop reason: HOSPADM

## 2019-05-09 RX ORDER — CLONIDINE HYDROCHLORIDE 0.2 MG/1
0.2 TABLET ORAL 2 TIMES DAILY
Status: ON HOLD | COMMUNITY
End: 2019-05-17 | Stop reason: HOSPADM

## 2019-05-09 RX ORDER — LOSARTAN POTASSIUM 50 MG/1
50 TABLET ORAL 2 TIMES DAILY
Status: DISCONTINUED | OUTPATIENT
Start: 2019-05-09 | End: 2019-05-17 | Stop reason: HOSPADM

## 2019-05-09 RX ORDER — SODIUM CHLORIDE 0.9 % (FLUSH) 0.9 %
10 SYRINGE (ML) INJECTION EVERY 12 HOURS SCHEDULED
Status: DISCONTINUED | OUTPATIENT
Start: 2019-05-09 | End: 2019-05-17 | Stop reason: HOSPADM

## 2019-05-09 RX ORDER — HEPARIN SODIUM 1000 [USP'U]/ML
80 INJECTION, SOLUTION INTRAVENOUS; SUBCUTANEOUS PRN
Status: DISCONTINUED | OUTPATIENT
Start: 2019-05-09 | End: 2019-05-11

## 2019-05-09 RX ORDER — ETHYL CHLORIDE 100 %
AEROSOL, SPRAY (ML) TOPICAL PRN
COMMUNITY

## 2019-05-09 RX ORDER — HEPARIN SODIUM 10000 [USP'U]/100ML
18 INJECTION, SOLUTION INTRAVENOUS CONTINUOUS
Status: DISCONTINUED | OUTPATIENT
Start: 2019-05-09 | End: 2019-05-11

## 2019-05-09 RX ORDER — LACTULOSE 10 G/15ML
30 SOLUTION ORAL DAILY PRN
COMMUNITY

## 2019-05-09 RX ORDER — SODIUM BICARBONATE 650 MG/1
1300 TABLET ORAL 3 TIMES DAILY
COMMUNITY

## 2019-05-09 RX ORDER — ATORVASTATIN CALCIUM 20 MG/1
20 TABLET, FILM COATED ORAL DAILY
Status: DISCONTINUED | OUTPATIENT
Start: 2019-05-09 | End: 2019-05-17 | Stop reason: HOSPADM

## 2019-05-09 RX ORDER — FOLIC ACID/VIT B COMPLEX AND C 0.8 MG
1 TABLET ORAL DAILY
COMMUNITY

## 2019-05-09 RX ORDER — HEPARIN SODIUM 1000 [USP'U]/ML
80 INJECTION, SOLUTION INTRAVENOUS; SUBCUTANEOUS ONCE
Status: COMPLETED | OUTPATIENT
Start: 2019-05-09 | End: 2019-05-09

## 2019-05-09 RX ORDER — CHLORHEXIDINE GLUCONATE 4 G/100ML
SOLUTION TOPICAL DAILY
COMMUNITY

## 2019-05-09 RX ORDER — LORATADINE 10 MG/1
10 TABLET ORAL PRN
COMMUNITY

## 2019-05-09 RX ORDER — ATORVASTATIN CALCIUM 20 MG/1
20 TABLET, FILM COATED ORAL DAILY
COMMUNITY

## 2019-05-09 RX ORDER — CINACALCET 30 MG/1
120 TABLET, FILM COATED ORAL DAILY
Status: DISCONTINUED | OUTPATIENT
Start: 2019-05-09 | End: 2019-05-17 | Stop reason: HOSPADM

## 2019-05-09 RX ADMIN — Medication 29.8 MILLICURIE: at 14:38

## 2019-05-09 RX ADMIN — SODIUM BICARBONATE 1300 MG: 650 TABLET ORAL at 19:52

## 2019-05-09 RX ADMIN — Medication 7.4 MILLICURIE: at 14:55

## 2019-05-09 RX ADMIN — OXYCODONE AND ACETAMINOPHEN 2 TABLET: 5; 325 TABLET ORAL at 19:52

## 2019-05-09 RX ADMIN — ATORVASTATIN CALCIUM 20 MG: 20 TABLET, FILM COATED ORAL at 19:53

## 2019-05-09 RX ADMIN — HEPARIN SODIUM AND DEXTROSE 18 UNITS/KG/HR: 10000; 5 INJECTION INTRAVENOUS at 16:56

## 2019-05-09 RX ADMIN — LOSARTAN POTASSIUM 50 MG: 50 TABLET, FILM COATED ORAL at 19:53

## 2019-05-09 RX ADMIN — HYDRALAZINE HYDROCHLORIDE 100 MG: 50 TABLET, FILM COATED ORAL at 19:52

## 2019-05-09 RX ADMIN — Medication 1 TABLET: at 23:01

## 2019-05-09 RX ADMIN — CLONIDINE HYDROCHLORIDE 0.2 MG: 0.2 TABLET ORAL at 19:53

## 2019-05-09 RX ADMIN — HEPARIN SODIUM 5840 UNITS: 1000 INJECTION, SOLUTION INTRAVENOUS; SUBCUTANEOUS at 16:59

## 2019-05-09 ASSESSMENT — PAIN DESCRIPTION - LOCATION
LOCATION: BACK

## 2019-05-09 ASSESSMENT — PAIN DESCRIPTION - PAIN TYPE
TYPE: ACUTE PAIN

## 2019-05-09 ASSESSMENT — ENCOUNTER SYMPTOMS
COLOR CHANGE: 0
VOMITING: 0
ABDOMINAL PAIN: 0
NAUSEA: 0
SHORTNESS OF BREATH: 0
BACK PAIN: 1
COUGH: 1
DIARRHEA: 0

## 2019-05-09 ASSESSMENT — PAIN SCALES - GENERAL
PAINLEVEL_OUTOF10: 8
PAINLEVEL_OUTOF10: 9
PAINLEVEL_OUTOF10: 10
PAINLEVEL_OUTOF10: 5
PAINLEVEL_OUTOF10: 0

## 2019-05-09 NOTE — ED PROVIDER NOTES
eMERGENCY dEPARTMENT eNCOUnter   Attending Attestation     Pt Name: Apoorva Campbell  MRN: [id number]  [place] 1945  Date of evaluation: 5/9/19   Apoorva Campbell is a 72 y.o. female with CC: Back Pain    MDM:   The patient's a 68-year-old female who presented to the emergency department secondary to back pain and leg pain. Patient had a history of lung cancer but did not undergo chemoradiation or follow-up. Bilateral ultrasound negative for DVT however VQ scan was performed a high probability for a PE.  CT chest also positive for mass. Patient undergoes dialysis Tuesday Thursday Saturday, her nephrologist is Dr. Jasmyn Dacosta, she does not make urine, she underwent dialysis today for session. He'll be contacted in regards to anticoagulation. Patient was discussed with Dr. Abdifatah Light on-call for Dr. Jasmyn Dacosta, he was okay with patient being initiated on heparin. Patient will be discussed with internal medicine service, Dr. Gibson who agreed to admit patient for further evaluation. CRITICAL CARE:   CRITICAL CARE TIME     Due to the high probability of sudden and clinically significant deterioration in the patient's condition she required highest level of my preparedness to intervene urgently. I provided critical care time including documentation time, medication orders and management, reevaluation, vital sign assessment, ordering and reviewing of of lab tests ordering and reviewing of x-ray studies, and admission orders. Aggregate critical care time is between 45 minutes including only time during which I was engaged in work directly related to her care and did not include time spent treating other patients simultaneously. EKG:  All EKG's are interpreted by the Emergency Department Physician who either signs or Co-signs this chart in the absence of a cardiologist.      RADIOLOGY:All plain film, CT, MRI, and formal ultrasound images (except ED bedside ultrasound) are read by the radiologist, see reports below, unless otherwise noted in MDM or here. NM LUNG VENT/PERFUSION (VQ)   Preliminary Result   Large matching perfusion and ventilation defect involving the apicoposterior   and anterior segments of the left upper lobe which is matched. However,   findings correspond to the left upper lobe mass and satellite lesions in the   recent CT chest of 05/09/2019, but the area of diminished perfusion does   appear larger than the left upper lobe lung mass and satellite pulmonary   nodules. This places the exam into a high probability category. High   probability scan. The findings were sent to the Radiology Results [address] at 3:33   pm on 5/9/2019to be communicated to a licensed caregiver. CT CHEST WO CONTRAST   Final Result   1. Heterogeneous thyroid with multiple nodules larger of 1.4 cm with no   further ultrasound follow-up advised due to nodular size. 2.  Marked interval enlargement in size of a left upper lobe mass. A new   satellite lesion is present near the left upper lobe mass. Other additional   nodules left upper lobe are noted as above, new since prior study. 3.  Lack of IV contrast limits assessment but no gross mediastinal adenopathy   is noted. 4.  Nonobstructing calcifications both kidneys. Calcification near the   pancreatic tail. Other findings as above. 5.  Marked interval advancement in size of pre-existing left upper lobe   nodule. Please note that the patient had a PET-CT study April 2019 consistent with   diffuse luis and osseous metastatic disease. RECOMMENDATIONS:   Advise tissue sampling of the dominant nodule in the left upper lobe which   abuts the pleura. XR CHEST STANDARD (2 VW)   Final Result   Left upper lobe nodule measures 5 cm, significantly increased from 1.4 cm on   chest CT of 06/22/2018. This is likely malignant. LABS: All lab results were reviewed by myself, and all abnormals are listed below.   Labs Reviewed   BASIC METABOLIC PANEL - Abnormal; Notable for the following components:       Result Value    CREATININE 2.87 (*)     Bun/Cre Ratio 3 (*)     Chloride 95 (*)     GFR Non- 16 (*)     GFR  20 (*)     All other components within normal limits   CBC WITH AUTO DIFFERENTIAL - Abnormal; Notable for the following components:    RBC 3.82 (*)     Hemoglobin 10.6 (*)     Hematocrit 35.5 (*)     RDW 18.4 (*)     Seg Neutrophils 73 (*)     Lymphocytes 14 (*)     Monocytes 13 (*)     Eosinophils % 0 (*)     Absolute Lymph # 0.83 (*)     All other components within normal limits   BRAIN NATRIURETIC PEPTIDE - Abnormal; Notable for the following components:    Pro-BNP 9,403 (*)     All other components within normal limits   PROTIME-INR   APTT           I personally evaluated and examined the patient in conjunction with the APC and agree with the assessment, treatment plan, and disposition of the patient as recorded by the APC.    Cassidy Gudino MD  Attending Emergency Physician

## 2019-05-09 NOTE — ED PROVIDER NOTES
Team [address] ED  eMERGENCY dEPARTMENT eNCOUnter      Pt Name: Radha Moreira  MRN: [id number]  [place] 1945  Date of evaluation: 5/9/2019  Provider: JAMES Grady - YOGESH    CHIEF COMPLAINT       Chief Complaint   Patient presents with    Back Pain         HISTORY OF PRESENT ILLNESS  (Location/Symptom, Timing/Onset, Context/Setting, Quality, Duration, Modifying Factors, Severity.)   Radha Moreira is a 72 y.o. female who presents to the emergency department via private auto for left upper back pain. Onset was about 3 weeks ago. Denies injury, fever, chills, SOB, urinary symptoms. She has a cough. Rates her pain 10/10 at this time. She is in physical therapy for her back pain. She was advised by her pcp to try a different bra; she stopped wearing one. She is a dialysis patient; TRS. She had dialysis this morning. The patient was diagnosed with a lung nodule around 6/2018 per records. It appears she declined a PET scan during her follow up for the nodule. Nursing Notes were reviewed.     ALLERGIES     Pcn [penicillins] and Sulfa antibiotics    CURRENT MEDICATIONS       Previous Medications    ALBUTEROL SULFATE  (90 BASE) MCG/ACT INHALER    Inhale 2 puffs into the lungs every 6 hours as needed for Wheezing or Shortness of Breath    ATORVASTATIN (LIPITOR) 20 MG TABLET    Take 20 mg by mouth daily    B COMPLEX-C-FOLIC ACID (MADAI-AYAN) TABS    Take 1 tablet by mouth daily    CHLORHEXIDINE (HIBICLENS) 4 % EXTERNAL LIQUID    Apply topically daily As directed    CLONIDINE (CATAPRES) 0.2 MG TABLET    Take 0.2 mg by mouth 2 times daily    HYDRALAZINE (APRESOLINE) 100 MG TABLET    Take 100 mg by mouth 3 times daily    INCONTINENCE SUPPLY DISPOSABLE (INCONTINENCE BRIEF MEDIUM) MISC    1 each by Does not apply route 2 times daily as needed (incotinenace)    LACTULOSE (CONSTULOSE) 10 GM/15ML SOLUTION    Take 30 mLs by mouth daily as needed    LOSARTAN (COZAAR) 50 MG TABLET    Take 1 tablet by mouth 2 times daily    OMEPRAZOLE (PRILOSEC) 20 MG DELAYED RELEASE CAPSULE    TAKE 1 CAPSULE BY MOUTH DAILY    SENSIPAR 60 MG TABLET    Take 120 mg by mouth daily     SODIUM BICARBONATE 650 MG TABLET    Take 1,300 mg by mouth 3 times daily    UMECLIDINIUM BROMIDE (INCRUSE ELLIPTA) 62.5 MCG/INH AEPB    Inhale 1 puff into the lungs daily       PAST MEDICAL HISTORY         Diagnosis Date    Anemia     Asthma     Atrial fibrillation ([facility] 2017    Breast cancer ([facility] 1988     has annual mammograms only, no heme/onc    Bursitis     bilateral    Carpal tunnel syndrome     Chronic obstructive pulmonary disease (COPD) ([facility]     Disease of blood and blood forming organ     History of blood transfusion     Hypertension     Irregular heart beat     Lung nodule     Osteoarthritis     knee and back    Pneumonia     Pulmonary emphysema ([facility] 2017    Renal failure      end stage renal failure on dialysis    Stenosis of left carotid artery 2017    Tobacco abuse        SURGICAL HISTORY           Procedure Laterality Date    ABDOMEN SURGERY      BREAST SURGERY      left;  breast cancer    CARPAL TUNNEL RELEASE      right    COLONOSCOPY  2017    DR Melecio Cantu - adenomatous polyp    ECTOPIC PREGNANCY SURGERY      KNEE SURGERY      left    SD COLON CA SCRN NOT HI RSK IND N/A 2017    COLONOSCOPY [facility]illa [facility]ings [facility] at [facility]    SD ESOPHAGOGASTRODUODENOSCOPY TRANSORAL DIAGNOSTIC N/A 2017    EGD ESOPHAGOGASTRODUODENOSCOPY performed by Kamilla Billings DO at [address] [place] [address] ENDOSCOPY  2016         FAMILY HISTORY           Problem Relation Age of Onset    Cancer Father     Diabetes Sister      Family Status   Relation Name Status    Father      Sister          SOCIAL HISTORY      reports that she has been smoking cigarettes. She has a 13.75 pack-year smoking history.  She has never used smokeless tobacco. She reports that she does not drink alcohol or use drugs. REVIEW OF SYSTEMS    (2-9 systems for level 4, 10 or more for level 5)   Review of Systems   Constitutional: Negative for chills, diaphoresis, fatigue and fever. Respiratory: Positive for cough. Negative for shortness of breath. Cardiovascular: Negative for chest pain. Gastrointestinal: Negative for abdominal pain, diarrhea, nausea and vomiting. Genitourinary: Negative for dysuria, flank pain, frequency, hematuria and urgency. Musculoskeletal: Positive for back pain. Negative for arthralgias, gait problem, neck pain and neck stiffness. Skin: Negative for color change, rash and wound. Neurological: Negative for dizziness, weakness, light-headedness, numbness and headaches. Except as noted above the remainder of the review of systems was reviewed and negative. PHYSICAL EXAM    (up to 7 for level 4, 8 or more for level 5)     ED Triage Vitals [05/09/19 1231]   BP Temp Temp Source Pulse Resp SpO2 Height Weight   (!) 144/51 98 °F (36.7 °C) Oral 87 20 96 % 5' 6\" (1.676 m) 160 lb 15 oz (73 kg)     Physical Exam   Constitutional: She is oriented to person, place, and time. She appears well-developed and well-nourished. No distress. Eyes: Conjunctivae are normal.   Cardiovascular: Normal rate, regular rhythm and normal heart sounds. Pulmonary/Chest: Effort normal and breath sounds normal. No respiratory distress. She has no wheezes. She has no rales. Abdominal: There is no CVA tenderness. Musculoskeletal:        Cervical back: She exhibits no tenderness, no bony tenderness and no pain. Thoracic back: She exhibits tenderness and pain. She exhibits no bony tenderness and no swelling. Lumbar back: She exhibits no tenderness, no bony tenderness and no pain. Back:    Neurological: She is alert and oriented to person, place, and time. Skin: Skin is warm and dry. No rash noted. She is not diaphoretic.    Psychiatric: She has a normal mood No cardiomegaly or pericardial effusion is evident. Lungs/pleura: Moderate emphysematous changes are present in the lungs with interval enlargement of a left upper lobe nodule now measuring 4.2 x 4.2 cm, previously 1.4 cm diameter. Adjacent to this is a satellite lesion between the mass which is peripheral and the left mediastinum 1.7 x 1.2 cm. Subtle volume loss in the left hemithorax is noted and several other nodules are identified including an ill-defined soft tissue nodule of 6.5 mm left upper lobe image 48; pleurally based rounded mass of 1.6 cm diameter in the pleura abutting the left upper lobe image _____ . No acute infiltrate is noted. No effusions are present. Tracheobronchial tree is patent. Upper Abdomen: Nonobstructing calcifications are present both kidneys. Calcification is noted near the pancreatic tail, stable. No acute abnormality in the upper abdomen. Soft Tissues/Bones: No acute osseous or soft tissue abnormality. Estimated biologic radiation dose for this procedure:368.98 mGy/cm2.     1.  Heterogeneous thyroid with multiple nodules larger of 1.4 cm with no further ultrasound follow-up advised due to nodular size. 2.  Marked interval enlargement in size of a left upper lobe mass. A new satellite lesion is present near the left upper lobe mass. Other additional nodules left upper lobe are noted as above, new since prior study. 3.  Lack of IV contrast limits assessment but no gross mediastinal adenopathy is noted. 4.  Nonobstructing calcifications both kidneys. Calcification near the pancreatic tail. Other findings as above. 5.  Marked interval advancement _____ . RECOMMENDATIONS: Advise tissue sampling of the dominant nodule in the left upper lobe which abuts the pleura.      Nm Lung Vent/perfusion (vq)    Result Date: 5/9/2019  EXAMINATION: NUCLEAR MEDICINE VENTILATION PERFUSION SCAN. 5/9/2019 TECHNIQUE: 51.4 millicuries aerosolized Tc99m PYP was administered via mask prior to planar Abnormal; Notable for the following components:       Result Value    CREATININE 2.87 (*)     Bun/Cre Ratio 3 (*)     Chloride 95 (*)     GFR Non- 16 (*)     GFR  20 (*)     All other components within normal limits   CBC WITH AUTO DIFFERENTIAL - Abnormal; Notable for the following components:    RBC 3.82 (*)     Hemoglobin 10.6 (*)     Hematocrit 35.5 (*)     RDW 18.4 (*)     Seg Neutrophils 73 (*)     Lymphocytes 14 (*)     Monocytes 13 (*)     Eosinophils % 0 (*)     Absolute Lymph # 0.83 (*)     All other components within normal limits   BRAIN NATRIURETIC PEPTIDE - Abnormal; Notable for the following components:    Pro-BNP 9,403 (*)     All other components within normal limits   PROTIME-INR   APTT       All other labs were within normal range or not returned as of this dictation. EMERGENCY DEPARTMENT COURSE and DIFFERENTIAL DIAGNOSIS/MDM:   Vitals:    Vitals:    05/09/19 1231   BP: (!) 144/51   Pulse: 87   Resp: 20   Temp: 98 °F (36.7 °C)   TempSrc: Oral   SpO2: 96%   Weight: 160 lb 15 oz (73 kg)   Height: 5' 6\" (1.676 m)       MEDICATIONS GIVEN IN THE ED:  Medications   heparin (porcine) injection 5,840 Units (has no administration in time range)   heparin (porcine) injection 5,840 Units (has no administration in time range)   heparin (porcine) injection 2,920 Units (has no administration in time range)   heparin 25,000 units in dextrose 5% 250 mL infusion (has no administration in time range)   technetium pyrophosphate (PYP) injection 20 millicurie (23.5 millicuries Inhalation Given 5/9/19 1438)   technetium albumin aggregated (MAA) solution 6 millicurie (7.4 millicuries Intravenous Given 5/9/19 1455)       CLINICAL DECISION MAKING:  The patient presented alert with a nontoxic appearance and was seen in conjunction with Dr. Romeo Dunaway. VQ scan showed high probability.  CT scan showed marked interval enlargement in size of a left upper lobe mass; additional nodules left upper lobe. The patient will be admitted for further evaluation and treatment. CONSULTS:  IP CONSULT TO NEPHROLOGY  IP CONSULT TO INTERNAL MEDICINE  IP CONSULT TO NEPHROLOGY      FINAL IMPRESSION      1. Other acute pulmonary embolism without acute cor pulmonale (HCC)    2. Lung mass    3.  Dialysis patient [facility])            Problem List  Patient Active Problem List   Diagnosis Code    End stage renal disease ([facility] N18.6    Anemia D64.9    Malignant neoplasm of female breast ([facility] C50.919    Tobacco abuse Z72.0    Non-rheumatic mitral regurgitation I34.0    Gastrointestinal hemorrhage K92.2    NSAID long-term use Z79.1    Anemia of chronic disease D63.8    Erosive gastritis K29.60    Iron deficiency anemia D50.9    Atrial fibrillation ([facility] I48.91    Hypertension I10    Severe anemia D64.9    Fatigue R53.83    Colon polyp K63.5    Pulmonary emphysema (HCC) J43.9    Stenosis of left carotid artery I65.22    Anemia of chronic renal failure, stage 5 (HCC) N18.5, D63.1    COPD exacerbation (HCC) J44.1    Need for vaccination Z23    [facility] I26.99         DISPOSITION/PLAN   DISPOSITION        PATIENT REFERRED TO:   [address], [address] [id number] [phone]            DISCHARGE MEDICATIONS:     New Prescriptions    No medications on file           (Please note that portions of this note were completed with a voice recognition program.  Efforts were made to edit the dictations but occasionally words are mis-transcribed.)    JAMES Casillas - JAMES Estes CNP  05/09/19 5028

## 2019-05-09 NOTE — PROGRESS NOTES
Emergency Department Pharmacist Note    Patient presents to the ED with complaints of:   Chief Complaint   Patient presents with    Back Pain     Patient was seen by the ED pharmacist and offered counseling. Counseled patient on heparin including administration, its mechanism of action, duration of therapy, and adverse effects. All medication related questions were answered and patient verbalized understanding.     Alexandria Brennan PharmD  5/9/2019  4:15 PM

## 2019-05-09 NOTE — PLAN OF CARE
Problem: Falls - Risk of:  Goal: Will remain free from falls  Description  Will remain free from falls  Outcome: Ongoing  Goal: Absence of physical injury  Description  Absence of physical injury  Outcome: Ongoing     Problem: Tobacco Use:  Goal: Inpatient tobacco use cessation counseling participation  Description  Inpatient tobacco use cessation counseling participation  Outcome: Ongoing

## 2019-05-09 NOTE — DISCHARGE INSTR - COC
Continuity of Care Form    Patient Name: Michela Seymour   :    MRN:  [id number]    Admit date:  2019  Discharge date:  19    Code Status Order: Prior   Advance Directives:     Admitting Physician:  No admitting provider for patient encounter.   PCP: Lia Andujar MD    Discharging Nurse: [facility] Unit/Room#: [facility] ICU 9999  Discharging Unit Phone Number: [phone]    Emergency Contact:   Extended Emergency Contact Information  Primary Emergency Contact: Delta  Address: [address] [place] [address] [address] [place], [address] [place] [address] [facility] Phone: [phone]  Work Phone: [phone]  Mobile Phone: [phone]  Relation: Child  Secondary Emergency Contact: Piñaley Leyden [facility] Phone: [phone]  Work Phone: [phone]  Mobile Phone: [phone]  Relation: Niece/Nephew    Past Surgical History:  Past Surgical History:   Procedure Laterality Date    ABDOMEN SURGERY      BREAST SURGERY      left;  breast cancer    CARPAL TUNNEL RELEASE      right    COLONOSCOPY  2017    DR Matt Carrington - adenomatous polyp    ECTOPIC PREGNANCY SURGERY      KNEE SURGERY      left    OR COLON CA SCRN NOT HI RSK IND N/A 2017    COLONOSCOPY performed by Emerson Suh DO at [facility] Endoscopy    OR ESOPHAGOGASTRODUODENOSCOPY TRANSORAL DIAGNOSTIC N/A 2017    EGD ESOPHAGOGASTRODUODENOSCOPY performed by Emerson Suh DO at [address] [place] [address]  2016       Immunization History:   Immunization History   Administered Date(s) Administered    Influenza Vaccine, unspecified formulation 10/03/2015, 10/01/2016    PPD Test 2010    Pneumococcal 13-valent Conjugate ([id number]) 2019    Pneumococcal Conjugate 7-valent 2011    Pneumococcal Polysaccharide ([id number]) 2011       Active Problems:  Patient Active Problem List   Diagnosis Code    End stage renal disease ([facility] N18.6    Anemia D64.9    Malignant neoplasm of female breast ([facility] C50.919    Tobacco abuse Z72.0    Non-rheumatic mitral regurgitation I34.0    Gastrointestinal hemorrhage K92.2    NSAID long-term use Z79.1    Anemia of chronic disease D63.8    Erosive gastritis K29.60    Iron deficiency anemia D50.9    Atrial fibrillation (HCC) I48.91    Hypertension I10    Severe anemia D64.9    Fatigue R53.83    Colon polyp K63.5    Pulmonary emphysema (HCC) J43.9    Stenosis of left carotid artery I65.22    Anemia of chronic renal failure, stage 5 (HCC) N18.5, D63.1    COPD exacerbation ([facility]) J44.1    Need for vaccination Z23       Isolation/Infection:   Isolation          No Isolation            Nurse Assessment:  Last Vital Signs: BP (!) 144/51   Pulse 87   Temp 98 °F (36.7 °C) (Oral)   Resp 20   Ht 5' 6\" (1.676 m)   Wt 160 lb 15 oz (73 kg)   SpO2 96%   BMI 25.98 kg/m²     Last documented pain score (0-10 scale): Pain Level: 10  Last Weight:   Wt Readings from Last 1 Encounters:   05/09/19 160 lb 15 oz (73 kg)     Mental Status:  oriented and alert    IV Access:  - Dialysis Catheter  - site  right and femoral, insertion date: 5/15/19    Nursing Mobility/ADLs:  Walking   Assisted  Transfer  Independent  Bathing  Independent  Dressing  Independent  Toileting  Independent  Feeding  Independent  Med Admin  Dependent  Med Delivery   whole    Wound Care Documentation and Therapy:        Elimination:  Continence:   · Bowel: Yes  · Bladder: Yes  Urinary Catheter: None   Colostomy/Ileostomy/Ileal Conduit: No       Date of Last BM: 5/16/19  No intake or output data in the 24 hours ending 05/09/19 1619  No intake/output data recorded. Safety Concerns:      At Risk for Falls    Impairments/Disabilities:      None    Nutrition Therapy:  Current Nutrition Therapy:   - Oral Diet:  Renal    Routes of Feeding: Oral  Liquids: No Restrictions  Daily Fluid Restriction: yes - amount 1500  Last Modified Barium Swallow with Video (Video

## 2019-05-09 NOTE — PRE-CERTIFICATION NOTE
[] [facility]  [address]  [phone]  F: [phone] [] [address]  [facility]   [address]  P: [phone]  F: [phone] [] [place]  [address] [facility]  P: [phone]  F: [phone] [] [address]  [facility]   [address]   P: [phone]  F: [phone] [x] [address]   Outpatient Occupational Therapy  [facility] [address] [place] [address] [address] E: [phone]  F: [phone]          Therapy Pre-certification Note      5/9/2019    Keegan Mcleod  1945   [id number]      Insurance approval was received for Physical Therapy from [place] on 5/9/19. Approval was received for 12 visits, from 5/8/19 to 7/9/19. Authorization number [id number]. Patient is already currently scheduled.       Electronically signed by Valeria Alvares PT on 5/9/2019 at 12:45 PM

## 2019-05-09 NOTE — PROGRESS NOTES
Transitions of Care Pharmacy Service   Medication Review    The patient's list of current home medications has been reviewed. Source(s) of information: patient, [facility]    Based on information provided by the above source(s), I have updated the patient's home med list as described below. Please review the ACTION REQUESTED section of this note below for any discrepancies on current hospital orders. I changed or updated the following medications on the patient's home medication list:  Added Amlodipine 10mg daily  Loratadine 10mg daily  Metoprolol tart 25mg BID  Ethyl chloride spray prior to dialysis         PROVIDER ACTION REQUESTED  Discrepancies on current hospital orders that need to be addressed by a physician/nurse practitioner:    Medication Action Requested   Meds added (see above)     Need review         Please feel free to call me with any questions about this encounter. Thank you.     Bette Parham [address] [place] [address]   Transitions of Care Pharmacy Service  Phone:  [phone]  Fax: [phone]      Electronically signed by Bette Parham [address] [place] [address] on 5/9/2019 at 5:25 PM           Prior to Admission medications    Medication Sig       atorvastatin (LIPITOR) 20 MG tablet Take 20 mg by mouth daily       B Complex-C-Folic Acid (MADAI-AYAN) TABS Take 1 tablet by mouth daily       cloNIDine (CATAPRES) 0.2 MG tablet Take 0.2 mg by mouth 2 times daily       lactulose (CONSTULOSE) 10 GM/15ML solution Take 30 mLs by mouth daily as needed       chlorhexidine (HIBICLENS) 4 % external liquid Apply topically daily As directed       hydrALAZINE (APRESOLINE) 100 MG tablet Take 100 mg by mouth 3 times daily       Umeclidinium Bromide (INCRUSE ELLIPTA) 62.5 MCG/INH AEPB Inhale 1 puff into the lungs daily       sodium bicarbonate 650 MG tablet Take 1,300 mg by mouth 3 times daily       amLODIPine (NORVASC) 10 MG tablet Take 10 mg by mouth daily       loratadine (CLARITIN) 10 MG tablet Take 10 mg by mouth daily metoprolol tartrate (LOPRESSOR) 25 MG tablet Take 25 mg by mouth 2 times daily       ethyl chloride spray Apply topically as needed (to arm prior to dialysis)       omeprazole (PRILOSEC) 20 MG delayed release capsule TAKE 1 CAPSULE BY MOUTH DAILY       albuterol sulfate  (90 Base) MCG/ACT inhaler Inhale 2 puffs into the lungs every 6 hours as needed for Wheezing or Shortness of Breath       losartan (COZAAR) 50 MG tablet Take 1 tablet by mouth 2 times daily       SENSIPAR 60 MG tablet Take 120 mg by mouth daily

## 2019-05-09 NOTE — H&P
[address]     HISTORY AND PHYSICAL EXAMINATION            Date:   5/9/2019  Patient name:  Bill Lux  Date of admission:  5/9/2019  1:12 PM  MRN:   [id number]  Account:  [de-identified]  YOB: 1945  PCP:    Kalpana Cantu MD  Room:   2011/2011-02  Code Status:    Full Code    Chief Complaint:     Chief Complaint   Patient presents with    Back Pain       History Obtained From:     patient, family member - daughter    History of Present Illness: The patient is a 72 y.o.  AA female who presents with Back Pain   and she is admitted to the hospital for the management of  PE and pain. She has had left upper back pain near shoulder for past 3 weeks. VQ done in ER showed possible PE. She has also had chills and occ n/v but denies cp/sob/cough/f/s/s. She has been placed on heparin drip-when I mentioned this the patient then tells me she hasn't gotten heparin on dialysis for over a year. Her daughter then explained she had a hgb of 3-4 range repeatedly requiring transfusions every few months in 2017 and despite full GI workup, could never find source.       She has known lung mass and previously refused biopsy; radiologist referenced a PET scan, but not sure where this was done        Past Medical History:     Past Medical History:   Diagnosis Date    Anemia     Asthma     Atrial fibrillation ([address] 2/18/2017    Breast cancer ([address] 1988     has annual mammograms only, no heme/onc    Bursitis     bilateral    Carpal tunnel syndrome     Chronic obstructive pulmonary disease (COPD) ([address]     Disease of blood and blood forming organ     History of blood transfusion     Hypertension     Irregular heart beat     Lung nodule     Osteoarthritis     knee and back    Pneumonia     Pulmonary emphysema ([address] 6/9/2017    Renal failure      end stage renal failure on dialysis    Stenosis of left carotid artery 6/9/2017    Tobacco abuse         Past Surgical History:     Past Surgical History:   Procedure Laterality Date    ABDOMEN SURGERY      BREAST SURGERY      left;  breast cancer    CARPAL TUNNEL RELEASE      right    COLONOSCOPY  02/22/2017    DR Christine Shetty - adenomatous polyp    ECTOPIC PREGNANCY SURGERY      KNEE SURGERY      left    CA COLON CA SCRN NOT HI RSK IND N/A 2/22/2017    COLONOSCOPY performed by Joel [facility] at [facility]    CA ESOPHAGOGASTRODUODENOSCOPY TRANSORAL DIAGNOSTIC N/A 2/22/2017    EGD ESOPHAGOGASTRODUODENOSCOPY [facility] Joel [facility] DO at [facility] ENDOSCOPY  09/30/2016        Medications Prior to Admission:     Prior to Admission medications    Medication Sig Start Date End Date Taking?  Authorizing Provider   atorvastatin (LIPITOR) 20 MG tablet Take 20 mg by mouth daily   Yes Historical Provider, MD   B Complex-C-Folic Acid (MADAI-AYAN) TABS Take 1 tablet by mouth daily   Yes Historical Provider, MD   cloNIDine (CATAPRES) 0.2 MG tablet Take 0.2 mg by mouth 2 times daily   Yes Historical Provider, MD   lactulose (CONSTULOSE) 10 GM/15ML solution Take 30 mLs by mouth daily as needed   Yes Historical Provider, MD   chlorhexidine (HIBICLENS) 4 % external liquid Apply topically daily As directed   Yes Historical Provider, MD   hydrALAZINE (APRESOLINE) 100 MG tablet Take 100 mg by mouth 3 times daily   Yes Historical Provider, MD   Umeclidinium Bromide (INCRUSE ELLIPTA) 62.5 MCG/INH AEPB Inhale 1 puff into the lungs daily   Yes Historical Provider, MD   sodium bicarbonate 650 MG tablet Take 1,300 mg by mouth 3 times daily   Yes Historical Provider, MD   amLODIPine (NORVASC) 10 MG tablet Take 10 mg by mouth daily   Yes Historical Provider, MD   loratadine (CLARITIN) 10 MG tablet Take 10 mg by mouth daily   Yes Historical Provider, MD   metoprolol tartrate (LOPRESSOR) 25 MG tablet Take 25 mg by mouth 2 times daily   Yes Historical Provider, MD   ethyl chloride spray Apply topically as needed (to arm prior to dialysis)   Yes Historical Provider, MD   omeprazole (PRILOSEC) 20 MG delayed release capsule TAKE 1 CAPSULE BY MOUTH DAILY 2/20/19  Yes Sergei Lam MD   albuterol sulfate  (90 Base) MCG/ACT inhaler Inhale 2 puffs into the lungs every 6 hours as needed for Wheezing or Shortness of Breath 9/5/18 5/9/19 Yes David Gonzalez,    losartan (COZAAR) 50 MG tablet Take 1 tablet by mouth 2 times daily 10/7/16  Yes Sixto Beal MD   SENSIPAR 60 MG tablet Take 120 mg by mouth daily  12/3/15  Yes Historical Provider, MD   Incontinence Supply Disposable (INCONTINENCE BRIEF MEDIUM) MISC 1 each by Does not apply route 2 times daily as needed (incotinenace) 1/8/19   Beronica Salazar MD        Allergies:     Pcn [penicillins] and Sulfa antibiotics    Social History:     Tobacco:    reports that she has been smoking cigarettes. She has a 13.75 pack-year smoking history. She has never used smokeless tobacco.  Alcohol:      reports that she does not drink alcohol. Drug Use:  reports that she does not use drugs. Family History:     Family History   Problem Relation Age of Onset   Omari Pickard Cancer Father     Diabetes Sister        Review of Systems:     Positive and Negative as described in HPI. CONSTITUTIONAL:  negative for fevers, sweats  HEENT:  negative for vision, hearing changes, runny nose, throat pain  RESPIRATORY:  negative for shortness of breath, cough, congestion, wheezing. CARDIOVASCULAR:  negative for chest pain, palpitations.   GASTROINTESTINAL:  negative for nausea, vomiting, diarrhea, constipation, change in bowel habits, abdominal pain   GENITOURINARY:  negative for difficulty of urination, burning with urination, frequency   INTEGUMENT:  negative for rash, skin lesions, easy bruising   HEMATOLOGIC/LYMPHATIC:  negative for swelling/edema   ALLERGIC/IMMUNOLOGIC:  negative for urticaria , itching  ENDOCRINE:  negative increase in drinking, increase in urination, hot or cold intolerance  MUSCULOSKELETAL:  negative joint pains, muscle aches, swelling of joints  NEUROLOGICAL:  negative for headaches, dizziness, lightheadedness, numbness, tingling extremities  BEHAVIOR/PSYCH:  negative for depression, anxiety    Physical Exam:   BP (!) 144/66   Pulse 81   Temp 99 °F (37.2 °C) (Oral)   Resp 17   Ht 5' 6\" (1.676 m)   Wt 160 lb 15 oz (73 kg)   SpO2 98%   BMI 25.98 kg/m²   Temp (24hrs), Av.5 °F (36.9 °C), Min:98 °F (36.7 °C), Max:99 °F (37.2 °C)    No results for input(s): POCGLU in the last 72 hours. No intake or output data in the 24 hours ending 19 1909    General Appearance:  alert, well appearing, and in no acute distress  Mental status: oriented to person, place, and time with normal affect  Head:  normocephalic, atraumatic. Eye: no icterus, redness, pupils equal and reactive, extraocular eye movements intact, conjunctiva clear  Ear: normal external ear, no discharge, hearing intact  Nose:  no drainage noted  Mouth: mucous membranes moist  Neck: supple, no carotid bruits, thyroid not palpable  Lungs: Bilateral equal air entry, clear to auscultation, no wheezing, rales or rhonchi, normal effort  Cardiovascular: normal rate, regular rhythm, no murmur, gallop, rub.   Abdomen: Soft, nontender, nondistended, normal bowel sounds, no hepatomegaly or splenomegaly  Neurologic: There are no new focal motor or sensory deficits, normal muscle tone and bulk, no abnormal sensation, normal speech, cranial nerves II through XII grossly intact  Skin: No gross lesions, rashes, bruising or bleeding on exposed skin area  Extremities:  peripheral pulses palpable, no pedal edema or calf pain with palpation  Psych: normal affect     Investigations:      Laboratory Testing:  Recent Results (from the past 24 hour(s))   Basic Metabolic Prof    Collection Time: 19  3:10 PM   Result Value Ref Range    Glucose 94 70 - 99 mg/dL    BUN 10 8 - 23 mg/dL    CREATININE 2.87 (H) 0.50 - 0.90 mg/dL Bun/Cre Ratio 3 (L) 9 - 20    Calcium 9.0 8.6 - 10.4 mg/dL    Sodium 140 135 - 144 mmol/L    Potassium 3.7 3.7 - 5.3 mmol/L    Chloride 95 (L) 98 - 107 mmol/L    CO2 31 20 - 31 mmol/L    Anion Gap 14 9 - 17 mmol/L    GFR Non-African American 16 (L) >60 mL/min    GFR African American 20 (L) >60 mL/min    GFR Comment          GFR Staging NOT REPORTED    CBC with DIFF    Collection Time: 05/09/19  3:10 PM   Result Value Ref Range    WBC 5.9 3.5 - 11.3 k/uL    RBC 3.82 (L) 3.95 - 5.11 m/uL    Hemoglobin 10.6 (L) 11.9 - 15.1 g/dL    Hematocrit 35.5 (L) 36.3 - 47.1 %    MCV 92.9 82.6 - 102.9 fL    MCH 27.7 25.2 - 33.5 pg    MCHC 29.9 28.4 - 34.8 g/dL    RDW 18.4 (H) 11.8 - 14.4 %    Platelets 545 006 - 702 k/uL    MPV 10.7 8.1 - 13.5 fL    NRBC Automated 0.0 0.0 per 100 WBC    Differential Type NOT REPORTED     Seg Neutrophils 73 (H) 36 - 65 %    Lymphocytes 14 (L) 24 - 43 %    Monocytes 13 (H) 3 - 12 %    Eosinophils % 0 (L) 1 - 4 %    Basophils 0 0 - 2 %    Immature Granulocytes 0 0 %    Segs Absolute 4.27 1.50 - 8.10 k/uL    Absolute Lymph # 0.83 (L) 1.10 - 3.70 k/uL    Absolute Mono # 0.79 0.10 - 1.20 k/uL    Absolute Eos # <0.03 0.00 - 0.44 k/uL    Basophils # <0.03 0.00 - 0.20 k/uL    Absolute Immature Granulocyte 0.01 0.00 - 0.30 k/uL    WBC Morphology NOT REPORTED     RBC Morphology ANISOCYTOSIS PRESENT     Platelet Estimate NOT REPORTED    Brain Natriuretic Peptide    Collection Time: 05/09/19  3:10 PM   Result Value Ref Range    Pro-BNP 9,403 (H) <300 pg/mL    BNP Interpretation Pro-BNP Reference Range:    Protime-INR    Collection Time: 05/09/19  3:10 PM   Result Value Ref Range    Protime 10.5 9.7 - 11.6 sec    INR 1.0    APTT    Collection Time: 05/09/19  3:10 PM   Result Value Ref Range    PTT 30.8 23 - 31 sec       Imaging/Diagnostics:    Vq:  Impression   Large matching perfusion and ventilation defect involving the apicoposterior   and anterior segments of the left upper lobe which is matched. Heena Mcknight, and mets, not the PE  5. Smoking cessation  6. Try and get PET results-unsure where she had it done  7. D/w daughter in detail    Consultations:   IP CONSULT TO NEPHROLOGY  IP CONSULT TO INTERNAL MEDICINE  IP CONSULT TO NEPHROLOGY     Patient is admitted as inpatient status because of co-morbidities listed above, severity of signs and symptoms as outlined, requirement for current medical therapies and most importantly because of direct risk to patient if care not provided in a hospital setting.     Luis Pool Blood, DO  5/9/2019  7:09 PM    Copy sent to Dr. Óscar Chiu MD

## 2019-05-10 LAB
ANION GAP SERPL CALCULATED.3IONS-SCNC: 15 MMOL/L (ref 9–17)
BUN BLDV-MCNC: 19 MG/DL (ref 8–23)
BUN/CREAT BLD: 5 (ref 9–20)
CALCIUM SERPL-MCNC: 8.6 MG/DL (ref 8.6–10.4)
CHLORIDE BLD-SCNC: 95 MMOL/L (ref 98–107)
CO2: 30 MMOL/L (ref 20–31)
CREAT SERPL-MCNC: 4.08 MG/DL (ref 0.5–0.9)
GFR AFRICAN AMERICAN: 13 ML/MIN
GFR NON-AFRICAN AMERICAN: 11 ML/MIN
GFR SERPL CREATININE-BSD FRML MDRD: ABNORMAL ML/MIN/{1.73_M2}
GFR SERPL CREATININE-BSD FRML MDRD: ABNORMAL ML/MIN/{1.73_M2}
GLUCOSE BLD-MCNC: 107 MG/DL (ref 70–99)
HCT VFR BLD CALC: 32.7 % (ref 36.3–47.1)
HEMOGLOBIN: 9.7 G/DL (ref 11.9–15.1)
MCH RBC QN AUTO: 27.6 PG (ref 25.2–33.5)
MCHC RBC AUTO-ENTMCNC: 29.7 G/DL (ref 28.4–34.8)
MCV RBC AUTO: 93.2 FL (ref 82.6–102.9)
NRBC AUTOMATED: 0 PER 100 WBC
PARTIAL THROMBOPLASTIN TIME: 57.2 SEC (ref 23–31)
PARTIAL THROMBOPLASTIN TIME: 85.4 SEC (ref 23–31)
PARTIAL THROMBOPLASTIN TIME: 91.2 SEC (ref 23–31)
PARTIAL THROMBOPLASTIN TIME: 96.5 SEC (ref 23–31)
PDW BLD-RTO: 18.3 % (ref 11.8–14.4)
PLATELET # BLD: 271 K/UL (ref 138–453)
PMV BLD AUTO: 10.6 FL (ref 8.1–13.5)
POTASSIUM SERPL-SCNC: 4 MMOL/L (ref 3.7–5.3)
RBC # BLD: 3.51 M/UL (ref 3.95–5.11)
SODIUM BLD-SCNC: 140 MMOL/L (ref 135–144)
WBC # BLD: 5.7 K/UL (ref 3.5–11.3)

## 2019-05-10 PROCEDURE — 6360000002 HC RX W HCPCS: Performed by: INTERNAL MEDICINE

## 2019-05-10 PROCEDURE — 80048 BASIC METABOLIC PNL TOTAL CA: CPT

## 2019-05-10 PROCEDURE — 99232 SBSQ HOSP IP/OBS MODERATE 35: CPT | Performed by: INTERNAL MEDICINE

## 2019-05-10 PROCEDURE — 6370000000 HC RX 637 (ALT 250 FOR IP): Performed by: NURSE PRACTITIONER

## 2019-05-10 PROCEDURE — 93970 EXTREMITY STUDY: CPT

## 2019-05-10 PROCEDURE — 85027 COMPLETE CBC AUTOMATED: CPT

## 2019-05-10 PROCEDURE — 36415 COLL VENOUS BLD VENIPUNCTURE: CPT

## 2019-05-10 PROCEDURE — 85730 THROMBOPLASTIN TIME PARTIAL: CPT

## 2019-05-10 PROCEDURE — 6370000000 HC RX 637 (ALT 250 FOR IP): Performed by: INTERNAL MEDICINE

## 2019-05-10 PROCEDURE — 1200000000 HC SEMI PRIVATE

## 2019-05-10 RX ORDER — NICOTINE 21 MG/24HR
1 PATCH, TRANSDERMAL 24 HOURS TRANSDERMAL DAILY
Status: DISCONTINUED | OUTPATIENT
Start: 2019-05-10 | End: 2019-05-17 | Stop reason: HOSPADM

## 2019-05-10 RX ADMIN — HEPARIN SODIUM AND DEXTROSE 14 UNITS/KG/HR: 10000; 5 INJECTION INTRAVENOUS at 07:45

## 2019-05-10 RX ADMIN — OXYCODONE AND ACETAMINOPHEN 2 TABLET: 5; 325 TABLET ORAL at 04:58

## 2019-05-10 RX ADMIN — OXYCODONE AND ACETAMINOPHEN 2 TABLET: 5; 325 TABLET ORAL at 00:18

## 2019-05-10 RX ADMIN — OXYCODONE AND ACETAMINOPHEN 2 TABLET: 5; 325 TABLET ORAL at 10:22

## 2019-05-10 RX ADMIN — LOSARTAN POTASSIUM 50 MG: 50 TABLET, FILM COATED ORAL at 10:21

## 2019-05-10 RX ADMIN — LOSARTAN POTASSIUM 50 MG: 50 TABLET, FILM COATED ORAL at 20:22

## 2019-05-10 RX ADMIN — CLONIDINE HYDROCHLORIDE 0.2 MG: 0.2 TABLET ORAL at 10:32

## 2019-05-10 RX ADMIN — CLONIDINE HYDROCHLORIDE 0.2 MG: 0.2 TABLET ORAL at 20:22

## 2019-05-10 RX ADMIN — HYDRALAZINE HYDROCHLORIDE 100 MG: 50 TABLET, FILM COATED ORAL at 10:21

## 2019-05-10 RX ADMIN — CINACALCET HYDROCHLORIDE 120 MG: 30 TABLET, COATED ORAL at 10:21

## 2019-05-10 RX ADMIN — OXYCODONE AND ACETAMINOPHEN 2 TABLET: 5; 325 TABLET ORAL at 20:22

## 2019-05-10 RX ADMIN — SODIUM BICARBONATE 1300 MG: 650 TABLET ORAL at 13:50

## 2019-05-10 RX ADMIN — HYDRALAZINE HYDROCHLORIDE 100 MG: 50 TABLET, FILM COATED ORAL at 13:50

## 2019-05-10 RX ADMIN — HEPARIN SODIUM AND DEXTROSE 16 UNITS/KG/HR: 10000; 5 INJECTION INTRAVENOUS at 13:55

## 2019-05-10 RX ADMIN — Medication 1 TABLET: at 10:21

## 2019-05-10 RX ADMIN — OXYCODONE AND ACETAMINOPHEN 2 TABLET: 5; 325 TABLET ORAL at 16:21

## 2019-05-10 RX ADMIN — SODIUM BICARBONATE 1300 MG: 650 TABLET ORAL at 10:21

## 2019-05-10 RX ADMIN — ATORVASTATIN CALCIUM 20 MG: 20 TABLET, FILM COATED ORAL at 20:21

## 2019-05-10 RX ADMIN — HEPARIN SODIUM 2920 UNITS: 1000 INJECTION INTRAVENOUS; SUBCUTANEOUS at 13:51

## 2019-05-10 RX ADMIN — HYDRALAZINE HYDROCHLORIDE 100 MG: 50 TABLET, FILM COATED ORAL at 20:22

## 2019-05-10 RX ADMIN — PANTOPRAZOLE SODIUM 40 MG: 40 TABLET, DELAYED RELEASE ORAL at 06:00

## 2019-05-10 ASSESSMENT — PAIN SCALES - GENERAL
PAINLEVEL_OUTOF10: 6
PAINLEVEL_OUTOF10: 8
PAINLEVEL_OUTOF10: 8
PAINLEVEL_OUTOF10: 7
PAINLEVEL_OUTOF10: 7
PAINLEVEL_OUTOF10: 8
PAINLEVEL_OUTOF10: 5
PAINLEVEL_OUTOF10: 4
PAINLEVEL_OUTOF10: 4
PAINLEVEL_OUTOF10: 8

## 2019-05-10 ASSESSMENT — PAIN DESCRIPTION - LOCATION
LOCATION: BACK

## 2019-05-10 ASSESSMENT — PAIN DESCRIPTION - FREQUENCY
FREQUENCY: INTERMITTENT

## 2019-05-10 ASSESSMENT — PAIN DESCRIPTION - DESCRIPTORS
DESCRIPTORS: ACHING

## 2019-05-10 ASSESSMENT — PAIN DESCRIPTION - PAIN TYPE
TYPE: ACUTE PAIN

## 2019-05-10 ASSESSMENT — PAIN DESCRIPTION - ONSET
ONSET: ON-GOING
ONSET: ON-GOING
ONSET: SUDDEN

## 2019-05-10 ASSESSMENT — PAIN DESCRIPTION - ORIENTATION: ORIENTATION: LEFT;UPPER

## 2019-05-10 NOTE — CONSULTS
Renal Consult Note    Patient :  Chaz Mckeon; 72 y.o. MRN# [id number]  Location:  2011/2011-02  Attending:  Diamante Gibson DO  Admit Date:  5/9/2019   Hospital Day: 1    Reason for Consult:     Asked by Dr Diamante Gibson DO to see for CHANDLER/Elevated Creatinine. History Obtained From:     patient    HD Access:     previous  Left AV graft    HD Unit:     Central    Nephrologist:     Dr Gabo De Anda Weight:     72 Kg    Admission Weight:     73Kg    History of Present Illness:     Chaz Mckeon; 72 y.o. female with past medical history as mentioned below presented to the hospital with the chief complaint of shoulder and back pain which started a day or so prior to presentation. No history of ESRD on hemodialysis Tuesday Thursday Saturday at the central dialysis unit via left AV graft. Last dialysis was yesterday. She also has been diagnosed with lung cancer based on imaging studies showing left upper lobe mass which has been expanding. She was supposed to get repeat imaging done in PET scan done as an outpatient which hasn't been accomplished yet. Patient has been declining most of the interventions in the past.  She also has known history of COPD. On presentation she had a VQ scan done which came back high probability for pulmonary bothers him. She's been placed on heparin. Additionally the lung mass was felt to be expanding. We'll scan this been ordered as well to see if she has any bony metastases causing muscular skeletal symptoms. Other than discomfort in her back and shoulder she denies any complaints. Oral intake is good. Denies any fever chills or weight loss. Past History/Allergies? Social History:     Past Medical History:   Diagnosis Date    Anemia     Asthma     Atrial fibrillation ([address] 2/18/2017    Breast cancer ([address] 1988     has annual mammograms only, no heme/onc    Bursitis     bilateral    Carpal tunnel syndrome     Chronic obstructive pulmonary disease (COPD) ([address]     Disease of blood and blood forming organ     History of blood transfusion     Hypertension     Irregular heart beat     Lung nodule     Osteoarthritis     knee and back    Pneumonia     Pulmonary emphysema (HCC) 2017    Renal failure      end stage renal failure on dialysis    Stenosis of left carotid artery 2017    Tobacco abuse        Allergies   Allergen Reactions    Pcn [Penicillins] Hives and Itching    Sulfa Antibiotics      difficulty moving???       Social History     Socioeconomic History    Marital status:      Spouse name: Not on file    Number of children: Not on file    Years of education: Not on file    Highest education level: Not on file   Occupational History    Not on file   Social Needs    Financial resource strain: Not on file    Food insecurity:     Worry: Not on file     Inability: Not on file    Transportation needs:     Medical: Not on file     Non-medical: Not on file   Tobacco Use    Smoking status: Current Every Day Smoker     Packs/day: 0.25     Years: 55.00     Pack years: 13.75     Types: Cigarettes     Last attempt to quit: 2005     Years since quittin.3    Smokeless tobacco: Never Used   Substance and Sexual Activity    Alcohol use: No     Alcohol/week: 0.0 oz    Drug use: No    Sexual activity: Not on file   Lifestyle    Physical activity:     Days per week: Not on file     Minutes per session: Not on file    Stress: Not on file   Relationships    Social connections:     Talks on phone: Not on file     Gets together: Not on file     Attends Hindu service: Not on file     Active member of club or organization: Not on file     Attends meetings of clubs or organizations: Not on file     Relationship status: Not on file    Intimate partner violence:     Fear of current or ex partner: Not on file     Emotionally abused: Not on file     Physically abused: Not on file     Forced sexual activity: Not on file   Other Topics Concern  Not on file   Social History Narrative    Not on file       Family History:        Family History   Problem Relation Age of Onset    Cancer Father     Diabetes Sister        Outpatient Medications:     Medications Prior to Admission: atorvastatin (LIPITOR) 20 MG tablet, Take 20 mg by mouth daily  B Complex-C-Folic Acid (EDWINA-AYAN) TABS, Take 1 tablet by mouth daily  cloNIDine (CATAPRES) 0.2 MG tablet, Take 0.2 mg by mouth 2 times daily  lactulose (CONSTULOSE) 10 GM/15ML solution, Take 30 mLs by mouth daily as needed  chlorhexidine (HIBICLENS) 4 % external liquid, Apply topically daily As directed  hydrALAZINE (APRESOLINE) 100 MG tablet, Take 100 mg by mouth 3 times daily  Umeclidinium Bromide (INCRUSE ELLIPTA) 62.5 MCG/INH AEPB, Inhale 1 puff into the lungs daily  sodium bicarbonate 650 MG tablet, Take 1,300 mg by mouth 3 times daily  amLODIPine (NORVASC) 10 MG tablet, Take 10 mg by mouth daily  loratadine (CLARITIN) 10 MG tablet, Take 10 mg by mouth daily  metoprolol tartrate (LOPRESSOR) 25 MG tablet, Take 25 mg by mouth 2 times daily  ethyl chloride spray, Apply topically as needed (to arm prior to dialysis)  omeprazole (PRILOSEC) 20 MG delayed release capsule, TAKE 1 CAPSULE BY MOUTH DAILY  albuterol sulfate  (90 Base) MCG/ACT inhaler, Inhale 2 puffs into the lungs every 6 hours as needed for Wheezing or Shortness of Breath  losartan (COZAAR) 50 MG tablet, Take 1 tablet by mouth 2 times daily  SENSIPAR 60 MG tablet, Take 120 mg by mouth daily   Incontinence Supply Disposable (INCONTINENCE BRIEF MEDIUM) MISC, 1 each by Does not apply route 2 times daily as needed (incotinenace)    Current Medications:     Scheduled Meds:    nicotine  1 patch Transdermal Daily    cinacalcet  120 mg Oral Daily    losartan  50 mg Oral BID    pantoprazole  40 mg Oral QAM AC    atorvastatin  20 mg Oral Daily    edwina-ayan  1 tablet Oral Daily    cloNIDine  0.2 mg Oral BID    chlorhexidine   Topical Daily    or flank tenderness. Neuro:  AAO x 3, No FND. SKIN:  No rashes, good skin turgor. Extremities:  No edema, palpable peripheral pulses, no calf tenderness. Left upper extremity AV graft with thrill and bruit  Labs:       Recent Labs     05/09/19  1510 05/10/19  0450   WBC 5.9 5.7   RBC 3.82* 3.51*   HGB 10.6* 9.7*   HCT 35.5* 32.7*   MCV 92.9 93.2   MCH 27.7 27.6   MCHC 29.9 29.7   RDW 18.4* 18.3*    271   MPV 10.7 10.6      BMP:   Recent Labs     05/09/19  1510 05/10/19  0450    140   K 3.7 4.0   CL 95* 95*   CO2 31 30   BUN 10 19   CREATININE 2.87* 4.08*   GLUCOSE 94 107*   CALCIUM 9.0 8.6      Phosphorus:   No results for input(s): PHOS in the last 72 hours. Magnesium:  No results for input(s): MG in the last 72 hours. Albumin:  No results for input(s): LABALBU in the last 72 hours. BNP:    No results found for: BNP  PTH:   No results found for: IPTH  Blood cultures:    Lab Results   Component Value Date    BC No growth after 5 days of incubation. 09/03/2018       Urinalysis/Chemistries:      Lab Results   Component Value Date    NITRU NEGATIVE 03/20/2017    COLORU YELLOW 03/20/2017    PHUR 8.5 03/20/2017    WBCUA None 03/20/2017    RBCUA None 03/20/2017    MUCUS NOT REPORTED 03/20/2017    TRICHOMONAS NOT REPORTED 03/20/2017    YEAST NOT REPORTED 03/20/2017    BACTERIA NOT REPORTED 03/20/2017    SPECGRAV 1.015 03/20/2017    LEUKOCYTESUR NEGATIVE 03/20/2017    UROBILINOGEN Normal 03/20/2017    BILIRUBINUR NEGATIVE 03/20/2017    GLUCOSEU NEGATIVE 03/20/2017    KETUA NEGATIVE 03/20/2017    AMORPHOUS NOT REPORTED 03/20/2017       Radiology:     CXR:     Assessment:     1. ESRD on Hemodialysis. His regular HD days are Tuesday Thursday Saturday at [facility] facility using left AV graft under [place]. His dry weight is 72 kg. Admitted with 73 kg  2. Anemia of chronic disease  3. Secondary hyperparathyroidism  4. Hypertension  5.   Left upper lung mass high suspicion for malignancy has been

## 2019-05-10 NOTE — PLAN OF CARE
Pt medicated with pain medication prn. Assessed all pain characteristics including level, type, location, frequency, and onset. Non-pharmacologic interventions offered to pt as well. Pt states pain is tolerable at this time.  Will continue to monitor

## 2019-05-10 NOTE — FLOWSHEET NOTE
Unable to locate pt PET scan.  [facility], [address] none of those of any record of a PET scan being done,

## 2019-05-10 NOTE — FLOWSHEET NOTE
Called pt primary care physician regarding the PET scan. It was ordered June of 2018, pt never completed it.

## 2019-05-10 NOTE — PROGRESS NOTES
[facility]    Progress Note    5/10/2019    11:17 AM    Name:   Vladimir Nguyen  MRN:     [id number]     [place]:      [de-identified]   Room:   2011/2011-02  IP Day:  1  Admit Date:  5/9/2019  1:12 PM    PCP:   Ahmet Justin MD  Code Status:  Full Code    Subjective:     C/C:   Chief Complaint   Patient presents with    Back Pain     Interval History Status: not changed. Still having some upper left back pain  Denies cp/sob/n/v    Brief History:     The patient is a 72 y.o.  AA female who presents with Back Pain   and she is admitted to the hospital for the management of  PE and pain. She has had left upper back pain near shoulder for past 3 weeks. VQ done in ER showed possible PE. She has also had chills and occ n/v but denies cp/sob/cough/f/s/s.     She has been placed on heparin drip-when I mentioned this the patient then tells me she hasn't gotten heparin on dialysis for over a year. Her daughter then explained she had a hgb of 3-4 range repeatedly requiring transfusions every few months in 2017 and despite full GI workup, could never find source.        She has known lung mass and previously refused biopsy        Review of Systems:     Constitutional:  negative for chills, fevers, sweats  Respiratory:  negative for cough, dyspnea on exertion, shortness of breath, wheezing  Cardiovascular:  negative for chest pain, chest pressure/discomfort, lower extremity edema, palpitations  Gastrointestinal:  negative for abdominal pain, constipation, diarrhea, nausea, vomiting  Neurological:  negative for dizziness, headache    Medications: Allergies:     Allergies   Allergen Reactions    Pcn [Penicillins] Hives and Itching    Sulfa Antibiotics      difficulty moving???       Current Meds:   Scheduled Meds:    nicotine  1 patch Transdermal Daily    cinacalcet  120 mg Oral Daily    losartan  50 mg Oral BID    pantoprazole  40 mg Oral QAM AC    atorvastatin  20 mg Oral Daily    edwina-anya  1 tablet Oral Daily    cloNIDine  0.2 mg Oral BID    chlorhexidine   Topical Daily    hydrALAZINE  100 mg Oral TID    tiotropium  18 mcg Inhalation Daily    sodium bicarbonate  1,300 mg Oral TID    sodium chloride flush  10 mL Intravenous 2 times per day     Continuous Infusions:    heparin (porcine) 14 Units/kg/hr (05/10/19 0745)     PRN Meds: heparin (porcine), heparin (porcine), lactulose, sodium chloride flush, magnesium hydroxide, ondansetron **OR** ondansetron, oxyCODONE-acetaminophen, oxyCODONE-acetaminophen    Data:     Past Medical History:   has a past medical history of Anemia, Asthma, Atrial fibrillation ([facility] Breast cancer ([facility] Bursitis, Carpal tunnel syndrome, Chronic obstructive pulmonary disease (COPD) ([facility] Disease of blood and blood forming organ, History of blood transfusion, Hypertension, Irregular heart beat, Lung nodule, Osteoarthritis, Pneumonia, Pulmonary emphysema ([facility] Renal failure, Stenosis of left carotid artery, and Tobacco abuse. Social History:   reports that she has been smoking cigarettes. She has a 13.75 pack-year smoking history. She has never used smokeless tobacco. She reports that she does not drink alcohol or use drugs. Family History:   Family History   Problem Relation Age of Onset    Cancer Father     Diabetes Sister        Vitals:  BP (!) 145/59   Pulse 75   Temp 98.6 °F (37 °C) (Oral)   Resp 16   Ht 5' 6\" (1.676 m)   Wt 160 lb 15 oz (73 kg)   SpO2 97%   BMI 25.98 kg/m²   Temp (24hrs), Av.5 °F (36.9 °C), Min:98 °F (36.7 °C), Max:99 °F (37.2 °C)    No results for input(s): POCGLU in the last 72 hours. I/O (24Hr):   No intake or output data in the 24 hours ending 05/10/19 1117    Labs:    Hematology:  Recent Labs     19  1510 05/10/19  0450   WBC 5.9 5.7   RBC 3.82* 3.51*   HGB 10.6* 9.7*   HCT 35.5* 32.7*   MCV 92.9 93.2   MCH 27.7 27.6   MCHC 29.9 29.7   RDW 18.4* 18.3*    271 MPV 10.7 10.6   INR 1.0  --      Chemistry:  Recent Labs     05/09/19  1510 05/10/19  0450    140   K 3.7 4.0   CL 95* 95*   CO2 31 30   GLUCOSE 94 107*   BUN 10 19   CREATININE 2.87* 4.08*   ANIONGAP 14 15   LABGLOM 16* 11*   GFRAA 20* 13*   CALCIUM 9.0 8.6   PROBNP 9,403*  --      No results for input(s): PROT, LABALBU, LABA1C, F8HKVHZ, L7OFJUJ, FT4, TSH, AST, ALT, LDH, GGT, ALKPHOS, LABGGT, BILITOT, BILIDIR, AMMONIA, AMYLASE, LIPASE, LACTATE, CHOL, HDL, LDLCHOLESTEROL, CHOLHDLRATIO, TRIG, VLDL, XCG74XP, PHENYTOIN, PHENYF, URICACID, POCGLU in the last 72 hours.       Lab Results   Component Value Date/Time    SPECIAL NOT REPORTED 03/20/2017 10:05 PM     Lab Results   Component Value Date/Time    CULTURE NO SIGNIFICANT GROWTH 03/20/2017 10:05 PM    CULTURE  03/20/2017 10:05 PM     Performed at [facility] [id number] [place] [address] [phone]       Lab Results   Component Value Date    PHART 7.472 09/04/2018    RXA1ACE 35 09/04/2018    PO2ART 120 09/04/2018    RQV2PLD 25.9 09/04/2018    BEART 2 09/04/2018    ZMR1NAV 27 09/04/2018    F9JKRHRV 99 09/04/2018       Radiology:    Nothing new      Physical Examination:        General appearance:  alert, cooperative and no distress  Mental Status:  oriented to person, place and time and normal affect  Lungs:  clear to auscultation bilaterally, normal effort  Heart:  regular rate and rhythm, no murmur  Abdomen:  soft, nontender, nondistended, normal bowel sounds, no masses, hepatomegaly, splenomegaly  Extremities:  no edema, redness, tenderness in the calves  Skin:  no gross lesions, rashes, induration    Assessment:        Primary Problem  Pulmonary embolus [facility])    Active Hospital Problems    Diagnosis Date Noted    Pulmonary embolus ([facility] [I26.99] 05/09/2019    Metastatic cancer ([facility] [C79.9] 05/09/2019    Hypertension [I10] 03/02/2017    Paroxysmal atrial fibrillation (HCC) [I48.0] 02/18/2017    Anemia of chronic disease [D63.8] 10/07/2016

## 2019-05-11 ENCOUNTER — APPOINTMENT (OUTPATIENT)
Dept: NUCLEAR MEDICINE | Age: 74
DRG: 180 | End: 2019-05-11
Payer: COMMERCIAL

## 2019-05-11 ENCOUNTER — APPOINTMENT (OUTPATIENT)
Dept: CT IMAGING | Age: 74
DRG: 180 | End: 2019-05-11
Payer: COMMERCIAL

## 2019-05-11 PROBLEM — C34.92 PRIMARY MALIGNANT NEOPLASM OF LEFT LUNG METASTATIC TO OTHER SITE (HCC): Status: ACTIVE | Noted: 2019-05-09

## 2019-05-11 LAB — PARTIAL THROMBOPLASTIN TIME: 53.5 SEC (ref 23–31)

## 2019-05-11 PROCEDURE — 6360000002 HC RX W HCPCS: Performed by: INTERNAL MEDICINE

## 2019-05-11 PROCEDURE — 97165 OT EVAL LOW COMPLEX 30 MIN: CPT

## 2019-05-11 PROCEDURE — 6370000000 HC RX 637 (ALT 250 FOR IP): Performed by: NURSE PRACTITIONER

## 2019-05-11 PROCEDURE — 99239 HOSP IP/OBS DSCHRG MGMT >30: CPT | Performed by: INTERNAL MEDICINE

## 2019-05-11 PROCEDURE — 1200000000 HC SEMI PRIVATE

## 2019-05-11 PROCEDURE — 6370000000 HC RX 637 (ALT 250 FOR IP): Performed by: INTERNAL MEDICINE

## 2019-05-11 PROCEDURE — 97530 THERAPEUTIC ACTIVITIES: CPT

## 2019-05-11 PROCEDURE — 6360000004 HC RX CONTRAST MEDICATION: Performed by: INTERNAL MEDICINE

## 2019-05-11 PROCEDURE — 71260 CT THORAX DX C+: CPT

## 2019-05-11 PROCEDURE — A9503 TC99M MEDRONATE: HCPCS | Performed by: INTERNAL MEDICINE

## 2019-05-11 PROCEDURE — 94664 DEMO&/EVAL PT USE INHALER: CPT

## 2019-05-11 PROCEDURE — 2580000003 HC RX 258: Performed by: INTERNAL MEDICINE

## 2019-05-11 PROCEDURE — 97535 SELF CARE MNGMENT TRAINING: CPT

## 2019-05-11 PROCEDURE — 94760 N-INVAS EAR/PLS OXIMETRY 1: CPT

## 2019-05-11 PROCEDURE — 78306 BONE IMAGING WHOLE BODY: CPT

## 2019-05-11 PROCEDURE — 36415 COLL VENOUS BLD VENIPUNCTURE: CPT

## 2019-05-11 PROCEDURE — 90935 HEMODIALYSIS ONE EVALUATION: CPT

## 2019-05-11 PROCEDURE — 3430000000 HC RX DIAGNOSTIC RADIOPHARMACEUTICAL: Performed by: INTERNAL MEDICINE

## 2019-05-11 PROCEDURE — 85730 THROMBOPLASTIN TIME PARTIAL: CPT

## 2019-05-11 PROCEDURE — 94640 AIRWAY INHALATION TREATMENT: CPT

## 2019-05-11 PROCEDURE — 5A1D70Z PERFORMANCE OF URINARY FILTRATION, INTERMITTENT, LESS THAN 6 HOURS PER DAY: ICD-10-PCS | Performed by: SURGERY

## 2019-05-11 RX ORDER — HEPARIN SODIUM 5000 [USP'U]/ML
5000 INJECTION, SOLUTION INTRAVENOUS; SUBCUTANEOUS EVERY 8 HOURS SCHEDULED
Status: DISCONTINUED | OUTPATIENT
Start: 2019-05-11 | End: 2019-05-17 | Stop reason: HOSPADM

## 2019-05-11 RX ORDER — 0.9 % SODIUM CHLORIDE 0.9 %
80 INTRAVENOUS SOLUTION INTRAVENOUS ONCE
Status: COMPLETED | OUTPATIENT
Start: 2019-05-11 | End: 2019-05-11

## 2019-05-11 RX ORDER — TC 99M MEDRONATE 20 MG/10ML
25 INJECTION, POWDER, LYOPHILIZED, FOR SOLUTION INTRAVENOUS
Status: COMPLETED | OUTPATIENT
Start: 2019-05-11 | End: 2019-05-11

## 2019-05-11 RX ORDER — OXYCODONE HYDROCHLORIDE AND ACETAMINOPHEN 5; 325 MG/1; MG/1
1 TABLET ORAL EVERY 4 HOURS PRN
Qty: 15 TABLET | Refills: 0 | Status: SHIPPED | OUTPATIENT
Start: 2019-05-11 | End: 2019-05-14

## 2019-05-11 RX ORDER — SODIUM CHLORIDE 0.9 % (FLUSH) 0.9 %
10 SYRINGE (ML) INJECTION PRN
Status: DISCONTINUED | OUTPATIENT
Start: 2019-05-11 | End: 2019-05-17 | Stop reason: HOSPADM

## 2019-05-11 RX ADMIN — LOSARTAN POTASSIUM 50 MG: 50 TABLET, FILM COATED ORAL at 09:40

## 2019-05-11 RX ADMIN — HYDRALAZINE HYDROCHLORIDE 100 MG: 50 TABLET, FILM COATED ORAL at 23:45

## 2019-05-11 RX ADMIN — Medication 10 ML: at 20:25

## 2019-05-11 RX ADMIN — OXYCODONE AND ACETAMINOPHEN 2 TABLET: 5; 325 TABLET ORAL at 10:13

## 2019-05-11 RX ADMIN — OXYCODONE AND ACETAMINOPHEN 2 TABLET: 5; 325 TABLET ORAL at 14:59

## 2019-05-11 RX ADMIN — OXYCODONE AND ACETAMINOPHEN 2 TABLET: 5; 325 TABLET ORAL at 20:24

## 2019-05-11 RX ADMIN — CINACALCET HYDROCHLORIDE 120 MG: 30 TABLET, COATED ORAL at 09:40

## 2019-05-11 RX ADMIN — CLONIDINE HYDROCHLORIDE 0.2 MG: 0.2 TABLET ORAL at 09:40

## 2019-05-11 RX ADMIN — CLONIDINE HYDROCHLORIDE 0.2 MG: 0.2 TABLET ORAL at 23:46

## 2019-05-11 RX ADMIN — HEPARIN SODIUM 5000 UNITS: 5000 INJECTION INTRAVENOUS; SUBCUTANEOUS at 23:46

## 2019-05-11 RX ADMIN — SODIUM CHLORIDE 80 ML: 9 INJECTION, SOLUTION INTRAVENOUS at 01:42

## 2019-05-11 RX ADMIN — HEPARIN SODIUM 5000 UNITS: 5000 INJECTION INTRAVENOUS; SUBCUTANEOUS at 14:54

## 2019-05-11 RX ADMIN — Medication 25.4 MILLICURIE: at 09:45

## 2019-05-11 RX ADMIN — Medication 1 TABLET: at 09:40

## 2019-05-11 RX ADMIN — OXYCODONE AND ACETAMINOPHEN 2 TABLET: 5; 325 TABLET ORAL at 05:09

## 2019-05-11 RX ADMIN — Medication 10 ML: at 01:42

## 2019-05-11 RX ADMIN — HYDRALAZINE HYDROCHLORIDE 100 MG: 50 TABLET, FILM COATED ORAL at 14:55

## 2019-05-11 RX ADMIN — OXYCODONE AND ACETAMINOPHEN 2 TABLET: 5; 325 TABLET ORAL at 00:45

## 2019-05-11 RX ADMIN — HYDRALAZINE HYDROCHLORIDE 100 MG: 50 TABLET, FILM COATED ORAL at 09:39

## 2019-05-11 RX ADMIN — PANTOPRAZOLE SODIUM 40 MG: 40 TABLET, DELAYED RELEASE ORAL at 05:08

## 2019-05-11 RX ADMIN — TIOTROPIUM BROMIDE 18 MCG: 18 CAPSULE ORAL; RESPIRATORY (INHALATION) at 09:48

## 2019-05-11 RX ADMIN — IOPAMIDOL 75 ML: 755 INJECTION, SOLUTION INTRAVENOUS at 01:42

## 2019-05-11 RX ADMIN — ATORVASTATIN CALCIUM 20 MG: 20 TABLET, FILM COATED ORAL at 23:44

## 2019-05-11 RX ADMIN — HEPARIN SODIUM 2920 UNITS: 1000 INJECTION INTRAVENOUS; SUBCUTANEOUS at 05:08

## 2019-05-11 RX ADMIN — LOSARTAN POTASSIUM 50 MG: 50 TABLET, FILM COATED ORAL at 23:46

## 2019-05-11 ASSESSMENT — PAIN SCALES - GENERAL
PAINLEVEL_OUTOF10: 7
PAINLEVEL_OUTOF10: 0
PAINLEVEL_OUTOF10: 7
PAINLEVEL_OUTOF10: 8
PAINLEVEL_OUTOF10: 7
PAINLEVEL_OUTOF10: 4
PAINLEVEL_OUTOF10: 8
PAINLEVEL_OUTOF10: 0
PAINLEVEL_OUTOF10: 7
PAINLEVEL_OUTOF10: 0
PAINLEVEL_OUTOF10: 7

## 2019-05-11 ASSESSMENT — PAIN DESCRIPTION - ONSET
ONSET: ON-GOING

## 2019-05-11 ASSESSMENT — PAIN DESCRIPTION - LOCATION
LOCATION: BACK

## 2019-05-11 ASSESSMENT — PAIN DESCRIPTION - DESCRIPTORS
DESCRIPTORS: ACHING

## 2019-05-11 ASSESSMENT — PAIN DESCRIPTION - ORIENTATION
ORIENTATION: LEFT;UPPER
ORIENTATION: LEFT
ORIENTATION: LEFT;UPPER

## 2019-05-11 ASSESSMENT — PAIN DESCRIPTION - PAIN TYPE
TYPE: ACUTE PAIN

## 2019-05-11 ASSESSMENT — PAIN DESCRIPTION - FREQUENCY
FREQUENCY: INTERMITTENT

## 2019-05-11 NOTE — PROGRESS NOTES
Ultrasound guided IV started. CT informed and tech will be to patient's room to  patient and take her to radiology for CTA.

## 2019-05-11 NOTE — PROGRESS NOTES
services?: Yes  Family / Caregiver Present: No  Referring Practitioner: Dr Gibson request KATHY tomlin prior to D/C per nursing  Pain Assessment  Pain Assessment: 0-10  Pain Level: 7  Pain Type: Acute pain  Pain Location: Back  Response to Pain Intervention: Patient Satisfied  Social/Functional History  Social/Functional History  Lives With: Alone  Type of Home: Apartment([facility] [address])  Home Layout: One level  Home Access: Level entry  Bathroom Shower/Tub: Walk-in shower  Bathroom Toilet: Standard  Bathroom Equipment: Grab bars in shower, Hand-held shower, Grab bars around toilet, Shower chair  Home Equipment: Cane(Uses for outside ambulation)  Receives Help From: Other (comment), Family, Neighbor(Dgter, son, neice, nephew, MOW 1X/wk, HHA 2X/wk 4 hrs)  ADL Assistance: Independent(SBA for showering 2 X/wk, I bathe sink side and dressing )  Homemaking Assistance: Independent(dgter brings meals as needed)  Homemaking Responsibilities: Yes(HHA completes some cleaning 2X/wk- pt or fa does remain)  Ambulation Assistance: Independent(with cane)  Transfer Assistance: Independent  Active : Yes(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 car)  Occupation: Retired  Leisure & Hobbies: volunteers  Additional Comments: Lorenza Moya neighbor present for eval states she can check on patient at night after work       Objective   Vision: Within Functional Limits  Hearing: Within functional limits    Orientation  Overall Orientation Status: Within Normal Limits  Observation/Palpation  Posture: Good  Balance  Sitting Balance: Independent  Standing Balance: Modified independent   Functional Mobility  Functional - Mobility Device: Cane  Activity: To/from bathroom; Other(functional mobility in the room)  Assist Level: Stand by assistance  Toilet Transfers  Toilet - Technique: Ambulating  Toilet Transfer: Independent  ADL  Feeding: Independent  Grooming: Independent  UE Bathing: Independent  LE Bathing: Modified independent (will need to sit and take breaks)  UE Dressing: Independent  LE Dressing: Modified independent (will need to sit and take breaks)  Toileting: Modified independent   Additional Comments: Pt takes time to complete ADL, requires breaks(Mod I  to bathe sinkside, SBA for showering)  Tone RUE  RUE Tone: Normotonic  Tone LUE  LUE Tone: Normotonic  Coordination  Movements Are Fluid And Coordinated: Yes  Functional Activity Tolerance  Functional Activity Tolerance: Tolerates 30 min exercise with multiple rests  Additional Comments: Pt reported she is very tired due to sleep interuptions in the hospital  Bed mobility  Rolling to Right: Independent  Supine to Sit: Independent  Sit to Supine: Independent  Scooting: Independent  Comment: With time  Transfers  Sit to stand: Independent  Stand to sit:  Independent  Vision - Basic Assessment  Prior Vision: No visual deficits     Perception  Overall Perceptual Status: WFL     Sensation  Overall Sensation Status: WNL        LUE AROM (degrees)  LUE AROM : Exceptions  L Shoulder Flexion 0-180: 100  Left Hand AROM (degrees)  Left Hand AROM: WNL  RUE AROM (degrees)  RUE AROM : Exceptions  R Shoulder Flexion 0-180: 100  Right Hand AROM (degrees)  Right Hand AROM: WNL  LUE Strength  Gross LUE Strength: WFL  L Hand Grasp: 4-/5  L Hand Release: 4-/5  LUE Strength Comment: 4-/5 mmt throughout  RUE Strength  Gross RUE Strength: WFL  R Hand Grasp: 4-/5  R Hand Release: 4-/5  RUE Strength Comment: 4-/5 mmt throughout     Hand Dominance  Hand Dominance: Right             Plan        G-Code     OutComes Score                                                  AM-PAC Score        AM-[facility] Inpatient Daily Activity Raw Score: 23  AM-PAC Inpatient ADL T-Scale Score : 51.12  ADL Inpatient CMS 0-100% Score: 15.86  ADL Inpatient CMS G-Code Modifier : CI    Goals          Therapy Time   Individual Concurrent Group Co-treatment   Time In  1630         Time Out  1727         Minutes  57           Additional 10 minutes for chart review and patient care coordination.        Maxine Crane, OT

## 2019-05-11 NOTE — PROGRESS NOTES
Call placed to on call dialysis RN at [phone] to see when patient will be taken for dialysis treatment. RN stated she was coming for patient and had to go see an ICU patient so she will see patient next.

## 2019-05-11 NOTE — PROGRESS NOTES
Patient resting in bed denies needs at this time. Will cont to monitor. Writer informed patient that after dialysis treatment  She will be discharged home per doctor's order.  Patient states \" if its too late im not going anywhere, ill wait until the morning\"

## 2019-05-11 NOTE — PROGRESS NOTES
[facility]    Progress Note    5/11/2019    10:26 AM    Name:   Aimee Durán  MRN:     [id number]     [place]:      [de-identified]   Room:   2011/2011-02  IP Day:  2  Admit Date:  5/9/2019  1:12 PM    PCP:   Jameson Vital MD  Code Status:  Full Code    Subjective:     C/C:   Chief Complaint   Patient presents with    Back Pain     Interval History Status: not changed. Still has upper left back pain and some in upper left chest also  Denies sob/n/v  cta chest did not show PE    Brief History:     The patient is a 73 y.o.  AA female who presents with Back Pain   and she is admitted to the hospital for the management of  PE and pain.  She has had left upper back pain near shoulder for past 3 weeks.  VQ done in ER showed possible PE. Mejia Iglesias has also had chills and occ n/v but denies cp/sob/cough/f/s/s.     She has been placed on heparin drip-when I mentioned this the patient then tells me she hasn't gotten heparin on dialysis for over a year. Keily Meza daughter then explained she had a hgb of 3-4 range repeatedly requiring transfusions every few months in 2017 and despite full GI workup, could never find source.        She has known lung mass and previously refused biopsy        Review of Systems:     Constitutional:  negative for chills, fevers, sweats  Respiratory:  negative for cough, dyspnea on exertion, shortness of breath, wheezing  Cardiovascular:  negative for lower extremity edema, palpitations  Gastrointestinal:  negative for abdominal pain, constipation, diarrhea, nausea, vomiting  Neurological:  negative for dizziness, headache    Medications: Allergies:     Allergies   Allergen Reactions    Pcn [Penicillins] Hives and Itching    Sulfa Antibiotics      difficulty moving???       Current Meds:   Scheduled Meds:    nicotine  1 patch Transdermal Daily    cinacalcet  120 mg Oral Daily    losartan  50 mg Oral BID    pantoprazole  40 mg Oral QAM AC    atorvastatin  20 mg Oral Daily    edwina-anya  1 tablet Oral Daily    cloNIDine  0.2 mg Oral BID    chlorhexidine   Topical Daily    hydrALAZINE  100 mg Oral TID    tiotropium  18 mcg Inhalation Daily    sodium chloride flush  10 mL Intravenous 2 times per day     Continuous Infusions:    heparin (porcine) 16 Units/kg/hr (19 4900)     PRN Meds: sodium chloride flush, heparin (porcine), heparin (porcine), lactulose, sodium chloride flush, magnesium hydroxide, ondansetron **OR** ondansetron, oxyCODONE-acetaminophen, oxyCODONE-acetaminophen    Data:     Past Medical History:   has a past medical history of Anemia, Asthma, Atrial fibrillation ([facility] Breast cancer ([facility] Bursitis, Carpal tunnel syndrome, Chronic obstructive pulmonary disease (COPD) ([facility] Disease of blood and blood forming organ, History of blood transfusion, Hypertension, Irregular heart beat, Lung nodule, Osteoarthritis, Pneumonia, Pulmonary emphysema ([facility] Renal failure, Stenosis of left carotid artery, and Tobacco abuse. Social History:   reports that she has been smoking cigarettes. She has a 13.75 pack-year smoking history. She has never used smokeless tobacco. She reports that she does not drink alcohol or use drugs. Family History:   Family History   Problem Relation Age of Onset    Cancer Father     Diabetes Sister        Vitals:  /68   Pulse 87   Temp 98.1 °F (36.7 °C) (Oral)   Resp 16   Ht 5' 6\" (1.676 m)   Wt 160 lb 15 oz (73 kg)   SpO2 95%   BMI 25.98 kg/m²   Temp (24hrs), Av.2 °F (36.8 °C), Min:98 °F (36.7 °C), Max:98.5 °F (36.9 °C)    No results for input(s): POCGLU in the last 72 hours. I/O (24Hr):     Intake/Output Summary (Last 24 hours) at 2019 1026  Last data filed at 2019 0629  Gross per 24 hour   Intake 1387 ml   Output --   Net 1387 ml       Labs:    Hematology:  Recent Labs     19  1510 05/10/19  0450   WBC 5.9 5.7   RBC 3.82* 3.51*   HGB 10.6* 9.7*   HCT 35.5* 32.7* MCV 92.9 93.2   MCH 27.7 27.6   MCHC 29.9 29.7   RDW 18.4* 18.3*    271   MPV 10.7 10.6   INR 1.0  --      Chemistry:  Recent Labs     05/09/19  1510 05/10/19  0450    140   K 3.7 4.0   CL 95* 95*   CO2 31 30   GLUCOSE 94 107*   BUN 10 19   CREATININE 2.87* 4.08*   ANIONGAP 14 15   LABGLOM 16* 11*   GFRAA 20* 13*   CALCIUM 9.0 8.6   PROBNP 9,403*  --      No results for input(s): PROT, LABALBU, LABA1C, L9EDRNO, R6OBQON, FT4, TSH, AST, ALT, LDH, GGT, ALKPHOS, LABGGT, BILITOT, BILIDIR, AMMONIA, AMYLASE, LIPASE, LACTATE, CHOL, HDL, LDLCHOLESTEROL, CHOLHDLRATIO, TRIG, VLDL, REK33BR, PHENYTOIN, PHENYF, URICACID, POCGLU in the last 72 hours. Lab Results   Component Value Date/Time    SPECIAL NOT REPORTED 03/20/2017 10:05 PM     Lab Results   Component Value Date/Time    CULTURE NO SIGNIFICANT GROWTH 03/20/2017 10:05 PM    CULTURE  03/20/2017 10:05 PM     Performed at [address], [address] [place] [address] [phone]       Lab Results   Component Value Date    PHART 7.472 09/04/2018    JNH5CNI 35 09/04/2018    PO2ART 120 09/04/2018    MYU5JFV 25.9 09/04/2018    BEART 2 09/04/2018    IKQ5BNK 27 09/04/2018    Q1BVEEVW 99 09/04/2018       Radiology:    cta chest:  Impression   No evidence of pulmonary embolism.       Neoplastic disease in left upper lobe as described and as detailed on recent   prior study with mediastinal lymphadenopathy and likely pleural involvement.       Bela disease encases the left upper lobe pulmonary artery.       T4 osseous metastatic disease with suggestion of extraosseous epidural spread   of disease.          Physical Examination:        General appearance:  alert, cooperative and no distress  Mental Status:  oriented to person, place and time and normal affect  Lungs:  clear to auscultation bilaterally, normal effort  Heart:  regular rate and rhythm, no murmur  Abdomen:  soft, nontender, nondistended, normal bowel sounds, no masses, hepatomegaly, splenomegaly  Extremities:  no edema, redness, tenderness in the calves  Skin:  no gross lesions, rashes, induration    Assessment:        Primary Problem  Pulmonary embolus [facility])    Active Hospital Problems    Diagnosis Date Noted    Pulmonary embolus ([facility] [I26.99] 05/09/2019    Metastatic cancer ([facility] [C79.9] 05/09/2019    Hypertension [I10] 03/02/2017    [facility] ([facility] [I48.0] 02/18/2017    Anemia of chronic disease [D63.8] 10/07/2016    Tobacco abuse [Z72.0] 08/07/2016    Malignant neoplasm of female breast ([facility] [C50.919]     End stage renal disease ([facility] [N18.6] 11/04/2011       Plan:        1. No PE-stop heparin and mobilize  2. I believe her symptoms are all cancer related  3. Would benefit from rad onc eval as outpatient  4. Pt/ot  5. Dialysis today  6. Bone scan today  7. Likely dc home later today  8.  D/w daughter    Carlos Enrique Martinez Blood, DO  5/11/2019  10:26 AM

## 2019-05-11 NOTE — CARE COORDINATION
Pt agreeable to referral to [facility] for SN, PT/OT and HHA. Referral called to [facility] and face sheet faxed to [phone]. Pt may be discharged tonight after dialysis. FERNIE initiated.

## 2019-05-11 NOTE — PROGRESS NOTES
Renal Consult Note    Patient :  Freedom Nazario; 72 y.o. MRN# [id number]  Location:  2011/2011-02  Attending:  Leti Gibson DO  Admit Date:  5/9/2019   Hospital Day: 2    Reason for Consult:     Asked by Dr Leti Gibson DO to see for CHANDLER/Elevated Creatinine. Interval History:  Lytes ok. Hgb 9.7. No new complaints. Denies nausea, SOB. HD Access:     previous  Left AV graft    HD Unit:     Central    Nephrologist:     Dr Rafal Hazel Weight:     72 Kg    Admission Weight:     73Kg    History of Present Illness:     Freedom Nazario; 72 y.o. female with past medical history as mentioned below presented to the hospital with the chief complaint of shoulder and back pain which started a day or so prior to presentation. No history of ESRD on hemodialysis Tuesday Thursday Saturday at the central dialysis unit via left AV graft. Last dialysis was yesterday. She also has been diagnosed with lung cancer based on imaging studies showing left upper lobe mass which has been expanding. She was supposed to get repeat imaging done in PET scan done as an outpatient which hasn't been accomplished yet. Patient has been declining most of the interventions in the past.  She also has known history of COPD. On presentation she had a VQ scan done which came back high probability for pulmonary bothers him. She's been placed on heparin. Additionally the lung mass was felt to be expanding. We'll scan this been ordered as well to see if she has any bony metastases causing muscular skeletal symptoms. Other than discomfort in her back and shoulder she denies any complaints. Oral intake is good. Denies any fever chills or weight loss. Past History/Allergies? Social History:     Past Medical History:   Diagnosis Date    Anemia     Asthma     Atrial fibrillation ([address] 2/18/2017    Breast cancer ([facility] 1988     has annual mammograms only, no heme/onc    Bursitis     bilateral    Carpal tunnel syndrome     [facility]     Disease of blood and blood forming organ     History of blood transfusion     Hypertension     Irregular heart beat     Lung nodule     Osteoarthritis     knee and back    Pneumonia     Pulmonary emphysema ([facility] 2017    Renal failure      end stage renal failure on dialysis    Stenosis of left carotid artery 2017    Tobacco abuse        Allergies   Allergen Reactions    Pcn [Penicillins] Hives and Itching    Sulfa Antibiotics      difficulty moving???       Social History     Socioeconomic History    Marital status:      Spouse name: Not on file    Number of children: Not on file    Years of education: Not on file    Highest education level: Not on file   Occupational History    Not on file   Social Needs    Financial resource strain: Not on file    Food insecurity:     Worry: Not on file     Inability: Not on file    Transportation needs:     Medical: Not on file     Non-medical: Not on file   Tobacco Use    Smoking status: Current Every Day Smoker     Packs/day: 0.25     Years: 55.00     Pack years: 13.75     Types: Cigarettes     Last attempt to quit: 2005     Years since quittin.3    Smokeless tobacco: Never Used   Substance and Sexual Activity    Alcohol use: No     Alcohol/week: 0.0 oz    Drug use: No    Sexual activity: Not on file   Lifestyle    Physical activity:     Days per week: Not on file     Minutes per session: Not on file    Stress: Not on file   Relationships    Social connections:     Talks on phone: Not on file     Gets together: Not on file     Attends [employer] service: Not on file     Active member of club or organization: Not on file     Attends meetings of clubs or organizations: Not on file     Relationship status: Not on file    Intimate partner violence:     Fear of current or ex partner: Not on file     Emotionally abused: Not on file     Physically abused: Not on file     Forced sexual activity: Not on file   Other Topics Concern    Not on file   Social History Narrative    Not on file       Family History:        Family History   Problem Relation Age of Onset    Cancer Father     Diabetes Sister        Outpatient Medications:     Medications Prior to Admission: atorvastatin (LIPITOR) 20 MG tablet, Take 20 mg by mouth daily  B Complex-C-Folic Acid (EDWINA-AYAN) TABS, Take 1 tablet by mouth daily  cloNIDine (CATAPRES) 0.2 MG tablet, Take 0.2 mg by mouth 2 times daily  lactulose (CONSTULOSE) 10 GM/15ML solution, Take 30 mLs by mouth daily as needed  chlorhexidine (HIBICLENS) 4 % external liquid, Apply topically daily As directed  hydrALAZINE (APRESOLINE) 100 MG tablet, Take 100 mg by mouth 3 times daily  Umeclidinium Bromide (INCRUSE ELLIPTA) 62.5 MCG/INH AEPB, Inhale 1 puff into the lungs daily  sodium bicarbonate 650 MG tablet, Take 1,300 mg by mouth 3 times daily  amLODIPine (NORVASC) 10 MG tablet, Take 10 mg by mouth daily  loratadine (CLARITIN) 10 MG tablet, Take 10 mg by mouth daily  metoprolol tartrate (LOPRESSOR) 25 MG tablet, Take 25 mg by mouth 2 times daily  ethyl chloride spray, Apply topically as needed (to arm prior to dialysis)  omeprazole (PRILOSEC) 20 MG delayed release capsule, TAKE 1 CAPSULE BY MOUTH DAILY  albuterol sulfate  (90 Base) MCG/ACT inhaler, Inhale 2 puffs into the lungs every 6 hours as needed for Wheezing or Shortness of Breath  losartan (COZAAR) 50 MG tablet, Take 1 tablet by mouth 2 times daily  SENSIPAR 60 MG tablet, Take 120 mg by mouth daily   Incontinence Supply Disposable (INCONTINENCE BRIEF MEDIUM) MISC, 1 each by Does not apply route 2 times daily as needed (incotinenace)    Current Medications:     Scheduled Meds:    nicotine  1 patch Transdermal Daily    cinacalcet  120 mg Oral Daily    losartan  50 mg Oral BID    pantoprazole  40 mg Oral QAM AC    atorvastatin  20 mg Oral Daily    edwina-ayan  1 tablet Oral Daily    cloNIDine  0.2 mg Oral BID    chlorhexidine   Topical Daily    hydrALAZINE  100 mg Oral TID    tiotropium  18 mcg Inhalation Daily    sodium chloride flush  10 mL Intravenous 2 times per day     Continuous Infusions:    heparin (porcine) 16 Units/kg/hr (19 2925)     PRN Meds:  sodium chloride flush, technetium medronate, heparin (porcine), heparin (porcine), lactulose, sodium chloride flush, magnesium hydroxide, ondansetron **OR** ondansetron, oxyCODONE-acetaminophen, oxyCODONE-acetaminophen    Review of Systems:     Constitutional: No fever, no chills, no lethargy, no weakness. HEENT:  No headache, otalgia, itchy eyes, nasal discharge or sore throat. Cardiac:  No chest pain, dyspnea, orthopnea or PND. Chest:              No cough, phlegm  Abdomen:  No abdominal pain, nausea  Neuro:  No focal weakness, abnormal movements orseizure like activity. Skin:   No rashes, no itching. :   No hematuria, no pyuria, no dysuria, no flank pain. Extremities:  +edema      Input/Output:     I/O last 3 completed shifts: In: 6729 [P.O.:942; I.V.:445]  Out: -     Patient Vitals for the past 96 hrs (Last 3 readings):   Weight   19 1231 160 lb 15 oz (73 kg)     Vital Signs:   Temperature:  Temp: 98.1 °F (36.7 °C)  TMax:   Temp (24hrs), Av.2 °F (36.8 °C), Min:98 °F (36.7 °C), Max:98.5 °F (36.9 °C)    Respirations:  Resp: 16  Pulse:   Pulse: 87  BP:    BP: 121/68  BP Range: Systolic (72MYM), ZMH:747 , Min:117 , STP:280       Diastolic (63QLI), NQI:13, Min:47, Max:68      Physical Examination:     General:  AAO x 3, speaking in full sentences, no accessory muscle use. HEENT: Atraumatic, normocephalic, no throat congestion, moist mucosa. Eyes:   Pupils equal, round and reactive to light, EOMI. Neck:   No JVD, no thyromegaly, no lymphadenopathy. Chest:               Bilateral vesicular breath sounds, no rales or wheezes. Cardiac:  S1 S2 RR, + murmurs, no gallops or rubs, JVP not raised.   Abdomen: Soft, non-tender, no masses or organomegaly, BS audible. :   No suprapubic or flank tenderness. Neuro:  AAO x 3, No FND. SKIN:  No rashes, good skin turgor. Extremities:  No edema, palpable peripheral pulses, no calf tenderness. Left upper extremity AV graft with thrill and bruit  Labs:       Recent Labs     05/09/19  1510 05/10/19  0450   WBC 5.9 5.7   RBC 3.82* 3.51*   HGB 10.6* 9.7*   HCT 35.5* 32.7*   MCV 92.9 93.2   MCH 27.7 27.6   MCHC 29.9 29.7   RDW 18.4* 18.3*    271   MPV 10.7 10.6      BMP:   Recent Labs     05/09/19  1510 05/10/19  0450    140   K 3.7 4.0   CL 95* 95*   CO2 31 30   BUN 10 19   CREATININE 2.87* 4.08*   GLUCOSE 94 107*   CALCIUM 9.0 8.6      Phosphorus:   No results for input(s): PHOS in the last 72 hours. Magnesium:  No results for input(s): MG in the last 72 hours. Albumin:  No results for input(s): LABALBU in the last 72 hours. BNP:    No results found for: BNP  PTH:   No results found for: IPTH  Blood cultures:    Lab Results   Component Value Date    BC No growth after 5 days of incubation. 09/03/2018       Urinalysis/Chemistries:      Lab Results   Component Value Date    NITRU NEGATIVE 03/20/2017    COLORU YELLOW 03/20/2017    PHUR 8.5 03/20/2017    WBCUA None 03/20/2017    RBCUA None 03/20/2017    MUCUS NOT REPORTED 03/20/2017    TRICHOMONAS NOT REPORTED 03/20/2017    YEAST NOT REPORTED 03/20/2017    BACTERIA NOT REPORTED 03/20/2017    SPECGRAV 1.015 03/20/2017    LEUKOCYTESUR NEGATIVE 03/20/2017    UROBILINOGEN Normal 03/20/2017    BILIRUBINUR NEGATIVE 03/20/2017    GLUCOSEU NEGATIVE 03/20/2017    KETUA NEGATIVE 03/20/2017    AMORPHOUS NOT REPORTED 03/20/2017       Radiology:     CXR:     Assessment:     1. ESRD on Hemodialysis. His regular HD days are Tuesday Thursday Saturday at [facility] facility using left AV graft under Dex[place]. His dry weight is 72 kg. Admitted with 73 kg  2. Anemia of chronic disease  3. Secondary hyperparathyroidism  4. Hypertension  5.   Left upper

## 2019-05-11 NOTE — PROGRESS NOTES
Patient informed that a new IV needed to be started in order for CTA to be done to check for PE. Patient at first refused IV start. Marian Ruelas RN at patient's bedride and further explaining to patient the importance of having test done as a PE can be potentially life threatening. Patient agreed to allow IV attempt but Marian Ruelas was unable to succuessfully get IV restarted and patient refused any further IV attempts without the use of an ultrasound.

## 2019-05-11 NOTE — PLAN OF CARE
Problem: Falls - Risk of:  Goal: Will remain free from falls  Description  Will remain free from falls  Outcome: Ongoing  Goal: Absence of physical injury  Description  Absence of physical injury  Outcome: Ongoing     Problem: Tobacco Use:  Goal: Inpatient tobacco use cessation counseling participation  Description  Inpatient tobacco use cessation counseling participation  Outcome: Ongoing     Problem: Pain:  Goal: Pain level will decrease  Description  Pain level will decrease  Outcome: Ongoing  Goal: Control of acute pain  Description  Control of acute pain  Outcome: Ongoing  Goal: Control of chronic pain  Description  Control of chronic pain  Outcome: Ongoing

## 2019-05-12 LAB
ANION GAP SERPL CALCULATED.3IONS-SCNC: 19 MMOL/L (ref 9–17)
BUN BLDV-MCNC: 38 MG/DL (ref 8–23)
BUN/CREAT BLD: 5 (ref 9–20)
CALCIUM SERPL-MCNC: 8.1 MG/DL (ref 8.6–10.4)
CHLORIDE BLD-SCNC: 92 MMOL/L (ref 98–107)
CO2: 24 MMOL/L (ref 20–31)
CREAT SERPL-MCNC: 7.03 MG/DL (ref 0.5–0.9)
GFR AFRICAN AMERICAN: 7 ML/MIN
GFR NON-AFRICAN AMERICAN: 6 ML/MIN
GFR SERPL CREATININE-BSD FRML MDRD: ABNORMAL ML/MIN/{1.73_M2}
GFR SERPL CREATININE-BSD FRML MDRD: ABNORMAL ML/MIN/{1.73_M2}
GLUCOSE BLD-MCNC: 88 MG/DL (ref 70–99)
HBV SURFACE AB TITR SER: 17.14 MIU/ML
HEPATITIS B SURFACE ANTIGEN: NONREACTIVE
POTASSIUM SERPL-SCNC: 4.4 MMOL/L (ref 3.7–5.3)
SODIUM BLD-SCNC: 135 MMOL/L (ref 135–144)

## 2019-05-12 PROCEDURE — 99232 SBSQ HOSP IP/OBS MODERATE 35: CPT | Performed by: INTERNAL MEDICINE

## 2019-05-12 PROCEDURE — 94760 N-INVAS EAR/PLS OXIMETRY 1: CPT

## 2019-05-12 PROCEDURE — 80048 BASIC METABOLIC PNL TOTAL CA: CPT

## 2019-05-12 PROCEDURE — 2580000003 HC RX 258: Performed by: INTERNAL MEDICINE

## 2019-05-12 PROCEDURE — 86317 IMMUNOASSAY INFECTIOUS AGENT: CPT

## 2019-05-12 PROCEDURE — 6360000002 HC RX W HCPCS: Performed by: INTERNAL MEDICINE

## 2019-05-12 PROCEDURE — 36415 COLL VENOUS BLD VENIPUNCTURE: CPT

## 2019-05-12 PROCEDURE — 87340 HEPATITIS B SURFACE AG IA: CPT

## 2019-05-12 PROCEDURE — 1200000000 HC SEMI PRIVATE

## 2019-05-12 PROCEDURE — 94640 AIRWAY INHALATION TREATMENT: CPT

## 2019-05-12 PROCEDURE — 6370000000 HC RX 637 (ALT 250 FOR IP): Performed by: INTERNAL MEDICINE

## 2019-05-12 PROCEDURE — 6370000000 HC RX 637 (ALT 250 FOR IP): Performed by: NURSE PRACTITIONER

## 2019-05-12 RX ADMIN — HEPARIN SODIUM 5000 UNITS: 5000 INJECTION INTRAVENOUS; SUBCUTANEOUS at 21:50

## 2019-05-12 RX ADMIN — HEPARIN SODIUM 5000 UNITS: 5000 INJECTION INTRAVENOUS; SUBCUTANEOUS at 14:28

## 2019-05-12 RX ADMIN — Medication 1 TABLET: at 09:20

## 2019-05-12 RX ADMIN — TIOTROPIUM BROMIDE 18 MCG: 18 CAPSULE ORAL; RESPIRATORY (INHALATION) at 09:11

## 2019-05-12 RX ADMIN — HYDRALAZINE HYDROCHLORIDE 100 MG: 50 TABLET, FILM COATED ORAL at 09:20

## 2019-05-12 RX ADMIN — LOSARTAN POTASSIUM 50 MG: 50 TABLET, FILM COATED ORAL at 21:50

## 2019-05-12 RX ADMIN — OXYCODONE AND ACETAMINOPHEN 2 TABLET: 5; 325 TABLET ORAL at 05:01

## 2019-05-12 RX ADMIN — Medication 10 ML: at 09:21

## 2019-05-12 RX ADMIN — CINACALCET HYDROCHLORIDE 120 MG: 30 TABLET, COATED ORAL at 09:20

## 2019-05-12 RX ADMIN — LOSARTAN POTASSIUM 50 MG: 50 TABLET, FILM COATED ORAL at 09:20

## 2019-05-12 RX ADMIN — OXYCODONE AND ACETAMINOPHEN 2 TABLET: 5; 325 TABLET ORAL at 21:50

## 2019-05-12 RX ADMIN — HYDRALAZINE HYDROCHLORIDE 100 MG: 50 TABLET, FILM COATED ORAL at 14:28

## 2019-05-12 RX ADMIN — OXYCODONE AND ACETAMINOPHEN 2 TABLET: 5; 325 TABLET ORAL at 00:54

## 2019-05-12 RX ADMIN — OXYCODONE AND ACETAMINOPHEN 2 TABLET: 5; 325 TABLET ORAL at 17:11

## 2019-05-12 RX ADMIN — CLONIDINE HYDROCHLORIDE 0.2 MG: 0.2 TABLET ORAL at 21:50

## 2019-05-12 RX ADMIN — HYDRALAZINE HYDROCHLORIDE 100 MG: 50 TABLET, FILM COATED ORAL at 21:50

## 2019-05-12 RX ADMIN — PANTOPRAZOLE SODIUM 40 MG: 40 TABLET, DELAYED RELEASE ORAL at 05:01

## 2019-05-12 RX ADMIN — CLONIDINE HYDROCHLORIDE 0.2 MG: 0.2 TABLET ORAL at 09:20

## 2019-05-12 RX ADMIN — HEPARIN SODIUM 5000 UNITS: 5000 INJECTION INTRAVENOUS; SUBCUTANEOUS at 05:01

## 2019-05-12 RX ADMIN — ATORVASTATIN CALCIUM 20 MG: 20 TABLET, FILM COATED ORAL at 21:50

## 2019-05-12 RX ADMIN — Medication 10 ML: at 21:52

## 2019-05-12 ASSESSMENT — PAIN DESCRIPTION - ORIENTATION
ORIENTATION: LEFT
ORIENTATION: LEFT
ORIENTATION: LEFT;LOWER

## 2019-05-12 ASSESSMENT — PAIN DESCRIPTION - DESCRIPTORS
DESCRIPTORS: ACHING

## 2019-05-12 ASSESSMENT — PAIN DESCRIPTION - FREQUENCY
FREQUENCY: INTERMITTENT
FREQUENCY: CONTINUOUS
FREQUENCY: CONTINUOUS

## 2019-05-12 ASSESSMENT — PAIN DESCRIPTION - ONSET
ONSET: ON-GOING

## 2019-05-12 ASSESSMENT — PAIN DESCRIPTION - PROGRESSION
CLINICAL_PROGRESSION: NOT CHANGED

## 2019-05-12 ASSESSMENT — PAIN SCALES - GENERAL
PAINLEVEL_OUTOF10: 7
PAINLEVEL_OUTOF10: 8
PAINLEVEL_OUTOF10: 8

## 2019-05-12 ASSESSMENT — PAIN DESCRIPTION - LOCATION
LOCATION: BACK

## 2019-05-12 ASSESSMENT — PAIN DESCRIPTION - PAIN TYPE
TYPE: ACUTE PAIN
TYPE: CHRONIC PAIN;ACUTE PAIN
TYPE: ACUTE PAIN

## 2019-05-12 ASSESSMENT — PAIN - FUNCTIONAL ASSESSMENT: PAIN_FUNCTIONAL_ASSESSMENT: PREVENTS OR INTERFERES SOME ACTIVE ACTIVITIES AND ADLS

## 2019-05-12 NOTE — PROGRESS NOTES
Call received from Dr. Juliet Oshea to discuss plan for procedure tomorrow. Dr. Juliet Oshea to take patient to surgery for placement of tunneled dialysis catheter. Plan for surgery at 1100.

## 2019-05-12 NOTE — PROGRESS NOTES
[facility]    Progress Note    5/12/2019    8:02 AM    Name:   Justyna Vásquez  MRN:     [id number]     Acct:      [de-identified]   Room:   2011/2011-02  IP Day:  3  Admit Date:  5/9/2019  1:12 PM    PCP:   Analilia Mack MD  Code Status:  Full Code    Subjective:     C/C:   Chief Complaint   Patient presents with    Back Pain     Interval History Status: improved. Pain a bit better today  Denies cp/sob/n/v    Brief History:     The patient is a 73 y.o.  AA female who presents with Back Pain   and she is admitted to the hospital for the management of  PE and pain.  She has had left upper back pain near shoulder for past 3 weeks.  VQ done in ER showed possible PE. Hannah More has also had chills and occ n/v but denies cp/sob/cough/f/s/s.     She has been placed on heparin drip-when I mentioned this the patient then tells me she hasn't gotten heparin on dialysis for over a year. Dianne Jaimes daughter then explained she had a hgb of 3-4 range repeatedly requiring transfusions every few months in 2017 and despite full GI workup, could never find source.        She has known lung mass and previously refused biopsy        Review of Systems:     Constitutional:  negative for chills, fevers, sweats  Respiratory:  negative for cough, dyspnea on exertion, shortness of breath, wheezing  Cardiovascular:  negative for chest pain, chest pressure/discomfort, lower extremity edema, palpitations  Gastrointestinal:  negative for abdominal pain, constipation, diarrhea, nausea, vomiting  Neurological:  negative for dizziness, headache    Medications: Allergies:     Allergies   Allergen Reactions    Pcn [Penicillins] Hives and Itching    Sulfa Antibiotics      difficulty moving???       Current Meds:   Scheduled Meds:    heparin (porcine)  5,000 Units Subcutaneous 3 times per day    nicotine  1 patch Transdermal Daily    cinacalcet  120 mg Oral Daily    losartan  50 mg Oral BID

## 2019-05-12 NOTE — PLAN OF CARE
[facility]    Second Visit Note  For more detailed information please refer to the progress note of the day      5/12/2019    6:36 PM    Name:   Dash Scanlon  MRN:     [id number]     [place]:      [de-identified]   Room:   2011/2011-02  IP Day:  3  Admit Date:  5/9/2019  1:12 PM    PCP:   Mena Butcher MD  Code Status:  Full Code        Pt vitals were reviewed   New labs were reviewed       Updated plan :     1.  For tunneled cath tomorrow for dialysis        [place] Cindi Blood, DO  5/12/2019  6:36 PM

## 2019-05-12 NOTE — PROGRESS NOTES
Treatment time: 210 mins   Net UF: 0    Pre weight: 73.7 kg  Post weight: 0  EDW: 74.0    Access used: left gragt upper arm   Access function: arterial fair, venous did not function well at all    Medications or blood products given: none    Regular outpatient schedule: TTS    Summary of response to treatment: pt was unable to have treatment, after 2 hours of trying, paged Dr Cesar Hall and he will see her in the morning and notify her vascular surgeon, Dr Robi Quiroz. Report given to pt's night nurse, Vidya Kang. VSS afebrile        * Intra-treatment documented Safety Checks include the followin) Access and face visible at all times. 2) All connections and blood lines are secure with no kinks. 3) NVL alarm engaged. 4) Hemosafe device applied (if applicable). 5) No collapse of Arterial or Venous blood chambers. 6) All blood lines / pump segments in the air detectors.

## 2019-05-12 NOTE — PROGRESS NOTES
Renal Progress Note    Patient :  Suyapa De La Fuente; 72 y.o. MRN# [id number]  Location:  2011/2011-02  Attending:  Shahbaz Gibson DO  Admit Date:  5/9/2019   Hospital Day: 3    Reason for Consult:     Asked by Dr Shahbaz Gibson DO to see for CHANDLER/Elevated Creatinine. Interval History:  Labs for today still pending  Unable to perform HD last night, nurse reported graft was not functional  Feels well today, denies dyspnea    HD Access:     previous  Left AV graft    HD Unit:     Central    Nephrologist:     Dr Kina Cantu Weight:     72 Kg    Admission Weight:     73Kg    History of Present Illness:     Suyapa De La Fuente; 72 y.o. female with past medical history as mentioned below presented to the hospital with the chief complaint of shoulder and back pain which started a day or so prior to presentation. No history of ESRD on hemodialysis Tuesday Thursday Saturday at the central dialysis unit via left AV graft. Last dialysis was yesterday. She also has been diagnosed with lung cancer based on imaging studies showing left upper lobe mass which has been expanding. She was supposed to get repeat imaging done in PET scan done as an outpatient which hasn't been accomplished yet. Patient has been declining most of the interventions in the past.  She also has known history of COPD. On presentation she had a VQ scan done which came back high probability for pulmonary bothers him. She's been placed on heparin. Additionally the lung mass was felt to be expanding. We'll scan this been ordered as well to see if she has any bony metastases causing muscular skeletal symptoms. Other than discomfort in her back and shoulder she denies any complaints. Oral intake is good. Denies any fever chills or weight loss. Past History/Allergies? Social History:     Past Medical History:   Diagnosis Date    Anemia     Asthma     Atrial fibrillation ([facility] 2/18/2017    Breast cancer ([facility] [address] 1988     has annual mammograms only, no heme/onc    Bursitis     bilateral    Carpal tunnel syndrome     Chronic obstructive pulmonary disease (COPD) (HCC)     Disease of blood and blood forming organ     History of blood transfusion     Hypertension     Irregular heart beat     Lung nodule     Osteoarthritis     knee and back    Pneumonia     Pulmonary emphysema (HCC) 2017    Renal failure      end stage renal failure on dialysis    Stenosis of left carotid artery 2017    Tobacco abuse        Allergies   Allergen Reactions    Pcn [Penicillins] Hives and Itching    Sulfa Antibiotics      difficulty moving???       Social History     Socioeconomic History    Marital status:      Spouse name: Not on file    Number of children: Not on file    Years of education: Not on file    Highest education level: Not on file   Occupational History    Not on file   Social Needs    Financial resource strain: Not on file    Food insecurity:     Worry: Not on file     Inability: Not on file    Transportation needs:     Medical: Not on file     Non-medical: Not on file   Tobacco Use    Smoking status: Current Every Day Smoker     Packs/day: 0.25     Years: 55.00     Pack years: 13.75     Types: Cigarettes     Last attempt to quit: 2005     Years since quittin.3    Smokeless tobacco: Never Used   Substance and Sexual Activity    Alcohol use: No     Alcohol/week: 0.0 oz    Drug use: No    Sexual activity: Not on file   Lifestyle    Physical activity:     Days per week: Not on file     Minutes per session: Not on file    Stress: Not on file   Relationships    Social connections:     Talks on phone: Not on file     Gets together: Not on file     Attends Zoroastrianism service: Not on file     Active member of club or organization: Not on file     Attends meetings of clubs or organizations: Not on file     Relationship status: Not on file    Intimate partner violence:     Fear of current or ex partner: Not on file Emotionally abused: Not on file     Physically abused: Not on file     Forced sexual activity: Not on file   Other Topics Concern    Not on file   Social History Narrative    Not on file       Family History:        Family History   Problem Relation Age of Onset    Cancer Father     Diabetes Sister        Outpatient Medications:     Medications Prior to Admission: atorvastatin (LIPITOR) 20 MG tablet, Take 20 mg by mouth daily  B Complex-C-Folic Acid (MADAI-AYAN) TABS, Take 1 tablet by mouth daily  cloNIDine (CATAPRES) 0.2 MG tablet, Take 0.2 mg by mouth 2 times daily  lactulose (CONSTULOSE) 10 GM/15ML solution, Take 30 mLs by mouth daily as needed  chlorhexidine (HIBICLENS) 4 % external liquid, Apply topically daily As directed  hydrALAZINE (APRESOLINE) 100 MG tablet, Take 100 mg by mouth 3 times daily  Umeclidinium Bromide (INCRUSE ELLIPTA) 62.5 MCG/INH AEPB, Inhale 1 puff into the lungs daily  sodium bicarbonate 650 MG tablet, Take 1,300 mg by mouth 3 times daily  amLODIPine (NORVASC) 10 MG tablet, Take 10 mg by mouth daily  loratadine (CLARITIN) 10 MG tablet, Take 10 mg by mouth daily  metoprolol tartrate (LOPRESSOR) 25 MG tablet, Take 25 mg by mouth 2 times daily  ethyl chloride spray, Apply topically as needed (to arm prior to dialysis)  omeprazole (PRILOSEC) 20 MG delayed release capsule, TAKE 1 CAPSULE BY MOUTH DAILY  albuterol sulfate  (90 Base) MCG/ACT inhaler, Inhale 2 puffs into the lungs every 6 hours as needed for Wheezing or Shortness of Breath  losartan (COZAAR) 50 MG tablet, Take 1 tablet by mouth 2 times daily  SENSIPAR 60 MG tablet, Take 120 mg by mouth daily   Incontinence Supply Disposable (INCONTINENCE BRIEF MEDIUM) MISC, 1 each by Does not apply route 2 times daily as needed (incotinenace)    Current Medications:     Scheduled Meds:    heparin (porcine)  5,000 Units Subcutaneous 3 times per day    nicotine  1 patch Transdermal Daily    cinacalcet  120 mg Oral Daily    losartan  50 mg Oral BID    pantoprazole  40 mg Oral QAM AC    atorvastatin  20 mg Oral Daily    edwina-anya  1 tablet Oral Daily    cloNIDine  0.2 mg Oral BID    chlorhexidine   Topical Daily    hydrALAZINE  100 mg Oral TID    tiotropium  18 mcg Inhalation Daily    sodium chloride flush  10 mL Intravenous 2 times per day     Continuous Infusions:     PRN Meds:  sodium chloride flush, lactulose, sodium chloride flush, magnesium hydroxide, ondansetron **OR** ondansetron, oxyCODONE-acetaminophen, oxyCODONE-acetaminophen    Review of Systems:     Constitutional: No fever, no chills, no lethargy, no weakness. HEENT:  No headache, otalgia, itchy eyes, nasal discharge or sore throat. Cardiac:  No chest pain, dyspnea, orthopnea or PND. Chest:              No cough, phlegm  Abdomen:  No abdominal pain, nausea  Neuro:  No focal weakness, abnormal movements orseizure like activity. Skin:   No rashes, no itching. :   No hematuria, no pyuria, no dysuria, no flank pain. Extremities:  +edema      Input/Output:     I/O last 3 completed shifts: In: 720 [P.O.:720]  Out: -     Patient Vitals for the past 96 hrs (Last 3 readings):   Weight   19 2045 160 lb (72.6 kg)   19 1231 160 lb 15 oz (73 kg)     Vital Signs:   Temperature:  Temp: 98.2 °F (36.8 °C)  TMax:   Temp (24hrs), Av.2 °F (36.8 °C), Min:97.9 °F (36.6 °C), Max:98.6 °F (37 °C)    Respirations:  Resp: 16  Pulse:   Pulse: 103  BP:    BP: (!) 143/64  BP Range: Systolic (71YLV), JVK:882 , Min:132 , AXC:224       Diastolic (11BMG), GDL:33, Min:48, Max:64      Physical Examination:     General:  AAO x 3, speaking in full sentences, no accessory muscle use. HEENT: Atraumatic, normocephalic, no throat congestion, moist mucosa. Eyes:   Pupils equal, round and reactive to light, EOMI. Neck:   No JVD, no thyromegaly, no lymphadenopathy. Chest:               Bilateral vesicular breath sounds, no rales or wheezes.   Cardiac:  S1 S2 RR, + murmurs, no gallops or rubs, JVP not raised. Abdomen: Soft, non-tender, no masses or organomegaly, BS audible. :   No suprapubic or flank tenderness. Neuro:  AAO x 3, No FND. SKIN:  No rashes, good skin turgor. Extremities:  No edema, palpable peripheral pulses, no calf tenderness. Left upper extremity AV graft with thrill and bruit  Labs:       Recent Labs     05/09/19  1510 05/10/19  0450   WBC 5.9 5.7   RBC 3.82* 3.51*   HGB 10.6* 9.7*   HCT 35.5* 32.7*   MCV 92.9 93.2   MCH 27.7 27.6   MCHC 29.9 29.7   RDW 18.4* 18.3*    271   MPV 10.7 10.6      BMP:   Recent Labs     05/09/19  1510 05/10/19  0450    140   K 3.7 4.0   CL 95* 95*   CO2 31 30   BUN 10 19   CREATININE 2.87* 4.08*   GLUCOSE 94 107*   CALCIUM 9.0 8.6      Phosphorus:   No results for input(s): PHOS in the last 72 hours. Magnesium:  No results for input(s): MG in the last 72 hours. Albumin:  No results for input(s): LABALBU in the last 72 hours. BNP:    No results found for: BNP  PTH:   No results found for: IPTH  Blood cultures:    Lab Results   Component Value Date    BC No growth after 5 days of incubation. 09/03/2018       Urinalysis/Chemistries:      Lab Results   Component Value Date    NITRU NEGATIVE 03/20/2017    COLORU YELLOW 03/20/2017    PHUR 8.5 03/20/2017    WBCUA None 03/20/2017    RBCUA None 03/20/2017    MUCUS NOT REPORTED 03/20/2017    TRICHOMONAS NOT REPORTED 03/20/2017    YEAST NOT REPORTED 03/20/2017    BACTERIA NOT REPORTED 03/20/2017    SPECGRAV 1.015 03/20/2017    LEUKOCYTESUR NEGATIVE 03/20/2017    UROBILINOGEN Normal 03/20/2017    BILIRUBINUR NEGATIVE 03/20/2017    GLUCOSEU NEGATIVE 03/20/2017    KETUA NEGATIVE 03/20/2017    AMORPHOUS NOT REPORTED 03/20/2017       Radiology:     CXR:     Assessment:     1. ESRD on Hemodialysis. His regular HD days are Tuesday Thursday Saturday at [address] [place] [address] Hemodialysis facility using left AV graft under Dexter. His dry weight is 72 kg. Admitted with 73 kg  2. Anemia of chronic disease  3.  Secondary hyperparathyroidism  4. Hypertension  5. Left upper lung mass high suspicion for malignancy has been refusing intervention  6. Pulmonary embolism on VQ scan on heparin  7. History of GI bleed with anticoagulation in the past    Plan:   1. F/U labs today  2. BP stable. 3. Low Potassium, Low phosphorus and low salt diet. Fluids restricted to 1500ml/day. 4. IV Aranesp/Epogen for anemia of chronic disease with HD weekly. 5. IV Zemplar per protocol for secondary hyperparathyroidism with HD thrice a week. 6. Vascular surgery consulted for non-functional AVG, f/u on met panel results to see if she needs a treatment today     Please do not hesitate to contact us for any further questions/concerns. We will continue to follow along with you.

## 2019-05-12 NOTE — PROGRESS NOTES
Patient returned to room #2011 at around 2250. Patient's dialysis treatment was unsuccessful because of access port being clogged. [employer] NP on call notified to get patient's discharge order canceled at [phone]. Dr Madison Jenkins was notified by the dialysis nurse and will see the patient in the morning.

## 2019-05-12 NOTE — PLAN OF CARE
Problem: Falls - Risk of:  Goal: Will remain free from falls  Description  Will remain free from falls  Outcome: Ongoing  Goal: Absence of physical injury  Description  Absence of physical injury  Outcome: Ongoing     Problem: Tobacco Use:  Goal: Inpatient tobacco use cessation counseling participation  Description  Inpatient tobacco use cessation counseling participation  Outcome: Ongoing     Problem: Pain:  Description  Pain management should include both nonpharmacologic and pharmacologic interventions. Goal: Pain level will decrease  Description  Pain level will decrease  5/12/2019 0524 by Pravin Klein RN  Outcome: Ongoing  Note:   Patient continues to report pain at 7/10 when awake throughout the shift. PRN pain medication given per patient request  Patient instructed to call out with new onset of pain or unrelieved pain    5/11/2019 2046 by Pravin Klein RN  Outcome: Ongoing  Note:   Pain level assessment complete. Patient educated on pain scale and control interventions  Patient rated pain at 7/10.  PRN pain medication given per patient request  Patient instructed to call out with new onset of pain or unrelieved pain    Goal: Control of acute pain  Description  Control of acute pain  5/12/2019 0524 by Pravin Klein RN  Outcome: Ongoing  5/11/2019 2046 by Pravin Klein RN  Outcome: Ongoing  Goal: Control of chronic pain  Description  Control of chronic pain  5/12/2019 0524 by Pravin Klein RN  Outcome: Ongoing  5/11/2019 2046 by Pravin Klein RN  Outcome: Ongoing

## 2019-05-12 NOTE — FLOWSHEET NOTE
05/12/19 1005   Provider Notification   Reason for Communication Critical Value (comment)  (Creatinine of 7.03)   Provider Name Dr. Tracey Estimable   Provider Notification Physician   Method of Communication Secure Message   Response Waiting for response   Notification Time 200     Dr. Tracey Estimable in to see patient today. Labs still pending at that time. BMP info sent and notified of critical level. Dr. Tracey Estimable indicated he would follow to decide if patient needs dialysis today. Waiting for response.   Electronically signed by Jess Cabrera RN on 5/12/2019 at 10:08 AM

## 2019-05-12 NOTE — PLAN OF CARE
Problem: Falls - Risk of:  Goal: Will remain free from falls  Description  Will remain free from falls  Outcome: Ongoing  Goal: Absence of physical injury  Description  Absence of physical injury  Outcome: Ongoing     Problem: Tobacco Use:  Goal: Inpatient tobacco use cessation counseling participation  Description  Inpatient tobacco use cessation counseling participation  Outcome: Ongoing     Problem: Pain:  Description  Pain management should include both nonpharmacologic and pharmacologic interventions. Goal: Pain level will decrease  Description  Pain level will decrease  Outcome: Ongoing  Note:   Pain level assessment complete. Patient educated on pain scale and control interventions  Patient rated pain at 7/10.  PRN pain medication given per patient request  Patient instructed to call out with new onset of pain or unrelieved pain    Goal: Control of acute pain  Description  Control of acute pain  5/11/2019 2046 by Cora Trammell RN  Outcome: Ongoing  5/11/2019 1152 by Amos Zavaleta RN  Outcome: Ongoing  Goal: Control of chronic pain  Description  Control of chronic pain  Outcome: Ongoing

## 2019-05-12 NOTE — PROGRESS NOTES
Physical Therapy  DATE: 2019    NAME: Satya Mello  MRN: [id number]   : 1945    Patient not seen this date for Physical Therapy due to:  [] Blood transfusion in progress  [] Cancel by RN  [] Hemodialysis  [x]  Refusal by Patient \"no one is letting me rest around here! \"  Refused PT despite MAX encouragement  [] Spine Precautions   [] Strict Bedrest  [] Surgery  [] Testing      [] Other        [] PT being discontinued at this time. Patient independent. No further needs. [] PT being discontinued at this time as the patient has been transferred to hospice care. No further needs.     Shaheed Palma, PT

## 2019-05-13 ENCOUNTER — ANESTHESIA EVENT (OUTPATIENT)
Dept: OPERATING ROOM | Age: 74
DRG: 180 | End: 2019-05-13
Payer: COMMERCIAL

## 2019-05-13 ENCOUNTER — APPOINTMENT (OUTPATIENT)
Dept: GENERAL RADIOLOGY | Age: 74
DRG: 180 | End: 2019-05-13
Payer: COMMERCIAL

## 2019-05-13 ENCOUNTER — ANESTHESIA (OUTPATIENT)
Dept: OPERATING ROOM | Age: 74
DRG: 180 | End: 2019-05-13
Payer: COMMERCIAL

## 2019-05-13 VITALS — TEMPERATURE: 98.4 F | SYSTOLIC BLOOD PRESSURE: 108 MMHG | DIASTOLIC BLOOD PRESSURE: 53 MMHG | OXYGEN SATURATION: 100 %

## 2019-05-13 LAB
ANION GAP SERPL CALCULATED.3IONS-SCNC: 18 MMOL/L (ref 9–17)
BUN BLDV-MCNC: 47 MG/DL (ref 8–23)
BUN/CREAT BLD: 5 (ref 9–20)
CALCIUM SERPL-MCNC: 8.1 MG/DL (ref 8.6–10.4)
CHLORIDE BLD-SCNC: 95 MMOL/L (ref 98–107)
CO2: 24 MMOL/L (ref 20–31)
CREAT SERPL-MCNC: 8.74 MG/DL (ref 0.5–0.9)
GFR AFRICAN AMERICAN: 5 ML/MIN
GFR NON-AFRICAN AMERICAN: 4 ML/MIN
GFR SERPL CREATININE-BSD FRML MDRD: ABNORMAL ML/MIN/{1.73_M2}
GFR SERPL CREATININE-BSD FRML MDRD: ABNORMAL ML/MIN/{1.73_M2}
GLUCOSE BLD-MCNC: 88 MG/DL (ref 70–99)
HCT VFR BLD CALC: 28.5 % (ref 36.3–47.1)
HEMOGLOBIN: 8.8 G/DL (ref 11.9–15.1)
MCH RBC QN AUTO: 28.6 PG (ref 25.2–33.5)
MCHC RBC AUTO-ENTMCNC: 30.9 G/DL (ref 28.4–34.8)
MCV RBC AUTO: 92.5 FL (ref 82.6–102.9)
NRBC AUTOMATED: ABNORMAL PER 100 WBC
PDW BLD-RTO: 18 % (ref 11.8–14.4)
PHOSPHORUS: 6.3 MG/DL (ref 2.6–4.5)
PLATELET # BLD: 261 K/UL (ref 138–453)
PMV BLD AUTO: 11.5 FL (ref 8.1–13.5)
POTASSIUM SERPL-SCNC: 4.8 MMOL/L (ref 3.7–5.3)
RBC # BLD: 3.08 M/UL (ref 3.95–5.11)
SODIUM BLD-SCNC: 137 MMOL/L (ref 135–144)
WBC # BLD: 4.9 K/UL (ref 3.5–11.3)

## 2019-05-13 PROCEDURE — 6370000000 HC RX 637 (ALT 250 FOR IP): Performed by: SURGERY

## 2019-05-13 PROCEDURE — 7100000000 HC PACU RECOVERY - FIRST 15 MIN: Performed by: SURGERY

## 2019-05-13 PROCEDURE — 2500000003 HC RX 250 WO HCPCS: Performed by: SURGERY

## 2019-05-13 PROCEDURE — 5A1D70Z PERFORMANCE OF URINARY FILTRATION, INTERMITTENT, LESS THAN 6 HOURS PER DAY: ICD-10-PCS | Performed by: SURGERY

## 2019-05-13 PROCEDURE — 80048 BASIC METABOLIC PNL TOTAL CA: CPT

## 2019-05-13 PROCEDURE — 6360000002 HC RX W HCPCS: Performed by: SURGERY

## 2019-05-13 PROCEDURE — 2500000003 HC RX 250 WO HCPCS: Performed by: INTERNAL MEDICINE

## 2019-05-13 PROCEDURE — 84100 ASSAY OF PHOSPHORUS: CPT

## 2019-05-13 PROCEDURE — 7100000001 HC PACU RECOVERY - ADDTL 15 MIN: Performed by: SURGERY

## 2019-05-13 PROCEDURE — 0JHN3XZ INSERTION OF TUNNELED VASCULAR ACCESS DEVICE INTO RIGHT LOWER LEG SUBCUTANEOUS TISSUE AND FASCIA, PERCUTANEOUS APPROACH: ICD-10-PCS | Performed by: SURGERY

## 2019-05-13 PROCEDURE — 6360000002 HC RX W HCPCS: Performed by: NURSE ANESTHETIST, CERTIFIED REGISTERED

## 2019-05-13 PROCEDURE — C1881 DIALYSIS ACCESS SYSTEM: HCPCS | Performed by: SURGERY

## 2019-05-13 PROCEDURE — 3700000001 HC ADD 15 MINUTES (ANESTHESIA): Performed by: SURGERY

## 2019-05-13 PROCEDURE — 1200000000 HC SEMI PRIVATE

## 2019-05-13 PROCEDURE — 85027 COMPLETE CBC AUTOMATED: CPT

## 2019-05-13 PROCEDURE — 2580000003 HC RX 258: Performed by: NURSE ANESTHETIST, CERTIFIED REGISTERED

## 2019-05-13 PROCEDURE — 6370000000 HC RX 637 (ALT 250 FOR IP): Performed by: INTERNAL MEDICINE

## 2019-05-13 PROCEDURE — 3700000000 HC ANESTHESIA ATTENDED CARE: Performed by: SURGERY

## 2019-05-13 PROCEDURE — 2580000003 HC RX 258: Performed by: SURGERY

## 2019-05-13 PROCEDURE — 36415 COLL VENOUS BLD VENIPUNCTURE: CPT

## 2019-05-13 PROCEDURE — 90935 HEMODIALYSIS ONE EVALUATION: CPT

## 2019-05-13 PROCEDURE — 6360000002 HC RX W HCPCS: Performed by: INTERNAL MEDICINE

## 2019-05-13 PROCEDURE — 74018 RADEX ABDOMEN 1 VIEW: CPT

## 2019-05-13 PROCEDURE — 3209999900 FLUORO FOR SURGICAL PROCEDURES

## 2019-05-13 PROCEDURE — 3600000002 HC SURGERY LEVEL 2 BASE: Performed by: SURGERY

## 2019-05-13 PROCEDURE — 94640 AIRWAY INHALATION TREATMENT: CPT

## 2019-05-13 PROCEDURE — 2500000003 HC RX 250 WO HCPCS: Performed by: NURSE ANESTHETIST, CERTIFIED REGISTERED

## 2019-05-13 PROCEDURE — 2709999900 HC NON-CHARGEABLE SUPPLY: Performed by: SURGERY

## 2019-05-13 PROCEDURE — 3600000012 HC SURGERY LEVEL 2 ADDTL 15MIN: Performed by: SURGERY

## 2019-05-13 PROCEDURE — 99232 SBSQ HOSP IP/OBS MODERATE 35: CPT | Performed by: FAMILY MEDICINE

## 2019-05-13 RX ORDER — LIDOCAINE HYDROCHLORIDE AND EPINEPHRINE 10; 10 MG/ML; UG/ML
INJECTION, SOLUTION INFILTRATION; PERINEURAL PRN
Status: DISCONTINUED | OUTPATIENT
Start: 2019-05-13 | End: 2019-05-13 | Stop reason: ALTCHOICE

## 2019-05-13 RX ORDER — SODIUM CITRATE 4 % (5 ML)
3 SYRINGE (ML) MISCELLANEOUS PRN
Status: DISCONTINUED | OUTPATIENT
Start: 2019-05-13 | End: 2019-05-17 | Stop reason: HOSPADM

## 2019-05-13 RX ORDER — PROPOFOL 10 MG/ML
INJECTION, EMULSION INTRAVENOUS PRN
Status: DISCONTINUED | OUTPATIENT
Start: 2019-05-13 | End: 2019-05-13 | Stop reason: SDUPTHER

## 2019-05-13 RX ORDER — CLINDAMYCIN PHOSPHATE 900 MG/50ML
INJECTION INTRAVENOUS PRN
Status: DISCONTINUED | OUTPATIENT
Start: 2019-05-13 | End: 2019-05-13 | Stop reason: SDUPTHER

## 2019-05-13 RX ORDER — SODIUM CHLORIDE 9 MG/ML
INJECTION, SOLUTION INTRAVENOUS CONTINUOUS PRN
Status: DISCONTINUED | OUTPATIENT
Start: 2019-05-13 | End: 2019-05-13 | Stop reason: SDUPTHER

## 2019-05-13 RX ORDER — FENTANYL CITRATE 50 UG/ML
INJECTION, SOLUTION INTRAMUSCULAR; INTRAVENOUS PRN
Status: DISCONTINUED | OUTPATIENT
Start: 2019-05-13 | End: 2019-05-13 | Stop reason: SDUPTHER

## 2019-05-13 RX ORDER — PHENYLEPHRINE HCL IN 0.9% NACL 1 MG/10 ML
SYRINGE (ML) INTRAVENOUS PRN
Status: DISCONTINUED | OUTPATIENT
Start: 2019-05-13 | End: 2019-05-13 | Stop reason: SDUPTHER

## 2019-05-13 RX ORDER — PROPOFOL 10 MG/ML
INJECTION, EMULSION INTRAVENOUS CONTINUOUS PRN
Status: DISCONTINUED | OUTPATIENT
Start: 2019-05-13 | End: 2019-05-13 | Stop reason: SDUPTHER

## 2019-05-13 RX ORDER — LIDOCAINE HYDROCHLORIDE 20 MG/ML
INJECTION, SOLUTION EPIDURAL; INFILTRATION; INTRACAUDAL; PERINEURAL PRN
Status: DISCONTINUED | OUTPATIENT
Start: 2019-05-13 | End: 2019-05-13 | Stop reason: SDUPTHER

## 2019-05-13 RX ORDER — HEPARIN SODIUM 1000 [USP'U]/ML
INJECTION, SOLUTION INTRAVENOUS; SUBCUTANEOUS PRN
Status: DISCONTINUED | OUTPATIENT
Start: 2019-05-13 | End: 2019-05-13 | Stop reason: ALTCHOICE

## 2019-05-13 RX ADMIN — TIOTROPIUM BROMIDE 18 MCG: 18 CAPSULE ORAL; RESPIRATORY (INHALATION) at 09:39

## 2019-05-13 RX ADMIN — Medication 200 MCG: at 12:15

## 2019-05-13 RX ADMIN — Medication 25 MCG: at 12:35

## 2019-05-13 RX ADMIN — Medication 25 MCG: at 12:39

## 2019-05-13 RX ADMIN — Medication 10 ML: at 21:00

## 2019-05-13 RX ADMIN — CLONIDINE HYDROCHLORIDE 0.2 MG: 0.2 TABLET ORAL at 21:38

## 2019-05-13 RX ADMIN — LOSARTAN POTASSIUM 50 MG: 50 TABLET, FILM COATED ORAL at 21:38

## 2019-05-13 RX ADMIN — SODIUM CHLORIDE: 9 INJECTION, SOLUTION INTRAVENOUS at 11:16

## 2019-05-13 RX ADMIN — Medication 100 MCG: at 11:43

## 2019-05-13 RX ADMIN — PROPOFOL 20 MG: 10 INJECTION, EMULSION INTRAVENOUS at 11:28

## 2019-05-13 RX ADMIN — Medication 200 MCG: at 12:24

## 2019-05-13 RX ADMIN — Medication 0.12 G: at 16:38

## 2019-05-13 RX ADMIN — Medication 200 MCG: at 12:29

## 2019-05-13 RX ADMIN — LIDOCAINE HYDROCHLORIDE 20 MG: 20 INJECTION, SOLUTION EPIDURAL; INFILTRATION; INTRACAUDAL; PERINEURAL at 11:27

## 2019-05-13 RX ADMIN — Medication 25 MCG: at 11:32

## 2019-05-13 RX ADMIN — Medication 100 MCG: at 11:50

## 2019-05-13 RX ADMIN — PANTOPRAZOLE SODIUM 40 MG: 40 TABLET, DELAYED RELEASE ORAL at 05:50

## 2019-05-13 RX ADMIN — OXYCODONE AND ACETAMINOPHEN 2 TABLET: 5; 325 TABLET ORAL at 05:50

## 2019-05-13 RX ADMIN — PROPOFOL 20 MG: 10 INJECTION, EMULSION INTRAVENOUS at 11:29

## 2019-05-13 RX ADMIN — HYDRALAZINE HYDROCHLORIDE 100 MG: 50 TABLET, FILM COATED ORAL at 18:33

## 2019-05-13 RX ADMIN — Medication 200 MCG: at 12:07

## 2019-05-13 RX ADMIN — OXYCODONE AND ACETAMINOPHEN 2 TABLET: 5; 325 TABLET ORAL at 22:35

## 2019-05-13 RX ADMIN — PROPOFOL 75 MCG/KG/MIN: 10 INJECTION, EMULSION INTRAVENOUS at 11:27

## 2019-05-13 RX ADMIN — Medication 100 MCG: at 12:02

## 2019-05-13 RX ADMIN — LACTULOSE 20 G: 20 SOLUTION ORAL at 18:44

## 2019-05-13 RX ADMIN — OXYCODONE AND ACETAMINOPHEN 2 TABLET: 5; 325 TABLET ORAL at 18:32

## 2019-05-13 RX ADMIN — HEPARIN SODIUM 5000 UNITS: 5000 INJECTION INTRAVENOUS; SUBCUTANEOUS at 21:38

## 2019-05-13 RX ADMIN — Medication 100 MCG: at 12:05

## 2019-05-13 RX ADMIN — Medication 200 MCG: at 12:20

## 2019-05-13 RX ADMIN — HEPARIN SODIUM 5000 UNITS: 5000 INJECTION INTRAVENOUS; SUBCUTANEOUS at 05:49

## 2019-05-13 RX ADMIN — PROPOFOL 20 MG: 10 INJECTION, EMULSION INTRAVENOUS at 11:27

## 2019-05-13 RX ADMIN — CHLORHEXIDINE GLUCONATE: 213 SOLUTION TOPICAL at 09:35

## 2019-05-13 RX ADMIN — CLINDAMYCIN PHOSPHATE 900 MG: 900 INJECTION, SOLUTION INTRAVENOUS at 11:46

## 2019-05-13 RX ADMIN — ATORVASTATIN CALCIUM 20 MG: 20 TABLET, FILM COATED ORAL at 21:38

## 2019-05-13 RX ADMIN — Medication 25 MCG: at 11:27

## 2019-05-13 ASSESSMENT — PAIN DESCRIPTION - DESCRIPTORS
DESCRIPTORS: ACHING
DESCRIPTORS: ACHING
DESCRIPTORS: ACHING;DISCOMFORT

## 2019-05-13 ASSESSMENT — PAIN SCALES - GENERAL
PAINLEVEL_OUTOF10: 0
PAINLEVEL_OUTOF10: 8
PAINLEVEL_OUTOF10: 0
PAINLEVEL_OUTOF10: 9
PAINLEVEL_OUTOF10: 10
PAINLEVEL_OUTOF10: 0
PAINLEVEL_OUTOF10: 7

## 2019-05-13 ASSESSMENT — PAIN DESCRIPTION - FREQUENCY
FREQUENCY: CONTINUOUS

## 2019-05-13 ASSESSMENT — PULMONARY FUNCTION TESTS
PIF_VALUE: 1
PIF_VALUE: 0
PIF_VALUE: 1

## 2019-05-13 ASSESSMENT — PAIN DESCRIPTION - PAIN TYPE
TYPE: ACUTE PAIN

## 2019-05-13 ASSESSMENT — PAIN DESCRIPTION - PROGRESSION
CLINICAL_PROGRESSION: NOT CHANGED
CLINICAL_PROGRESSION: GRADUALLY IMPROVING
CLINICAL_PROGRESSION: NOT CHANGED
CLINICAL_PROGRESSION: GRADUALLY WORSENING
CLINICAL_PROGRESSION: NOT CHANGED
CLINICAL_PROGRESSION: NOT CHANGED

## 2019-05-13 ASSESSMENT — PAIN DESCRIPTION - LOCATION
LOCATION: CHEST
LOCATION: CHEST
LOCATION: BACK;ARM;GENERALIZED

## 2019-05-13 ASSESSMENT — PAIN DESCRIPTION - ONSET
ONSET: ON-GOING

## 2019-05-13 ASSESSMENT — PAIN - FUNCTIONAL ASSESSMENT: PAIN_FUNCTIONAL_ASSESSMENT: PREVENTS OR INTERFERES WITH ALL ACTIVE AND SOME PASSIVE ACTIVITIES

## 2019-05-13 ASSESSMENT — PAIN DESCRIPTION - ORIENTATION: ORIENTATION: LEFT;UPPER

## 2019-05-13 NOTE — PROGRESS NOTES
Treatment time: 120 mins  Net UF: 1500 ml    Pre weight: 168.3 lbs  Post weight: 166.4 lbs  EDW: 72.5 kg    Access used: right groin cath   Access function: very good flow    Medications or blood products given: none    Regular outpatient schedule: TTS    Summary of response to treatment: tolerated very well         * Intra-treatment documented Safety Checks include the followin) Access and face visible at all times. 2) All connections and blood lines are secure with no kinks. 3) NVL alarm engaged. 4) Hemosafe device applied (if applicable). 5) No collapse of Arterial or Venous blood chambers. 6) All blood lines / pump segments in the air detectors.

## 2019-05-13 NOTE — TELEPHONE ENCOUNTER
Last visit:   Last Med refill:   Does patient have enough medication for 72 hours: Yes    Next Visit Date:  Future Appointments   Date Time Provider Sue Elsi   6/7/2019  9:30 AM Analilia Mack MD [address] [place] [address] Maintenance   Topic Date Due    Hepatitis B Vaccine (1 of 3 - Risk Recombivax 3-dose series) 07/15/1966    Shingles Vaccine (1 of 2) 07/15/1997    DTaP/Tdap/Td vaccine (1 - Tdap) 02/18/2020 (Originally 7/15/1964)    Flu vaccine (Season Ended) 02/19/2020 (Originally 9/1/2019)    Hepatitis C screen  08/15/2022 (Originally 1945)    Breast cancer screen  06/22/2019    Pneumococcal 65+ years Vaccine (2 of 2 - PPSV23) 01/14/2020    Lipid screen  03/30/2020    Potassium monitoring  05/12/2020    Creatinine monitoring  05/12/2020    Colon cancer screen colonoscopy  02/22/2027    DEXA (modify frequency per FRAX score)  Completed    HPV vaccine  Aged Out       Hemoglobin A1C (%)   Date Value   09/03/2018 4.3 (L)   08/07/2016 4.6   03/30/2015 4.6             ( goal A1C is < 7)   No results found for: LABMICR  LDL Cholesterol (mg/dL)   Date Value   03/30/2015 94       (goal LDL is <100)   AST (U/L)   Date Value   09/03/2018 28     ALT (U/L)   Date Value   09/03/2018 16     BUN (mg/dL)   Date Value   05/13/2019 47 (H)     BP Readings from Last 3 Encounters:   05/13/19 (!) 114/52   05/13/19 (!) 108/53   05/03/19 113/60          (goal 120/80)    All Future Testing planned in [facility]  Lab Frequency Next Occurrence   PET CT SKULL BASE TO MID THIGH Once 06/25/2018   CBC Auto Differential     Iron And TIBC     Ferritin     Initiate Oxygen Therapy Protocol     Activity as tolerated     Basic Metabolic Prof     Phosphorus, Inorg.      CBC     Magnesium                 Patient Active Problem List:     End stage renal disease ([address]     Anemia     Malignant neoplasm of female breast ([address]     Tobacco abuse     Non-rheumatic mitral regurgitation     Gastrointestinal hemorrhage     NSAID long-term use     Anemia of chronic disease     Erosive gastritis     Iron deficiency anemia     Paroxysmal atrial fibrillation (HCC)     Hypertension     Severe anemia     Fatigue     Colon polyp     Pulmonary emphysema (HCC)     Stenosis of left carotid artery     Anemia of chronic renal failure, stage 5 (HCC)     COPD exacerbation (HCC)     Need for vaccination     Primary malignant neoplasm of left lung metastatic to other site [facility])

## 2019-05-13 NOTE — ANESTHESIA PRE PROCEDURE
Department of Anesthesiology  Preprocedure Note       Name:  Jose De Jesus Cornejo   Age:  72 y.o.  :  1945                                          MRN:  [id number]         Date:  2019      Surgeon: Jorden Garcia):  Padmini Chapa MD    Procedure: CATHETER INSERTION HEMODIALYSIS -[id number] (N/A )    Medications prior to admission:   Prior to Admission medications    Medication Sig Start Date End Date Taking? Authorizing Provider   oxyCODONE-acetaminophen (PERCOCET) 5-325 MG per tablet Take 1 tablet by mouth every 4 hours as needed for Pain for up to 3 days.  19 Yes Donavon RIVERA Blood,    atorvastatin (LIPITOR) 20 MG tablet Take 20 mg by mouth daily   Yes Historical Provider, MD PIERCE Complex-C-Folic Acid (MADAI-AYAN) TABS Take 1 tablet by mouth daily   Yes Historical Provider, MD   cloNIDine (CATAPRES) 0.2 MG tablet Take 0.2 mg by mouth 2 times daily   Yes Historical Provider, MD   lactulose (CONSTULOSE) 10 GM/15ML solution Take 30 mLs by mouth daily as needed   Yes Historical Provider, MD   chlorhexidine (HIBICLENS) 4 % external liquid Apply topically daily As directed   Yes Historical Provider, MD   hydrALAZINE (APRESOLINE) 100 MG tablet Take 100 mg by mouth 3 times daily   Yes Historical Provider, MD   Umeclidinium Bromide (INCRUSE ELLIPTA) 62.5 MCG/INH AEPB Inhale 1 puff into the lungs daily   Yes Historical Provider, MD   sodium bicarbonate 650 MG tablet Take 1,300 mg by mouth 3 times daily   Yes Historical Provider, MD   amLODIPine (NORVASC) 10 MG tablet Take 10 mg by mouth daily   Yes Historical Provider, MD   loratadine (CLARITIN) 10 MG tablet Take 10 mg by mouth daily   Yes Historical Provider, MD   metoprolol tartrate (LOPRESSOR) 25 MG tablet Take 25 mg by mouth 2 times daily   Yes Historical Provider, MD   ethyl chloride spray Apply topically as needed (to arm prior to dialysis)   Yes Historical Provider, MD   omeprazole (PRILOSEC) 20 MG delayed release capsule TAKE 1 CAPSULE BY MOUTH DAILY 2/20/19  Yes Bobbi Corley MD   albuterol sulfate  (90 Base) MCG/ACT inhaler Inhale 2 puffs into the lungs every 6 hours as needed for Wheezing or Shortness of Breath 9/5/18 5/9/19 Yes David Gonzalez DO   losartan (COZAAR) 50 MG tablet Take 1 tablet by mouth 2 times daily 10/7/16  Yes Miryam Franklin MD   SENSIPAR 60 MG tablet Take 120 mg by mouth daily  12/3/15  Yes Historical Provider, MD   Incontinence Supply Disposable (INCONTINENCE BRIEF MEDIUM) MISC 1 each by Does not apply route 2 times daily as needed (incotinenace) 1/8/19   Titus Matthews MD       Current medications:    Current Facility-Administered Medications   Medication Dose Route Frequency Provider Last Rate Last Dose    sodium chloride flush 0.9 % injection 10 mL  10 mL Intravenous PRN Donavon P Blood, DO   10 mL at 05/11/19 0142    heparin (porcine) injection 5,000 Units  5,000 Units Subcutaneous 3 times per day Wynema Phong P Blood, DO   5,000 Units at 05/13/19 0549    nicotine (NICODERM CQ) 21 MG/24HR 1 patch  1 patch Transdermal Daily Albertina Rose, APRN - CNP   1 patch at 05/12/19 0921    cinacalcet (SENSIPAR) tablet 120 mg  120 mg Oral Daily Donavon P Blood, DO   120 mg at 05/12/19 0920    losartan (COZAAR) tablet 50 mg  50 mg Oral BID Donavon P Blood, DO   50 mg at 05/12/19 2150    pantoprazole (PROTONIX) tablet 40 mg  40 mg Oral QAM AC Donavon P Blood, DO   40 mg at 05/13/19 0550    atorvastatin (LIPITOR) tablet 20 mg  20 mg Oral Daily Donavon P Blood, DO   20 mg at 05/12/19 2150    edwina-anya tablet 1 tablet  1 tablet Oral Daily Donavon P Blood, DO   1 tablet at 05/12/19 0920    cloNIDine (CATAPRES) tablet 0.2 mg  0.2 mg Oral BID Donavon P Blood, DO   0.2 mg at 05/12/19 2150    lactulose (CHRONULAC) 10 GM/15ML solution 20 g  30 mL Oral Daily PRN Donavon P Blood, DO        chlorhexidine (HIBICLENS) 4 % liquid   Topical Daily Donavon Gibson, DO        hydrALAZINE (APRESOLINE) tablet 100 mg  100 mg Oral TID Janki Yinka P Blood, DO   100 mg at 05/12/19 2150    tiotropium (SPIRIVA) inhalation capsule 18 mcg  18 mcg Inhalation Daily Donavon P Blood, DO   18 mcg at 05/13/19 0939    sodium chloride flush 0.9 % injection 10 mL  10 mL Intravenous 2 times per day Reserve Yinka P Blood, DO   10 mL at 05/12/19 2152    sodium chloride flush 0.9 % injection 10 mL  10 mL Intravenous PRN Donavon P Blood, DO        magnesium hydroxide (MILK OF MAGNESIA) 400 MG/5ML suspension 30 mL  30 mL Oral Daily PRN Donavon P Blood, DO        ondansetron (ZOFRAN-ODT) disintegrating tablet 4 mg  4 mg Oral Q6H PRN Donavon P Blood, DO        Or    ondansetron (ZOFRAN) injection 4 mg  4 mg Intravenous Q6H PRN Donavon P Blood, DO        oxyCODONE-acetaminophen (PERCOCET) 5-325 MG per tablet 1 tablet  1 tablet Oral Q4H PRN Donavon P Blood, DO        oxyCODONE-acetaminophen (PERCOCET) 5-325 MG per tablet 2 tablet  2 tablet Oral Q4H PRN Donavon P Blood, DO   2 tablet at 05/13/19 0550       Allergies:     Allergies   Allergen Reactions    Pcn [Penicillins] Hives and Itching    Sulfa Antibiotics      difficulty moving???       Problem List:    Patient Active Problem List   Diagnosis Code    End stage renal disease ([facility] N18.6    Anemia D64.9    Malignant neoplasm of female breast ([facility] C50.919    Tobacco abuse Z72.0    Non-rheumatic mitral regurgitation I34.0    Gastrointestinal hemorrhage K92.2    NSAID long-term use Z79.1    Anemia of chronic disease D63.8    Erosive gastritis K29.60    Iron deficiency anemia D50.9    Paroxysmal atrial fibrillation (HCC) I48.0    Hypertension I10    Severe anemia D64.9    Fatigue R53.83    Colon polyp K63.5    Pulmonary emphysema (HCC) J43.9    Stenosis of left carotid artery I65.22    Anemia of chronic renal failure, stage 5 (HCC) N18.5, D63.1    COPD exacerbation (HCC) J44.1    Need for vaccination Z23    Primary malignant neoplasm of left lung metastatic to other site [facility]) C34.92    Pulmonary embolus ([facility] I26.99       Past Medical History:        Diagnosis Date    Anemia     Asthma     [facility] 2017    Breast cancer ([facility] 1988     has annual mammograms only, no heme/onc    Bursitis     bilateral    Carpal tunnel syndrome     Chronic obstructive pulmonary disease (COPD) ([facility]     Disease of blood and blood forming organ     History of blood transfusion     Hypertension     Irregular heart beat     Lung nodule     Osteoarthritis     knee and back    Pneumonia     Pulmonary emphysema (HCC) 2017    Renal failure      end stage renal failure on dialysis    Stenosis of left carotid artery 2017    Tobacco abuse        Past Surgical History:        Procedure Laterality Date    ABDOMEN SURGERY      BREAST SURGERY      left;  breast cancer    CARPAL TUNNEL RELEASE      right    COLONOSCOPY  2017    DR Alvin Lugo - adenomatous polyp    ECTOPIC PREGNANCY SURGERY      KNEE SURGERY      left    CT COLON CA SCRN NOT HI RSK IND N/A 2017    COLONOSCOPY performed by Marjan Pittman DO at [facility]    CT ESOPHAGOGASTRODUODENOSCOPY TRANSORAL DIAGNOSTIC N/A 2017    EGD ESOPHAGOGASTRODUODENOSCOPY performed by Marjan Pittman DO at [address] [place] [address]  2016       Social History:    Social History     Tobacco Use    Smoking status: Current Every Day Smoker     Packs/day: 0.25     Years: 55.00     Pack years: 13.75     Types: Cigarettes     Last attempt to quit: 2005     Years since quittin.3    Smokeless tobacco: Never Used   Substance Use Topics    Alcohol use: No     Alcohol/week: 0.0 oz                                Ready to quit: Not Answered  Counseling given: Not Answered      Vital Signs (Current):   Vitals:    19 0726 19 0911 19 1922 19 0737   BP: (!) 143/64  (!) 151/56 (!) 189/53   Pulse: 103  82 93   Resp: 16  16 16   Temp: 98.2 °F (36.8 °C)  98.1 °F (36.7 °C) 98.2 °F (36.8 auscultation                             Cardiovascular:            Rhythm: regular  Rate: normal                    Neuro/Psych:               GI/Hepatic/Renal:             Endo/Other:                     Abdominal:       Abdomen: soft. Vascular:                                      Anesthesia Plan      general     ASA 4       Induction: intravenous. MIPS: Postoperative opioids intended and Prophylactic antiemetics administered. Anesthetic plan and risks discussed with patient. Use of blood products discussed with patient whom consented to blood products. Plan discussed with attending and CRNA.     Attending anesthesiologist reviewed and agrees with Mathew Gilford, MD   5/13/2019

## 2019-05-13 NOTE — FLOWSHEET NOTE
[x] [facility]) [facility]  [address]    [phone]  F: [phone]   [] [address]  [address]   [address]  P: [phone]  F: [phone]  [] [address]  [address] [facility]  P: [phone]  F: [phone]   [] [address]  [facility] [address]   [place]: [phone]  F: [phone]  [] [place] [address] [facility] [address]  [place]: [phone]   F: [phone]     Physical Therapy Cancel/No Show note    Date: 2019  Patient: Raciel Kingsley  : 1945  MRN: [id number]    Cancels/No Shows to date:     For today's appointment patient:    [x]  Cancelled    [] Rescheduled appointment    [] No-show     Reason given by patient:    []  Patient ill    []  Conflicting appointment    [] No transportation      [] Conflict with work    [] No reason given    [] Weather related    [x] Other:      Comments:  Pt is currently hospitalized and will need approval from her physician to return to outpatient PT.      [] Next appointment was confirmed    Electronically signed by: Jordon Deluca, PT

## 2019-05-13 NOTE — PROGRESS NOTES
Nephrology Progress Note        Subjective: The patient is seen and examined. Her left upper arm AV graft is nonfunctional.  She is going to surgery for a tunneled femoral vein dialysis catheter placement. She is also currently going to a revision of the AV graft. She is n.p.o. For surgery today. She will require short dialysis later today after the tunneled dialysis catheters placed. Did not have dialysis on Saturday, 19 so she will require good dialysis today and then she is to be back on her usual dialysis schedule as of tomorrow, 19. She does have some skin itching but no other complaints including no chest pain or shortness of breath or nausea or vomiting. She is on room air.     Objective:  CURRENT TEMPERATURE:  Temp: 98.2 °F (36.8 °C)  MAXIMUM TEMPERATURE OVER 24HRS:  Temp (24hrs), Av.2 °F (36.8 °C), Min:98.1 °F (36.7 °C), Max:98.2 °F (36.8 °C)    CURRENT RESPIRATORY RATE:  Resp: 16  CURRENT PULSE:  Pulse: 93  CURRENT BLOOD PRESSURE:  BP: (!) 189/53  24HR BLOOD PRESSURE RANGE:  Systolic (37TPJ), UX , Min:151 , VXT:318   ; Diastolic (44LFJ), ZFJ:22, Min:53, Max:56    24HR INTAKE/OUTPUT:      Intake/Output Summary (Last 24 hours) at 2019 0915  Last data filed at 2019 1304  Gross per 24 hour   Intake 200 ml   Output --   Net 200 ml     Weight:      Physical Exam:  General appearance:Awake, alert, in no acute distress  Skin: warm and dry, no rash or erythema  Eyes: conjunctivae normal and sclera anicteric  ENT:no thrush, moist mucous membranes  Neck: no JVD, trachea midline, no accessory muscle use  Pulmonary: good bilateral air entry and clear to auscultation bilaterally without any wheezes or rales or rhonchi  Cardiovascular: regular rate and rhythm positive S1 and S2 and no rubs  Abdomen: Soft and non-tender and non-distended with active bowel sounds  Extremities: no pitting lower extremity edema    Access:  See The history of present illness    Current Medications: sodium chloride flush 0.9 % injection 10 mL PRN   heparin (porcine) injection 5,000 Units 3 times per day   nicotine (NICODERM CQ) 21 MG/24HR 1 patch Daily   cinacalcet (SENSIPAR) tablet 120 mg Daily   losartan (COZAAR) tablet 50 mg BID   pantoprazole (PROTONIX) tablet 40 mg QAM AC   atorvastatin (LIPITOR) tablet 20 mg Daily   edwina-anya tablet 1 tablet Daily   cloNIDine (CATAPRES) tablet 0.2 mg BID   lactulose (CHRONULAC) 10 GM/15ML solution 20 g Daily PRN   chlorhexidine (HIBICLENS) 4 % liquid Daily   hydrALAZINE (APRESOLINE) tablet 100 mg TID   tiotropium (SPIRIVA) inhalation capsule 18 mcg Daily   sodium chloride flush 0.9 % injection 10 mL 2 times per day   sodium chloride flush 0.9 % injection 10 mL PRN   magnesium hydroxide (MILK OF MAGNESIA) 400 MG/5ML suspension 30 mL Daily PRN   ondansetron (ZOFRAN-ODT) disintegrating tablet 4 mg Q6H PRN   Or    ondansetron (ZOFRAN) injection 4 mg Q6H PRN   oxyCODONE-acetaminophen (PERCOCET) 5-325 MG per tablet 1 tablet Q4H PRN   oxyCODONE-acetaminophen (PERCOCET) 5-325 MG per tablet 2 tablet Q4H PRN     Labs:   CBC: Recent Labs     05/13/19  0540   WBC 4.9   RBC 3.08*   HGB 8.8*   HCT 28.5*   MCV 92.5   MCH 28.6   MCHC 30.9   RDW 18.0*      MPV 11.5      BMP: Recent Labs     05/12/19  0932 05/13/19  0540    137   K 4.4 4.8   CL 92* 95*   CO2 24 24   BUN 38* 47*   CREATININE 7.03* 8.74*   GLUCOSE 88 88   CALCIUM 8.1* 8.1*        Phosphorus:    Recent Labs     05/13/19  0540   PHOS 6.3*     Magnesium: No results for input(s): MG in the last 72 hours. Albumin: No results for input(s): LABALBU in the last 72 hours. Dialysis bath: Dialysis Bath  K+ (Potassium): 3  Ca+ (Calcium): 2.5  Na+ (Sodium): 138  HCO3 (Bicarb): 35    Radiology:  Reviewed as available. Assessment:  1. ESRD  2. Non functional AV graft left upper arm  3. Plan for femoral dialysis catheter today   4. Me of chronic disease with hemoglobin below target  5.  Pruritis, possibly secondary to hyperphosphatemia and also missed dialysis       Plan:  1. Await femoral catheter placement   2. Hemodialysis today x 2 hours  3. Full dialysis tomorrow on a Tuesday, Thursday and Saturday hemodialysis schedule  4. Follow labs      Please do not hesitate to call with questions.     Electronically signed by Marita Patino MD on 5/13/2019 at 9:15 AM

## 2019-05-13 NOTE — PROGRESS NOTES
[facility]    Progress Note    5/13/2019    3:29 PM    Name:   Michela Seymour  MRN:     [id number]     [place]:      [de-identified]   Room:   2011/2011-02  IP Day:  4  Admit Date:  5/9/2019  1:12 PM    PCP:   Lia Andujar MD  Code Status:  Full Code    Subjective:     C/C:   Chief Complaint   Patient presents with    Back Pain     Interval History Status: improved. Patient seen and examined at dialysis unit, no acute events overnight. Had her tunneled catheter ( femoral access ) this am and currently getting her dialysis  Patient does report some fatigue and still has her persistent back pain ( chronic) , she  denies any chest pain, shortness of breath, chills, fevers, nausea or vomiting.   Patient vitals, labs and all providers notes were reviewed,from overnight shift and morning updates were noted and discussed with the nurse    Brief History:     Per chart  The patient is a 73 y.o.  AA female who presents with Back Pain   and she is admitted to the hospital for the management of  PE and pain.  She has had left upper back pain near shoulder for past 3 weeks.  VQ done in ER showed possible PE. Alicia Pack has also had chills and occ n/v but denies cp/sob/cough/f/s/s.     She has been placed on heparin drip-when I mentioned this the patient then tells me she hasn't gotten heparin on dialysis for over a year. [employer] Book[employer] daughter then explained she had a hgb of 3-4 range repeatedly requiring transfusions every few months in 2017 and despite full GI workup, could never find source.     She has known lung mass and previously refused biopsy     Review of Systems:     Constitutional:  negative for chills, fevers, sweats, +ve fatigue  Respiratory:  negative for cough, +ve for dyspnea on exertion, shortness of breath, no  wheezing  Cardiovascular:  negative for chest pain, chest pressure/discomfort, lower extremity edema, palpitations  Gastrointestinal:  negative for (32.8 °C), Max:98.8 °F (37.1 °C)    No results for input(s): POCGLU in the last 72 hours. I/O (24Hr): Intake/Output Summary (Last 24 hours) at 5/13/2019 1529  Last data filed at 5/13/2019 1300  Gross per 24 hour   Intake 241 ml   Output --   Net 241 ml       Labs:    Hematology:  Recent Labs     05/13/19  0540   WBC 4.9   RBC 3.08*   HGB 8.8*   HCT 28.5*   MCV 92.5   MCH 28.6   MCHC 30.9   RDW 18.0*      MPV 11.5     Chemistry:  Recent Labs     05/12/19  0932 05/13/19  0540    137   K 4.4 4.8   CL 92* 95*   CO2 24 24   GLUCOSE 88 88   BUN 38* 47*   CREATININE 7.03* 8.74*   ANIONGAP 19* 18*   LABGLOM 6* 4*   GFRAA 7* 5*   CALCIUM 8.1* 8.1*   PHOS  --  6.3*     No results for input(s): PROT, LABALBU, LABA1C, L7FRZXF, P4EVNSH, FT4, TSH, AST, ALT, LDH, GGT, ALKPHOS, LABGGT, BILITOT, BILIDIR, AMMONIA, AMYLASE, LIPASE, LACTATE, CHOL, HDL, LDLCHOLESTEROL, CHOLHDLRATIO, TRIG, VLDL, NJM93HS, PHENYTOIN, PHENYF, URICACID, POCGLU in the last 72 hours. Lab Results   Component Value Date/Time    SPECIAL NOT REPORTED 03/20/2017 10:05 PM     Lab Results   Component Value Date/Time    CULTURE NO SIGNIFICANT GROWTH 03/20/2017 10:05 PM    CULTURE  03/20/2017 10:05 PM     Performed at [facility] [place] [id number] [place] [address], [address] [place] [address] [phone]       Lab Results   Component Value Date    PHART 7.472 09/04/2018    FPD6JPI 35 09/04/2018    PO2ART 120 09/04/2018    GVV7RQH 25.9 09/04/2018    BEART 2 09/04/2018    VYI0BBE 27 09/04/2018    T7VJMXCY 99 09/04/2018       Radiology:    EXAMINATION:  ONE SUPINE XRAY VIEW(S) OF THE ABDOMEN  5/13/2019 12:55 pm  FINDINGS:  Single intraoperative view of the abdomen centered over the low abdomen and  right hemipelvis demonstrates a large-bore dialysis catheter entering via the  right femoral approach and extending superiorly to the L3 vertebral level,  likely within the IVC. Multiple punctate radiopaque densities are noted  within the field of indeterminate etiology. Impression:    Large bore dialysis catheter extending to the IVC level at       Physical Examination:        General appearance:  alert, chronically ill appearing, cooperative and no distress  Mental Status:  oriented to person, place and time and normal affect  Lungs:  clear to auscultation bilaterally, normal effort  Heart:  regular rate and rhythm, no murmur  Abdomen:  soft, nontender, nondistended, normal bowel sounds, no masses, hepatomegaly, splenomegaly  Extremities:  no edema, redness, tenderness in the calves, R femoral catheter noted  Skin:  no gross lesions, rashes, induration    Assessment:        Primary Problem  Primary malignant neoplasm of left lung metastatic to other site [facility])    Active Hospital Problems    Diagnosis Date Noted    Primary malignant neoplasm of left lung metastatic to other site [facility]) [C34.92] 05/09/2019    Hypertension [I10] 03/02/2017    Paroxysmal atrial fibrillation (HCC) [I48.0] 02/18/2017    Anemia of chronic disease [D63.8] 10/07/2016    Tobacco abuse [Z72.0] 08/07/2016    Malignant [facility] [C50.919]     End stage renal disease ([facility] [N18.6] 11/04/2011       Plan:        S/P R femoral tunneled catheter placement 5/13  HD today and tomorrow  Vascular to arrange AVF thrombectomy vs new fistula  Continue other chronic meds  DVT & GI PPx  DC planning    Melva Daniels MD  5/13/2019  3:29 PM

## 2019-05-13 NOTE — PLAN OF CARE
Problem: Falls - Risk of:  Goal: Will remain free from falls  Description  Will remain free from falls  Outcome: Ongoing  Note:   Siderails up x 2  Hourly rounding  Call light in reach  Instructed to call for assist before attempting out of bed. Remains free from falls and accidental injury at this time   Floor free from obstacles  Bed is locked and in lowest position  Adequate lighting provided  Bed alarm on, Red Falling star and Stay with Me signs posted      Goal: Absence of physical injury  Description  Absence of physical injury  Outcome: Ongoing     Problem: Tobacco Use:  Goal: Inpatient tobacco use cessation counseling participation  Description  Inpatient tobacco use cessation counseling participation  Outcome: Ongoing     Problem: Pain:  Goal: Pain level will decrease  Description  Pain level will decrease  Outcome: Ongoing  Note:   Pain level assessment complete.    Patient educated on pain scale and control interventions  PRN pain medication given per patient request  Patient instructed to call out with new onset of pain or unrelieved pain    Goal: Control of acute pain  Description  Control of acute pain  Outcome: Ongoing  Goal: Control of chronic pain  Description  Control of chronic pain  Outcome: Ongoing

## 2019-05-13 NOTE — PROGRESS NOTES
Admitted to 2011-2 from  PACU  At this time and report received. Site of Umberto Catheter is intact in the  Right groin area. Dressed with silk tape. No bleeding or drainage. Orders were activated.

## 2019-05-13 NOTE — PROGRESS NOTES
Physical Therapy  DATE: 2019    NAME: Eva Mcdonnell  MRN: [id number]   : 1945    Patient not seen this date for Physical Therapy due to:  [] Blood transfusion in progress  [] Hemodialysis  []  Patient Declined  [] Spine Precautions   [] Strict Bedrest  [x] Surgery/ Procedure  [] Testing      [x] Other  Checked on pt. At 1215, 1415 and 1451 and still at proceduret; Will check back tomorrow, 19 (having temporary dialysis catheter placed)        [] PT being discontinued at this time. Patient independent. No further needs. [] PT being discontinued at this time as the patient has been transferred to palliative care. No further needs.     Suki Currie, PT

## 2019-05-13 NOTE — CONSULTS
[address] [facility] [address]                [address] [place] [address], [address] [place] [address]                                  CONSULTATION    PATIENT NAME: Yanely Christine                    :        1945  MED REC NO:   [id number]                             ROOM:  ACCOUNT NO:   [de-identified]                           ADMIT DATE: 2019  PROVIDER:     Jerome Saenz DATE:  2019    HISTORY OF PRESENT ILLNESS:  I have seen the patient on 2019 at  09:30 in the morning requested by Nephrology and primary service about  the needs of a thrombectomy of the left AV fistula. This patient is 72 years old -American female with a chronic renal failure, on  hemodialysis and the patient had thrombosed left AV fistula, which is  the left brachioaxillary AV fistula with _____ graft. This fistula has  been revised numerous times on the left upper extremity by myself and  the last time we revised was in 2018. At this time, he suspected  highly that this patient has some sort of reason that the fistula has  thrombosed and therefore, dialysis is needed for acute hemodialysis. The potassium has been redrawn and today on 2019, potassium was  4.5. Potassium two days ago on 05/10/2019 was 4.0. I discussed with  Nephrology Department and we felt that we could wait until 2019 to  proceed with the placement of a catheter and then considerations for  revision of the fistula or creation of a new fistula. The patient is  otherwise stable and we have discussed this with Nephrology Department.         Cole Romero    D: 2019 11:25:39       T: 2019 12:47:19     [email or web address]  Job#: [id number]     Doc#: [id number]    CC:

## 2019-05-14 LAB
ANION GAP SERPL CALCULATED.3IONS-SCNC: 18 MMOL/L (ref 9–17)
BUN BLDV-MCNC: 40 MG/DL (ref 8–23)
BUN/CREAT BLD: 5 (ref 9–20)
CALCIUM SERPL-MCNC: 9 MG/DL (ref 8.6–10.4)
CHLORIDE BLD-SCNC: 95 MMOL/L (ref 98–107)
CO2: 22 MMOL/L (ref 20–31)
CREAT SERPL-MCNC: 7.69 MG/DL (ref 0.5–0.9)
GFR AFRICAN AMERICAN: 6 ML/MIN
GFR NON-AFRICAN AMERICAN: 5 ML/MIN
GFR SERPL CREATININE-BSD FRML MDRD: ABNORMAL ML/MIN/{1.73_M2}
GFR SERPL CREATININE-BSD FRML MDRD: ABNORMAL ML/MIN/{1.73_M2}
GLUCOSE BLD-MCNC: 95 MG/DL (ref 70–99)
HCT VFR BLD CALC: 30.5 % (ref 36.3–47.1)
HEMOGLOBIN: 9.1 G/DL (ref 11.9–15.1)
MAGNESIUM: 2.5 MG/DL (ref 1.6–2.6)
MCH RBC QN AUTO: 28.2 PG (ref 25.2–33.5)
MCHC RBC AUTO-ENTMCNC: 29.8 G/DL (ref 28.4–34.8)
MCV RBC AUTO: 94.4 FL (ref 82.6–102.9)
NRBC AUTOMATED: 0 PER 100 WBC
PDW BLD-RTO: 18.2 % (ref 11.8–14.4)
PHOSPHORUS: 6.4 MG/DL (ref 2.6–4.5)
PLATELET # BLD: 278 K/UL (ref 138–453)
PMV BLD AUTO: 11.8 FL (ref 8.1–13.5)
POTASSIUM SERPL-SCNC: 4.7 MMOL/L (ref 3.7–5.3)
RBC # BLD: 3.23 M/UL (ref 3.95–5.11)
SODIUM BLD-SCNC: 135 MMOL/L (ref 135–144)
WBC # BLD: 6.3 K/UL (ref 3.5–11.3)

## 2019-05-14 PROCEDURE — 85027 COMPLETE CBC AUTOMATED: CPT

## 2019-05-14 PROCEDURE — 97162 PT EVAL MOD COMPLEX 30 MIN: CPT

## 2019-05-14 PROCEDURE — 6370000000 HC RX 637 (ALT 250 FOR IP): Performed by: SURGERY

## 2019-05-14 PROCEDURE — 84100 ASSAY OF PHOSPHORUS: CPT

## 2019-05-14 PROCEDURE — 36415 COLL VENOUS BLD VENIPUNCTURE: CPT

## 2019-05-14 PROCEDURE — 97116 GAIT TRAINING THERAPY: CPT

## 2019-05-14 PROCEDURE — 6360000002 HC RX W HCPCS: Performed by: NURSE PRACTITIONER

## 2019-05-14 PROCEDURE — 99232 SBSQ HOSP IP/OBS MODERATE 35: CPT | Performed by: FAMILY MEDICINE

## 2019-05-14 PROCEDURE — 97168 OT RE-EVAL EST PLAN CARE: CPT

## 2019-05-14 PROCEDURE — 1200000000 HC SEMI PRIVATE

## 2019-05-14 PROCEDURE — 6360000002 HC RX W HCPCS: Performed by: SURGERY

## 2019-05-14 PROCEDURE — 97535 SELF CARE MNGMENT TRAINING: CPT

## 2019-05-14 PROCEDURE — 94760 N-INVAS EAR/PLS OXIMETRY 1: CPT

## 2019-05-14 PROCEDURE — 2580000003 HC RX 258: Performed by: SURGERY

## 2019-05-14 PROCEDURE — 80048 BASIC METABOLIC PNL TOTAL CA: CPT

## 2019-05-14 PROCEDURE — APPNB15 APP NON BILLABLE TIME 0-15 MINS: Performed by: NURSE PRACTITIONER

## 2019-05-14 PROCEDURE — 5A1D70Z PERFORMANCE OF URINARY FILTRATION, INTERMITTENT, LESS THAN 6 HOURS PER DAY: ICD-10-PCS | Performed by: INTERNAL MEDICINE

## 2019-05-14 PROCEDURE — 83735 ASSAY OF MAGNESIUM: CPT

## 2019-05-14 PROCEDURE — 94640 AIRWAY INHALATION TREATMENT: CPT

## 2019-05-14 RX ORDER — MORPHINE SULFATE 2 MG/ML
2 INJECTION, SOLUTION INTRAMUSCULAR; INTRAVENOUS ONCE
Status: COMPLETED | OUTPATIENT
Start: 2019-05-14 | End: 2019-05-14

## 2019-05-14 RX ADMIN — OXYCODONE AND ACETAMINOPHEN 2 TABLET: 5; 325 TABLET ORAL at 20:25

## 2019-05-14 RX ADMIN — Medication 1 TABLET: at 09:36

## 2019-05-14 RX ADMIN — LOSARTAN POTASSIUM 50 MG: 50 TABLET, FILM COATED ORAL at 20:25

## 2019-05-14 RX ADMIN — OXYCODONE AND ACETAMINOPHEN 2 TABLET: 5; 325 TABLET ORAL at 12:48

## 2019-05-14 RX ADMIN — HYDRALAZINE HYDROCHLORIDE 100 MG: 50 TABLET, FILM COATED ORAL at 20:25

## 2019-05-14 RX ADMIN — Medication 10 ML: at 20:25

## 2019-05-14 RX ADMIN — CINACALCET HYDROCHLORIDE 120 MG: 30 TABLET, COATED ORAL at 09:36

## 2019-05-14 RX ADMIN — OXYCODONE AND ACETAMINOPHEN 2 TABLET: 5; 325 TABLET ORAL at 05:48

## 2019-05-14 RX ADMIN — HEPARIN SODIUM 5000 UNITS: 5000 INJECTION INTRAVENOUS; SUBCUTANEOUS at 07:20

## 2019-05-14 RX ADMIN — PANTOPRAZOLE SODIUM 40 MG: 40 TABLET, DELAYED RELEASE ORAL at 07:20

## 2019-05-14 RX ADMIN — MORPHINE SULFATE 2 MG: 2 INJECTION, SOLUTION INTRAMUSCULAR; INTRAVENOUS at 22:48

## 2019-05-14 RX ADMIN — ATORVASTATIN CALCIUM 20 MG: 20 TABLET, FILM COATED ORAL at 20:25

## 2019-05-14 RX ADMIN — CLONIDINE HYDROCHLORIDE 0.2 MG: 0.2 TABLET ORAL at 20:25

## 2019-05-14 RX ADMIN — Medication 10 ML: at 09:41

## 2019-05-14 RX ADMIN — TIOTROPIUM BROMIDE 18 MCG: 18 CAPSULE ORAL; RESPIRATORY (INHALATION) at 07:46

## 2019-05-14 ASSESSMENT — PAIN DESCRIPTION - FREQUENCY
FREQUENCY: CONTINUOUS

## 2019-05-14 ASSESSMENT — PAIN DESCRIPTION - LOCATION
LOCATION: GENERALIZED
LOCATION: BACK;ARM;GENERALIZED
LOCATION: BACK
LOCATION: GENERALIZED

## 2019-05-14 ASSESSMENT — PAIN DESCRIPTION - ORIENTATION: ORIENTATION: LEFT;UPPER;RIGHT

## 2019-05-14 ASSESSMENT — PAIN DESCRIPTION - PAIN TYPE
TYPE: ACUTE PAIN

## 2019-05-14 ASSESSMENT — PAIN SCALES - GENERAL
PAINLEVEL_OUTOF10: 10
PAINLEVEL_OUTOF10: 8
PAINLEVEL_OUTOF10: 7
PAINLEVEL_OUTOF10: 10
PAINLEVEL_OUTOF10: 8
PAINLEVEL_OUTOF10: 10
PAINLEVEL_OUTOF10: 5
PAINLEVEL_OUTOF10: 7

## 2019-05-14 ASSESSMENT — PAIN DESCRIPTION - PROGRESSION: CLINICAL_PROGRESSION: NOT CHANGED

## 2019-05-14 ASSESSMENT — PAIN DESCRIPTION - DESCRIPTORS
DESCRIPTORS: ACHING

## 2019-05-14 ASSESSMENT — PAIN DESCRIPTION - ONSET
ONSET: ON-GOING

## 2019-05-14 NOTE — PLAN OF CARE
Patient is a fall risk during this admission. Fall risk assessment was performed. Patient is absent of falls. Bed is in the lowest position. Wheels on the bed are locked. Call light and bed side table are within reach. Clutter is removed. Patient was educated to call out when needing assistance or wanting to get out of bed. Patient offered toileting assistance during rounding. Hourly rounds have been performed. Pt medicated with pain medication prn. Assessed all pain characteristics including level, type, location, frequency, and onset. Non-pharmacologic interventions offered to pt as well. Pt states pain is tolerable at this time. Will continue to monitor     Patient continues to receive dialysis. Electrolytes continue to be monitored closely by nephrologist. Patient continues with personal fluid restriction of 2000ml/day.

## 2019-05-14 NOTE — PROGRESS NOTES
[facility]    Progress Note    5/14/2019    9:55 AM    Name:   Lj Siddiqui  MRN:     [id number]     [place]:      [de-identified]   Room:   2011/2011-02  IP Day:  5  Admit Date:  5/9/2019  1:12 PM    PCP:   Mayra Romo MD  Code Status:  Full Code    Subjective:     C/C:   Chief Complaint   Patient presents with    Back Pain     Interval History Status: improved. Patient seen and examined at dialysis unit, no acute events overnight. Had her tunneled catheter ( femoral access ) this am and currently getting her dialysis  Patient does report some fatigue and still has her persistent back pain ( chronic) , she  denies any chest pain, shortness of breath, chills, fevers, nausea or vomiting.   Patient vitals, labs and all providers notes were reviewed,from overnight shift and morning updates were noted and discussed with the nurse    Brief History:     Per chart  The patient is a 73 y.o.  AA female who presents with Back Pain   and she is admitted to the hospital for the management of  PE and pain.  She has had left upper back pain near shoulder for past 3 weeks.  VQ done in ER showed possible PE. Radha Roberts has also had chills and occ n/v but denies cp/sob/cough/f/s/s.     She has been placed on heparin drip-when I mentioned this the patient then tells me she hasn't gotten heparin on dialysis for over a year. Arley Sidhuer daughter then explained she had a hgb of 3-4 range repeatedly requiring transfusions every few months in 2017 and despite full GI workup, could never find source.     She has known lung mass and previously refused biopsy     Review of Systems:     Constitutional:  negative for chills, fevers, sweats, +ve fatigue  Respiratory:  negative for cough, +ve for dyspnea on exertion, shortness of breath, no  wheezing  Cardiovascular:  negative for chest pain, chest pressure/discomfort, lower extremity edema, palpitations  Gastrointestinal:  negative for (24hrs), Av.6 °F (36.4 °C), Min:91 °F (32.8 °C), Max:98.8 °F (37.1 °C)    No results for input(s): POCGLU in the last 72 hours. I/O (24Hr): Intake/Output Summary (Last 24 hours) at 2019 0955  Last data filed at 2019 1846  Gross per 24 hour   Intake 961 ml   Output 1853 ml   Net -892 ml       Labs:    Hematology:  Recent Labs     19  0540 19  0535   WBC 4.9 6.3   RBC 3.08* 3.23*   HGB 8.8* 9.1*   HCT 28.5* 30.5*   MCV 92.5 94.4   MCH 28.6 28.2   MCHC 30.9 29.8   RDW 18.0* 18.2*    278   MPV 11.5 11.8     Chemistry:  Recent Labs     19  0932 1940 19  0535    137 135   K 4.4 4.8 4.7   CL 92* 95* 95*   CO2 24 24 22   GLUCOSE 88 88 95   BUN 38* 47* 40*   CREATININE 7.03* 8.74* 7.69*   MG  --   --  2.5   ANIONGAP 19* 18* 18*   LABGLOM 6* 4* 5*   GFRAA 7* 5* 6*   CALCIUM 8.1* 8.1* 9.0   PHOS  --  6.3* 6.4*     No results for input(s): PROT, LABALBU, LABA1C, E0RZIVC, F7TUDWJ, FT4, TSH, AST, ALT, LDH, GGT, ALKPHOS, LABGGT, BILITOT, BILIDIR, AMMONIA, AMYLASE, LIPASE, LACTATE, CHOL, HDL, LDLCHOLESTEROL, CHOLHDLRATIO, TRIG, VLDL, NPH88CI, PHENYTOIN, PHENYF, URICACID, POCGLU in the last 72 hours.       Lab Results   Component Value Date/Time    SPECIAL NOT REPORTED 2017 10:05 PM     Lab Results   Component Value Date/Time    CULTURE NO SIGNIFICANT GROWTH 2017 10:05 PM    CULTURE  2017 10:05 PM     Performed at [facility], [address] [place] [address] [phone]       Lab Results   Component Value Date    PHART 7.472 2018    ZPT4TNB 35 2018    PO2ART 120 2018    BMG9YAD 25.9 2018    BEART 2 2018    LKF0HZJ 27 2018    B4FGBNMF 99 2018       Radiology:    EXAMINATION:  ONE SUPINE XRAY VIEW(S) OF THE ABDOMEN  2019 12:55 pm  FINDINGS:  Single intraoperative view of the abdomen centered over the low abdomen and  right hemipelvis demonstrates a large-bore dialysis catheter entering via the  right femoral approach and extending superiorly to the L3 vertebral level,  likely within the IVC. Multiple punctate radiopaque densities are noted  within the field of indeterminate etiology.    Impression:    Large bore dialysis catheter extending to the IVC level at       Physical Examination:        General appearance:  alert, chronically ill appearing, cooperative and no distress  Mental Status:  oriented to person, place and time and normal affect  Lungs:  clear to auscultation bilaterally, normal effort  Heart:  regular rate and rhythm, no murmur  Abdomen:  soft, nontender, nondistended, normal bowel sounds, no masses, hepatomegaly, splenomegaly  Extremities:  no edema, redness, tenderness in the calves, R femoral catheter noted  Skin:  no gross lesions, rashes, induration    Assessment:        Primary Problem  Primary malignant neoplasm of left lung metastatic to other site [facility])    Active Hospital Problems    Diagnosis Date Noted    Primary malignant neoplasm of left lung metastatic to other site [facility]) [C34.92] 05/09/2019    Hypertension [I10] 03/02/2017    Paroxysmal atrial fibrillation (HCC) [I48.0] 02/18/2017    Anemia of chronic disease [D63.8] 10/07/2016    Tobacco abuse [Z72.0] 08/07/2016    Malignant neoplasm of female breast ([facility] [address] [address] [C50.919]     End stage renal disease ([facility] [address] [N18.6] 11/04/2011       Plan:        S/P R femoral tunneled catheter placement 5/13  HD per nephro  Vascular to arrange AVF thrombectomy vs new fistula mostly as OP   Continue other chronic meds  DVT & GI PPx     Lung neoplasm with metastatic lesion to T4 likely, patient did refuse biopsy in the past    Her back pain is likely 2/2 her metastatic lesion    After discussing with the patient she is agreeing to have lung biopsy  Orders added     DC planning    Chadwick Green MD  5/14/2019  9:55 AM

## 2019-05-14 NOTE — PROGRESS NOTES
[place] was evaluated today and a DME order was entered for a wheeled walker with seat because she requires this to successfully complete daily living tasks of ambulating. A wheeled walker with seat is necessary due to the patient's unsteady gait, upper body weakness, inability to  and ambulation device, ambulating only short distances by pushing a walker, and the need to sit for a short time before resuming ambulation. These tasks cannot be completed with a lesser ambulation device such as a cane, crutch, or standard walker. The need for this equipment was discussed with the patient and she understands and is in agreement.

## 2019-05-14 NOTE — PROGRESS NOTES
Nephrology Progress Note        Subjective: The patient is seen and examined. Her left upper arm AV graft is clotted. She went to surgery on 19 for a tunneled femoral vein dialysis catheter placement and went to hemodialysis yesterday after the right femoral catheter placed. She is eating and drinking ok. She will require routine hemodialysis later today. EDW is 72.5 kg. Did not have dialysis on Saturday, 19. Improvement in her skin itching but does complain of pain and is on medication. She denies any no chest pain or shortness of breath or nausea or vomiting. She is on room air.     Objective:  CURRENT TEMPERATURE:  Temp: 98.4 °F (36.9 °C)  MAXIMUM TEMPERATURE OVER 24HRS:  Temp (24hrs), Av.6 °F (36.4 °C), Min:91 °F (32.8 °C), Max:98.8 °F (37.1 °C)    CURRENT RESPIRATORY RATE:  Resp: 17  CURRENT PULSE:  Pulse: 89  CURRENT BLOOD PRESSURE:  BP: (!) 123/55  24HR BLOOD PRESSURE RANGE:  Systolic (06NPM), MCR:442 , Min:71 , NQA:823   ; Diastolic (30RZI), TTM:79, Min:37, Max:77    24HR INTAKE/OUTPUT:      Intake/Output Summary (Last 24 hours) at 2019 1002  Last data filed at 2019 1846  Gross per 24 hour   Intake 961 ml   Output 1853 ml   Net -892 ml     Weight:      Physical Exam:  General appearance:Awake, alert, in no acute distress  Skin: warm and dry, no rash or erythema  Eyes: conjunctivae pale and sclera anicteric  ENT:no thrush, moist mucous membranes  Neck: no JVD, trachea midline, no accessory muscle use  Pulmonary: good bilateral air entry and clear to auscultation bilaterally without any wheezes or rales or rhonchi  Cardiovascular: regular rate and rhythm positive S1 and S2 and no rubs  Abdomen: Soft and non-tender and non-distended with active bowel sounds  Extremities: no pitting lower extremity edema    Access:  Right femoral vein dialysis catheter    Current Medications:      sodium citrate 4 % injection 0.12 g PRN   sodium citrate 4 % injection 0.12 g PRN   sodium chloride flush 0.9 % injection 10 mL PRN   heparin (porcine) injection 5,000 Units 3 times per day   nicotine (NICODERM CQ) 21 MG/24HR 1 patch Daily   cinacalcet (SENSIPAR) tablet 120 mg Daily   losartan (COZAAR) tablet 50 mg BID   pantoprazole (PROTONIX) tablet 40 mg QAM AC   atorvastatin (LIPITOR) tablet 20 mg Daily   edwina-anya tablet 1 tablet Daily   cloNIDine (CATAPRES) tablet 0.2 mg BID   lactulose (CHRONULAC) 10 GM/15ML solution 20 g Daily PRN   chlorhexidine (HIBICLENS) 4 % liquid Daily   hydrALAZINE (APRESOLINE) tablet 100 mg TID   tiotropium (SPIRIVA) inhalation capsule 18 mcg Daily   sodium chloride flush 0.9 % injection 10 mL 2 times per day   sodium chloride flush 0.9 % injection 10 mL PRN   magnesium hydroxide (MILK OF MAGNESIA) 400 MG/5ML suspension 30 mL Daily PRN   ondansetron (ZOFRAN-ODT) disintegrating tablet 4 mg Q6H PRN   Or    ondansetron (ZOFRAN) injection 4 mg Q6H PRN   oxyCODONE-acetaminophen (PERCOCET) 5-325 MG per tablet 1 tablet Q4H PRN   oxyCODONE-acetaminophen (PERCOCET) 5-325 MG per tablet 2 tablet Q4H PRN     Labs:   CBC:   Recent Labs     05/13/19  0540 05/14/19  0535   WBC 4.9 6.3   RBC 3.08* 3.23*   HGB 8.8* 9.1*   HCT 28.5* 30.5*   MCV 92.5 94.4   MCH 28.6 28.2   MCHC 30.9 29.8   RDW 18.0* 18.2*    278   MPV 11.5 11.8      BMP:   Recent Labs     05/12/19  0932 05/13/19  0540 05/14/19  0535    137 135   K 4.4 4.8 4.7   CL 92* 95* 95*   CO2 24 24 22   BUN 38* 47* 40*   CREATININE 7.03* 8.74* 7.69*   GLUCOSE 88 88 95   CALCIUM 8.1* 8.1* 9.0        Phosphorus:    Recent Labs     05/13/19  0540 05/14/19  0535   PHOS 6.3* 6.4*     Magnesium:   Recent Labs     05/14/19  0535   MG 2.5     Albumin: No results for input(s): LABALBU in the last 72 hours. Dialysis bath: Dialysis Bath  K+ (Potassium): 2  Ca+ (Calcium): 2.5  Na+ (Sodium): 138  HCO3 (Bicarb): 30    Radiology:  Reviewed as available. Assessment:  1. ESRD  2. Clotted AV graft left upper arm  3.  Status post right femoral

## 2019-05-14 NOTE — PLAN OF CARE
Problem: Falls - Risk of:  Goal: Will remain free from falls  Description  Will remain free from falls  Outcome: Ongoing  Goal: Absence of physical injury  Description  Absence of physical injury  Outcome: Ongoing     Problem: Tobacco Use:  Goal: Inpatient tobacco use cessation counseling participation  Description  Inpatient tobacco use cessation counseling participation  Outcome: Ongoing     Problem: Pain:  Goal: Pain level will decrease  Description  Pain level will decrease  Outcome: Ongoing  Goal: Control of acute pain  Description  Control of acute pain  Outcome: Ongoing  Goal: Control of chronic pain  Description  Control of chronic pain  Outcome: Ongoing Goals are not all being met. Pt. Has ongoing pain,  Hgb. Of 8.8, and  now has new  temporary  catheter for the  dialysis placed in the   right groin. This dressing is intact. She has  ongoing chest wall pain  due to   tumor growing in the upper left lung. She is taking oral  percocet for pain control and states it is effective. Continue plan of care .

## 2019-05-14 NOTE — PROGRESS NOTES
Osteoarthritis, Pneumonia, Pulmonary emphysema ([facility] [address] [facility] Renal failure, Stenosis of left carotid artery, and Tobacco abuse.   has a past surgical history that includes Carpal tunnel release; knee surgery; Breast surgery; Ectopic pregnancy surgery; Upper gastrointestinal endoscopy (09/30/2016); pr esophagogastroduodenoscopy transoral diagnostic (N/A, 2/22/2017); pr colon ca scrn not hi rsk ind (N/A, 2/22/2017); Abdomen surgery; Colonoscopy (02/22/2017); Tunneled venous port placement (Right, 05/13/2019); and hc dialysis catheter (N/A, 5/13/2019). Restrictions  Restrictions/Precautions  Restrictions/Precautions: Up as Tolerated, General Precautions, Fall Risk  Required Braces or Orthoses?: No  Position Activity Restriction  Other position/activity restrictions: Dialysis T, Th, and Sat.     Subjective   General  Chart Reviewed: Yes  Patient assessed for rehabilitation services?: Yes  Family / Caregiver Present: No  Referring Practitioner: Dr Gibson request KATHY tomlin prior to D/C per nursing     Social/Functional History  Social/Functional History  Lives With: Alone  Type of Home: Apartment([address])  Home Layout: One level  Home Access: Level entry  Bathroom Shower/Tub: Walk-in shower  Bathroom Toilet: Standard  Bathroom Equipment: Grab bars in shower, Hand-held shower, Grab bars around toilet, Shower chair  Home Equipment: [employer] Help From: Family, Neighbor  ADL Assistance: Independent(HHA will assist pt. when she is there, however pt. states she is capeable of doing it on her own)  [address]: Independent(dgter brings meals as needed)  Homemaking Responsibilities: Yes(HHA assist with cleaning)  Ambulation Assistance: Independent(cane)  Transfer Assistance: Independent  Active : No  Mode of Transportation: Cab  Occupation: Retired  Leisure & Hobbies: volunteers for [employer] at a   Additional Comments: Pt. has a neighbor who checks on her at night after work Dominance  Hand Dominance: Right             Plan   Plan  Times per week: 4-5x/week, 1-2x/day  Current Treatment Recommendations: Strengthening, Balance Training, Functional Mobility Training, Endurance Training, Equipment Evaluation, Education, & procurement, Patient/Caregiver Education & Training, Self-Care / ADL, Safety Education & Training    G-Code     OutComes Score                                                  AM-PAC Score             Goals  Short term goals  Time Frame for Short term goals: by discharge, pt will   Short term goal 1: demo S/MI with ADL transfers with approp AD/DME and good safety  Short term goal 2: demo S/MI with functional mob for ADL completion with good safety/pacing and approp AD  Short term goal 3: demo S/MI with toileting routine with good safety  Short term goal 4: demo I with UB ADLs and SBA LB ADLs with approp AE/DME  Short term goal 5: demo and verb good understanding of fall prevention techs, EC/WS techs, and possible equip needs for home  Patient Goals   Patient goals : to go home       Therapy Time   Individual Concurrent Group Co-treatment   Time In 1141(+10 min chart review)         Time Out 1201         Minutes 20              Pt would benefit from skilled home OT services in order to maximize occupational performance, safety, and independence in the home environment.      Lashawn Rodgers, OT

## 2019-05-14 NOTE — PROGRESS NOTES
a past medical history of Anemia, Asthma, Atrial fibrillation ([facility] [address] Breast cancer ([facility] [address] Bursitis, Carpal tunnel syndrome, Chronic obstructive pulmonary disease (COPD) ([facility] [address] [facility] Disease of blood and blood forming organ, History of blood transfusion, Hypertension, Irregular heart beat, Lung nodule, Osteoarthritis, Pneumonia, Pulmonary emphysema ([facility] [address] Renal failure, Stenosis of left carotid artery, and Tobacco abuse.   has a past surgical history that includes Carpal tunnel release; knee surgery; Breast surgery; Ectopic pregnancy surgery; Upper gastrointestinal endoscopy (09/30/2016); pr esophagogastroduodenoscopy transoral diagnostic (N/A, 2/22/2017); pr colon ca scrn not hi rsk ind (N/A, 2/22/2017); Abdomen surgery; Colonoscopy (02/22/2017); Tunneled venous port placement (Right, 05/13/2019); and hc dialysis catheter (N/A, 5/13/2019). Restrictions  Restrictions/Precautions  Restrictions/Precautions: Up as Tolerated  Required Braces or Orthoses?: No  Position Activity Restriction  Other position/activity restrictions: Dialysis T, Th, and Sat.   Vision/Hearing  Vision: Within Functional Limits  Hearing: Within functional limits     Subjective  General  Chart Reviewed: Yes  Patient assessed for rehabilitation services?: Yes  Family / Caregiver Present: No  Follows Commands: Within Functional Limits  Pain Screening  Patient Currently in Pain: Yes  Vital Signs  Patient Currently in Pain: Yes       Orientation  Orientation  Overall Orientation Status: Within Functional Limits  Social/Functional History  Social/Functional History  Lives With: Alone  Type of Home: [address]ment([address]  [facility] [facility])  Home Layout: One level  Home Access: Level entry  Bathroom Shower/Tub: Walk-in shower  Bathroom Toilet: Standard  Bathroom Equipment: Grab bars in shower, Hand-held shower, Grab bars around toilet, Shower chair  Home Equipment: [employer] Help From: Family, Neighbor  ADL Assistance: Independent(HHA will assist pt. when she is there, however pt. states she is capeable of doing it on her own)  Homemaking Assistance: Independent(dgter brings meals as needed)  Homemaking Responsibilities: Yes(HHA assist with cleaning)  Ambulation Assistance: Independent(cane)  Transfer Assistance: Independent  Active : No  Mode of Transportation: Cab  Occupation: Retired  Leisure & Hobbies: volunteers for [employer] at a   Additional Comments: Pt. has a neighbor who checks on her at night after work  Cognition        Objective     Observation/Palpation  Posture: Good    AROM RLE (degrees)  RLE AROM: WFL  AROM LLE (degrees)  LLE AROM : WFL  AROM RUE (degrees)  RUE AROM : WFL  AROM LUE (degrees)  LUE AROM : WFL  Strength RLE  Strength RLE: WFL  Strength LLE  Strength LLE: WFL  Strength RUE  Strength RUE: WFL  Strength LUE  Strength LUE: WFL  Tone RLE  RLE Tone: Normotonic  Tone LLE  LLE Tone: Normotonic     Bed mobility  Supine to Sit: Stand by assistance  Sit to Supine: Stand by assistance  Scooting: Stand by assistance  Transfers  Sit to Stand: Contact guard assistance  Stand to sit: Contact guard assistance  Ambulation  Ambulation?: Yes  Ambulation 1  Surface: level tile  Device: Single point cane  Assistance: Contact guard assistance  Quality of Gait: Short step length; fwd. head posture. Pt. declines use of RW. Distance: 100ft. Comments: UP to chair with chair alarm.        Balance  Posture: Fair(moderately severe fwd. head)  Sitting - Static: Good  Sitting - Dynamic: Good  Standing - Static: Good;-(with st. cane)        Plan   Plan  Times per week: 1-2x/day; 5-6days/wk  Specific instructions for Next Treatment: Try 2QD with pt; spoke with d/c planner re. getting one for pt. at discharge  Current Treatment Recommendations: Strengthening, Functional Mobility Training, Home Exercise Program, ROM, Transfer Training, Gait Training, Safety Education & Training, Balance Training, Endurance Training, Patient/Caregiver Education & Training  Safety Devices  Type of devices: All fall risk precautions in place, Gait belt, Patient at risk for falls, Call light within reach, Left in chair, Chair alarm in place, Nurse notified    AM-PAC Score  AM-PAC Inpatient Mobility Raw Score : 22  AM-PAC Inpatient T-Scale Score : 53.28  Mobility Inpatient CMS 0-100% Score: 20.91  Mobility Inpatient CMS G-Code Modifier : CJ          Goals  Short term goals  Time Frame for Short term goals: 12 visits:  Short term goal 1: Pt. to be indep with bed mob. Short term goal 2: Pt. to be indep with transfers  Short term goal 3: Pt. to be indep with gait with approp. assistive device, 150ft. Short term goal 4: Pt. to have good+ standing dynamic balance with approp. assistive device as needed  Short term goal 5: Pt. to tolerate 25+ min. of PT for ther ex/ gait/ balance/ functional mobility training daily  Patient Goals   Patient goals : d/c home       Therapy Time   Individual Concurrent Group Co-treatment   Time In 2875         Time Out 1151(10 min.  chart review)         Minutes 701 [address], SANJANA

## 2019-05-14 NOTE — OP NOTE
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 OPERATION PERFORMED:  Placement of the right femoral dual tunneled  catheter under local anesthesia. Anesthesia standing by and under OEC  9900 fluoroscopic approach. SURGEON:  Dr. Gunnar Spencer. BRIEF CLINICAL SUMMARY:  The patient is 63-year-old female who has  chronic renal failure and hemodialysis for number of years. The patient  has developed a thrombosis of the left brachioaxillary AV fistula and  this is the fistula that I have created and has revised several times. The last revision of this fistula is from 2018. The patient was  evaluated with a basically normal potassium yesterday and potassium was  not an issue on , 2019. I discussed with Dr. Lawrence Saini,  Nephrology and we decided to proceed semi-electively as today for the  placement of the catheter and considerations for probably new AV  fistula. I believe that the chances to revive the other AV fistula is  very remote. Conditions were discussed with the patient and the family  and basically she was scheduled to proceed as indicated for a right  femoral catheter tunnel double-lumen. OPERATIVE PROCEDURE:  The patient was transferred to room 5. The  patient was in supine position under Anesthesia standing by and local  sedation.   OEC 9900 was used as a C-arm fluoroscopic and the patient left the operating room in good  condition.         Phoenix Vann    D: 05/14/2019 11:40:53       T: 05/14/2019 13:03:07     [email or web address]  Job#: [id number]     Doc#: [id number]    CC:

## 2019-05-14 NOTE — PROGRESS NOTES
Dialysis Post Treatment Report:     -Access Assessment  wnl     -Ultrafiltration   UF Goal 1000   Prime (-) 400   NS Flush (-) 100   Other (-/+)    Total UF Removed 500     -Medications / Blood Administration  Medications Given (Y/N) n   Blood Products (Y/N) n     Narrative:  Pt tolerated hd no issues noted

## 2019-05-14 NOTE — PLAN OF CARE
Problem: Falls - Risk of:  Goal: Will remain free from falls  Description  Will remain free from falls  5/14/2019 0311 by Allie Bedoya RN  Outcome: Ongoing     Problem: Pain:  Goal: Pain level will decrease  Description  Pain level will decrease  5/14/2019 0311 by Allie Bedoya RN  Outcome: Ongoing     Problem: Pain:  Goal: Control of acute pain  Description  Control of acute pain  5/14/2019 0311 by Allie Bedoya RN  Outcome: Ongoing     Problem: Pain:  Goal: Control of chronic pain  Description  Control of chronic pain  5/14/2019 0311 by Allie Bedoya RN  Outcome: Ongoing

## 2019-05-15 ENCOUNTER — APPOINTMENT (OUTPATIENT)
Dept: CT IMAGING | Age: 74
DRG: 180 | End: 2019-05-15
Payer: COMMERCIAL

## 2019-05-15 ENCOUNTER — HOSPITAL ENCOUNTER (OUTPATIENT)
Dept: PHYSICAL THERAPY | Age: 74
Setting detail: THERAPIES SERIES
Discharge: HOME | End: 2019-05-15
Payer: COMMERCIAL

## 2019-05-15 PROBLEM — E87.70 VOLUME OVERLOAD: Status: ACTIVE | Noted: 2019-05-15

## 2019-05-15 PROBLEM — E44.0 MODERATE MALNUTRITION (HCC): Chronic | Status: ACTIVE | Noted: 2019-05-15

## 2019-05-15 LAB
ANION GAP SERPL CALCULATED.3IONS-SCNC: 14 MMOL/L (ref 9–17)
BUN BLDV-MCNC: 22 MG/DL (ref 8–23)
BUN/CREAT BLD: 4 (ref 9–20)
CALCIUM SERPL-MCNC: 9.1 MG/DL (ref 8.6–10.4)
CHLORIDE BLD-SCNC: 96 MMOL/L (ref 98–107)
CO2: 25 MMOL/L (ref 20–31)
CREAT SERPL-MCNC: 5.37 MG/DL (ref 0.5–0.9)
GFR AFRICAN AMERICAN: 10 ML/MIN
GFR NON-AFRICAN AMERICAN: 8 ML/MIN
GFR SERPL CREATININE-BSD FRML MDRD: ABNORMAL ML/MIN/{1.73_M2}
GFR SERPL CREATININE-BSD FRML MDRD: ABNORMAL ML/MIN/{1.73_M2}
GLUCOSE BLD-MCNC: 111 MG/DL (ref 70–99)
HCT VFR BLD CALC: 28.9 % (ref 36.3–47.1)
HEMOGLOBIN: 8.7 G/DL (ref 11.9–15.1)
INR BLD: 1.1
MCH RBC QN AUTO: 28.1 PG (ref 25.2–33.5)
MCHC RBC AUTO-ENTMCNC: 30.1 G/DL (ref 28.4–34.8)
MCV RBC AUTO: 93.2 FL (ref 82.6–102.9)
NRBC AUTOMATED: 0 PER 100 WBC
PDW BLD-RTO: 17.9 % (ref 11.8–14.4)
PHOSPHORUS: 5 MG/DL (ref 2.6–4.5)
PLATELET # BLD: 250 K/UL (ref 138–453)
PMV BLD AUTO: 11.2 FL (ref 8.1–13.5)
POTASSIUM SERPL-SCNC: 4.4 MMOL/L (ref 3.7–5.3)
PROTHROMBIN TIME: 10.8 SEC (ref 9.7–11.6)
RBC # BLD: 3.1 M/UL (ref 3.95–5.11)
SODIUM BLD-SCNC: 135 MMOL/L (ref 135–144)
WBC # BLD: 6.4 K/UL (ref 3.5–11.3)

## 2019-05-15 PROCEDURE — 84100 ASSAY OF PHOSPHORUS: CPT

## 2019-05-15 PROCEDURE — 88342 IMHCHEM/IMCYTCHM 1ST ANTB: CPT

## 2019-05-15 PROCEDURE — 6370000000 HC RX 637 (ALT 250 FOR IP): Performed by: SURGERY

## 2019-05-15 PROCEDURE — 6360000002 HC RX W HCPCS: Performed by: RADIOLOGY

## 2019-05-15 PROCEDURE — 32405 CT NEEDLE BIOPSY LUNG PERCUTANEOUS: CPT

## 2019-05-15 PROCEDURE — 2580000003 HC RX 258: Performed by: SURGERY

## 2019-05-15 PROCEDURE — 2709999900 CT GUIDED NEEDLE PLACEMENT

## 2019-05-15 PROCEDURE — 80048 BASIC METABOLIC PNL TOTAL CA: CPT

## 2019-05-15 PROCEDURE — 97535 SELF CARE MNGMENT TRAINING: CPT | Performed by: NURSE PRACTITIONER

## 2019-05-15 PROCEDURE — 88333 PATH CONSLTJ SURG CYTO XM 1: CPT

## 2019-05-15 PROCEDURE — 94640 AIRWAY INHALATION TREATMENT: CPT

## 2019-05-15 PROCEDURE — 97530 THERAPEUTIC ACTIVITIES: CPT | Performed by: NURSE PRACTITIONER

## 2019-05-15 PROCEDURE — 0BBG3ZX EXCISION OF LEFT UPPER LUNG LOBE, PERCUTANEOUS APPROACH, DIAGNOSTIC: ICD-10-PCS | Performed by: RADIOLOGY

## 2019-05-15 PROCEDURE — 88341 IMHCHEM/IMCYTCHM EA ADD ANTB: CPT

## 2019-05-15 PROCEDURE — 36415 COLL VENOUS BLD VENIPUNCTURE: CPT

## 2019-05-15 PROCEDURE — 88305 TISSUE EXAM BY PATHOLOGIST: CPT

## 2019-05-15 PROCEDURE — 85027 COMPLETE CBC AUTOMATED: CPT

## 2019-05-15 PROCEDURE — 99232 SBSQ HOSP IP/OBS MODERATE 35: CPT | Performed by: FAMILY MEDICINE

## 2019-05-15 PROCEDURE — 1200000000 HC SEMI PRIVATE

## 2019-05-15 PROCEDURE — 85610 PROTHROMBIN TIME: CPT

## 2019-05-15 RX ORDER — ALBUTEROL SULFATE 2.5 MG/3ML
2.5 SOLUTION RESPIRATORY (INHALATION)
Status: DISCONTINUED | OUTPATIENT
Start: 2019-05-15 | End: 2019-05-16

## 2019-05-15 RX ORDER — MIDAZOLAM HYDROCHLORIDE 1 MG/ML
INJECTION INTRAMUSCULAR; INTRAVENOUS
Status: COMPLETED | OUTPATIENT
Start: 2019-05-15 | End: 2019-05-15

## 2019-05-15 RX ORDER — FENTANYL CITRATE 50 UG/ML
INJECTION, SOLUTION INTRAMUSCULAR; INTRAVENOUS
Status: COMPLETED | OUTPATIENT
Start: 2019-05-15 | End: 2019-05-15

## 2019-05-15 RX ORDER — ACETAMINOPHEN 325 MG/1
325 TABLET ORAL EVERY 4 HOURS PRN
Status: DISCONTINUED | OUTPATIENT
Start: 2019-05-15 | End: 2019-05-17 | Stop reason: HOSPADM

## 2019-05-15 RX ADMIN — Medication 50 MCG: at 13:09

## 2019-05-15 RX ADMIN — HYDRALAZINE HYDROCHLORIDE 100 MG: 50 TABLET, FILM COATED ORAL at 14:48

## 2019-05-15 RX ADMIN — Medication 10 ML: at 14:58

## 2019-05-15 RX ADMIN — MIDAZOLAM 0.5 MG: 1 INJECTION INTRAMUSCULAR; INTRAVENOUS at 13:09

## 2019-05-15 RX ADMIN — CHLORHEXIDINE GLUCONATE: 213 SOLUTION TOPICAL at 10:06

## 2019-05-15 RX ADMIN — HYDRALAZINE HYDROCHLORIDE 100 MG: 50 TABLET, FILM COATED ORAL at 21:12

## 2019-05-15 RX ADMIN — OXYCODONE AND ACETAMINOPHEN 2 TABLET: 5; 325 TABLET ORAL at 14:49

## 2019-05-15 RX ADMIN — LOSARTAN POTASSIUM 50 MG: 50 TABLET, FILM COATED ORAL at 21:12

## 2019-05-15 RX ADMIN — CLONIDINE HYDROCHLORIDE 0.2 MG: 0.2 TABLET ORAL at 21:12

## 2019-05-15 RX ADMIN — TIOTROPIUM BROMIDE 18 MCG: 18 CAPSULE ORAL; RESPIRATORY (INHALATION) at 09:55

## 2019-05-15 RX ADMIN — PANTOPRAZOLE SODIUM 40 MG: 40 TABLET, DELAYED RELEASE ORAL at 05:51

## 2019-05-15 RX ADMIN — Medication 1 TABLET: at 14:53

## 2019-05-15 RX ADMIN — OXYCODONE AND ACETAMINOPHEN 2 TABLET: 5; 325 TABLET ORAL at 04:39

## 2019-05-15 RX ADMIN — Medication 50 MCG: at 13:13

## 2019-05-15 RX ADMIN — LACTULOSE 20 G: 20 SOLUTION ORAL at 05:59

## 2019-05-15 RX ADMIN — CINACALCET HYDROCHLORIDE 120 MG: 30 TABLET, COATED ORAL at 14:53

## 2019-05-15 RX ADMIN — OXYCODONE AND ACETAMINOPHEN 2 TABLET: 5; 325 TABLET ORAL at 21:16

## 2019-05-15 RX ADMIN — LOSARTAN POTASSIUM 50 MG: 50 TABLET, FILM COATED ORAL at 14:53

## 2019-05-15 RX ADMIN — ATORVASTATIN CALCIUM 20 MG: 20 TABLET, FILM COATED ORAL at 21:12

## 2019-05-15 ASSESSMENT — PAIN SCALES - GENERAL
PAINLEVEL_OUTOF10: 8
PAINLEVEL_OUTOF10: 7
PAINLEVEL_OUTOF10: 7
PAINLEVEL_OUTOF10: 8

## 2019-05-15 ASSESSMENT — PAIN DESCRIPTION - FREQUENCY
FREQUENCY: CONTINUOUS
FREQUENCY: CONTINUOUS

## 2019-05-15 ASSESSMENT — PAIN DESCRIPTION - ONSET
ONSET: ON-GOING
ONSET: ON-GOING

## 2019-05-15 ASSESSMENT — PAIN DESCRIPTION - PAIN TYPE
TYPE: ACUTE PAIN
TYPE: CHRONIC PAIN
TYPE: ACUTE PAIN

## 2019-05-15 ASSESSMENT — PAIN DESCRIPTION - DESCRIPTORS
DESCRIPTORS: ACHING
DESCRIPTORS: ACHING

## 2019-05-15 ASSESSMENT — PAIN DESCRIPTION - LOCATION
LOCATION: GENERALIZED

## 2019-05-15 ASSESSMENT — PAIN - FUNCTIONAL ASSESSMENT: PAIN_FUNCTIONAL_ASSESSMENT: ACTIVITIES ARE NOT PREVENTED

## 2019-05-15 ASSESSMENT — PAIN DESCRIPTION - PROGRESSION: CLINICAL_PROGRESSION: GRADUALLY IMPROVING

## 2019-05-15 NOTE — PLAN OF CARE
[facility]    Second Visit Note  For more detailed information please refer to the progress note of the day      5/15/2019    6:12 PM    Name:   Duy Diana  MRN:     [id number]     Alinaide:      [de-identified]   Room:   2011/2011-02  IP Day:  6  Admit Date:  5/9/2019  1:12 PM    PCP:   Jimenez Escamilla MD  Code Status:  Full Code        Pt vitals were reviewed   New labs were reviewed   Patient was seen, doing ok after the biopsy  Not seen by oncology yet    Updated plan :     1.  Continue current management        Carla Mark MD  5/15/2019  6:12 PM

## 2019-05-15 NOTE — PROGRESS NOTES
Call placed to radiology and transferred to [place] [facility]. CTA from 5/11/2019 does not have matching impression and narrative. Addendum to be completed.

## 2019-05-15 NOTE — PROGRESS NOTES
Bursitis, Carpal tunnel syndrome, Chronic obstructive pulmonary disease (COPD) ([facility] [address] Disease of blood and blood forming organ, History of blood transfusion, Hypertension, Irregular heart beat, Lung nodule, Osteoarthritis, Pneumonia, Pulmonary emphysema ([facility] [address] Renal failure, Stenosis of left carotid artery, and Tobacco abuse.   has a past surgical history that includes Carpal tunnel release; knee surgery; Breast surgery; Ectopic pregnancy surgery; Upper gastrointestinal endoscopy (09/30/2016); pr esophagogastroduodenoscopy transoral diagnostic (N/A, 2/22/2017); pr colon ca scrn not hi rsk ind (N/A, 2/22/2017); Abdomen surgery; Colonoscopy (02/22/2017); Tunneled venous port placement (Right, 05/13/2019); and hc dialysis catheter (N/A, 5/13/2019). Restrictions  Restrictions/Precautions  Restrictions/Precautions: Up as Tolerated, General Precautions, Fall Risk  Required Braces or Orthoses?: No  Position Activity Restriction  Other position/activity restrictions: Up with assist, bed/chair alarm  Subjective   General  Chart Reviewed: Yes  Patient assessed for rehabilitation services?: Yes  Response to previous treatment: Patient with no complaints from previous session  Family / Caregiver Present: No  Referring Practitioner: Dr Arthur tomlin prior to D/C per nursing  Pain Assessment  Pain Assessment: 0-10  Pain Level: 7  Pain Type: Acute pain  Pain Location: Generalized  Vital Signs  Patient Currently in Pain: Yes  Oxygen Therapy  O2 Device: None (Room air)   Orientation  Orientation  Overall Orientation Status: Within Functional Limits  Objective    ADL  LE Dressing: Setup(With encouragement to don socks seated EOB)  Toileting: Stand by assistance        Balance  Sitting Balance: Modified independent   Standing Balance: Stand by assistance  Functional Mobility  Functional - Mobility Device: 4-Wheeled Walker  Activity: To/from bathroom; Other  Assist Level: Minimal assistance  Functional Mobility Comments:  Max VC for safety and navigation around hazards and objects with poor carryover. Pt would benefit from continued edu and completion of safe 4WW use during functional activities such as ADLs, ADL transfers, bathroom and room navigation as well as problem solving. Max VC for pursed lip breathing secondary to SOB, poor carryover. Toilet Transfers  Toilet - Technique: Ambulating  Equipment Used: Grab bars  Toilet Transfer: Minimal assistance  Toilet Transfers Comments: VC for safe 4WW use with poor carryover  Bed mobility  Supine to Sit: Stand by assistance  Sit to Supine: Stand by assistance  Comment: With HOB raised and use of bed rails                          Cognition  Overall Cognitive Status: Exceptions  Following Commands: Inconsistently follows commands; Follows one step commands with repetition;Does not follow commands  Attention Span: Difficulty attending to directions  Memory: Decreased recall of precautions;Decreased short term memory  Problem Solving: Decreased awareness of errors  Insights: Decreased awareness of deficits  Initiation: Requires cues for some  Sequencing: Requires cues for some     Perception  Overall Perceptual Status: [facility]        Additional Activities Comment  Additional Activities:   Upon writer exit, call light within reach, pt retired to bed. All lines intact and patient positioned comfortably. All patient needs addressed prior to ending therapy session. Chart reviewed prior to treatment and patient is agreeable for therapy. RN reports patient is medically stable for therapy treatment this date.                           Plan   Plan  Times per week: 4-5x/week, 1-2x/day  Current Treatment Recommendations: Strengthening, Balance Training, Functional Mobility Training, Endurance Training, Equipment Evaluation, Education, & procurement, Patient/Caregiver Education & Training, Self-Care / ADL, Safety Education & Training    AM-PAC Score        AM-PAC Inpatient Daily Activity Raw Score: 20  AM-PAC Inpatient ADL T-Scale Score : 42.03  ADL Inpatient CMS 0-100% Score: 38.32  ADL Inpatient CMS G-Code Modifier : CJ    Goals  Short term goals  Time Frame for Short term goals: by discharge, pt will   Short term goal 1: demo S/MI with ADL transfers with approp AD/DME and good safety  Short term goal 2: demo S/MI with functional mob for ADL completion with good safety/pacing and approp AD  Short term goal 3: demo S/MI with toileting routine with good safety  Short term goal 4: demo I with UB ADLs and SBA LB ADLs with approp AE/DME  Short term goal 5: demo and verb good understanding of fall prevention techs, EC/WS techs, and possible equip needs for home  Patient Goals   Patient goals : to go home       Therapy Time   Individual Concurrent Group Co-treatment   Time In 1111         Time Out 1136         Minutes [address] [place] [address]

## 2019-05-15 NOTE — PROGRESS NOTES
Physical Therapy  DATE: 5/15/2019    NAME: Bill Lux  MRN: [id number]   : 1945    Patient not seen this date for Physical Therapy due to:  [] Blood transfusion in progress  [] Cancel by RN  [] Hemodialysis  []  Refusal by Patient   [] Spine Precautions   [] Strict Bedrest  [] Surgery  [] Testing      [x] Other (out of room for Left Lung Biopsy procedure)         [] PT being discontinued at this time. Patient independent. No further needs. [] PT being discontinued at this time as the patient has been transferred to hospice care. No further needs.     Munir Antoine, ROSMERY

## 2019-05-15 NOTE — CARE COORDINATION
Met with patient and she has not received her rollator walker yet. Phone call to [employer] and they have no record of the order. Re faxed order and note for rollator walker to them and asked for delivery later today or tomorrow. Lung BX done today.

## 2019-05-15 NOTE — PROGRESS NOTES
headache    Medications: Allergies: Allergies   Allergen Reactions    Pcn [Penicillins] Hives and Itching    Sulfa Antibiotics      difficulty moving???       Current Meds:   Scheduled Meds:    [Held by provider] heparin (porcine)  5,000 Units Subcutaneous 3 times per day    nicotine  1 patch Transdermal Daily    cinacalcet  120 mg Oral Daily    losartan  50 mg Oral BID    pantoprazole  40 mg Oral QAM AC    atorvastatin  20 mg Oral Daily    edwina-anya  1 tablet Oral Daily    cloNIDine  0.2 mg Oral BID    chlorhexidine   Topical Daily    hydrALAZINE  100 mg Oral TID    tiotropium  18 mcg Inhalation Daily    sodium chloride flush  10 mL Intravenous 2 times per day     Continuous Infusions:   PRN Meds: acetaminophen, sodium citrate, sodium citrate, sodium chloride flush, lactulose, sodium chloride flush, magnesium hydroxide, ondansetron **OR** ondansetron, oxyCODONE-acetaminophen, oxyCODONE-acetaminophen    Data:     Past Medical History:   has a past medical history of Anemia, Asthma, Atrial fibrillation ([facility] [address] Breast cancer ([facility] [address] Bursitis, Carpal tunnel syndrome, Chronic obstructive pulmonary disease (COPD) ([address] Disease of blood and blood forming organ, History of blood transfusion, Hypertension, Irregular heart beat, Lung nodule, Osteoarthritis, Pneumonia, Pulmonary emphysema ([address] Renal failure, Stenosis of left carotid artery, and Tobacco abuse. Social History:   reports that she has been smoking cigarettes. She has a 13.75 pack-year smoking history. She has never used smokeless tobacco. She reports that she does not drink alcohol or use drugs.      Family History:   Family History   Problem Relation Age of Onset    Cancer Father     Diabetes Sister        Vitals:  BP (!) 153/62   Pulse 95   Temp 97.5 °F (36.4 °C) (Oral)   Resp 16   Ht 5' 6\" (1.676 m)   Wt 146 lb 14.4 oz (66.6 kg)   SpO2 97%   BMI 23.71 kg/m²   Temp (24hrs), Av.3 °F (36.8 °C), Min:97.5 °F (36.4 °C), Max:98.8

## 2019-05-15 NOTE — CONSULTS
Pulmonary Medicine and [address] Maxime, YOGESH / Dr. Stefani Junior M.D. Patient - Danette Park   MRN -  [id number]   Acct # - [de-identified]   - 1945      Date of Admission -  2019  1:12 PM  Date of evaluation -  5/15/2019  Room -    Hospital Day - 6  Consulting - Sunil Jo MD Primary Care Physician - [address] Radha Yip MD     Reason for Consult    Lung mass    Assessment   · Left upper lobe lung mass with satellite nodules/mediastinal adenopathy concerning for malignancy, Osseous metastatic lesion   · COPD  · Active smoking/40 pack year smoking history   · ESRD on HD  · History of left sided breast cancer s/p [id number]     Recommendations   · 2 liters/min via nasal cannula PRN  · Await biopsy results   · Albuterol Q 4 hours prn  · Spiriva  · HD per nephrology   · Nicoderm patch/smoking cessation education   · X-ray chest in am  · DVT prophylaxis with low molecular weight heparin  · Incentive spirometry every hour while awake  · Will follow with you    Problem List      Patient Active Problem List   Diagnosis    End stage renal disease ([address]    Anemia    Malignant neoplasm of female breast ([address]    Tobacco abuse    Non-rheumatic mitral regurgitation    Gastrointestinal hemorrhage    NSAID long-term use    Anemia of chronic disease    Erosive gastritis    Iron deficiency anemia    Paroxysmal atrial fibrillation (HCC)    Hypertension    Severe anemia    Fatigue    Colon polyp    Pulmonary emphysema ([address]    Stenosis of left carotid artery    Anemia of chronic renal failure, stage 5 ([address]    COPD exacerbation ([address]    Need for vaccination    Primary malignant neoplasm of left lung metastatic to other site [facility])    Pulmonary embolus ([address]    Pain    Volume overload    Moderate malnutrition ([address]       HPI     Danette Park is 72 y.o.,  female, admitted because of lung mass after complaints of back pain.  She was admitted on 5/9/19 and underwent biopsy of GOYO lung mass earlier today, cytology pending. She is smoking a few cigarettes per day with around 40 pack year smoking history. She has past history of breast cancer of the left breast s/p lumpectomy in 1988. She admits to a cough that is occasionally productive, at times her sputum is pink tinged. She denies shortness of breath or chest pain, no fever or chills. She does have back pain intermittently.      PMHx   Past Medical History      Diagnosis Date    Anemia     Asthma     Atrial fibrillation ([address] 2/18/2017    Breast cancer ([address] 1988     has annual mammograms only, no heme/onc    Bursitis     bilateral    Carpal tunnel syndrome     Chronic obstructive pulmonary disease (COPD) ([address]     Disease of blood and blood forming organ     History of blood transfusion     Hypertension     Irregular heart beat     Lung nodule     Osteoarthritis     knee and back    Pneumonia     Pulmonary emphysema ([address] 6/9/2017    Renal failure      end stage renal failure on dialysis    Stenosis of left carotid artery 6/9/2017    Tobacco abuse       Past Surgical History        Procedure Laterality Date    ABDOMEN SURGERY      BREAST SURGERY      left;  breast cancer    CARPAL TUNNEL RELEASE      right    COLONOSCOPY  02/22/2017    DR Joanne Schumacher - adenomatous polyp    ECTOPIC PREGNANCY SURGERY      HC DIALYSIS CATHETER N/A 5/13/2019    CATHETER INSERTION HEMODIALYSIS -DURAMAX CATHETER #59,[id number] performed by Sam Jarvis MD at [address] [facility]      left    [facility] N/A 2/22/2017    COLONOSCOPY performed by Adiel Vargas DO at [facility]    ME ESOPHAGOGASTRODUODENOSCOPY TRANSORAL DIAGNOSTIC N/A 2/22/2017    EGD ESOPHAGOGASTRODUODENOSCOPY performed by Adiel Vargas DO at  [facility] [address] Right 05/13/2019    UPPER GASTROINTESTINAL ENDOSCOPY  09/30/2016       Meds    Current Medications    [Held by provider] (INCONTINENCE BRIEF MEDIUM) MISC, 1 each by Does not apply route 2 times daily as needed (incotinenace)    Allergies    Pcn [penicillins] and Sulfa antibiotics  Social History     Social History     Socioeconomic History    Marital status:      Spouse name: Not on file    Number of children: Not on file    Years of education: Not on file    Highest education level: Not on file   Occupational History    Not on file   Social Needs    Financial resource strain: Not on file    Food insecurity:     Worry: Not on file     Inability: Not on file    Transportation needs:     Medical: Not on file     Non-medical: Not on file   Tobacco Use    Smoking status: Current Every Day Smoker     Packs/day: 0.25     Years: 55.00     Pack years: 13.75     Types: Cigarettes     Last attempt to quit: 2005     Years since quittin.3    Smokeless tobacco: Never Used   Substance and Sexual Activity    Alcohol use: No     Alcohol/week: 0.0 oz    Drug use: No    Sexual activity: Not on file   Lifestyle    Physical activity:     Days per week: Not on file     Minutes per session: Not on file    Stress: Not on file   Relationships    Social connections:     Talks on phone: Not on file     Gets together: Not on file     Attends [employer] service: Not on file     Active member of club or organization: Not on file     Attends meetings of clubs or organizations: Not on file     Relationship status: Not on file    Intimate partner violence:     Fear of current or ex partner: Not on file     Emotionally abused: Not on file     Physically abused: Not on file     Forced sexual activity: Not on file   Other Topics Concern    Not on file   Social History Narrative    Not on file     Family History          Problem Relation Age of Onset    Cancer Father     Diabetes Sister      ROS - 11 systems   General Denies any fever or chills  HEENT Denies any diplopia, tinnitus or vertigo  Resp positive for  cough with sputum and dyspnea  Cardiac Denies any chest pain, palpitations, claudication or edema  GI Denies any melena, hematochezia, hematemesis or pyrosis   Denies any frequency, urgency, hesitancy or incontinence  Heme Denies bruising or bleeding easily  Endocrine Denies any history of diabetes or thyroid disease  Neuro Denies any focal motor or sensory deficits  Psychiatric Denies anxiety, depression, suicidal ideation  Skin Denies rashes, itching, open sores    Vitals    BP (!) 131/55   Pulse 92   Temp 98.2 °F (36.8 °C) (Oral)   Resp 16   Ht 5' 6\" (1.676 m)   Wt 146 lb 14.4 oz (66.6 kg)   SpO2 96%   BMI 23.71 kg/m²   Body mass index is 23.71 kg/m². I/O        Intake/Output Summary (Last 24 hours) at 5/15/2019 1635  Last data filed at 5/15/2019 1458  Gross per 24 hour   Intake 10 ml   Output --   Net 10 ml     I/O last 3 completed shifts: In: 10 [I.V.:10]  Out: -    Patient Vitals for the past 96 hrs (Last 3 readings):   Weight   05/15/19 0457 146 lb 14.4 oz (66.6 kg)   05/14/19 1641 153 lb 2 oz (69.5 kg)   05/13/19 1623 166 lb 6.4 oz (75.5 kg)     Exam   General Appearance  Awake, alert, oriented, in no acute distress, family at bedside   HEENT - Head is normocephalic, atraumatic. Pupil reactive to light  Neck - Supple, symmetrical, trachea midline and Soft, trachea midline and straight  Lungs - decreased air exchange with no wheeze  Cardiovascular - Heart sounds are normal.  Regular rhythm normal rate without murmur, gallop or rub. Abdomen - Soft, nontender, nondistended, no masses or organomegaly  Neurologic - CN II-XII are grossly intact.  There are no focal motor or sensory deficits  Skin - No bruising or bleeding  Extremities - No cyanosis, clubbing or edema    Labs  - Old records and notes have been reviewed in [facility]   CBC     Lab Results   Component Value Date    WBC 6.4 05/15/2019    RBC 3.10 05/15/2019    HGB 8.7 05/15/2019    HCT 28.9 05/15/2019     05/15/2019    MCV 93.2 05/15/2019    MCH 28.1 05/15/2019    MCHC 30.1 05/15/2019    RDW 17.9 05/15/2019    LYMPHOPCT 14 05/09/2019    LYMPHOPCT 29.0 09/03/2018    MONOPCT 13 05/09/2019    MONOPCT 10.5 09/03/2018    BASOPCT 0 05/09/2019    BASOPCT 1.1 09/03/2018    MONOSABS 0.79 05/09/2019    MONOSABS 1.5 09/03/2018    LYMPHSABS 0.83 05/09/2019    LYMPHSABS 2.8 09/03/2018    EOSABS <0.03 05/09/2019    EOSABS 0.2 09/03/2018    BASOSABS <0.03 05/09/2019    DIFFTYPE NOT REPORTED 05/09/2019     BMP   Lab Results   Component Value Date     05/15/2019    K 4.4 05/15/2019    K 5.3 09/05/2018    CL 96 05/15/2019    CO2 25 05/15/2019    BUN 22 05/15/2019    CREATININE 5.37 05/15/2019    GLUCOSE 111 05/15/2019    CALCIUM 9.1 05/15/2019    MG 2.5 05/14/2019     LFTS  Lab Results   Component Value Date    ALKPHOS 249 09/03/2018    ALT 16 09/03/2018    AST 28 09/03/2018    PROT 8.7 09/03/2018    BILITOT 0.3 09/03/2018    BILIDIR <0.08 03/20/2017    IBILI CANNOT BE CALCULATED 03/20/2017    LABALBU 4.4 09/03/2018     PTT  Lab Results   Component Value Date    APTT 53.5 (H) 05/11/2019     INR   Lab Results   Component Value Date    INR 1.1 05/15/2019    INR 1.0 05/09/2019    INR 1.1 03/08/2017    PROTIME 10.8 05/15/2019    PROTIME 10.5 05/09/2019    PROTIME 11.4 03/08/2017       Radiology    CXR       CT Scans    (See actual reports for details)    \"Thank you for asking us to see this patient\"    Case discussed with nurse and patient/family. Questions and concerns addressed.     Electronically signed by     JAMES Hernandez CNP on 5/15/2019 at 4:35 PM  Patient was seen under the supervision of Dr. Norleen Galeazzi and Sleep Medicine,    [facility]: [phone]

## 2019-05-15 NOTE — PROGRESS NOTES
Nephrology Progress Note        Subjective: The patient is seen and examined. Her left upper arm AV graft is clotted. She went to surgery on 19 for a tunneled femoral vein dialysis catheter placement and went to hemodialysis on 19 for 2 hours to make up for her missed dialysis on 19 and then had dialysis again yesterday, 19, for 3 hours. She is status post biopsy in IR today of a left upper lobe lung mass. Next dialysis scheduled for tomorrow, 19. EDW is 72.5 kg. Did not have dialysis on Saturday, 19. Improvement in her skin itching but does complain of pain post biopsy and is also having some discomfort in the right femoral vein area where the new dialysis catheter was placed. She is receiving pain medication. She denies any shortness of breath or nausea or vomiting. She is on room air.     Objective:  CURRENT TEMPERATURE:  Temp: 97.5 °F (36.4 °C)  MAXIMUM TEMPERATURE OVER 24HRS:  Temp (24hrs), Av.3 °F (36.8 °C), Min:97.5 °F (36.4 °C), Max:98.8 °F (37.1 °C)    CURRENT RESPIRATORY RATE:  Resp: 16  CURRENT PULSE:  Pulse: 95  CURRENT BLOOD PRESSURE:  BP: (!) 153/62  24HR BLOOD PRESSURE RANGE:  Systolic (24XMS), FOQ:362 , Min:131 , WIC:212   ; Diastolic (87BWB), ZBS:37, Min:50, Max:95    24HR INTAKE/OUTPUT:    No intake or output data in the 24 hours ending 05/15/19 1446  Weight:      Physical Exam:  General appearance:Awake, alert, in no acute distress  Skin: warm and dry, no rash or erythema  Eyes: conjunctivae pale and sclera anicteric  ENT:no thrush, moist mucous membranes  Neck: no JVD, trachea midline, no accessory muscle use  Pulmonary: good bilateral air entry and clear to auscultation bilaterally without any wheezes or rales or rhonchi  Cardiovascular: regular rate and rhythm positive S1 and S2 and no murmurs rubs or gallop rhythms appreciated  Abdomen: soft and non-tender and not distended with active bowel sounds and no ascites  Extremities: no pitting lower extremity edema and no peripheral cyanosis, right femoral vein dialysis catheter in place    Access:  Right femoral vein dialysis catheter    Current Medications:      acetaminophen (TYLENOL) tablet 325 mg Q4H PRN   sodium citrate 4 % injection 0.12 g PRN   sodium citrate 4 % injection 0.12 g PRN   sodium chloride flush 0.9 % injection 10 mL PRN   [Held by provider] heparin (porcine) injection 5,000 Units 3 times per day   nicotine (NICODERM CQ) 21 MG/24HR 1 patch Daily   cinacalcet (SENSIPAR) tablet 120 mg Daily   losartan (COZAAR) tablet 50 mg BID   pantoprazole (PROTONIX) tablet 40 mg QAM AC   atorvastatin (LIPITOR) tablet 20 mg Daily   edwina-anya tablet 1 tablet Daily   cloNIDine (CATAPRES) tablet 0.2 mg BID   lactulose (CHRONULAC) 10 GM/15ML solution 20 g Daily PRN   chlorhexidine (HIBICLENS) 4 % liquid Daily   hydrALAZINE (APRESOLINE) tablet 100 mg TID   tiotropium (SPIRIVA) inhalation capsule 18 mcg Daily   sodium chloride flush 0.9 % injection 10 mL 2 times per day   sodium chloride flush 0.9 % injection 10 mL PRN   magnesium hydroxide (MILK OF MAGNESIA) 400 MG/5ML suspension 30 mL Daily PRN   ondansetron (ZOFRAN-ODT) disintegrating tablet 4 mg Q6H PRN   Or    ondansetron (ZOFRAN) injection 4 mg Q6H PRN   oxyCODONE-acetaminophen (PERCOCET) 5-325 MG per tablet 1 tablet Q4H PRN   oxyCODONE-acetaminophen (PERCOCET) 5-325 MG per tablet 2 tablet Q4H PRN     Labs:   CBC:   Recent Labs     05/13/19  0540 05/14/19  0535 05/15/19  0455   WBC 4.9 6.3 6.4   RBC 3.08* 3.23* 3.10*   HGB 8.8* 9.1* 8.7*   HCT 28.5* 30.5* 28.9*   MCV 92.5 94.4 93.2   MCH 28.6 28.2 28.1   MCHC 30.9 29.8 30.1   RDW 18.0* 18.2* 17.9*    278 250   MPV 11.5 11.8 11.2      BMP:   Recent Labs     05/13/19  0540 05/14/19  0535 05/15/19  0455    135 135   K 4.8 4.7 4.4   CL 95* 95* 96*   CO2 24 22 25   BUN 47* 40* 22   CREATININE 8.74* 7.69* 5.37*   GLUCOSE 88 95 111*   CALCIUM 8.1* 9.0 9.1        Phosphorus:    Recent Labs 05/13/19  0540 05/14/19  0535 05/15/19  0455   PHOS 6.3* 6.4* 5.0*     Magnesium:   Recent Labs     05/14/19  0535   MG 2.5     Albumin: No results for input(s): LABALBU in the last 72 hours. Dialysis bath: Dialysis Bath  K+ (Potassium): 2  Ca+ (Calcium): 2.5  Na+ (Sodium): 138  HCO3 (Bicarb): 30    Radiology:  Reviewed as available. Assessment:  1. ESRD  2. Clotted AV graft left upper arm  3. Status post right femoral vein dialysis catheter placement on 5/13/19  4. Anemia of chronic disease with hemoglobin below target at less than 9  5. Pruritis, possibly secondary to hyperphosphatemia and also missed dialysis, improving  6. Metastatic lung cancer  7. History of breast cancer  8. Essential hypertension with good control  9. Status post biopsy today, 5/15/19, of a lung mass       Plan:  1. Outpatient EDW of 72.5 kg  2. Hemodialysis tomorrow x 3.5 hours  3. Continue on dialysis on a Tuesday, Thursday and Saturday hemodialysis schedule  4. Follow labs  5. Pain control      Please do not hesitate to call with questions.     Electronically signed by Carlos A Colin MD on 5/15/2019 at 2:46 PM

## 2019-05-15 NOTE — PROGRESS NOTES
Nutrition Assessment    Type and Reason for Visit: Reassess(LOS=6days)    Nutrition Recommendations: 1. Suggest modifying current Renal diet to include 1500ml fluid restriction. 2. Consider ordering frozen oral nutrition supplement [facility]) 2x/d. Nutrition Assessment: Patient moderately malnourished as evidenced by 21lb (13%) weight loss x 3 months, report of no appetite with PO intake <75% x7days, moderate subcutaneous fat loss around orbitals and mild muscle mass loss around temples and interosseous. Patient is at risk for further nutritional compromise realated to dx malignant neoplasm of left lung metastatic to other sites needing chemotherapy and increased needs related to dialysis in which patient reported increased weakness and fatigue. Suggest modifying current diet to include fluid restriction 1500mL per nephrology, Dr. Aster Benson. Consider adding frozen oral nutrition supplement [facility]) 2x/d. Malnutrition Assessment:  · Malnutrition Status: Meets the criteria for moderate malnutrition  · Context: Acute illness or injury  · Findings of the 6 clinical characteristics of malnutrition (Minimum of 2 out of 6 clinical characteristics is required to make the diagnosis of moderate or severe Protein Calorie Malnutrition based on AND/ASPEN Guidelines):  1. Energy Intake-Less than or equal to 75% of estimated energy requirement, Greater than or equal to 7 days    2. Weight Loss-10% loss or greater, in 3 months  3. Fat Loss-Moderate subcutaneous fat loss, Orbital  4. Muscle Loss-Mild muscle mass loss, Temples (temporalis muscle), Interosseous  5. Fluid Accumulation-No significant fluid accumulation  6.  Strength-Not measured    Nutrition Risk Level: High    Nutrient Needs:  · Estimated Daily Total Kcal: 7664-0514 (29-30 kcal/kg)  · Estimated Daily Protein (g): 80-90 (1.2-1.4 g/kg)  · Estimated Daily Total Fluid (ml/day): 1500 ml     Nutrition Diagnosis:   · Problem:  Moderate malnutrition  · Etiology: related to Catabolic illness, Pain     Signs and symptoms:  as evidenced by Known losses from dialysis, Mild loss of subcutaneous fat, Mild muscle loss, Patient report of, Intake 50-75%    Objective Information:  · Nutrition-Focused Physical Findings: GI: soft; constipation; active bowel sounds  PV: generalized trace edema  Skin: warm; incision 5/13 right groin   · Wound Type: None  · Current Nutrition Therapies:  · Oral Diet Orders: Renal   · Oral Diet intake: 51-75%  · Oral Nutrition Supplement (ONS) Orders: None  · ONS intake: Unable to assess  · Anthropometric Measures:  · Ht: 5' 6\" (167.6 cm)   · Current Body Wt: 146 lb 14.4 oz (66.6 kg)  · Admission Body Wt: 160 lb (72.6 kg)(Stated)  · Usual Body Wt: 160 lb (72.6 kg)  · % Weight Change:  ,  13% loss in 3 months  · Ideal Body Wt: 130 lb (59 kg), % Ideal Body 112%  · BMI Classification: BMI 18.5 - 24.9 Normal Weight    Nutrition Interventions:   Modify current diet, Start ONS  Continued Inpatient Monitoring, Coordination of Care    Nutrition Evaluation:   · Evaluation: Goals set   · Goals: PO intake to meet >75% of estimated kcal/protein need with good renal indices     · Monitoring: Meal Intake, Diet Tolerance, Skin Integrity, Wound Healing, I&O, Weight, Pertinent Labs, Monitor Bowel Function, Constipation    Janell Miguel Dietetic Intern   Electronically signed by Sandy Hernandez RDN, LD on 5/15/19 at 4:06 PM    Contact Number: [phone]

## 2019-05-15 NOTE — PLAN OF CARE
Problem: Falls - Risk of:  Goal: Will remain free from falls  Description  Will remain free from falls  5/15/2019 0228 by Alejandra Holm RN  Outcome: Ongoing     Problem: Pain:  Goal: Pain level will decrease  Description  Pain level will decrease  5/15/2019 0228 by Alejandra Holm RN  Outcome: Ongoing     Problem: Pain:  Goal: Control of acute pain  Description  Control of acute pain  5/15/2019 0228 by Alejandra Holm RN  Outcome: Ongoing

## 2019-05-15 NOTE — PRE SEDATION
Sedation Pre-Procedure Note    Patient Name: Sudha Dillard   YOB: 1945  Room/Bed: 2011/2011-02  Medical Record Number: [id number]  Date: 5/15/2019   Time: 12:38 PM       Indication:  Left lung mass    Consent: I have discussed with the patient and/or the patient representative the indication, alternatives, and the possible risks and/or complications of the planned procedure and the anesthesia methods. The patient and/or patient representative appear to understand and agree to proceed. Vital Signs:   Vitals:    05/15/19 1214   BP: (!) 134/51   Pulse: 91   Resp: 16   Temp: 98.2 °F (36.8 °C)   SpO2: 100%       Past Medical History:   has a past medical history of Anemia, Asthma, Atrial fibrillation ([address] Breast cancer ([address] Bursitis, Carpal tunnel syndrome, Chronic obstructive pulmonary disease (COPD) ([address] Disease of blood and blood forming organ, History of blood transfusion, Hypertension, Irregular heart beat, Lung nodule, Osteoarthritis, Pneumonia, Pulmonary emphysema ([address] Renal failure, Stenosis of left carotid artery, and Tobacco abuse. Past Surgical History:   has a past surgical history that includes Carpal tunnel release; knee surgery; Breast surgery; Ectopic pregnancy surgery; Upper gastrointestinal endoscopy (09/30/2016); pr esophagogastroduodenoscopy transoral diagnostic (N/A, 2/22/2017); pr colon ca scrn not hi rsk ind (N/A, 2/22/2017); Abdomen surgery; Colonoscopy (02/22/2017); Tunneled venous port placement (Right, 05/13/2019); and hc dialysis catheter (N/A, 5/13/2019).     Medications:   Scheduled Meds:    [Held by provider] heparin (porcine)  5,000 Units Subcutaneous 3 times per day    nicotine  1 patch Transdermal Daily    cinacalcet  120 mg Oral Daily    losartan  50 mg Oral BID    pantoprazole  40 mg Oral QAM AC    atorvastatin  20 mg Oral Daily    edwina-anya  1 tablet Oral Daily    cloNIDine  0.2 mg Oral BID    chlorhexidine   Topical Daily    hydrALAZINE  100 mg Oral TID    tiotropium  18 mcg Inhalation Daily    sodium chloride flush  10 mL Intravenous 2 times per day     Continuous Infusions:   PRN Meds: sodium citrate, sodium citrate, sodium chloride flush, lactulose, sodium chloride flush, magnesium hydroxide, ondansetron **OR** ondansetron, oxyCODONE-acetaminophen, oxyCODONE-acetaminophen  Home Meds:   Prior to Admission medications    Medication Sig Start Date End Date Taking?  Authorizing Provider   atorvastatin (LIPITOR) 20 MG tablet Take 20 mg by mouth daily   Yes Historical Provider, MD   B Complex-C-Folic Acid (MADAI-AYAN) TABS Take 1 tablet by mouth daily   Yes Historical Provider, MD   cloNIDine (CATAPRES) 0.2 MG tablet Take 0.2 mg by mouth 2 times daily   Yes Historical Provider, MD   lactulose (CONSTULOSE) 10 GM/15ML solution Take 30 mLs by mouth daily as needed   Yes Historical Provider, MD   chlorhexidine (HIBICLENS) 4 % external liquid Apply topically daily As directed   Yes Historical Provider, MD   hydrALAZINE (APRESOLINE) 100 MG tablet Take 100 mg by mouth 3 times daily   Yes Historical Provider, MD   Umeclidinium Bromide (INCRUSE ELLIPTA) 62.5 MCG/INH AEPB Inhale 1 puff into the lungs daily   Yes Historical Provider, MD   sodium bicarbonate 650 MG tablet Take 1,300 mg by mouth 3 times daily   Yes Historical Provider, MD   amLODIPine (NORVASC) 10 MG tablet Take 10 mg by mouth daily   Yes Historical Provider, MD   loratadine (CLARITIN) 10 MG tablet Take 10 mg by mouth daily   Yes Historical Provider, MD   ethyl chloride spray Apply topically as needed (to arm prior to dialysis)   Yes Historical Provider, MD   omeprazole (PRILOSEC) 20 MG delayed release capsule TAKE 1 CAPSULE BY MOUTH DAILY 2/20/19  Yes Alfonso Acosta MD   albuterol sulfate  (90 Base) MCG/ACT inhaler Inhale 2 puffs into the lungs every 6 hours as needed for Wheezing or Shortness of Breath 9/5/18 5/9/19 Yes David Gonzalez DO   losartan (COZAAR) 50 MG tablet Take 1 tablet by mouth 2 times daily 10/7/16  Yes Marta Lopez MD   SENSIPAR 60 MG tablet Take 120 mg by mouth daily  12/3/15  Yes Historical Provider, MD   metoprolol tartrate (LOPRESSOR) 25 MG tablet TAKE 1 TABLET BY MOUTH TWICE DAILY 5/14/19   Davis Bojorquez MD   Incontinence Supply Disposable (INCONTINENCE BRIEF MEDIUM) MISC 1 each by Does not apply route 2 times daily as needed (incotinenace) 1/8/19   Catherine Medrano MD     Coumadin Use Last 7 Days:  no  Antiplatelet drug therapy use last 7 days: no  Other anticoagulant use last 7 days: no  Additional Medication Information:  None      Pre-Sedation Documentation and Exam:   I have reviewed the patient's history and review of systems.     Mallampati Airway Assessment:  Mallampati Class III - (soft palate & base of uvula are visible)    Prior History of Anesthesia Complications:   none    ASA Classification:  Class 2 - A normal healthy patient with mild systemic disease    Sedation/ Anesthesia Plan:   intravenous sedation    Medications Planned:   midazolam (Versed) intravenously and fentanyl intravenously    Patient is an appropriate candidate for plan of sedation: yes    Electronically signed by Ruth Dhillon MD on 5/15/2019 at 12:38 PM

## 2019-05-15 NOTE — PROGRESS NOTES
Returned from  IR per bed accompanied by staff. HOB elevated 45 degrees. Bandaid dressing intact toleft upper chest wall. Telemetry is on,  No IVF  Infusing. Lungs clear, no cough.

## 2019-05-16 ENCOUNTER — APPOINTMENT (OUTPATIENT)
Dept: GENERAL RADIOLOGY | Age: 74
DRG: 180 | End: 2019-05-16
Payer: COMMERCIAL

## 2019-05-16 ENCOUNTER — APPOINTMENT (OUTPATIENT)
Dept: CT IMAGING | Age: 74
DRG: 180 | End: 2019-05-16
Payer: COMMERCIAL

## 2019-05-16 LAB
ABSOLUTE EOS #: 0.03 K/UL (ref 0–0.44)
ABSOLUTE IMMATURE GRANULOCYTE: 0.3 K/UL (ref 0–0.3)
ABSOLUTE LYMPH #: 0.88 K/UL (ref 1.1–3.7)
ABSOLUTE MONO #: 0.79 K/UL (ref 0.1–1.2)
ANION GAP SERPL CALCULATED.3IONS-SCNC: 15 MMOL/L (ref 9–17)
ANION GAP SERPL CALCULATED.3IONS-SCNC: 16 MMOL/L (ref 9–17)
BASOPHILS # BLD: 0 % (ref 0–2)
BASOPHILS ABSOLUTE: 0.04 K/UL (ref 0–0.2)
BUN BLDV-MCNC: 25 MG/DL (ref 8–23)
BUN BLDV-MCNC: 36 MG/DL (ref 8–23)
BUN/CREAT BLD: 5 (ref 9–20)
BUN/CREAT BLD: 5 (ref 9–20)
CALCIUM SERPL-MCNC: 9.1 MG/DL (ref 8.6–10.4)
CALCIUM SERPL-MCNC: 9.4 MG/DL (ref 8.6–10.4)
CHLORIDE BLD-SCNC: 97 MMOL/L (ref 98–107)
CHLORIDE BLD-SCNC: 98 MMOL/L (ref 98–107)
CO2: 22 MMOL/L (ref 20–31)
CO2: 24 MMOL/L (ref 20–31)
CREAT SERPL-MCNC: 5.49 MG/DL (ref 0.5–0.9)
CREAT SERPL-MCNC: 7.07 MG/DL (ref 0.5–0.9)
DIFFERENTIAL TYPE: ABNORMAL
EOSINOPHILS RELATIVE PERCENT: 0 % (ref 1–4)
FIO2: 50
GFR AFRICAN AMERICAN: 7 ML/MIN
GFR AFRICAN AMERICAN: 9 ML/MIN
GFR NON-AFRICAN AMERICAN: 6 ML/MIN
GFR NON-AFRICAN AMERICAN: 8 ML/MIN
GFR SERPL CREATININE-BSD FRML MDRD: ABNORMAL ML/MIN/{1.73_M2}
GLUCOSE BLD-MCNC: 103 MG/DL (ref 70–99)
GLUCOSE BLD-MCNC: 133 MG/DL (ref 70–99)
HCT VFR BLD CALC: 29.3 % (ref 36.3–47.1)
HCT VFR BLD CALC: 31 % (ref 36.3–47.1)
HEMOGLOBIN: 8.9 G/DL (ref 11.9–15.1)
HEMOGLOBIN: 9.2 G/DL (ref 11.9–15.1)
IMMATURE GRANULOCYTES: 2 %
LYMPHOCYTES # BLD: 6 % (ref 24–43)
MAGNESIUM: 2.1 MG/DL (ref 1.6–2.6)
MCH RBC QN AUTO: 28.3 PG (ref 25.2–33.5)
MCH RBC QN AUTO: 28.5 PG (ref 25.2–33.5)
MCHC RBC AUTO-ENTMCNC: 29.7 G/DL (ref 28.4–34.8)
MCHC RBC AUTO-ENTMCNC: 30.4 G/DL (ref 28.4–34.8)
MCV RBC AUTO: 93.9 FL (ref 82.6–102.9)
MCV RBC AUTO: 95.4 FL (ref 82.6–102.9)
MONOCYTES # BLD: 5 % (ref 3–12)
MYOGLOBIN: 122 NG/ML (ref 25–58)
MYOGLOBIN: 136 NG/ML (ref 25–58)
NEGATIVE BASE EXCESS, ART: 3 (ref 0–2)
NRBC AUTOMATED: 0 PER 100 WBC
NRBC AUTOMATED: 0 PER 100 WBC
O2 DEVICE/FLOW/%: ABNORMAL
PATIENT TEMP: 37
PDW BLD-RTO: 17.9 % (ref 11.8–14.4)
PDW BLD-RTO: 18 % (ref 11.8–14.4)
PHOSPHORUS: 4.2 MG/DL (ref 2.6–4.5)
PHOSPHORUS: 5.9 MG/DL (ref 2.6–4.5)
PLATELET # BLD: 270 K/UL (ref 138–453)
PLATELET # BLD: 284 K/UL (ref 138–453)
PLATELET ESTIMATE: ABNORMAL
PMV BLD AUTO: 11.2 FL (ref 8.1–13.5)
PMV BLD AUTO: 11.3 FL (ref 8.1–13.5)
POC HCO3: 21.8 MMOL/L (ref 22–27)
POC O2 SATURATION: 98 %
POC PCO2 TEMP: ABNORMAL MM HG
POC PCO2: 33 MM HG (ref 32–45)
POC PH TEMP: ABNORMAL
POC PH: 7.43 (ref 7.35–7.45)
POC PO2 TEMP: ABNORMAL MM HG
POC PO2: 103 MM HG (ref 75–95)
POSITIVE BASE EXCESS, ART: ABNORMAL (ref 0–2)
POTASSIUM SERPL-SCNC: 4.1 MMOL/L (ref 3.7–5.3)
POTASSIUM SERPL-SCNC: 4.8 MMOL/L (ref 3.7–5.3)
RBC # BLD: 3.12 M/UL (ref 3.95–5.11)
RBC # BLD: 3.25 M/UL (ref 3.95–5.11)
RBC # BLD: ABNORMAL 10*6/UL
SEG NEUTROPHILS: 87 % (ref 36–65)
SEGMENTED NEUTROPHILS ABSOLUTE COUNT: 14.01 K/UL (ref 1.5–8.1)
SODIUM BLD-SCNC: 136 MMOL/L (ref 135–144)
SODIUM BLD-SCNC: 136 MMOL/L (ref 135–144)
SURGICAL PATHOLOGY REPORT: NORMAL
TCO2 (CALC), ART: 23 MMOL/L (ref 23–28)
TROPONIN INTERP: ABNORMAL
TROPONIN INTERP: ABNORMAL
TROPONIN T: ABNORMAL NG/ML
TROPONIN T: ABNORMAL NG/ML
TROPONIN, HIGH SENSITIVITY: 80 NG/L (ref 0–14)
TROPONIN, HIGH SENSITIVITY: 88 NG/L (ref 0–14)
WBC # BLD: 16.1 K/UL (ref 3.5–11.3)
WBC # BLD: 9.1 K/UL (ref 3.5–11.3)
WBC # BLD: ABNORMAL 10*3/UL

## 2019-05-16 PROCEDURE — 6360000004 HC RX CONTRAST MEDICATION: Performed by: INTERNAL MEDICINE

## 2019-05-16 PROCEDURE — 85027 COMPLETE CBC AUTOMATED: CPT

## 2019-05-16 PROCEDURE — 6370000000 HC RX 637 (ALT 250 FOR IP): Performed by: SURGERY

## 2019-05-16 PROCEDURE — 5A1D70Z PERFORMANCE OF URINARY FILTRATION, INTERMITTENT, LESS THAN 6 HOURS PER DAY: ICD-10-PCS | Performed by: INTERNAL MEDICINE

## 2019-05-16 PROCEDURE — 36600 WITHDRAWAL OF ARTERIAL BLOOD: CPT

## 2019-05-16 PROCEDURE — 97530 THERAPEUTIC ACTIVITIES: CPT

## 2019-05-16 PROCEDURE — 2580000003 HC RX 258: Performed by: INTERNAL MEDICINE

## 2019-05-16 PROCEDURE — 85025 COMPLETE CBC W/AUTO DIFF WBC: CPT

## 2019-05-16 PROCEDURE — 2700000000 HC OXYGEN THERAPY PER DAY

## 2019-05-16 PROCEDURE — 71045 X-RAY EXAM CHEST 1 VIEW: CPT

## 2019-05-16 PROCEDURE — 94761 N-INVAS EAR/PLS OXIMETRY MLT: CPT

## 2019-05-16 PROCEDURE — 6360000002 HC RX W HCPCS: Performed by: SURGERY

## 2019-05-16 PROCEDURE — 2580000003 HC RX 258: Performed by: SURGERY

## 2019-05-16 PROCEDURE — 84100 ASSAY OF PHOSPHORUS: CPT

## 2019-05-16 PROCEDURE — 83735 ASSAY OF MAGNESIUM: CPT

## 2019-05-16 PROCEDURE — 82803 BLOOD GASES ANY COMBINATION: CPT

## 2019-05-16 PROCEDURE — 71260 CT THORAX DX C+: CPT

## 2019-05-16 PROCEDURE — 99233 SBSQ HOSP IP/OBS HIGH 50: CPT | Performed by: FAMILY MEDICINE

## 2019-05-16 PROCEDURE — 94150 VITAL CAPACITY TEST: CPT

## 2019-05-16 PROCEDURE — 97116 GAIT TRAINING THERAPY: CPT

## 2019-05-16 PROCEDURE — 36415 COLL VENOUS BLD VENIPUNCTURE: CPT

## 2019-05-16 PROCEDURE — 99255 IP/OBS CONSLTJ NEW/EST HI 80: CPT | Performed by: INTERNAL MEDICINE

## 2019-05-16 PROCEDURE — 80048 BASIC METABOLIC PNL TOTAL CA: CPT

## 2019-05-16 PROCEDURE — 83874 ASSAY OF MYOGLOBIN: CPT

## 2019-05-16 PROCEDURE — 90935 HEMODIALYSIS ONE EVALUATION: CPT

## 2019-05-16 PROCEDURE — 2000000000 HC ICU R&B

## 2019-05-16 PROCEDURE — 84484 ASSAY OF TROPONIN QUANT: CPT

## 2019-05-16 RX ORDER — SODIUM CHLORIDE 0.9 % (FLUSH) 0.9 %
10 SYRINGE (ML) INJECTION
Status: COMPLETED | OUTPATIENT
Start: 2019-05-16 | End: 2019-05-16

## 2019-05-16 RX ORDER — CLONIDINE HYDROCHLORIDE 0.2 MG/1
0.2 TABLET ORAL 3 TIMES DAILY
Status: DISCONTINUED | OUTPATIENT
Start: 2019-05-16 | End: 2019-05-17 | Stop reason: HOSPADM

## 2019-05-16 RX ORDER — AMLODIPINE BESYLATE 5 MG/1
5 TABLET ORAL DAILY
Status: DISCONTINUED | OUTPATIENT
Start: 2019-05-16 | End: 2019-05-17 | Stop reason: HOSPADM

## 2019-05-16 RX ORDER — MIDODRINE HYDROCHLORIDE 5 MG/1
5 TABLET ORAL ONCE
Status: DISCONTINUED | OUTPATIENT
Start: 2019-05-16 | End: 2019-05-17 | Stop reason: HOSPADM

## 2019-05-16 RX ORDER — ALBUTEROL SULFATE 2.5 MG/3ML
2.5 SOLUTION RESPIRATORY (INHALATION) EVERY 6 HOURS PRN
Status: DISCONTINUED | OUTPATIENT
Start: 2019-05-16 | End: 2019-05-17 | Stop reason: HOSPADM

## 2019-05-16 RX ORDER — 0.9 % SODIUM CHLORIDE 0.9 %
80 INTRAVENOUS SOLUTION INTRAVENOUS ONCE
Status: COMPLETED | OUTPATIENT
Start: 2019-05-16 | End: 2019-05-16

## 2019-05-16 RX ADMIN — CLONIDINE HYDROCHLORIDE 0.2 MG: 0.2 TABLET ORAL at 08:36

## 2019-05-16 RX ADMIN — OXYCODONE AND ACETAMINOPHEN 2 TABLET: 5; 325 TABLET ORAL at 18:37

## 2019-05-16 RX ADMIN — Medication 1 TABLET: at 08:36

## 2019-05-16 RX ADMIN — OXYCODONE AND ACETAMINOPHEN 2 TABLET: 5; 325 TABLET ORAL at 14:06

## 2019-05-16 RX ADMIN — HYDRALAZINE HYDROCHLORIDE 100 MG: 50 TABLET, FILM COATED ORAL at 08:36

## 2019-05-16 RX ADMIN — HEPARIN SODIUM 5000 UNITS: 5000 INJECTION INTRAVENOUS; SUBCUTANEOUS at 21:22

## 2019-05-16 RX ADMIN — CINACALCET HYDROCHLORIDE 120 MG: 30 TABLET, COATED ORAL at 08:36

## 2019-05-16 RX ADMIN — IOPAMIDOL 75 ML: 755 INJECTION, SOLUTION INTRAVENOUS at 13:31

## 2019-05-16 RX ADMIN — OXYCODONE AND ACETAMINOPHEN 2 TABLET: 5; 325 TABLET ORAL at 05:24

## 2019-05-16 RX ADMIN — Medication 10 ML: at 21:22

## 2019-05-16 RX ADMIN — ATORVASTATIN CALCIUM 20 MG: 20 TABLET, FILM COATED ORAL at 21:21

## 2019-05-16 RX ADMIN — PANTOPRAZOLE SODIUM 40 MG: 40 TABLET, DELAYED RELEASE ORAL at 05:24

## 2019-05-16 RX ADMIN — SODIUM CHLORIDE 80 ML: 9 INJECTION, SOLUTION INTRAVENOUS at 13:31

## 2019-05-16 RX ADMIN — OXYCODONE AND ACETAMINOPHEN 2 TABLET: 5; 325 TABLET ORAL at 01:33

## 2019-05-16 RX ADMIN — Medication 10 ML: at 13:32

## 2019-05-16 RX ADMIN — HEPARIN SODIUM 5000 UNITS: 5000 INJECTION INTRAVENOUS; SUBCUTANEOUS at 14:06

## 2019-05-16 RX ADMIN — LOSARTAN POTASSIUM 50 MG: 50 TABLET, FILM COATED ORAL at 08:36

## 2019-05-16 ASSESSMENT — PAIN DESCRIPTION - ONSET
ONSET: ON-GOING

## 2019-05-16 ASSESSMENT — PAIN DESCRIPTION - LOCATION
LOCATION: GENERALIZED

## 2019-05-16 ASSESSMENT — PAIN SCALES - GENERAL
PAINLEVEL_OUTOF10: 8
PAINLEVEL_OUTOF10: 7
PAINLEVEL_OUTOF10: 0
PAINLEVEL_OUTOF10: 8
PAINLEVEL_OUTOF10: 0
PAINLEVEL_OUTOF10: 8
PAINLEVEL_OUTOF10: 7
PAINLEVEL_OUTOF10: 0

## 2019-05-16 ASSESSMENT — PAIN DESCRIPTION - PAIN TYPE
TYPE: CHRONIC PAIN

## 2019-05-16 ASSESSMENT — PAIN DESCRIPTION - PROGRESSION
CLINICAL_PROGRESSION: GRADUALLY WORSENING
CLINICAL_PROGRESSION: GRADUALLY IMPROVING

## 2019-05-16 ASSESSMENT — PAIN - FUNCTIONAL ASSESSMENT
PAIN_FUNCTIONAL_ASSESSMENT: ACTIVITIES ARE NOT PREVENTED

## 2019-05-16 ASSESSMENT — PAIN DESCRIPTION - DESCRIPTORS
DESCRIPTORS: ACHING

## 2019-05-16 ASSESSMENT — PAIN DESCRIPTION - FREQUENCY
FREQUENCY: CONTINUOUS

## 2019-05-16 ASSESSMENT — PAIN DESCRIPTION - ORIENTATION: ORIENTATION: LEFT;UPPER;RIGHT

## 2019-05-16 NOTE — PROGRESS NOTES
Pulmonary Critical Care Progress Note  Onur Salcido CNP / Dr. Jose R Headley M.D. Patient seen for the follow up of Primary malignant neoplasm of left lung metastatic to other site [facility])     Subjective:  She is having dialysis. She is feeling more short of breath at this time, increased respiratory effort. She is on room air, oxygen saturation 84%, she comes up to 89% on 5L NC. She has cough with blood tinged sputum. She denies chest pain. Examination:  Vitals: BP (!) 153/59   Pulse 93   Temp 98.2 °F (36.8 °C) (Oral)   Resp 16   Ht 5' 6\" (1.676 m)   Wt 156 lb 11.2 oz (71.1 kg)   SpO2 99%   BMI 25.29 kg/m²     General appearance: awake and talking, in HD  Neck: No JVD  Lungs: increased respiratory effort, no wheeze  Heart: regular rate and rhythm, S1, S2 normal, no gallop  Abdomen: Soft, non tender, + BS  Extremities: no cyanosis or clubbing. No significant edema    LABs:  CBC:   Recent Labs     05/15/19  0455 05/16/19  0629   WBC 6.4 9.1   HGB 8.7* 8.9*   HCT 28.9* 29.3*    284     BMP:   Recent Labs     05/15/19  0455 05/16/19  0629    136   K 4.4 4.8   CO2 25 24   BUN 22 36*   CREATININE 5.37* 7.07*   LABGLOM 8* 6*   GLUCOSE 111* 103*     PT/INR:   Recent Labs     05/15/19  0752   PROTIME 10.8   INR 1.1     Radiology:      Impression   No evidence of pneumothorax.  Unchanged pleural-based masses in the left upper lung.         Impression:  · Acute hypoxic respiratory failure  · Left upper lobe lung mass with satellite nodules/mediastinal adenopathy concerning for malignancy, Osseous metastatic lesion   · Hemoptysis   · COPD  · Active smoking/40 pack year smoking history   · ESRD on HD  · History of left sided breast cancer s/p [id number]     Recommendations:  · Maintain oxygen saturation >94%  · Home O2 evaluation prior to discharge  · Await biopsy results   · Albuterol Q 4 hours prn  · Spiriva  · HD per nephrology   · Nicoderm patch/smoking cessation education   · DVT prophylaxis with low molecular weight heparin  · Incentive spirometry every hour while awake  · Discussed with RN/HD RN  · Discussed with RT  · Will follow with you    Electronically signed by     JAMES Loving CNP on 5/16/2019 at 10:28 AM  Patient was seen under the supervision of Dr. Triny Webb and Sleep Medicine,    [facility] AT [place]: [phone]

## 2019-05-16 NOTE — PROGRESS NOTES
Nephrology Progress Note        Subjective:  Patient seen at dialysis. Was tolerating treatment for at least 3 hours. Thereafter she dropped her pressures. Became short of breath. Dialysis was stopped. 2 L of fluid was removed. She was transferred to the ICU. CT angiogram negative. The pressures are better controlled now. Did receive hydralazine prior to blood pressure dropping. Results of lung biopsy pending. Hematology oncology evaluation reviewed. The patient is seen and examined. Her left upper arm AV graft is clotted. She went to surgery on 19 for a tunneled femoral vein dialysis catheter placement and went to hemodialysis on 19 for 2 hours to make up for her missed dialysis on 19 and then had dialysis again yesterday, 19, for 3 hours. She is status post biopsy in IR today of a left upper lobe lung mass. Next dialysis scheduled for tomorrow, 19  EDW is 72.5 kg. Did not have dialysis on Saturday, 19. Improvement in her skin itching but does complain of pain post biopsy and is also having some discomfort in the right femoral vein area where the new dialysis catheter was placed. She is receiving pain medication. She denies any shortness of breath or nausea or vomiting. She is on room air.     Objective:  CURRENT TEMPERATURE:  Temp: 98 °F (36.7 °C)  MAXIMUM TEMPERATURE OVER 24HRS:  Temp (24hrs), Av.1 °F (36.7 °C), Min:96.8 °F (36 °C), Max:98.8 °F (37.1 °C)    CURRENT RESPIRATORY RATE:  Resp: (!) 33  CURRENT PULSE:  Pulse: 89  CURRENT BLOOD PRESSURE:  BP: (!) 111/44  24HR BLOOD PRESSURE RANGE:  Systolic (33GSN), GIP:615 , Min:91 , UVA:897   ; Diastolic (71CRT), SZT:56, Min:44, Max:69    24HR INTAKE/OUTPUT:      Intake/Output Summary (Last 24 hours) at 2019 1843  Last data filed at 2019 1310  Gross per 24 hour   Intake 420 ml   Output 1825 ml   Net -1405 ml     Weight:      Physical Exam:  General appearance:Awake, alert, in no acute distress  Skin: warm and dry, no rash or erythema  Eyes: conjunctivae pale and sclera anicteric  ENT:no thrush, moist mucous membranes  Neck: no JVD, trachea midline, no accessory muscle use  Pulmonary: good bilateral air entry and clear to auscultation bilaterally without any wheezes or rales or rhonchi  Cardiovascular: regular rate and rhythm positive S1 and S2 and no murmurs rubs or gallop rhythms appreciated  Abdomen: soft and non-tender and not distended with active bowel sounds and no ascites  Extremities: no pitting lower extremity edema and no peripheral cyanosis, right femoral vein dialysis catheter in place    Access:  Right femoral vein dialysis catheter    Current Medications:      midodrine (PROAMATINE) tablet 5 mg Once   albuterol (PROVENTIL) nebulizer solution 2.5 mg Q6H PRN   cloNIDine (CATAPRES) tablet 0.2 mg TID   amLODIPine (NORVASC) tablet 5 mg Daily   acetaminophen (TYLENOL) tablet 325 mg Q4H PRN   sodium citrate 4 % injection 0.12 g PRN   sodium citrate 4 % injection 0.12 g PRN   sodium chloride flush 0.9 % injection 10 mL PRN   heparin (porcine) injection 5,000 Units 3 times per day   nicotine (NICODERM CQ) 21 MG/24HR 1 patch Daily   cinacalcet (SENSIPAR) tablet 120 mg Daily   losartan (COZAAR) tablet 50 mg BID   pantoprazole (PROTONIX) tablet 40 mg QAM AC   atorvastatin (LIPITOR) tablet 20 mg Daily   edwina-anya tablet 1 tablet Daily   lactulose (CHRONULAC) 10 GM/15ML solution 20 g Daily PRN   chlorhexidine (HIBICLENS) 4 % liquid Daily   tiotropium (SPIRIVA) inhalation capsule 18 mcg Daily   sodium chloride flush 0.9 % injection 10 mL 2 times per day   sodium chloride flush 0.9 % injection 10 mL PRN   magnesium hydroxide (MILK OF MAGNESIA) 400 MG/5ML suspension 30 mL Daily PRN   ondansetron (ZOFRAN-ODT) disintegrating tablet 4 mg Q6H PRN   Or    ondansetron (ZOFRAN) injection 4 mg Q6H PRN   oxyCODONE-acetaminophen (PERCOCET) 5-325 MG per tablet 1 tablet Q4H PRN   oxyCODONE-acetaminophen (PERCOCET) 5-325 MG per

## 2019-05-16 NOTE — PROGRESS NOTES
Occupational Therapy  DATE: 2019    NAME: Dash Scanlon  MRN: [id number]   : 1945  [facility]  Occupational Therapy Not Seen Note    Patient not available for Occupational Therapy due to:    [] Testing:    [x] Hemodialysis    [] Cancelled by RN:    []Refusal by Patient:    [] Surgery:     [] Intubation:     [] Pain Medication:    [] Sedation:     [] Spine Precautions :    [] Medical Instability:    [] Other:        Serina NEWBY

## 2019-05-16 NOTE — PROGRESS NOTES
Physical Therapy  Facility/Department: [facility] ICU  Daily Treatment Note  NAME: Eva Mcdonnell  :   MRN: [id number]    Date of Service: 2019    Discharge Recommendations:  Home with Home health PT        Patient Diagnosis(es): The primary encounter diagnosis was Other acute pulmonary embolism without acute cor pulmonale ([facility] [address] Diagnoses of Lung mass, Dialysis patient [facility]), and Primary malignant neoplasm of left lung metastatic to other site [facility]) were also pertinent to this visit. has a past medical history of Anemia, Asthma, Atrial fibrillation ([facility] [address] Breast cancer ([facility] [address] Bursitis, Carpal tunnel syndrome, Chronic obstructive pulmonary disease (COPD) ([facility] [address] Disease of blood and blood forming organ, History of blood transfusion, Hypertension, Irregular heart beat, Lung nodule, Osteoarthritis, Pneumonia, Pulmonary emphysema ([facility] [address] Renal failure, Stenosis of left carotid artery, and Tobacco abuse.   has a past surgical history that includes Carpal tunnel release; knee surgery; Breast surgery; Ectopic pregnancy surgery; Upper gastrointestinal endoscopy (2016); pr esophagogastroduodenoscopy transoral diagnostic (N/A, 2017); pr colon ca scrn not hi rsk ind (N/A, 2017); Abdomen surgery; Colonoscopy (2017); Tunneled venous port placement (Right, 2019); and hc dialysis catheter (N/A, 2019). Restrictions  Restrictions/Precautions  Restrictions/Precautions: Up as Tolerated, General Precautions, Fall Risk  Required Braces or Orthoses?: No  Position Activity Restriction  Other position/activity restrictions:  Up with assist, bed/chair alarm  Subjective   General  Chart Reviewed: Yes  Family / Caregiver Present: No  Pain Screening  Patient Currently in Pain: Denies  Pain Assessment  Pain Assessment: 0-10  Pain Level: 8  Pain Type: Chronic pain  Pain Location: Generalized  Pain Orientation: Left;Upper;Right  Pain Descriptors: Aching  Pain Frequency: Continuous  Pain Onset: On-going  Clinical Progression: Gradually improving  Functional Pain Assessment: Activities are not prevented  Non-Pharmaceutical Pain Intervention(s): Repositioned  Vital Signs  Patient Currently in Pain: Denies  Oxygen Therapy  O2 Flow Rate (L/min): 2 L/min       Orientation     Cognition      Objective   Bed mobility  Scooting: Contact guard assistance  Transfers  Sit to Stand: Contact guard assistance  Stand to sit: Stand by assistance;Contact guard assistance  Stand Pivot Transfers: Contact guard assistance  Ambulation  Ambulation?: Yes  Ambulation 1  Surface: level tile  Device: Single point cane  Assistance: Contact guard assistance  Quality of Gait: Short step length; fwd. head posture. Pt. declines use of RW. pt required extended seated rest break after 80 ft of amb. Distance: 80 ft x 2     Balance  Posture: Fair  Sitting - Static: Good  Sitting - Dynamic: Good  Standing - Static: Good;-  Exercises  Comments: reviewed seated ex         Comment: assisted pt back to bed per request of pt. Assessment   Body structures, Functions, Activity limitations: Decreased functional mobility ; Decreased strength;Decreased endurance;Decreased ROM; Decreased balance  Assessment: Recommending home PT and 4ww for pt. She needs more support than a cane but refuses to use tradtional RW. She likes idea of seat to rest whenever needed. Spoke with social work re. this. Also recommending Home PT to address gait/ balance training  Activity Tolerance  Activity Tolerance: Patient Tolerated treatment well     G-Code     OutComes Score                                                  AM-PAC Score  AM-PAC Inpatient Mobility Raw Score : 22  AM-PAC Inpatient T-Scale Score : 53.28  Mobility Inpatient CMS 0-100% Score: 20.91  Mobility Inpatient CMS G-Code Modifier : CJ          Goals  Short term goals  Time Frame for Short term goals: 12 visits:  Short term goal 1: Pt. to be indep with bed mob.    Short term goal 2: Pt. to be indep with transfers  Short term goal 3: Pt. to be indep with gait with approp. assistive device, 150ft. Short term goal 4: Pt. to have good+ standing dynamic balance with approp. assistive device as needed  Short term goal 5: Pt. to tolerate 25+ min. of PT for ther ex/ gait/ balance/ functional mobility training daily  Patient Goals   Patient goals : d/c home    Plan    Plan  Times per week: 1-2x/day; 5-6days/wk  Specific instructions for Next Treatment: Try 0GZ with pt; spoke with d/c planner re. getting one for pt. at discharge  Current Treatment Recommendations: Strengthening, Functional Mobility Training, Home Exercise Program, ROM, Transfer Training, Gait Training, Safety Education & Training, Balance Training, Endurance Training, Patient/Caregiver Education & Training  Safety Devices  Type of devices:  All fall risk precautions in place, Gait belt, Patient at risk for falls, Call light within reach, Left in chair, Chair alarm in place, Nurse notified     Therapy Time   Individual Concurrent Group Co-treatment   Time In  855         Time Out  918         Minutes  23                 [id number], PTA

## 2019-05-16 NOTE — FLOWSHEET NOTE
Dr Angela Leon requests blood pressures for response to NS bolus, pt received total of 250 ml's, Dr Patel Counter in the room. Do not want to give pt any more fluid at this time.  Pt being prepared to transfer to ICU

## 2019-05-16 NOTE — CONSULTS
ONCOLOGY NOTE    PCP: Ni Hernández MD  Referring Provider: No ref. provider found    VISIT DIAGNOSIS:  The primary encounter diagnosis was Other acute pulmonary embolism without acute cor pulmonale ([facility] Diagnoses of Lung mass, Dialysis patient [facility]), and Primary malignant neoplasm of left lung metastatic to other site [facility]) were also pertinent to this visit. STAGING:  Cancer Staging  No matching staging information was found for the patient. Patient Active Problem List    Diagnosis Date Noted    Moderate malnutrition ([address] 05/15/2019     Priority: Medium    Volume overload 05/15/2019    Pulmonary embolus (HCC)     Pain     Primary malignant neoplasm of left lung metastatic to other site ([facility] 05/09/2019    COPD exacerbation (HCC) 09/13/2018    Need for vaccination 09/13/2018    Anemia of chronic renal failure, stage 5 ([address] 07/08/2018    Pulmonary emphysema (HCC) 06/09/2017    Stenosis of left carotid artery 06/09/2017    Severe anemia     Fatigue     Colon polyp     Hypertension     Paroxysmal atrial fibrillation (HCC) 02/18/2017    Iron deficiency anemia     Erosive gastritis 10/07/2016    Anemia of chronic disease     Gastrointestinal hemorrhage     NSAID long-term use     Non-rheumatic mitral regurgitation 08/09/2016    Tobacco abuse 08/07/2016    Anemia     Malignant neoplasm of female breast ([facility]      status post left breast surgery       End stage renal disease ([facility] 11/04/2011     ON HD twice weekly , Tuesday and Saturday  Goes to central dialysis     FU with           SUBJECTIVE/HPI:  Mikhail Reid is a very pleasant 72 y.o. female who is admitted through the emergency room on 5/9/2019 for back pain pulmonary embolus with VQ scan showing possible PE. Patient has history of lung mass I do not know prior PET CT scan which is requesting whether this was ever performed but she eventually refused further workup and biopsy.   CT scan chest revealed marked interval enlargement size of left upper lobe mass and new satellite lesion in the left upper lobe mass. Other additional nodules left upper lobe are noted and are new. CT of the chest revealed no evidence of pulmonary embolism there is significant mediastinal lymphadenopathy and a T4 osseous process consistent with metastatic disease was noted. Mediastinal lymphadenopathy is noted in the AP window 3.5 x 2.9 cm encasing left pulmonary artery. Primary masses 4.3 x 3.4 cm with adjacent pleural thickening or probable involvement satellite nodule. Patient has now undergone CT needle biopsy with results pending has ongoing dialysis and is clinically stable. She is unaware of neurologic complaints such as headache change mentation or lack of motor sensory issues. She has minimal back pain which is well controlled at this time. She has no nausea or vomiting.     PAST MEDICAL HISTORY:      Diagnosis Date    Anemia     Asthma     Atrial fibrillation ([address] 2/18/2017    Breast cancer ([address] 1988     has annual mammograms only, no heme/onc    Bursitis     bilateral    Carpal tunnel syndrome     Chronic obstructive pulmonary disease (COPD) ([address]     Disease of blood and blood forming organ     History of blood transfusion     Hypertension     Irregular heart beat     Lung nodule     Osteoarthritis     knee and back    Pneumonia     Pulmonary emphysema ([address] 6/9/2017    Renal failure      end stage renal failure on dialysis    Stenosis of left carotid artery 6/9/2017    Tobacco abuse        PAST SURGICAL HISTORY:      Procedure Laterality Date    ABDOMEN SURGERY      BREAST SURGERY      left;  breast cancer    CARPAL TUNNEL RELEASE      right    COLONOSCOPY  02/22/2017    DR Solange Inman - adenomatous polyp    ECTOPIC PREGNANCY SURGERY      [facility] DIALYSIS CATHETER N/A 5/13/2019    CATHETER INSERTION HEMODIALYSIS -DURAMAX CATHETER #50,16,64 performed by Russ Gottlieb MD at [address] [place] [address] left    TN COLON CA SCRN NOT HI RSK IND N/A 2/22/2017    COLONOSCOPY performed by Kimberly Hassan DO at [facility]    TN ESOPHAGOGASTRODUODENOSCOPY TRANSORAL DIAGNOSTIC N/A 2/22/2017    EGD ESOPHAGOGASTRODUODENOSCOPY performed by Kimberly Hassan DO at  [address] TUNNELED VENOUS PORT PLACEMENT Right 05/13/2019    UPPER GASTROINTESTINAL ENDOSCOPY  09/30/2016       CURRENT MEDICATIONS:   Current Facility-Administered Medications   Medication Dose Route Frequency Provider Last Rate Last Dose    acetaminophen (TYLENOL) tablet 325 mg  325 mg Oral Q4H PRN Ginna Alonzo MD        albuterol (PROVENTIL) nebulizer solution 2.5 mg  2.5 mg Nebulization As Directed RT PRN JAMES Benito - CNP        sodium citrate 4 % injection 0.12 g  3 mL Hemodialysis PRN Otilia Cadena MD   0.12 g at 05/13/19 1638    sodium citrate 4 % injection 0.12 g  3 mL Hemodialysis PRN Otilia Cadena MD   0.12 g at 05/13/19 1638    sodium chloride flush 0.9 % injection 10 mL  10 mL Intravenous PRN Ernie Calvo MD   10 mL at 05/11/19 0142    [Held by provider] heparin (porcine) injection 5,000 Units  5,000 Units Subcutaneous 3 times per day Ernie Calvo MD   5,000 Units at 05/14/19 0720    nicotine (NICODERM CQ) 21 MG/24HR 1 patch  1 patch Transdermal Daily Ernie Calvo MD   1 patch at 05/16/19 0836    cinacalcet (SENSIPAR) tablet 120 mg  120 mg Oral Daily Ernie Calvo MD   120 mg at 05/16/19 0836    losartan (COZAAR) tablet 50 mg  50 mg Oral BID Ernie Calvo MD   50 mg at 05/16/19 0836    pantoprazole (PROTONIX) tablet 40 mg  40 mg Oral QAM AC Ernie Calvo MD   40 mg at 05/16/19 0524    atorvastatin (LIPITOR) tablet 20 mg  20 mg Oral Daily Ernie Calvo MD   20 mg at 05/15/19 2112    edwina-anya tablet 1 tablet  1 tablet Oral Daily Ernie Calvo MD   1 tablet at 05/16/19 2670    cloNIDine (CATAPRES) tablet 0.2 mg  0.2 mg Oral BID Ernie Calvo MD   0.2 negative for fevers, sweats  HEENT:  negative for vision, hearing changes, runny nose, throat pain  RESPIRATORY:  negative for shortness of breath, cough, congestion, wheezing. CARDIOVASCULAR:  negative for chest pain, palpitations. GASTROINTESTINAL:  negative for nausea, vomiting, diarrhea, constipation, change in bowel habits, abdominal pain   GENITOURINARY:  negative for difficulty of urination, burning with urination, frequency   INTEGUMENT:  negative for rash, skin lesions, easy bruising   HEMATOLOGIC/LYMPHATIC:  negative for swelling/edema   ALLERGIC/IMMUNOLOGIC:  negative for urticaria , itching  ENDOCRINE:  negative increase in drinking, increase in urination, hot or cold intolerance  MUSCULOSKELETAL:  negative joint pains, muscle aches, swelling of joints  NEUROLOGICAL:  negative for headaches, dizziness, lightheadedness, numbness, tingling extremities  BEHAVIOR/PSYCH:  negative for depression, anxiety         Vitals:    05/16/19 0749   BP: (!) 153/59   Pulse: 93   Resp: 16   Temp: 98.2 °F (36.8 °C)   SpO2: 99%       Physical Exam  On physical examination in the dialysis suite patient is stable and is nontoxic in appearance she has no significant petechiae purpura or palpable skin lesions. She has high areas of hyperpigmentation in the bilateral arms. She exhibits no palpable clinical lymphadenopathy cervical supra clavicular or axillary inguinal sites. Patient is alert and oriented times result cranial nerve deficits and peripheral motor sensory deficits. There are no neck mass or goiter. Heart sounds are distant but irregular abdomen is soft nontender without again a megaly or ascites. No costovertebral angle tenderness. Minimal tenderness in drip percussion thoracic spine. No acute leg findings.   LABS:   Results for orders placed or performed during the hospital encounter of 73/93/43   Basic Metabolic Prof   Result Value Ref Range    Glucose 94 70 - 99 mg/dL    BUN 10 8 - 23 mg/dL    CREATININE 2.87 (H) 0.50 - 0.90 mg/dL    Bun/Cre Ratio 3 (L) 9 - 20    Calcium 9.0 8.6 - 10.4 mg/dL    Sodium 140 135 - 144 mmol/L    Potassium 3.7 3.7 - 5.3 mmol/L    Chloride 95 (L) 98 - 107 mmol/L    CO2 31 20 - 31 mmol/L    Anion Gap 14 9 - 17 mmol/L    GFR Non-African American 16 (L) >60 mL/min    GFR African American 20 (L) >60 mL/min    GFR Comment          GFR Staging NOT REPORTED    CBC with DIFF   Result Value Ref Range    WBC 5.9 3.5 - 11.3 k/uL    RBC 3.82 (L) 3.95 - 5.11 m/uL    Hemoglobin 10.6 (L) 11.9 - 15.1 g/dL    Hematocrit 35.5 (L) 36.3 - 47.1 %    MCV 92.9 82.6 - 102.9 fL    MCH 27.7 25.2 - 33.5 pg    MCHC 29.9 28.4 - 34.8 g/dL    RDW 18.4 (H) 11.8 - 14.4 %    Platelets 436 660 - 923 k/uL    MPV 10.7 8.1 - 13.5 fL    NRBC Automated 0.0 0.0 per 100 WBC    Differential Type NOT REPORTED     Seg Neutrophils 73 (H) 36 - 65 %    Lymphocytes 14 (L) 24 - 43 %    Monocytes 13 (H) 3 - 12 %    Eosinophils % 0 (L) 1 - 4 %    Basophils 0 0 - 2 %    Immature Granulocytes 0 0 %    Segs Absolute 4.27 1.50 - 8.10 k/uL    Absolute Lymph # 0.83 (L) 1.10 - 3.70 k/uL    Absolute Mono # 0.79 0.10 - 1.20 k/uL    Absolute Eos # <0.03 0.00 - 0.44 k/uL    Basophils # <0.03 0.00 - 0.20 k/uL    Absolute Immature Granulocyte 0.01 0.00 - 0.30 k/uL    WBC Morphology NOT REPORTED     RBC Morphology ANISOCYTOSIS PRESENT     Platelet Estimate NOT REPORTED    Brain Natriuretic Peptide   Result Value Ref Range    Pro-BNP 9,403 (H) <300 pg/mL    BNP Interpretation Pro-BNP Reference Range:    Protime-INR   Result Value Ref Range    Protime 10.5 9.7 - 11.6 sec    INR 1.0    APTT   Result Value Ref Range    PTT 30.8 23 - 31 sec   Basic Metabolic Panel w/ Reflex to MG   Result Value Ref Range    Glucose 107 (H) 70 - 99 mg/dL    BUN 19 8 - 23 mg/dL    CREATININE 4.08 (H) 0.50 - 0.90 mg/dL    Bun/Cre Ratio 5 (L) 9 - 20    Calcium 8.6 8.6 - 10.4 mg/dL    Sodium 140 135 - 144 mmol/L    Potassium 4.0 3.7 - 5.3 mmol/L    Chloride 95 (L) 98 - 107 mmol/L American 4 (L) >60 mL/min    GFR African American 5 (L) >60 mL/min    GFR Comment          GFR Staging NOT REPORTED    Phosphorus, Inorg. Result Value Ref Range    Phosphorus 6.3 (H) 2.6 - 4.5 mg/dL   CBC   Result Value Ref Range    WBC 4.9 3.5 - 11.3 k/uL    RBC 3.08 (L) 3.95 - 5.11 m/uL    Hemoglobin 8.8 (L) 11.9 - 15.1 g/dL    Hematocrit 28.5 (L) 36.3 - 47.1 %    MCV 92.5 82.6 - 102.9 fL    MCH 28.6 25.2 - 33.5 pg    MCHC 30.9 28.4 - 34.8 g/dL    RDW 18.0 (H) 11.8 - 14.4 %    Platelets 546 217 - 217 k/uL    MPV 11.5 8.1 - 13.5 fL    NRBC Automated NOT REPORTED 0.0 per 100 WBC   Basic Metabolic Prof   Result Value Ref Range    Glucose 95 70 - 99 mg/dL    BUN 40 (H) 8 - 23 mg/dL    CREATININE 7.69 (HH) 0.50 - 0.90 mg/dL    Bun/Cre Ratio 5 (L) 9 - 20    Calcium 9.0 8.6 - 10.4 mg/dL    Sodium 135 135 - 144 mmol/L    Potassium 4.7 3.7 - 5.3 mmol/L    Chloride 95 (L) 98 - 107 mmol/L    CO2 22 20 - 31 mmol/L    Anion Gap 18 (H) 9 - 17 mmol/L    GFR Non-African American 5 (L) >60 mL/min    GFR  6 (L) >60 mL/min    GFR Comment          GFR Staging NOT REPORTED    Phosphorus, Inorg.    Result Value Ref Range    Phosphorus 6.4 (H) 2.6 - 4.5 mg/dL   CBC   Result Value Ref Range    WBC 6.3 3.5 - 11.3 k/uL    RBC 3.23 (L) 3.95 - 5.11 m/uL    Hemoglobin 9.1 (L) 11.9 - 15.1 g/dL    Hematocrit 30.5 (L) 36.3 - 47.1 %    MCV 94.4 82.6 - 102.9 fL    MCH 28.2 25.2 - 33.5 pg    MCHC 29.8 28.4 - 34.8 g/dL    RDW 18.2 (H) 11.8 - 14.4 %    Platelets 840 564 - 627 k/uL    MPV 11.8 8.1 - 13.5 fL    NRBC Automated 0.0 0.0 per 100 WBC   Magnesium   Result Value Ref Range    Magnesium 2.5 1.6 - 2.6 mg/dL   Basic Metabolic Prof   Result Value Ref Range    Glucose 111 (H) 70 - 99 mg/dL    BUN 22 8 - 23 mg/dL    CREATININE 5.37 (HH) 0.50 - 0.90 mg/dL    Bun/Cre Ratio 4 (L) 9 - 20    Calcium 9.1 8.6 - 10.4 mg/dL    Sodium 135 135 - 144 mmol/L    Potassium 4.4 3.7 - 5.3 mmol/L    Chloride 96 (L) 98 - 107 mmol/L    CO2 25 20 - 31 stage renal disease (HCC)    Anemia    Malignant neoplasm of female breast ([facility]    Tobacco abuse    Non-rheumatic mitral regurgitation    Gastrointestinal hemorrhage    NSAID long-term use    Anemia of chronic disease    Erosive gastritis    Iron deficiency anemia    Paroxysmal atrial fibrillation (HCC)    Hypertension    Severe anemia    Fatigue    Colon polyp    Pulmonary emphysema (HCC)    Stenosis of left carotid artery    Anemia of chronic renal failure, stage 5 (HCC)    COPD exacerbation (HCC)    Need for vaccination    Primary malignant neoplasm of left lung metastatic to other site [facility])    Pulmonary embolus (HCC)    Pain    Volume overload    Moderate malnutrition (HCC)   Impression:   #1 patient is inoperable clinical T3 N2 M1 bronchogenic carcinoma. Results of lung biopsy performed yesterday are pending. She has 2 out of 10 mid thoracic back pain potentially from metastatic disease. She has baseline shortness of breath without significant cough. #2 end-stage renal disease and dialysis for over a decade. #3 long-standing hypertension denies stroke or myocardial infarction. #4 history of breast cancer patient an exact details post left breast cancer surgery with mammography within the last year unremarkable. #5 long-standing anemia thought to be secondary to chronic renal failure being followed by my partner in the clinic. PLAN:     1. Have discussed clinical findings with the patient today. We have advised her she appears to have metastatic bronchogenic carcinoma pending review of pathology. She does have history of breast cancer though with lung mass spread to the mediastinum smoking history and do not think this is metastatic breast cancer. All goals of therapy this time will be palliative and the patient is also has end-stage renal disease on dialysis. We have discussed that she is not a surgical candidate.   I have advised radiation oncology consultation for potentially radiation to the chest to prevent future problems with shortness of breath and cough chest pain as well as potentially the thoracic spine. Imaging of the brain not recommended because inability obtained contrast imaging and lack of symptomatology. PET CT scan can be performed as an outpatient to guide radiation oncology. In terms of systemic therapy I believe the chemotherapy would not be palliative in a patient on dialysis and would not recommend same. Furthermore patient is not wanting chemotherapy. We'll ask for special testing on recent biopsy to see she can respond immunotherapy or if she has other options. Patient can follow-up in the office for PET scanning review pathology. 2. We'll consult radiation oncology for palliation. 3. Last pathology for specialized testing if enough samples available. 4. To follow-up in the office for further discussions.       Electronically signed by Rachel Lopez MD on 5/16/2019 at 10:39 AM

## 2019-05-16 NOTE — PROGRESS NOTES
[facility] Perez[facility] Other acute [facility] embolism without acute cor pulmonale ([facility] [I26.99] . Vitals:    05/16/19 1440   BP:    Pulse:    Resp: 25   Temp:    SpO2: 100%   . Patients home meds are   Prior to Admission medications    Medication Sig Start Date End Date Taking?  Authorizing Provider   atorvastatin (LIPITOR) 20 MG tablet Take 20 mg by mouth daily   Yes Historical Provider, MD PIERCE Complex-C-Folic Acid (MADAI-AYAN) TABS Take 1 tablet by mouth daily   Yes Historical Provider, MD   cloNIDine (CATAPRES) 0.2 MG tablet Take 0.2 mg by mouth 2 times daily   Yes Historical Provider, MD   lactulose (CONSTULOSE) 10 GM/15ML solution Take 30 mLs by mouth daily as needed   Yes Historical Provider, MD   chlorhexidine (HIBICLENS) 4 % external liquid Apply topically daily As directed   Yes Historical Provider, MD   hydrALAZINE (APRESOLINE) 100 MG tablet Take 100 mg by mouth 3 times daily   Yes Historical Provider, MD   Umeclidinium Bromide (INCRUSE ELLIPTA) 62.5 MCG/INH AEPB Inhale 1 puff into the lungs daily   Yes Historical Provider, MD   sodium bicarbonate 650 MG tablet Take 1,300 mg by mouth 3 times daily   Yes Historical Provider, MD   amLODIPine (NORVASC) 10 MG tablet Take 10 mg by mouth daily   Yes Historical Provider, MD   loratadine (CLARITIN) 10 MG tablet Take 10 mg by mouth daily   Yes Historical Provider, MD   ethyl chloride spray Apply topically as needed (to arm prior to dialysis)   Yes Historical Provider, MD   omeprazole (PRILOSEC) 20 MG delayed release capsule TAKE 1 CAPSULE BY MOUTH DAILY 2/20/19  Yes Mirela Goins MD   albuterol sulfate  (90 Base) MCG/ACT inhaler Inhale 2 puffs into the lungs every 6 hours as needed for Wheezing or Shortness of Breath 9/5/18 5/9/19 Yes David Gonzalez DO   losartan (COZAAR) 50 MG tablet Take 1 tablet by mouth 2 times daily 10/7/16  Yes Meghann Rosa MD   SENSIPAR 60 MG tablet Take 120 mg by mouth daily  12/3/15  Yes Historical Provider, MD   metoprolol tartrate (LOPRESSOR) 25 MG tablet TAKE 1 TABLET BY MOUTH TWICE DAILY 5/14/19   Jameson Vital MD   Incontinence Supply Disposable (INCONTINENCE BRIEF MEDIUM) MISC 1 each by Does not apply route 2 times daily as needed (incotinenace) 1/8/19   Catrachito Jackson MD   .  Recent Surgical History: None = 0     Assessment     Peak Flow (asthma only)    Predicted:  Personal Best:   PEF   % Predicted   Peak Flow :     FEV1/FVC    FEV1 Predicted 53%      FEV1 1.8    FEV1 % Predicted 53%  FVC 1.6  IS volume   IBW   FIO2% 2L  SPO2 100%  RR 18  Breath Sounds: Clear      · Bronchodilator assessment at level  1  · Hyperinflation assessment at level   · Secretion Management assessment at level    ·   · [x]    Bronchodilator Assessment  BRONCHODILATOR ASSESSMENT SCORE  Score 0 1 2 3 4 5   Breath Sounds   []  Patient Baseline [x]  No Wheeze good aeration []  Faint, scattered wheezing, good aeration []  Expiratory Wheezing and or moderately diminished []  Insp/Exp wheeze and/or very diminished []  Insp/Exp and/ or marked distress   Respiratory Rate   []  Patient Baseline [x]  Less than 20 [x]  Less than 20 []  20-25 []  Greater than 25 []  Greater than 25   Peak flow % of Pred or PB [x]  NA   []  Greater than 90%  []  81-90% []  71-80% []  Less than or equal to 70%  or unable to perform []  Unable due to Respiratory Distress   Dyspnea re []  Patient Baseline [x]  No SOB [x]  No SOB []  SOB on exertion []  SOB min activity []  At rest/acute   e FEV% Predicted       []  NA []  Above 69%  []  Unable []  Above 60-69%  []  Unable [x]  Above 50-59%  []  Unable []  Above 35-49%  []  Unable []  Less than 35%  []  Unable                 []  Hyperinflation Assessment  Score 1 2 3   CXR and Breath Sounds   []  Clear []  No atelectasis  Basilar aeration []  Atelectasis or absent basilar breath sounds   Incentive Spirometry Volume  (Per IBW)   []  Greater than or equal to 15ml/Kg []  less than 15ml/Kg []  less than 15ml/Kg

## 2019-05-16 NOTE — PROGRESS NOTES
diarrhea, nausea, vomiting  Neurological:  negative for dizziness, headache    Medications: Allergies: Allergies   Allergen Reactions    Pcn [Penicillins] Hives and Itching    Sulfa Antibiotics      difficulty moving???       Current Meds:   Scheduled Meds:    [Held by provider] heparin (porcine)  5,000 Units Subcutaneous 3 times per day    nicotine  1 patch Transdermal Daily    cinacalcet  120 mg Oral Daily    losartan  50 mg Oral BID    pantoprazole  40 mg Oral QAM AC    atorvastatin  20 mg Oral Daily    edwina-anya  1 tablet Oral Daily    cloNIDine  0.2 mg Oral BID    chlorhexidine   Topical Daily    hydrALAZINE  100 mg Oral TID    tiotropium  18 mcg Inhalation Daily    sodium chloride flush  10 mL Intravenous 2 times per day     Continuous Infusions:   PRN Meds: acetaminophen, albuterol, sodium citrate, sodium citrate, sodium chloride flush, lactulose, sodium chloride flush, magnesium hydroxide, ondansetron **OR** ondansetron, oxyCODONE-acetaminophen, oxyCODONE-acetaminophen    Data:     Past Medical History:   has a past medical history of Anemia, Asthma, Atrial fibrillation ([address] Breast cancer ([facility] [address] Bursitis, Carpal tunnel syndrome, Chronic obstructive pulmonary disease (COPD) ([address] Disease of blood and blood forming organ, History of blood transfusion, Hypertension, Irregular heart beat, Lung nodule, Osteoarthritis, Pneumonia, Pulmonary emphysema ([address] Renal failure, Stenosis of left carotid artery, and Tobacco abuse. Social History:   reports that she has been smoking cigarettes. She has a 13.75 pack-year smoking history. She has never used smokeless tobacco. She reports that she does not drink alcohol or use drugs.      Family History:   Family History   Problem Relation Age of Onset    Cancer Father     Diabetes Sister        Vitals:  BP (!) 153/59   Pulse 93   Temp 98.2 °F (36.8 °C) (Oral)   Resp 16   Ht 5' 6\" (1.676 m)   Wt 156 lb 11.2 oz (71.1 kg)   SpO2 99%   BMI 25.29 kg/m²   Temp (24hrs), Av.3 °F (36.8 °C), Min:97.5 °F (36.4 °C), Max:98.8 °F (37.1 °C)    No results for input(s): POCGLU in the last 72 hours. I/O (24Hr): Intake/Output Summary (Last 24 hours) at 2019 0943  Last data filed at 2019 2572  Gross per 24 hour   Intake 670 ml   Output --   Net 670 ml       Labs:    Hematology:  Recent Labs     05/14/19  0535 05/15/19  0455 05/15/19  07519  0629   WBC 6.3 6.4  --  9.1   RBC 3.23* 3.10*  --  3.12*   HGB 9.1* 8.7*  --  8.9*   HCT 30.5* 28.9*  --  29.3*   MCV 94.4 93.2  --  93.9   MCH 28.2 28.1  --  28.5   MCHC 29.8 30.1  --  30.4   RDW 18.2* 17.9*  --  17.9*    250  --  284   MPV 11.8 11.2  --  11.3   INR  --   --  1.1  --      Chemistry:  Recent Labs     05/14/19  0535 05/15/19  0455 05/16/19  0629    135 136   K 4.7 4.4 4.8   CL 95* 96* 97*   CO2 22 25 24   GLUCOSE 95 111* 103*   BUN 40* 22 36*   CREATININE 7.69* 5.37* 7.07*   MG 2.5  --   --    ANIONGAP 18* 14 15   LABGLOM 5* 8* 6*   GFRAA 6* 10* 7*   CALCIUM 9.0 9.1 9.4   PHOS 6.4* 5.0* 5.9*     No results for input(s): PROT, LABALBU, LABA1C, D9AAVGW, X4KZVRR, FT4, TSH, AST, ALT, LDH, GGT, ALKPHOS, LABGGT, BILITOT, BILIDIR, AMMONIA, AMYLASE, LIPASE, LACTATE, CHOL, HDL, LDLCHOLESTEROL, CHOLHDLRATIO, TRIG, VLDL, BZC06EW, PHENYTOIN, PHENYF, URICACID, POCGLU in the last 72 hours.       Lab Results   Component Value Date/Time    SPECIAL NOT REPORTED 2017 10:05 PM     Lab Results   Component Value Date/Time    CULTURE NO SIGNIFICANT GROWTH 2017 10:05 PM    CULTURE  2017 10:05 PM     Performed at [address] [place] [address] [phone]       Lab Results   Component Value Date    PHART 7.472 2018    JHE1TCS 35 2018    PO2ART 120 2018    DAF9YUC 25.9 2018    BEART 2 2018    MKK7QQY 27 2018    S4VJFRPC 99 2018       Radiology:    EXAMINATION:  ONE SUPINE XRAY VIEW(S) OF THE ABDOMEN  2019 12:55 pm  FINDINGS:  Single intraoperative view of the abdomen centered over the low abdomen and  right hemipelvis demonstrates a large-bore dialysis catheter entering via the  right femoral approach and extending superiorly to the L3 vertebral level,  likely within the IVC. Multiple punctate radiopaque densities are noted  within the field of indeterminate etiology. Impression:    Large bore dialysis catheter extending to the IVC level at     Physical Examination:        General appearance:  Lethargic but answers your questions, in some distress   Mental Status:  oriented to person, place and time and normal affect  Lungs:  clear to auscultation bilaterally, normal effort  Heart:  regular rate and rhythm, no murmur  Abdomen:  soft, nontender, nondistended, normal bowel sounds, no masses, hepatomegaly, splenomegaly  Extremities:  no edema, redness, tenderness in the calves, R femoral catheter noted, LUE AV graft noted  Skin:  no gross lesions, rashes, induration    Assessment:        Primary Problem  Primary malignant neoplasm of left lung metastatic to other site [facility])    Active Hospital Problems    Diagnosis Date Noted    Volume overload [E87.70] 05/15/2019     Priority: High    End stage renal disease (HCC) [N18.6] 11/04/2011     Priority: High    Moderate malnutrition ([facility] [E44.0] 05/15/2019     Priority: Medium    Pain [R52]     Primary malignant neoplasm of left lung metastatic to other site [facility]) [C34.92] 05/09/2019    Anemia of chronic renal failure, stage 5 ([address] [N18.5, D63.1] 07/08/2018    Hypertension [I10]     Paroxysmal atrial fibrillation ([address] [I48.0] 02/18/2017    Anemia of chronic disease [D63.8]     Tobacco abuse [Z72.0] 08/07/2016    Malignant neoplasm of female breast ([facility] [address] [address] [C50.919]        Plan:        RRT called  Hypoxia/ dyspnea and hypotension  Stat labs, CXR. ABG & EKG ordered  STAT CTA chest  Tx to ICU    ABG reviewed, no acidosis or CO2 retention, CXR with no change  Rest of work up is still pending     GOYO mass CT S/P  guided biopsy  S/P R femoral tunneled catheter placement 5/13  HD per nephro  Vascular to arrange AVF thrombectomy vs new fistula mostly as OP   Continue other chronic meds  BP & glycemic control  DVT & GI PPx  No PE    Spent 45 minutes assessing the patient, attending her rapid response putting orders and stabilizing her   Discussed with the patient and she still wants everything to be done for her   Discussed plan with nursing team        Radha Richardson MD  5/16/2019  9:43 AM

## 2019-05-16 NOTE — PROGRESS NOTES
Roque Dickinson, PPatient Assessment complete. Other acute pulmonary embolism without acute cor pulmonale ([facility] [I26.99] . Vitals:    05/15/19 1922   BP: (!) 158/50   Pulse: 95   Resp: 16   Temp: 98.4 °F (36.9 °C)   SpO2: 99%   . Patients home meds are   Prior to Admission medications    Medication Sig Start Date End Date Taking?  Authorizing Provider   atorvastatin (LIPITOR) 20 MG tablet Take 20 mg by mouth daily   Yes Historical Provider, MD   B Complex-C-Folic Acid (MADAI-AYAN) TABS Take 1 tablet by mouth daily   Yes Historical Provider, MD   cloNIDine (CATAPRES) 0.2 MG tablet Take 0.2 mg by mouth 2 times daily   Yes Historical Provider, MD   lactulose (CONSTULOSE) 10 GM/15ML solution Take 30 mLs by mouth daily as needed   Yes Historical Provider, MD   chlorhexidine (HIBICLENS) 4 % external liquid Apply topically daily As directed   Yes Historical Provider, MD   hydrALAZINE (APRESOLINE) 100 MG tablet Take 100 mg by mouth 3 times daily   Yes Historical Provider, MD   Umeclidinium Bromide (INCRUSE ELLIPTA) 62.5 MCG/INH AEPB Inhale 1 puff into the lungs daily   Yes Historical Provider, MD   sodium bicarbonate 650 MG tablet Take 1,300 mg by mouth 3 times daily   Yes Historical Provider, MD   amLODIPine (NORVASC) 10 MG tablet Take 10 mg by mouth daily   Yes Historical Provider, MD   loratadine (CLARITIN) 10 MG tablet Take 10 mg by mouth daily   Yes Historical Provider, MD   ethyl chloride spray Apply topically as needed (to arm prior to dialysis)   Yes Historical Provider, MD   omeprazole (PRILOSEC) 20 MG delayed release capsule TAKE 1 CAPSULE BY MOUTH DAILY 2/20/19  Yes Analilia Mack MD   albuterol sulfate  (90 Base) MCG/ACT inhaler Inhale 2 puffs into the lungs every 6 hours as needed for Wheezing or Shortness of Breath 9/5/18 5/9/19 Yes David Gonzalez DO   losartan (COZAAR) 50 MG tablet Take 1 tablet by mouth 2 times daily 10/7/16  Yes Nj Loo MD   SENSIPAR 60 MG tablet Take 120 mg by mouth daily 60-69%  [x]  Unable []  Above 50-59%  []  Unable []  Above 35-49%  []  Unable []  Less than 35%  []  Unable                 []  Hyperinflation Assessment  Score 1 2 3   CXR and Breath Sounds   []  Clear []  No atelectasis  Basilar aeration []  Atelectasis or absent basilar breath sounds   Incentive Spirometry Volume  (Per IBW)   []  Greater than or equal to 15ml/Kg []  less than 15ml/Kg []  less than 15ml/Kg   Surgery within last 2 weeks []  None or general   []  Abdominal or thoracic surgery  []  Abdominal or thoracic   Chronic Pulmonary Historyre []  No []  Yes []  Yes     []  Secretion Management Assessment  Score 1 2 3   Bilateral Breath Sounds   []  Occasional Rhonchi []  Scattered Rhonchi []  Course Rhonchi and/or poor aeration   Sputum    []  Small amount of thin secretions []  Moderate amount of viscous secretions []  Copius, Viscious Yellow/ Secretions   CXR as reported by physician []  clear  []  Unavailable []  Infiltrates and/or consolidation  []  Unavailable []  Mucus Plugging and or lobar consolidation  []  Unavailable   Cough []  Strong, productive cough []  Weak productive cough []  No cough or weak non-productive cough

## 2019-05-16 NOTE — PLAN OF CARE
Problem: Falls - Risk of:  Goal: Will remain free from falls  Description  Will remain free from falls  Outcome: Ongoing  Goal: Absence of physical injury  Description  Absence of physical injury  Outcome: Ongoing     Problem: Tobacco Use:  Goal: Inpatient tobacco use cessation counseling participation  Description  Inpatient tobacco use cessation counseling participation  Outcome: Ongoing     Problem: Pain:  Goal: Pain level will decrease  Description  Pain level will decrease  Outcome: Ongoing  Goal: Control of acute pain  Description  Control of acute pain  Outcome: Ongoing  Goal: Control of chronic pain  Description  Control of chronic pain  Outcome: Ongoing     Problem: IP BALANCE  Goal: LTG - Patient will maintain balance to allow for safe/functional mobility  Outcome: Ongoing     Problem: Tissue Perfusion - Renal, Altered:  Goal: Electrolytes within specified parameters  Description  Electrolytes within specified parameters  Outcome: Ongoing  Goal: Urine creatinine clearance will be within specified parameters  Description  Urine creatinine clearance will be within specified parameters  Outcome: Ongoing  Goal: Serum creatinine will be within specified parameters  Description  Serum creatinine will be within specified parameters  Outcome: Ongoing  Goal: Ability to achieve a balanced intake and output will improve  Description  Ability to achieve a balanced intake and output will improve  Outcome: Ongoing     Problem: Nutrition  Goal: Optimal nutrition therapy  Description  Nutrition Problem:  Moderate malnutrition  Intervention: Food and/or Nutrient Delivery: Modify current diet, Start ONS  Nutritional Goals: PO intake to meet >75% of estimated kcal/protein need with good renal indices    5/16/2019 0208 by Flavio Bustillo RN  Outcome: Ongoing  5/15/2019 1608 by Yadira Pacheco RD, LD  Outcome: Ongoing

## 2019-05-17 ENCOUNTER — HOSPITAL ENCOUNTER (OUTPATIENT)
Dept: PHYSICAL THERAPY | Age: 74
Setting detail: THERAPIES SERIES
End: 2019-05-17
Payer: COMMERCIAL

## 2019-05-17 VITALS
DIASTOLIC BLOOD PRESSURE: 47 MMHG | WEIGHT: 156.53 LBS | OXYGEN SATURATION: 97 % | HEIGHT: 66 IN | BODY MASS INDEX: 25.16 KG/M2 | RESPIRATION RATE: 14 BRPM | HEART RATE: 86 BPM | TEMPERATURE: 98.7 F | SYSTOLIC BLOOD PRESSURE: 126 MMHG

## 2019-05-17 LAB
ANION GAP SERPL CALCULATED.3IONS-SCNC: 17 MMOL/L (ref 9–17)
BUN BLDV-MCNC: 36 MG/DL (ref 8–23)
BUN/CREAT BLD: 5 (ref 9–20)
CALCIUM SERPL-MCNC: 9.8 MG/DL (ref 8.6–10.4)
CHLORIDE BLD-SCNC: 94 MMOL/L (ref 98–107)
CO2: 23 MMOL/L (ref 20–31)
CREAT SERPL-MCNC: 6.87 MG/DL (ref 0.5–0.9)
GFR AFRICAN AMERICAN: 7 ML/MIN
GFR NON-AFRICAN AMERICAN: 6 ML/MIN
GFR SERPL CREATININE-BSD FRML MDRD: ABNORMAL ML/MIN/{1.73_M2}
GFR SERPL CREATININE-BSD FRML MDRD: ABNORMAL ML/MIN/{1.73_M2}
GLUCOSE BLD-MCNC: 108 MG/DL (ref 70–99)
HCT VFR BLD CALC: 29.2 % (ref 36.3–47.1)
HEMOGLOBIN: 8.8 G/DL (ref 11.9–15.1)
MCH RBC QN AUTO: 28.3 PG (ref 25.2–33.5)
MCHC RBC AUTO-ENTMCNC: 30.1 G/DL (ref 28.4–34.8)
MCV RBC AUTO: 93.9 FL (ref 82.6–102.9)
MYOGLOBIN: 139 NG/ML (ref 25–58)
NRBC AUTOMATED: ABNORMAL PER 100 WBC
PDW BLD-RTO: 17.7 % (ref 11.8–14.4)
PHOSPHORUS: 6.7 MG/DL (ref 2.6–4.5)
PLATELET # BLD: 249 K/UL (ref 138–453)
PMV BLD AUTO: 11.4 FL (ref 8.1–13.5)
POTASSIUM SERPL-SCNC: 5 MMOL/L (ref 3.7–5.3)
RBC # BLD: 3.11 M/UL (ref 3.95–5.11)
SODIUM BLD-SCNC: 134 MMOL/L (ref 135–144)
TROPONIN INTERP: ABNORMAL
TROPONIN T: ABNORMAL NG/ML
TROPONIN, HIGH SENSITIVITY: 80 NG/L (ref 0–14)
WBC # BLD: 5.8 K/UL (ref 3.5–11.3)

## 2019-05-17 PROCEDURE — 6370000000 HC RX 637 (ALT 250 FOR IP): Performed by: SURGERY

## 2019-05-17 PROCEDURE — 80048 BASIC METABOLIC PNL TOTAL CA: CPT

## 2019-05-17 PROCEDURE — 97116 GAIT TRAINING THERAPY: CPT

## 2019-05-17 PROCEDURE — 84100 ASSAY OF PHOSPHORUS: CPT

## 2019-05-17 PROCEDURE — 36415 COLL VENOUS BLD VENIPUNCTURE: CPT

## 2019-05-17 PROCEDURE — 99239 HOSP IP/OBS DSCHRG MGMT >30: CPT | Performed by: FAMILY MEDICINE

## 2019-05-17 PROCEDURE — 97530 THERAPEUTIC ACTIVITIES: CPT | Performed by: NURSE PRACTITIONER

## 2019-05-17 PROCEDURE — 2580000003 HC RX 258: Performed by: SURGERY

## 2019-05-17 PROCEDURE — 6370000000 HC RX 637 (ALT 250 FOR IP): Performed by: INTERNAL MEDICINE

## 2019-05-17 PROCEDURE — 94640 AIRWAY INHALATION TREATMENT: CPT

## 2019-05-17 PROCEDURE — 2700000000 HC OXYGEN THERAPY PER DAY

## 2019-05-17 PROCEDURE — 97530 THERAPEUTIC ACTIVITIES: CPT

## 2019-05-17 PROCEDURE — 6360000002 HC RX W HCPCS: Performed by: SURGERY

## 2019-05-17 PROCEDURE — 94761 N-INVAS EAR/PLS OXIMETRY MLT: CPT

## 2019-05-17 PROCEDURE — 99221 1ST HOSP IP/OBS SF/LOW 40: CPT | Performed by: INTERNAL MEDICINE

## 2019-05-17 PROCEDURE — 84484 ASSAY OF TROPONIN QUANT: CPT

## 2019-05-17 PROCEDURE — 85027 COMPLETE CBC AUTOMATED: CPT

## 2019-05-17 PROCEDURE — 83874 ASSAY OF MYOGLOBIN: CPT

## 2019-05-17 RX ORDER — CLONIDINE HYDROCHLORIDE 0.2 MG/1
0.2 TABLET ORAL 3 TIMES DAILY
Qty: 60 TABLET | Refills: 3 | Status: SHIPPED | OUTPATIENT
Start: 2019-05-17

## 2019-05-17 RX ADMIN — CLONIDINE HYDROCHLORIDE 0.2 MG: 0.2 TABLET ORAL at 14:39

## 2019-05-17 RX ADMIN — TIOTROPIUM BROMIDE 18 MCG: 18 CAPSULE ORAL; RESPIRATORY (INHALATION) at 07:34

## 2019-05-17 RX ADMIN — OXYCODONE AND ACETAMINOPHEN 2 TABLET: 5; 325 TABLET ORAL at 01:05

## 2019-05-17 RX ADMIN — PANTOPRAZOLE SODIUM 40 MG: 40 TABLET, DELAYED RELEASE ORAL at 05:20

## 2019-05-17 RX ADMIN — OXYCODONE AND ACETAMINOPHEN 2 TABLET: 5; 325 TABLET ORAL at 11:22

## 2019-05-17 RX ADMIN — Medication 10 ML: at 08:28

## 2019-05-17 RX ADMIN — HEPARIN SODIUM 5000 UNITS: 5000 INJECTION INTRAVENOUS; SUBCUTANEOUS at 05:20

## 2019-05-17 RX ADMIN — CINACALCET HYDROCHLORIDE 120 MG: 30 TABLET, COATED ORAL at 08:28

## 2019-05-17 RX ADMIN — LOSARTAN POTASSIUM 50 MG: 50 TABLET, FILM COATED ORAL at 08:28

## 2019-05-17 RX ADMIN — OXYCODONE AND ACETAMINOPHEN 2 TABLET: 5; 325 TABLET ORAL at 15:29

## 2019-05-17 RX ADMIN — Medication 1 TABLET: at 08:28

## 2019-05-17 RX ADMIN — OXYCODONE AND ACETAMINOPHEN 2 TABLET: 5; 325 TABLET ORAL at 05:19

## 2019-05-17 RX ADMIN — CLONIDINE HYDROCHLORIDE 0.2 MG: 0.2 TABLET ORAL at 08:28

## 2019-05-17 RX ADMIN — AMLODIPINE BESYLATE 5 MG: 5 TABLET ORAL at 08:28

## 2019-05-17 ASSESSMENT — PAIN DESCRIPTION - LOCATION
LOCATION: BACK;ARM;LEG
LOCATION: BACK
LOCATION: GENERALIZED

## 2019-05-17 ASSESSMENT — PAIN DESCRIPTION - ONSET: ONSET: ON-GOING

## 2019-05-17 ASSESSMENT — PAIN SCALES - GENERAL
PAINLEVEL_OUTOF10: 8
PAINLEVEL_OUTOF10: 8
PAINLEVEL_OUTOF10: 4
PAINLEVEL_OUTOF10: 9
PAINLEVEL_OUTOF10: 8
PAINLEVEL_OUTOF10: 8

## 2019-05-17 ASSESSMENT — PAIN DESCRIPTION - DESCRIPTORS: DESCRIPTORS: ACHING

## 2019-05-17 ASSESSMENT — PAIN DESCRIPTION - PAIN TYPE
TYPE: CHRONIC PAIN
TYPE: CHRONIC PAIN;ACUTE PAIN
TYPE: CHRONIC PAIN

## 2019-05-17 ASSESSMENT — PAIN - FUNCTIONAL ASSESSMENT: PAIN_FUNCTIONAL_ASSESSMENT: PREVENTS OR INTERFERES SOME ACTIVE ACTIVITIES AND ADLS

## 2019-05-17 ASSESSMENT — PAIN DESCRIPTION - ORIENTATION: ORIENTATION: LEFT;UPPER;RIGHT

## 2019-05-17 ASSESSMENT — PAIN DESCRIPTION - PROGRESSION: CLINICAL_PROGRESSION: NOT CHANGED

## 2019-05-17 ASSESSMENT — PAIN DESCRIPTION - FREQUENCY: FREQUENCY: CONTINUOUS

## 2019-05-17 NOTE — PROGRESS NOTES
CLINICAL PHARMACY NOTE: MEDS TO [address] Select Patient?: No  Total # of Prescriptions Filled: 2   The following medications were delivered to the patient:  · Clonidine 0.2mg  · Perc 5/325mg  Total # of Interventions Completed: 2  Time Spent (min): 30    Additional Documentation:    Pt was originally in med surg. meds were delivered when she was in ICU.  Pt had no co-pay

## 2019-05-17 NOTE — PROGRESS NOTES
Occupational Therapy  Facility/Department: [email or web address] ICU  Daily Treatment Note  NAME: Danette Park  : 1945  MRN: [id number]    Date of Service: 2019    Discharge Recommendations:  [address]    Patient would benefit from SNF for continued occupational therapy to increase independence with  ADL of bathing, dressing, toileting and grooming. Writer recommending SNF placement for for activity tolerance and strength which will increase independence with ADL's coordinated with bed mobility and chair transfers. Continued skilled OT services to address decreased safety awareness with ADL and IADL tasks and for education and increased independence with DME and AE for fall prevention and ec/ws techniques prior to d/c home. Assessment   Performance deficits / Impairments: Decreased ADL status; Decreased strength;Decreased safe awareness;Decreased balance;Decreased functional mobility ; Decreased cognition;Decreased endurance  Prognosis: Good  Patient Education: Educated patient on pursed lip breathing to increase control of breathing. Initiated by breathing through the nose and blowing out with pursed lip to increase O2 into lungs. REQUIRES OT FOLLOW UP: Yes  Activity Tolerance  Activity Tolerance: Patient limited by fatigue  Activity Tolerance: Pt SpO2 WFL; pt very SOB during and after transfer to chair. Significant decreased activity tolerance noted  Safety Devices  Safety Devices in place: Yes  Type of devices: Call light within reach;Gait belt;Nurse notified; Patient at risk for falls; All fall risk precautions in place; Left in chair         Patient Diagnosis(es): The primary encounter diagnosis was Other acute pulmonary embolism without acute cor pulmonale ([facility] Diagnoses of Lung mass, Dialysis patient [facility]), and Primary malignant neoplasm of left lung metastatic to other site [facility]) were also pertinent to this visit.       has a past medical history of Anemia, Asthma, Atrial fibrillation ([facility] Breast cancer ([facility] [address] Bursitis, Carpal tunnel syndrome, Chronic obstructive pulmonary disease (COPD) ([facility] Disease of blood and blood forming organ, History of blood transfusion, Hypertension, Irregular heart beat, Lung nodule, Osteoarthritis, Pneumonia, Pulmonary emphysema ([facility] Renal failure, Stenosis of left carotid artery, and Tobacco abuse.   has a past surgical history that includes Carpal tunnel release; knee surgery; Breast surgery; Ectopic pregnancy surgery; Upper gastrointestinal endoscopy (09/30/2016); pr esophagogastroduodenoscopy transoral diagnostic (N/A, 2/22/2017); pr colon ca scrn not hi rsk ind (N/A, 2/22/2017); Abdomen surgery; Colonoscopy (02/22/2017); Tunneled venous port placement (Right, 05/13/2019); and hc dialysis catheter (N/A, 5/13/2019). Restrictions  Restrictions/Precautions  Restrictions/Precautions: Up as Tolerated, General Precautions, Fall Risk  Required Braces or Orthoses?: No  Position Activity Restriction  Other position/activity restrictions: Up with assist, ICU monitoring  Subjective   General  Chart Reviewed: Yes  Patient assessed for rehabilitation services?: Yes  Response to previous treatment: Patient with no complaints from previous session  Family / Caregiver Present: No  Referring Practitioner: Dr Gibson request KATHY tomlin prior to D/C per nursing  Pain Assessment  Pain Assessment: 0-10  Pain Level: 8  Pain Type: Chronic pain;Acute pain  Pain Location: Back;Arm;Leg  Pain Orientation: Left;Upper;Right(L upper back, R arm and leg)  Vital Signs  Patient Currently in Pain: Yes   Orientation  Orientation  Overall Orientation Status: Within Functional Limits  Objective             Balance  Sitting Balance: Supervision  Standing Balance: Minimal assistance  Standing Balance  Activity: Pt standing before writer could place RW in front of pt. Writer stated we should use the RW, pt stated she does not want to use any device.  Stand step from bed to chair with KERVIN [facility] assist, pt safety  Short term goal 4: demo I with UB ADLs and SBA LB ADLs with approp AE/DME  Short term goal 5: demo and verb good understanding of fall prevention techs, EC/WS techs, and possible equip needs for home  Patient Goals   Patient goals : to go home       Therapy Time   Individual Concurrent Group Co-treatment   Time In 1050         Time Out 1115         Minutes 4545 [facility]

## 2019-05-17 NOTE — PROGRESS NOTES
Pulmonary Critical Care Progress Note  Dm Pedersen M.D. Patient seen for the follow up of Primary malignant neoplasm of left lung metastatic to other site [facility])     Subjective:  Patient lying comfortably in the bed. She denies any chest pain, cough or significant shortness of breath. She had uneventful night. Her blood pressure is stable. She is tolerating orals. Examination:  Vitals: BP (!) 114/48   Pulse 91   Temp 98.7 °F (37.1 °C) (Oral)   Resp 11   Ht 5' 6\" (1.676 m)   Wt 156 lb 8.4 oz (71 kg)   SpO2 98%   BMI 25.26 kg/m²     General appearance: awake and alert, in the bedside recliner  Neck: No JVD  Lungs: Moderate air exchange no wheeze  Heart: regular rate and rhythm, S1, S2 normal, no gallop  Abdomen: Soft, non tender, + BS  Extremities: no cyanosis or clubbing. No significant edema    LABs:  CBC:   Recent Labs     05/16/19  1257 05/17/19  0508   WBC 16.1* 5.8   HGB 9.2* 8.8*   HCT 31.0* 29.2*    249     BMP:   Recent Labs     05/16/19  1257 05/17/19  0508    134*   K 4.1 5.0   CO2 22 23   BUN 25* 36*   CREATININE 5.49* 6.87*   LABGLOM 8* 6*   GLUCOSE 133* 108*     PT/INR:   Recent Labs     05/15/19  0752   PROTIME 10.8   INR 1.1     Radiology:      Impression   No evidence of pneumothorax.  Unchanged pleural-based masses in the left upper lung.         Impression:  · Acute hypoxic respiratory failure  · Left upper lobe lung mass with satellite nodules/mediastinal adenopathy concerning for malignancy, Osseous metastatic lesion   · Hemoptysis   · COPD  · Active smoking/40 pack year smoking history   · ESRD on HD  · History of left sided breast cancer s/p [id number]     Recommendations:  · Patient didn't qualify for home oxygen  · Await biopsy results, preliminary results suggestive of adenocarcinoma    · Albuterol Q 4 hours prn  · Spiriva  · HD per nephrology   · Nicoderm patch/smoking cessation education   · DVT prophylaxis with low molecular weight heparin  · Incentive spirometry every hour while awake  · OK for discharge planning from pulmonary stand point with follow up with pulmonary in 1-2 weeks. Plan was discussed with patient and she understands it.     Electronically signed by     Aixa Watkins MD on 5/17/2019 at 1:20 PM  Pulmonary Critical Care and Sleep Medicine,  [facility]  Cell: [phone]  Office: [phone]

## 2019-05-17 NOTE — PROGRESS NOTES
Physical Therapy  Facility/Department: [facility] ICU  Daily Treatment Note  NAME: Jill Reid  :   MRN: [id number]    Date of Service: 2019    Discharge Recommendations:  Home with Home health PT        Patient Diagnosis(es): The primary encounter diagnosis was Other acute pulmonary embolism without acute cor pulmonale ([facility] [address] Diagnoses of Lung mass, Dialysis patient [facility]), and Primary malignant neoplasm of left lung metastatic to other site [facility]) were also pertinent to this visit. has a past medical history of Anemia, Asthma, Atrial fibrillation ([facility] [address] Breast cancer ([facility] [address] [facility] Bursitis, Carpal tunnel syndrome, Chronic obstructive pulmonary disease (COPD) ([facility] [facility] Disease of blood and blood forming organ, History of blood transfusion, Hypertension, Irregular heart beat, Lung nodule, Osteoarthritis, Pneumonia, Pulmonary emphysema ([facility] Renal failure, Stenosis of left carotid artery, and Tobacco abuse.   has a past surgical history that includes Carpal tunnel release; knee surgery; Breast surgery; Ectopic pregnancy surgery; Upper gastrointestinal endoscopy (2016); pr esophagogastroduodenoscopy transoral diagnostic (N/A, 2017); pr colon ca scrn not hi rsk ind (N/A, 2017); Abdomen surgery; Colonoscopy (2017); Tunneled venous port placement (Right, 2019); and hc dialysis catheter (N/A, 2019). Restrictions  Restrictions/Precautions  Restrictions/Precautions: Up as Tolerated, General Precautions, Fall Risk  Required Braces or Orthoses?: No  Position Activity Restriction  Other position/activity restrictions:  Up with assist, ICU monitoring  Subjective   General  Chart Reviewed: Yes  Family / Caregiver Present: No  Pain Screening  Patient Currently in Pain: Yes  Pain Assessment  Pain Assessment: 0-10  Patient's Stated Pain Goal: No pain  Pain Type: Chronic pain  Pain Location: Generalized  Vital Signs  Patient Currently in Pain: Yes       Orientation     Cognition      Objective Transfers  Sit to Stand: Contact guard assistance  Stand to sit: Stand by assistance;Contact guard assistance  Stand Pivot Transfers: Contact guard assistance  Ambulation  Ambulation?: Yes  Ambulation 1  Surface: level tile  Device: Rollator  Assistance: Contact guard assistance  Quality of Gait: Short step length; fwd. head posture. Distance: 140 ft     Balance  Posture: Fair  Sitting - Static: Good  Sitting - Dynamic: Good  Standing - Static: Good;-            Comment: assisted pt to bathroom for toileting, assisted pt with dressing in preparation for discharge               Assessment   Body structures, Functions, Activity limitations: Decreased functional mobility ; Decreased strength;Decreased endurance;Decreased ROM; Decreased balance  Assessment: Recommending home PT and 4ww for pt. She needs more support than a cane but refuses to use tradtional RW. She likes idea of seat to rest whenever needed. Spoke with social work re. this. Also recommending Home PT to address gait/ balance training  Activity Tolerance  Activity Tolerance: Patient Tolerated treatment well     G-Code     OutComes Score                                                  AM-PAC Score  AM-PAC Inpatient Mobility Raw Score : 18  AM-PAC Inpatient T-Scale Score : 43.63  Mobility Inpatient CMS 0-100% Score: 46.58  Mobility Inpatient CMS G-Code Modifier : CK          Goals  Short term goals  Time Frame for Short term goals: 12 visits:  Short term goal 1: Pt. to be indep with bed mob. Short term goal 2: Pt. to be indep with transfers  Short term goal 3: Pt. to be indep with gait with approp. assistive device, 150ft. Short term goal 4: Pt. to have good+ standing dynamic balance with approp. assistive device as needed  Short term goal 5: Pt. to tolerate 25+ min.  of PT for ther ex/ gait/ balance/ functional mobility training daily  Patient Goals   Patient goals : d/c home    Plan    Plan  Times per week: 1-2x/day; 5-6days/wk  Specific instructions for Next Treatment: Try 7YW with pt; spoke with d/c planner re. getting one for pt. at discharge  Current Treatment Recommendations: Strengthening, Functional Mobility Training, Home Exercise Program, ROM, Transfer Training, Gait Training, Safety Education & Training, Balance Training, Endurance Training, Patient/Caregiver Education & Training  Safety Devices  Type of devices:  All fall risk precautions in place, Gait belt, Patient at risk for falls, Call light within reach, Left in chair, Chair alarm in place, Nurse notified     Therapy Time   Individual Concurrent Group Co-treatment   Time In  1529         Time Out  1554         Minutes  25                 [address], P[place]

## 2019-05-17 NOTE — DISCHARGE SUMMARY
[facility]    Discharge Summary     Patient ID: Vania Andujar  :     MRN: [id number]     ACCOUNT:  [de-identified]   Patient's PCP: Luis Dunn MD  Admit Date: 2019   Discharge Date: 2019   Length of Stay: 8  Code Status:  DNR-CCA  Admitting Physician: Cara Gibson, DO  Discharge Physician: Leni Rebolledo MD     Active Discharge Diagnoses:     Hospital Problem Lists:  Principal Problem:    Primary malignant neoplasm of left lung metastatic to other site [facility])  Active Problems:    End stage renal disease ([facility]    Volume overload    Moderate malnutrition ([facility]    Malignant neoplasm of female breast ([facility]    Tobacco abuse    Anemia of chronic disease    Paroxysmal [facility] fibrillation [facility]    Hypertension    Anemia of chronic renal failure, stage 5 (HCC)    Pain    Hypoxia    Hypotension  Resolved Problems:    * No resolved hospital problems.  *    Hypoxia/ hypotension ( resolved)     Admission Condition:  poor     Discharged Condition: fair    Hospital Stay:     Hospital Course:  Per chart  The patient is a 76 y.o.  AA female who presents with Back Pain   and she is admitted to the hospital for the management of  PE and pain.  She has had left upper back pain near shoulder for past 3 weeks.  VQ done in ER showed possible PE. Angela Reilly has also had chills and occ n/v but denies cp/sob/cough/f/s/s.     She has been placed on heparin drip-when I mentioned this the patient then tells me she hasn't gotten heparin on dialysis for over a year. Link Lana daughter then explained she had a hgb of 3-4 range repeatedly requiring transfusions every few months in 2017 and despite full GI workup, could never find source.     She has known lung mass and previously refused biopsy but currently agreeing, done during the admission and it is confirmed   poorly differentiated adenocarcinoma ( lung primary)    Hypoxia/ dyspnea and hypotension all resolved   CTA chest negative       GOYO mass CT S/P  guided biopsy 5/15, poorly differentiated adenocarcinoma ( lung primary)   Rad onc consulted by oncology and plan to set up appointment as OP for palliative radiation     S/P R femoral tunneled catheter placement 5/13  HD per nephro  Vascular to arrange AVF thrombectomy vs new fistula mostly as OP      Continue other chronic meds     BP & glycemic control     DVT & GI PPx     No PE     Discussed code status with the patient  agreeing to [facility] , changes made  Palliative care set up      DC planning today          During the admission patient was managed as follow      Significant Diagnostic Studies:       Radiology:    Xr Abdomen (kub) (single Ap View)    Result Date: 5/13/2019  Large bore dialysis catheter extending to the IVC level at L3. Xr Chest Portable    Result Date: 5/16/2019  No evidence of pneumothorax. Unchanged pleural-based masses in the left upper lung. Xr Chest Portable    Result Date: 5/16/2019  Stable left upper lobe opacity concerning for any assessment     Ct Chest Pulmonary Embolism W Contrast    Result Date: 5/16/2019  No pulmonary embolism. No change from chest CT obtained 5 days ago. Ct Chest Pulmonary Embolism W Contrast    Result Date: 5/11/2019  No evidence of pulmonary embolism. Neoplastic disease in left upper lobe as described and as detailed on recent prior study with mediastinal lymphadenopathy and likely pleural involvement. Bela disease encases the left upper lobe pulmonary artery. T4 osseous metastatic disease with suggestion of extraosseous epidural spread of disease. Ct Needle Biopsy Lung Percutaneous    Result Date: 5/15/2019  Technically successful core biopsy, yielding excellent specimens. Nm Bone Scan Whole Body    Result Date: 5/11/2019  There is no convincing evidence of osseous metastatic disease on the submitted scan. Specifically, focal increased activity in T4 is not identified.          Consultations: tablet  Commonly known as:  APRESOLINE     Incontinence Brief Medium Misc  1 each by Does not apply route 2 times daily as needed (incotinenace)     INCRUSE ELLIPTA 62.5 MCG/INH Aepb  Generic drug:  Umeclidinium Bromide     loratadine 10 MG tablet  Commonly known as:  CLARITIN     losartan 50 MG tablet  Commonly known as:  COZAAR  Take 1 tablet by mouth 2 times daily     omeprazole 20 MG delayed release capsule  Commonly known as:  PRILOSEC  TAKE 1 CAPSULE BY MOUTH DAILY     edwina-anya Tabs     SENSIPAR 60 MG tablet  Generic drug:  cinacalcet     sodium bicarbonate 650 MG tablet        ASK your doctor about these medications    metoprolol tartrate 25 MG tablet  Commonly known as:  LOPRESSOR  TAKE 1 TABLET BY MOUTH TWICE DAILY     oxyCODONE-acetaminophen 5-325 MG per tablet  Commonly known as:  PERCOCET  Take 1 tablet by mouth every 4 hours as needed for Pain for up to 3 days. Ask about: Should I take this medication? Where to Get Your Medications      These medications were sent to [address], [facility] [phone] [facility] [phone]  [address] [place] [address] [place] [address] [address]    Phone:  [phone]   · cloNIDine 0.2 MG tablet     These medications were sent to [facility] [address], [address] [place] [address]. [facility] [phone] - F [phone]  [address]/ [address] [address]    Phone:  [phone]   · metoprolol tartrate 25 MG tablet     You can get these medications from any pharmacy    Bring a paper prescription for each of these medications  · oxyCODONE-acetaminophen 5-325 MG per tablet         Time Spent on discharge is  32 mins in patient examination, evaluation, counseling as well as medication reconciliation, prescriptions for required medications, discharge plan and follow up.     Electronically signed by   Janak Walker MD  5/17/2019  12:23 PM      Thank you Dr. aSnam Cota MD for the opportunity to be involved in this patient's care.

## 2019-05-17 NOTE — PROGRESS NOTES
Spoke with Dr. Maria Fernanda Jain with radiation oncology about new consult. States that pt is okay to just set up an outpatient appointment and there is no need to delay discharge from hospital today for new consult. He will continue to monitor pt on outpatient basis if she chooses. Updated Dr. Chepe Salgado with information.

## 2019-05-17 NOTE — PROGRESS NOTES
[facility] Oncology Progress Note  5/17/2019 9:04 AM  Subjective:   Admit Date: 5/9/2019  PCP: Ahmet Justin MD  Interval History: Yesterday at dialysis patient became hypoxic and hypotensive and transferred to the intensive care unit. Patient less short of breath today. Vital signs. Be adequate. Pathology is returned revealing poorly differentiated adenocarcinoma consistent with lung primary. Diet: DIET RENAL; Daily Fluid Restriction: 1500 ml  Dietary Nutrition Supplements: Frozen Oral Supplement  Medications:   Scheduled Meds:   midodrine  5 mg Oral Once    cloNIDine  0.2 mg Oral TID    amLODIPine  5 mg Oral Daily    heparin (porcine)  5,000 Units Subcutaneous 3 times per day    nicotine  1 patch Transdermal Daily    cinacalcet  120 mg Oral Daily    losartan  50 mg Oral BID    pantoprazole  40 mg Oral QAM AC    atorvastatin  20 mg Oral Daily    edwina-anya  1 tablet Oral Daily    chlorhexidine   Topical Daily    tiotropium  18 mcg Inhalation Daily    sodium chloride flush  10 mL Intravenous 2 times per day     Continuous Infusions:  CBC:   Recent Labs     05/16/19  0629 05/16/19  1257 05/17/19  0508   WBC 9.1 16.1* 5.8   HGB 8.9* 9.2* 8.8*    270 249     BMP:    Recent Labs     05/16/19  0629 05/16/19  1257 05/17/19  0508    136 134*   K 4.8 4.1 5.0   CL 97* 98 94*   CO2 24 22 23   BUN 36* 25* 36*   CREATININE 7.07* 5.49* 6.87*   GLUCOSE 103* 133* 108*     Hepatic: No results for input(s): AST, ALT, ALB, BILITOT, ALKPHOS in the last 72 hours. INR:   Recent Labs     05/15/19  0752   INR 1.1       Objective:   Vitals: BP (!) 121/55   Pulse 96   Temp 98.7 °F (37.1 °C) (Oral)   Resp 14   Ht 5' 6\" (1.676 m)   Wt 156 lb 8.4 oz (71 kg)   SpO2 100%   BMI 25.26 kg/m²   General appearance: alert and cooperative with exam  HEENT: Head: Normocephalic, no lesions, without obvious abnormality.   Neck: no adenopathy  Lungs: clear to auscultation bilaterally  Heart: regular rate and rhythm, S1, S2 normal, no murmur, click, rub or gallop  Abdomen: soft, non-tender; bowel sounds normal; no masses,  no organomegaly  Extremities: extremities normal, atraumatic, no cyanosis or edema  Neurologic: Mental status: Alert, oriented, thought content appropriate    Assessment and Plan:   Principal Problem:    Primary malignant neoplasm of left lung metastatic to other site [facility])  Active Problems: Moderate malnutrition (HCC)    End stage renal disease (HCC)    Malignant neoplasm of female breast (HCC)    Tobacco abuse    Anemia of chronic disease    Paroxysmal atrial fibrillation (HCC)    Hypertension    Anemia of chronic renal failure, stage 5 (HCC)    Pain    Volume overload    Hypoxia    Hypotension  Resolved Problems:    * No resolved hospital problems. *  Impression:  #1 metastatic adenocarcinoma of the lung with identified bone met in T-spine. She is not a good candidate for systemic chemotherapy is on dialysis. Have recommended radiation therapy if in consult. We will obtain specialized testing on pathology to see if she would respond to non-chemotherapeutic agents. If possible these would be considered post palliative radiation treatment. Can obtain PET CT scan to complete staging as an outpatient. #2 hypotensive and hypoxic episode was noted appears to have improved hospitalist team following. #3 chronic renal failure on dialysis. Recommendations:  #1 radiation oncology consultation. #2 can follow-up with us as an outpatient in several weeks time to review pathology and discuss further.     Isaías Gaytan MD

## 2019-05-17 NOTE — FLOWSHEET NOTE
Patient is sleeping in a chair. Writer offers a silent prayer. Patient did not respond. Spiritual Care will continue to follow as needed.        05/17/19 9533   Encounter Summary   Services provided to: Patient   Referral/Consult From: Palliative Care;Rounding   Support System Family members   Complexity of Encounter Low   Length of Encounter 15 minutes   Routine   Type Follow up   Assessment Sleeping   Intervention Prayer   Outcome Did not respond

## 2019-05-17 NOTE — CONSULTS
.. PALLIATIVE CARE NURSING ASSESSMENT    Patient: Satya Mello  Room: 5943/6216-69    Reason For Consult   Goals of care evaluation  Distress management  Guidance and support  Facilitate communications  Assistance in coordinating care      Impression: Satya Mello is a 72y.o. year old female  has a past medical history of Anemia, Asthma, Atrial fibrillation ([facility] Breast cancer ([facility] Bursitis, Carpal tunnel syndrome, Chronic obstructive pulmonary disease (COPD) ([facility] Disease of blood and blood forming organ, History of blood transfusion, Hypertension, Irregular heart beat, Lung nodule, Osteoarthritis, Pneumonia, Pulmonary emphysema ([facility] Renal failure, Stenosis of left carotid artery, and Tobacco abuse. .  Currently hospitalized for the management of back pain. The Palliative Care Team is following to assist with goals of care. Vital Signs  Blood pressure (!) 114/48, pulse 91, temperature 98.7 °F (37.1 °C), temperature source Oral, resp. rate 11, height 5' 6\" (1.676 m), weight 156 lb 8.4 oz (71 kg), SpO2 98 %, not currently breastfeeding. Patient Active Problem List   Diagnosis    End stage renal disease ([facility]    Anemia    Malignant neoplasm of female breast ([facility]    Tobacco abuse    Non-rheumatic mitral regurgitation    Gastrointestinal hemorrhage    NSAID long-term use    Anemia of chronic disease    Erosive gastritis    Iron deficiency anemia    Paroxysmal atrial fibrillation (HCC)    Hypertension    Severe anemia    Fatigue    Colon polyp    Pulmonary emphysema (HCC)    Stenosis of left carotid artery    Anemia of chronic renal failure, stage 5 (HCC)    COPD exacerbation (HCC)    Need for vaccination    Primary malignant neoplasm of left lung metastatic to other site [facility])    Pulmonary embolus (HCC)    Pain    Volume overload    Moderate malnutrition (HCC)    Hypoxia    Hypotension       Palliative Interaction: Pt sitting in the chair at bedside.  Pt states she is having pain in her back 7/10 and appears sleepy. She answers questions appropriately and states she is sleepy because she just received a pain pill. We discussed palliative care and how that differs from hospice. I explained our focus was symptom management. She is interested in a home-based palliative care r/t her dialysis schedule and multiple doctor appointments. Pt plans to follow up with radiation for options. Will make referral to [address]. They will call her with a consultation appointment time. Brochure given to patient. Nurse updated and will add to [address]. Also explained program to family member at bedside. Goals/Plan of care  Education/support to family  Education/support to patient  Discharge planning/helping to coordinate care  Providing support for coping/adaptation/distress of family  Providing support for coping/adaptation/distress of patient  Continue with current plan of care  Clarification of medical condition to patient and family  Code status clarified: [facility]  Referral made to [address] for symptom management at home.        Palliative Care Coordinator  Terry Knox RN, [address] [place] [address] Office: [address] Office: [phone]  Work Cell Phone: [phone]    For Symptom Management Clinic scheduling please call [phone]

## 2019-05-17 NOTE — PROGRESS NOTES
(Last 24 hours) at 5/17/2019 0726  Last data filed at 5/16/2019 1310  Gross per 24 hour   Intake --   Output 1825 ml   Net -1825 ml       Labs:    Hematology:  Recent Labs     05/15/19  0752 05/16/19  0629 05/16/19  1257 05/17/19  0508   WBC  --  9.1 16.1* 5.8   RBC  --  3.12* 3.25* 3.11*   HGB  --  8.9* 9.2* 8.8*   HCT  --  29.3* 31.0* 29.2*   MCV  --  93.9 95.4 93.9   MCH  --  28.5 28.3 28.3   MCHC  --  30.4 29.7 30.1   RDW  --  17.9* 18.0* 17.7*   PLT  --  284 270 249   MPV  --  11.3 11.2 11.4   INR 1.1  --   --   --      Chemistry:  Recent Labs     05/16/19  0629 05/16/19  1257 05/16/19  1836 05/17/19  0100 05/17/19  0508    136  --   --  134*   K 4.8 4.1  --   --  5.0   CL 97* 98  --   --  94*   CO2 24 22  --   --  23   GLUCOSE 103* 133*  --   --  108*   BUN 36* 25*  --   --  36*   CREATININE 7.07* 5.49*  --   --  6.87*   MG  --  2.1  --   --   --    ANIONGAP 15 16  --   --  17   LABGLOM 6* 8*  --   --  6*   GFRAA 7* 9*  --   --  7*   CALCIUM 9.4 9.1  --   --  9.8   PHOS 5.9* 4.2  --   --  6.7*   TROPHS  --  88* 80* 80*  --    MYOGLOBIN  --  122* 136* 139*  --      No results for input(s): PROT, LABALBU, LABA1C, L0WQPTF, Q3CWWSW, FT4, TSH, AST, ALT, LDH, GGT, ALKPHOS, LABGGT, BILITOT, BILIDIR, AMMONIA, AMYLASE, LIPASE, LACTATE, CHOL, HDL, LDLCHOLESTEROL, CHOLHDLRATIO, TRIG, VLDL, CNS87CR, PHENYTOIN, PHENYF, URICACID, POCGLU in the last 72 hours.       Lab Results   Component Value Date/Time    SPECIAL NOT REPORTED 03/20/2017 10:05 PM     Lab Results   Component Value Date/Time    CULTURE NO SIGNIFICANT GROWTH 03/20/2017 10:05 PM    CULTURE  03/20/2017 10:05 PM     Performed at [address] [place] [address] [phone]       Lab Results   Component Value Date    POCPH 7.43 05/16/2019    PHART 7.472 09/04/2018    POCPCO2 33 05/16/2019    KBK0NLZ 35 09/04/2018    POCPO2 103 05/16/2019    PO2ART 120 09/04/2018    POCHCO3 21.8 05/16/2019    DGN1ZNR 25.9 09/04/2018    NBEA 3 05/16/2019 PBEA NOT REPORTED 05/16/2019    BEART 2 09/04/2018    ZPU9HPK 23 05/16/2019    EZMF7OOO 98 05/16/2019    W7ZIMKRO 99 09/04/2018    FIO2 50.0 05/16/2019       Radiology:    EXAMINATION:  ONE SUPINE XRAY VIEW(S) OF THE ABDOMEN  5/13/2019 12:55 pm  FINDINGS:  Single intraoperative view of the abdomen centered over the low abdomen and  right hemipelvis demonstrates a large-bore dialysis catheter entering via the  right femoral approach and extending superiorly to the L3 vertebral level,  likely within the IVC. Multiple punctate radiopaque densities are noted  within the field of indeterminate etiology.    Impression:    Large bore dialysis catheter extending to the IVC level at     Physical Examination:        General appearance:  Awake and alert in NAD  Mental Status:  oriented to person, place and time and normal affect  Lungs:  clear to auscultation bilaterally, normal effort  Heart:  regular rate and rhythm, no murmur  Abdomen:  soft, nontender, nondistended, normal bowel sounds, no masses, hepatomegaly, splenomegaly  Extremities:  no edema, redness, tenderness in the calves, R femoral catheter noted, LUE AV graft noted  Skin:  no gross lesions, rashes, induration    Assessment:        Primary Problem  Primary malignant neoplasm of left lung metastatic to other site [facility])    Active Hospital Problems    Diagnosis Date Noted    Volume overload [E87.70] 05/15/2019     Priority: High    End stage renal disease ([address] [N18.6] 11/04/2011     Priority: High    Moderate malnutrition ([address] [E44.0] 05/15/2019     Priority: Medium    Hypoxia [R09.02]     Hypotension [I95.9]     Pain [R52]     Primary malignant neoplasm of left lung metastatic to other site [facility]) [C34.92] 05/09/2019    Anemia of chronic renal failure, stage 5 ([address] [N18.5, D63.1] 07/08/2018    Hypertension [I10]     Paroxysmal atrial fibrillation ([address] [I48.0] 02/18/2017    Anemia of chronic disease [D63.8]     Tobacco abuse [Z72.0] 08/07/2016    Malignant neoplasm of female breast ([facility] [C50.919]        Plan:          Hypoxia/ dyspnea and hypotension all resolved   CTA chest negative      GOYO mass CT S/P  guided biopsy 5/15, poorly differentiated adenocarcinoma ( lung primary)   Rad onc consulted by oncology and plan to set up appointment as OP for palliative radiation    S/P R femoral tunneled catheter placement 5/13  HD per nephro  Vascular to arrange AVF thrombectomy vs new fistula mostly as OP     Continue other chronic meds    BP & glycemic control    DVT & GI PPx    No PE    Discussed code status with the patient  agreeing to [facility] , changes made  Palliative care consulted    DC planning today      Gemini Elena MD  5/17/2019  7:26 AM

## 2019-05-17 NOTE — PROGRESS NOTES
Renal Progress Note    Patient :  Tracy Cuevas; 72 y.o. MRN# [id number]  Location:  1104/1104-01  Attending:  Rajwinder Goodwin MD  Admit Date:  5/9/2019   Hospital Day: 8      Subjective:     Patient was seen and examined. No new issues overnight. Gets her hemodialysis as per TTS schedule. Hematology oncology on board, results of lung biopsy revealing- poorly differentiated adenocarcinoma, lung primary. Patient had an left upper arm AV graft which is clotted and status post tunnel catheter placement 5/13/19.     Outpatient Medications:     Medications Prior to Admission: atorvastatin (LIPITOR) 20 MG tablet, Take 20 mg by mouth daily  B Complex-C-Folic Acid (MADAI-AYAN) TABS, Take 1 tablet by mouth daily  lactulose (CONSTULOSE) 10 GM/15ML solution, Take 30 mLs by mouth daily as needed  chlorhexidine (HIBICLENS) 4 % external liquid, Apply topically daily As directed  hydrALAZINE (APRESOLINE) 100 MG tablet, Take 100 mg by mouth 3 times daily  Umeclidinium Bromide (INCRUSE ELLIPTA) 62.5 MCG/INH AEPB, Inhale 1 puff into the lungs daily  sodium bicarbonate 650 MG tablet, Take 1,300 mg by mouth 3 times daily  amLODIPine (NORVASC) 10 MG tablet, Take 10 mg by mouth daily  loratadine (CLARITIN) 10 MG tablet, Take 10 mg by mouth daily  ethyl chloride spray, Apply topically as needed (to arm prior to dialysis)  omeprazole (PRILOSEC) 20 MG delayed release capsule, TAKE 1 CAPSULE BY MOUTH DAILY  albuterol sulfate  (90 Base) MCG/ACT inhaler, Inhale 2 puffs into the lungs every 6 hours as needed for Wheezing or Shortness of Breath  losartan (COZAAR) 50 MG tablet, Take 1 tablet by mouth 2 times daily  SENSIPAR 60 MG tablet, Take 120 mg by mouth daily   [DISCONTINUED] cloNIDine (CATAPRES) 0.2 MG tablet, Take 0.2 mg by mouth 2 times daily  Incontinence Supply Disposable (INCONTINENCE BRIEF MEDIUM) MISC, 1 each by Does not apply route 2 times daily as needed (incotinenace)    Current Medications:     Scheduled Meds:    midodrine  5 mg Oral Once    cloNIDine  0.2 mg Oral TID    amLODIPine  5 mg Oral Daily    heparin (porcine)  5,000 Units Subcutaneous 3 times per day    nicotine  1 patch Transdermal Daily    cinacalcet  120 mg Oral Daily    losartan  50 mg Oral BID    pantoprazole  40 mg Oral QAM AC    atorvastatin  20 mg Oral Daily    edwina-anya  1 tablet Oral Daily    chlorhexidine   Topical Daily    tiotropium  18 mcg Inhalation Daily    sodium chloride flush  10 mL Intravenous 2 times per day     Continuous Infusions:   PRN Meds:  albuterol, acetaminophen, sodium citrate, sodium citrate, sodium chloride flush, lactulose, sodium chloride flush, magnesium hydroxide, ondansetron **OR** ondansetron, oxyCODONE-acetaminophen, oxyCODONE-acetaminophen    Input/Output:       I/O last 3 completed shifts:  In: -   Out: 1825 . Patient Vitals for the past 96 hrs (Last 3 readings):   Weight   19 1000 156 lb 8.4 oz (71 kg)   19 0922 156 lb 11.2 oz (71.1 kg)   05/15/19 0457 146 lb 14.4 oz (66.6 kg)       Vital Signs:   Temperature:  Temp: 98.7 °F (37.1 °C)  TMax:   Temp (24hrs), Av.2 °F (36.8 °C), Min:98 °F (36.7 °C), Max:98.7 °F (37.1 °C)    Respirations:  Resp: 26  Pulse:   Pulse: 98  BP:    BP: (!) 120/52  BP Range: Systolic (30EAZ), HRE:368 , Min:91 , RZO:804       Diastolic (64EQI), PJU:28, Min:40, Max:61      Physical Examination:     General:  AAO x 3, speaking in full sentences, no accessory muscle use. HEENT: Atraumatic, normocephalic, no throat congestion, moist mucosa. Eyes:   Pupils equal, round and reactive to light, EOMI. Neck:   Supple  Chest:   Bilateral vesicular breath sounds, no rales or wheezes. Cardiac:  S1 S2 RR, no murmurs, gallops or rubs. Abdomen: Soft, non-tender, no masses or organomegaly, BS audible. :   No suprapubic or flank tenderness. Neuro:  AAO x 3, No FND. SKIN:  No rashes, good skin turgor. Extremities:  No edema.     Labs:       Recent Labs     19  1280 05/16/19  1257 05/17/19  0508   WBC 9.1 16.1* 5.8   RBC 3.12* 3.25* 3.11*   HGB 8.9* 9.2* 8.8*   HCT 29.3* 31.0* 29.2*   MCV 93.9 95.4 93.9   MCH 28.5 28.3 28.3   MCHC 30.4 29.7 30.1   RDW 17.9* 18.0* 17.7*    270 249   MPV 11.3 11.2 11.4      BMP:   Recent Labs     05/16/19  0629 05/16/19  1257 05/17/19  0508    136 134*   K 4.8 4.1 5.0   CL 97* 98 94*   CO2 24 22 23   BUN 36* 25* 36*   CREATININE 7.07* 5.49* 6.87*   GLUCOSE 103* 133* 108*   CALCIUM 9.4 9.1 9.8      Phosphorus:     Recent Labs     05/16/19  0629 05/16/19  1257 05/17/19  0508   PHOS 5.9* 4.2 6.7*     Magnesium:    Recent Labs     05/16/19  1257   MG 2.1     SPEP:  Lab Results   Component Value Date    PROT 8.7 09/03/2018    PATH ELECTRONICALLY SIGNED. Felicia Guzman M.D. 01/05/2017     Hep BsAg:         Lab Results   Component Value Date    HEPBSAG NONREACTIVE 05/12/2019     Urinalysis/Chemistries:      Lab Results   Component Value Date    NITRU NEGATIVE 03/20/2017    COLORU YELLOW 03/20/2017    PHUR 8.5 03/20/2017    WBCUA None 03/20/2017    RBCUA None 03/20/2017    MUCUS NOT REPORTED 03/20/2017    TRICHOMONAS NOT REPORTED 03/20/2017    YEAST NOT REPORTED 03/20/2017    BACTERIA NOT REPORTED 03/20/2017    SPECGRAV 1.015 03/20/2017    LEUKOCYTESUR NEGATIVE 03/20/2017    UROBILINOGEN Normal 03/20/2017    BILIRUBINUR NEGATIVE 03/20/2017    GLUCOSEU NEGATIVE 03/20/2017    KETUA NEGATIVE 03/20/2017    AMORPHOUS NOT REPORTED 03/20/2017       Radiology:     Reviewed. Assessment:     1. ESRD on hemodialysis now via right groin tunnel catheter [facility] under Dr. Moran Officer. EDW 72.5 kgs. 2. Clotted AV graft left upper arm  3. Status post right femoral vein dialysis catheter placement on 5/13/19  4. Anemia of chronic disease with hemoglobin below target at less than 9  5. Pruritis, possibly secondary to hyperphosphatemia and also missed dialysis, improving  6. Metastatic lung cancer  7. History of breast cancer  8.  Essential hypertension with good control  9. Status post biopsy today, 5/15/19, of a lung mass- poorly differentiated adenocarcinoma, lung primary with spine mets. Plan:   1. Will get regular dialysis in a.m. as per TTS schedule. No acute need for HD today per labs and exam.  2.  Adenocarcinoma management per heme/onc. Radiation onc. consulted. 3.  Will follow. Nutrition   Please ensure that patient is on a renal diet/TF. Avoid nephrotoxic drugs/contrast exposure. We will continue to follow along with you. Simone Alfredo MD  [facility]     This note is created with the assistance of a speech-recognition program. While intending to generate a document that actually reflects the content of the visit, no guarantees can be provided that every mistake has been identified and corrected by editing.

## 2019-05-21 ENCOUNTER — TELEPHONE (OUTPATIENT)
Dept: CASE MANAGEMENT | Age: 74
End: 2019-05-21

## 2019-05-23 ENCOUNTER — HOSPITAL ENCOUNTER (INPATIENT)
Age: 74
LOS: 1 days | Discharge: HOME | DRG: 682 | End: 2019-05-24
Attending: EMERGENCY MEDICINE | Admitting: INTERNAL MEDICINE
Payer: COMMERCIAL

## 2019-05-23 DIAGNOSIS — D64.9 LOW HEMOGLOBIN: Primary | ICD-10-CM

## 2019-05-23 PROBLEM — N18.5 ANEMIA ASSOCIATED WITH STAGE 5 CHRONIC RENAL FAILURE (HCC): Status: ACTIVE | Noted: 2019-05-23

## 2019-05-23 PROBLEM — D63.1 ANEMIA ASSOCIATED WITH STAGE 5 CHRONIC RENAL FAILURE (HCC): Status: ACTIVE | Noted: 2019-05-23

## 2019-05-23 LAB
ABSOLUTE EOS #: 0.05 K/UL (ref 0–0.44)
ABSOLUTE IMMATURE GRANULOCYTE: 0.05 K/UL (ref 0–0.3)
ABSOLUTE LYMPH #: 1.16 K/UL (ref 1.1–3.7)
ABSOLUTE MONO #: 0.7 K/UL (ref 0.1–1.2)
ANION GAP SERPL CALCULATED.3IONS-SCNC: 19 MMOL/L (ref 9–17)
BASOPHILS # BLD: 0 % (ref 0–2)
BASOPHILS ABSOLUTE: <0.03 K/UL (ref 0–0.2)
BUN BLDV-MCNC: 25 MG/DL (ref 8–23)
BUN/CREAT BLD: 6 (ref 9–20)
CALCIUM SERPL-MCNC: 8.8 MG/DL (ref 8.6–10.4)
CHLORIDE BLD-SCNC: 93 MMOL/L (ref 98–107)
CO2: 26 MMOL/L (ref 20–31)
CREAT SERPL-MCNC: 4.53 MG/DL (ref 0.5–0.9)
DIFFERENTIAL TYPE: ABNORMAL
EOSINOPHILS RELATIVE PERCENT: 1 % (ref 1–4)
GFR AFRICAN AMERICAN: 12 ML/MIN
GFR NON-AFRICAN AMERICAN: 10 ML/MIN
GFR SERPL CREATININE-BSD FRML MDRD: ABNORMAL ML/MIN/{1.73_M2}
GFR SERPL CREATININE-BSD FRML MDRD: ABNORMAL ML/MIN/{1.73_M2}
GLUCOSE BLD-MCNC: 115 MG/DL (ref 70–99)
HCT VFR BLD CALC: 22.5 % (ref 36.3–47.1)
HEMOGLOBIN: 6.9 G/DL (ref 11.9–15.1)
IMMATURE GRANULOCYTES: 1 %
LYMPHOCYTES # BLD: 13 % (ref 24–43)
MCH RBC QN AUTO: 28.9 PG (ref 25.2–33.5)
MCHC RBC AUTO-ENTMCNC: 30.7 G/DL (ref 28.4–34.8)
MCV RBC AUTO: 94.1 FL (ref 82.6–102.9)
MONOCYTES # BLD: 8 % (ref 3–12)
NRBC AUTOMATED: 0 PER 100 WBC
PDW BLD-RTO: 18.7 % (ref 11.8–14.4)
PLATELET # BLD: 348 K/UL (ref 138–453)
PLATELET ESTIMATE: ABNORMAL
PMV BLD AUTO: 11 FL (ref 8.1–13.5)
POTASSIUM SERPL-SCNC: 3.6 MMOL/L (ref 3.7–5.3)
RBC # BLD: 2.39 M/UL (ref 3.95–5.11)
RBC # BLD: ABNORMAL 10*6/UL
SEG NEUTROPHILS: 78 % (ref 36–65)
SEGMENTED NEUTROPHILS ABSOLUTE COUNT: 7.15 K/UL (ref 1.5–8.1)
SODIUM BLD-SCNC: 138 MMOL/L (ref 135–144)
TROPONIN INTERP: ABNORMAL
TROPONIN INTERP: ABNORMAL
TROPONIN T: ABNORMAL NG/ML
TROPONIN T: ABNORMAL NG/ML
TROPONIN, HIGH SENSITIVITY: 89 NG/L (ref 0–14)
TROPONIN, HIGH SENSITIVITY: 95 NG/L (ref 0–14)
WBC # BLD: 9.1 K/UL (ref 3.5–11.3)
WBC # BLD: ABNORMAL 10*3/UL

## 2019-05-23 PROCEDURE — 99285 EMERGENCY DEPT VISIT HI MDM: CPT

## 2019-05-23 PROCEDURE — 2580000003 HC RX 258: Performed by: NURSE PRACTITIONER

## 2019-05-23 PROCEDURE — 86920 COMPATIBILITY TEST SPIN: CPT

## 2019-05-23 PROCEDURE — 80048 BASIC METABOLIC PNL TOTAL CA: CPT

## 2019-05-23 PROCEDURE — 99222 1ST HOSP IP/OBS MODERATE 55: CPT | Performed by: INTERNAL MEDICINE

## 2019-05-23 PROCEDURE — 36415 COLL VENOUS BLD VENIPUNCTURE: CPT

## 2019-05-23 PROCEDURE — APPSS45 APP SPLIT SHARED TIME 31-45 MINUTES: Performed by: NURSE PRACTITIONER

## 2019-05-23 PROCEDURE — 86850 RBC ANTIBODY SCREEN: CPT

## 2019-05-23 PROCEDURE — 86901 BLOOD TYPING SEROLOGIC RH(D): CPT

## 2019-05-23 PROCEDURE — 86900 BLOOD TYPING SEROLOGIC ABO: CPT

## 2019-05-23 PROCEDURE — G0378 HOSPITAL OBSERVATION PER HR: HCPCS

## 2019-05-23 PROCEDURE — 93005 ELECTROCARDIOGRAM TRACING: CPT | Performed by: EMERGENCY MEDICINE

## 2019-05-23 PROCEDURE — 84484 ASSAY OF TROPONIN QUANT: CPT

## 2019-05-23 PROCEDURE — 6370000000 HC RX 637 (ALT 250 FOR IP): Performed by: NURSE PRACTITIONER

## 2019-05-23 PROCEDURE — 85025 COMPLETE CBC W/AUTO DIFF WBC: CPT

## 2019-05-23 PROCEDURE — 1200000000 HC SEMI PRIVATE

## 2019-05-23 PROCEDURE — P9016 RBC LEUKOCYTES REDUCED: HCPCS

## 2019-05-23 PROCEDURE — 86902 BLOOD TYPE ANTIGEN DONOR EA: CPT

## 2019-05-23 PROCEDURE — 36430 TRANSFUSION BLD/BLD COMPNT: CPT

## 2019-05-23 RX ORDER — ATORVASTATIN CALCIUM 20 MG/1
20 TABLET, FILM COATED ORAL DAILY
Status: DISCONTINUED | OUTPATIENT
Start: 2019-05-23 | End: 2019-05-24 | Stop reason: HOSPADM

## 2019-05-23 RX ORDER — SODIUM CHLORIDE 9 MG/ML
INJECTION, SOLUTION INTRAVENOUS CONTINUOUS
Status: DISCONTINUED | OUTPATIENT
Start: 2019-05-23 | End: 2019-05-23

## 2019-05-23 RX ORDER — HYDROCODONE BITARTRATE AND ACETAMINOPHEN 5; 325 MG/1; MG/1
2 TABLET ORAL EVERY 4 HOURS PRN
Status: DISCONTINUED | OUTPATIENT
Start: 2019-05-23 | End: 2019-05-24 | Stop reason: HOSPADM

## 2019-05-23 RX ORDER — SODIUM BICARBONATE 650 MG/1
1300 TABLET ORAL 3 TIMES DAILY
Status: DISCONTINUED | OUTPATIENT
Start: 2019-05-23 | End: 2019-05-24 | Stop reason: HOSPADM

## 2019-05-23 RX ORDER — ALBUTEROL SULFATE 90 UG/1
2 AEROSOL, METERED RESPIRATORY (INHALATION) EVERY 6 HOURS PRN
Status: DISCONTINUED | OUTPATIENT
Start: 2019-05-23 | End: 2019-05-24 | Stop reason: HOSPADM

## 2019-05-23 RX ORDER — MORPHINE SULFATE 2 MG/ML
2 INJECTION, SOLUTION INTRAMUSCULAR; INTRAVENOUS
Status: DISCONTINUED | OUTPATIENT
Start: 2019-05-23 | End: 2019-05-24 | Stop reason: HOSPADM

## 2019-05-23 RX ORDER — AMLODIPINE BESYLATE 5 MG/1
10 TABLET ORAL DAILY
Status: DISCONTINUED | OUTPATIENT
Start: 2019-05-23 | End: 2019-05-24 | Stop reason: HOSPADM

## 2019-05-23 RX ORDER — ONDANSETRON 2 MG/ML
4 INJECTION INTRAMUSCULAR; INTRAVENOUS EVERY 6 HOURS PRN
Status: DISCONTINUED | OUTPATIENT
Start: 2019-05-23 | End: 2019-05-24 | Stop reason: HOSPADM

## 2019-05-23 RX ORDER — HYDRALAZINE HYDROCHLORIDE 25 MG/1
100 TABLET, FILM COATED ORAL 3 TIMES DAILY
Status: DISCONTINUED | OUTPATIENT
Start: 2019-05-23 | End: 2019-05-24 | Stop reason: HOSPADM

## 2019-05-23 RX ORDER — SODIUM CHLORIDE 0.9 % (FLUSH) 0.9 %
10 SYRINGE (ML) INJECTION PRN
Status: DISCONTINUED | OUTPATIENT
Start: 2019-05-23 | End: 2019-05-24 | Stop reason: HOSPADM

## 2019-05-23 RX ORDER — HYDROCODONE BITARTRATE AND ACETAMINOPHEN 5; 325 MG/1; MG/1
1 TABLET ORAL EVERY 4 HOURS PRN
Status: DISCONTINUED | OUTPATIENT
Start: 2019-05-23 | End: 2019-05-24 | Stop reason: HOSPADM

## 2019-05-23 RX ORDER — CLONIDINE HYDROCHLORIDE 0.1 MG/1
0.2 TABLET ORAL 3 TIMES DAILY
Status: DISCONTINUED | OUTPATIENT
Start: 2019-05-23 | End: 2019-05-24 | Stop reason: HOSPADM

## 2019-05-23 RX ORDER — CINACALCET 30 MG/1
120 TABLET, FILM COATED ORAL DAILY
Status: DISCONTINUED | OUTPATIENT
Start: 2019-05-23 | End: 2019-05-24 | Stop reason: HOSPADM

## 2019-05-23 RX ORDER — ONDANSETRON 4 MG/1
4 TABLET, ORALLY DISINTEGRATING ORAL EVERY 6 HOURS PRN
Status: DISCONTINUED | OUTPATIENT
Start: 2019-05-23 | End: 2019-05-24 | Stop reason: HOSPADM

## 2019-05-23 RX ORDER — LOSARTAN POTASSIUM 50 MG/1
50 TABLET ORAL 2 TIMES DAILY
Status: DISCONTINUED | OUTPATIENT
Start: 2019-05-23 | End: 2019-05-24 | Stop reason: HOSPADM

## 2019-05-23 RX ORDER — PANTOPRAZOLE SODIUM 40 MG/1
40 TABLET, DELAYED RELEASE ORAL
Status: DISCONTINUED | OUTPATIENT
Start: 2019-05-24 | End: 2019-05-24 | Stop reason: HOSPADM

## 2019-05-23 RX ORDER — CETIRIZINE HYDROCHLORIDE 10 MG/1
10 TABLET ORAL DAILY
Status: DISCONTINUED | OUTPATIENT
Start: 2019-05-23 | End: 2019-05-24 | Stop reason: HOSPADM

## 2019-05-23 RX ORDER — ACETAMINOPHEN 325 MG/1
650 TABLET ORAL EVERY 4 HOURS PRN
Status: DISCONTINUED | OUTPATIENT
Start: 2019-05-23 | End: 2019-05-24 | Stop reason: HOSPADM

## 2019-05-23 RX ORDER — ONDANSETRON 2 MG/ML
4 INJECTION INTRAMUSCULAR; INTRAVENOUS EVERY 6 HOURS PRN
Status: DISCONTINUED | OUTPATIENT
Start: 2019-05-23 | End: 2019-05-23

## 2019-05-23 RX ORDER — SODIUM CHLORIDE 0.9 % (FLUSH) 0.9 %
10 SYRINGE (ML) INJECTION EVERY 12 HOURS SCHEDULED
Status: DISCONTINUED | OUTPATIENT
Start: 2019-05-23 | End: 2019-05-24 | Stop reason: HOSPADM

## 2019-05-23 RX ORDER — MORPHINE SULFATE 4 MG/ML
4 INJECTION, SOLUTION INTRAMUSCULAR; INTRAVENOUS
Status: DISCONTINUED | OUTPATIENT
Start: 2019-05-23 | End: 2019-05-24 | Stop reason: HOSPADM

## 2019-05-23 RX ADMIN — HYDROCODONE BITARTRATE AND ACETAMINOPHEN 2 TABLET: 5; 325 TABLET ORAL at 16:42

## 2019-05-23 RX ADMIN — HYDRALAZINE HYDROCHLORIDE 100 MG: 25 TABLET, FILM COATED ORAL at 16:33

## 2019-05-23 RX ADMIN — ATORVASTATIN CALCIUM 20 MG: 20 TABLET, FILM COATED ORAL at 16:42

## 2019-05-23 RX ADMIN — CINACALCET HYDROCHLORIDE 120 MG: 30 TABLET, COATED ORAL at 17:13

## 2019-05-23 RX ADMIN — CLONIDINE HYDROCHLORIDE 0.2 MG: 0.1 TABLET ORAL at 21:30

## 2019-05-23 RX ADMIN — SODIUM CHLORIDE: 9 INJECTION, SOLUTION INTRAVENOUS at 17:13

## 2019-05-23 RX ADMIN — SODIUM BICARBONATE 1300 MG: 650 TABLET ORAL at 16:33

## 2019-05-23 RX ADMIN — HYDRALAZINE HYDROCHLORIDE 100 MG: 25 TABLET, FILM COATED ORAL at 21:34

## 2019-05-23 RX ADMIN — SODIUM BICARBONATE 1300 MG: 650 TABLET ORAL at 21:31

## 2019-05-23 RX ADMIN — CLONIDINE HYDROCHLORIDE 0.2 MG: 0.1 TABLET ORAL at 16:33

## 2019-05-23 RX ADMIN — AMLODIPINE BESYLATE 10 MG: 5 TABLET ORAL at 16:33

## 2019-05-23 RX ADMIN — LOSARTAN POTASSIUM 50 MG: 50 TABLET, FILM COATED ORAL at 21:31

## 2019-05-23 RX ADMIN — Medication 10 ML: at 21:36

## 2019-05-23 RX ADMIN — METOPROLOL TARTRATE 25 MG: 25 TABLET, FILM COATED ORAL at 21:31

## 2019-05-23 RX ADMIN — HYDROCODONE BITARTRATE AND ACETAMINOPHEN 2 TABLET: 5; 325 TABLET ORAL at 21:31

## 2019-05-23 RX ADMIN — CETIRIZINE HYDROCHLORIDE 10 MG: 10 TABLET, FILM COATED ORAL at 16:33

## 2019-05-23 ASSESSMENT — ENCOUNTER SYMPTOMS
ALLERGIC/IMMUNOLOGIC NEGATIVE: 1
SHORTNESS OF BREATH: 1
GASTROINTESTINAL NEGATIVE: 1
EYES NEGATIVE: 1

## 2019-05-23 ASSESSMENT — PAIN SCALES - GENERAL
PAINLEVEL_OUTOF10: 8
PAINLEVEL_OUTOF10: 6
PAINLEVEL_OUTOF10: 8

## 2019-05-23 ASSESSMENT — PAIN DESCRIPTION - LOCATION: LOCATION: BACK

## 2019-05-23 ASSESSMENT — PAIN DESCRIPTION - PAIN TYPE: TYPE: CHRONIC PAIN

## 2019-05-23 NOTE — DISCHARGE INSTR - COC
Continuity of Care Form    Patient Name: Blanca Hooper   :    MRN:  [id number]    Admit date:  2019  Discharge date:  2019    Code Status Order: Prior   Advance Directives:     Admitting Physician:  No admitting provider for patient encounter. PCP: Osvaldo Aguirre MD    Discharging Nurse: [facility] [place] Unit/Room#: STA25/25  Discharging Unit Phone Number: [phone]    Emergency Contact:   Extended Emergency Contact Information  Primary Emergency Contact: Montserrat Arora  Address: [address]  [address], [address] [facility] [place] of [address] [facility] Phone: [phone]  Work Phone: [phone]  Mobile Phone: [phone]  Relation: Child  Secondary Emergency Contact: [address] of [address] [facility] Phone: [phone]  Work Phone: [phone]  Mobile Phone: [phone]  Relation: Niece/Nephew    Past Surgical History:  Past Surgical History:   Procedure Laterality Date    ABDOMEN SURGERY      BREAST SURGERY      left;  breast cancer    CARPAL TUNNEL RELEASE      right    COLONOSCOPY  2017    DR Tato Buchanan - adenomatous polyp    ECTOPIC PREGNANCY SURGERY      [facility] DIALYSIS CATHETER N/A 2019    CATHETER INSERTION HEMODIALYSIS -[id number] performed by Shiv Samuel MD at [address] [facility]      left    [place] COLON CA SCRN NOT  [address]  IND N/A 2017    COLONOSCOPY performed by Vasu Beltran DO at [facility]    [place] ESOPHAGOGASTRODUODENOSCOPY TRANSORAL DIAGNOSTIC N/A 2017    EGD ESOPHAGOGASTRODUODENOSCOPY performed by Vasu Beltran DO at  [facility] [address] Right 2019    UPPER GASTROINTESTINAL ENDOSCOPY  2016       Immunization History:   Immunization History   Administered Date(s) Administered    Influenza Vaccine, unspecified formulation 10/03/2015, 10/01/2016    PPD Test 2010    Pneumococcal 13-valent Conjugate ([id number]) 2019    Pneumococcal Colostomy/Ileostomy/Ileal Conduit: No       Date of Last BM: 5/23/19  No intake or output data in the 24 hours ending 05/23/19 1536  No intake/output data recorded. Safety Concerns: At Risk for Falls    Impairments/Disabilities:      None    Nutrition Therapy:  Current Nutrition Therapy:   - Oral Diet:  Renal    Routes of Feeding: Oral  Liquids: No Restrictions  Daily Fluid Restriction: no  Last Modified Barium Swallow with Video (Video Swallowing Test): not done    Treatments at the Time of Hospital Discharge:   Respiratory Treatments: na  Oxygen Therapy:  is not on home oxygen therapy. Ventilator:    - No ventilator support    Rehab Therapies: Physical Therapy and Occupational Therapy  Weight Bearing Status/Restrictions: No weight bearing restirctions  Other Medical Equipment (for information only, NOT a DME order):  walker  Other Treatments: resume all services, assess tunnel cath site to rt groin    Patient's personal belongings (please select all that are sent with patient):  None    RN SIGNATURE:  Electronically signed by Lolita Wong RN on 5/24/19 at 10:00 AM    CASE MANAGEMENT/SOCIAL WORK SECTION    Inpatient Status Date: ***    Readmission Risk Assessment Score:  Readmission Risk              Risk of Unplanned Readmission:        0           Discharging to Facility/ Agency   · Name: [facility]  · Address:  · Phone: [phone]  · Fax: [phone]    Dialysis Facility (if applicable)   · Name: [facility]  · Address:  · Dialysis Schedule: T/TH/SAT @ 6:40AM  · Phone: [phone]  · Fax: [phone]    / signature: Electronically signed by Gail Hurst RN on 5/24/19 at 10:04 AM    PHYSICIAN SECTION    Prognosis: Fair    Condition at Discharge: Stable    Rehab Potential (if transferring to Rehab):  Fair    Recommended Labs or Other Treatments After Discharge: pt/ot    Physician Certification: I certify the above information and transfer of Ana Griffith  is necessary for the continuing treatment of the diagnosis listed and that she requires Home Care for greater 30 days.      Update Admission H&P: No change in H&P    PHYSICIAN SIGNATURE:  Electronically signed by Bessy Gibson DO on 5/24/19 at 9:57 AM

## 2019-05-23 NOTE — ED NOTES
Patient comes to er via mobile care from dialysis at [facility] and had a hgb of 6.6. Dialysis sent her over with critical hbg and medic states that she had hgb of 8.1 on 5/21. Patient is alert and oriented and denies any dizziness or other complaints. Patient does not appear to be be bleeding and patient denies any rectal bleeding. Patient states that she has no change since 5/21. Patient denies any SOB or chest and does not appear to be in distress at this time.       Tiki Piper RN  05/23/19 6186

## 2019-05-23 NOTE — ED PROVIDER NOTES
EMERGENCY DEPARTMENT ENCOUNTER    Pt Name: Guillermo Gomez  MRN: [id number]  [place] 1945  Date of evaluation: 5/23/19  CHIEF COMPLAINT       Chief Complaint   Patient presents with    Other     low hgb at dialysys     HISTORY OF PRESENT ILLNESS   HPI    81-year-old female history of end-stage renal disease on dialysis presents from dialysis for evaluation of low hemoglobin. Patient was advised by PCP to come to the ER because her hemoglobin of her dialysis was found to be 6.6. Patient went to dialysis sessions a successful but was told to come here right after. Patient with no history of GI bleed. No Bloody stool. Patient with no chest pain or shortness of breath. Patient with no lightheadedness or dizziness. Patient has history of metastatic lung cancer. REVIEW OF SYSTEMS     Review of Systems   All other systems reviewed and are negative.     PASTMEDICAL HISTORY     Past Medical History:   Diagnosis Date    Anemia     Asthma     Atrial fibrillation ([address] 2/18/2017    Breast cancer ([address] 1988     has annual mammograms only, no heme/onc    Bursitis     bilateral    Carpal tunnel syndrome     Chronic obstructive pulmonary disease (COPD) ([address]     Disease of blood and blood forming organ     History of blood transfusion     Hypertension     Irregular heart beat     Osteoarthritis     knee and back    Pneumonia     Primary lung adenocarcinoma ([address]     Pulmonary emphysema ([address] 6/9/2017    Renal failure      end stage renal failure on dialysis    Stenosis of left carotid artery 6/9/2017    Tobacco abuse      SURGICAL HISTORY       Past Surgical History:   Procedure Laterality Date    ABDOMEN SURGERY      BREAST SURGERY      left;  breast cancer    CARPAL TUNNEL RELEASE      right    COLONOSCOPY  02/22/2017    DR Kermit Denise - adenomatous polyp    ECTOPIC PREGNANCY SURGERY      HC DIALYSIS CATHETER N/A 5/13/2019    CATHETER INSERTION HEMODIALYSIS -[address] #84,29,36 performed by Koko Hanna MD at [address] [facility]      left    [place] COLON CA SCRN NOT  [address]  IND N/A 2/22/2017    COLONOSCOPY performed by Chanda Gonzalez DO at [facility]    [place] ESOPHAGOGASTRODUODENOSCOPY TRANSORAL DIAGNOSTIC N/A 2/22/2017    EGD ESOPHAGOGASTRODUODENOSCOPY performed by Chanda Gonzalez DO at  [address]UN[address] VENOUS PORT PLACEMENT Right 05/13/2019    UPPER GASTROINTESTINAL ENDOSCOPY  09/30/2016     CURRENT MEDICATIONS       Discharge Medication List as of 5/24/2019 10:03 AM      CONTINUE these medications which have NOT CHANGED    Details   cloNIDine (CATAPRES) 0.2 MG tablet Take 1 tablet by mouth 3 times daily, Disp-60 tablet, R-3Normal      metoprolol tartrate (LOPRESSOR) 25 MG tablet TAKE 1 TABLET BY MOUTH TWICE DAILY, Disp-60 tablet, R-5Normal      atorvastatin (LIPITOR) 20 MG tablet Take 20 mg by mouth dailyHistorical Med      B Complex-C-Folic Acid (MADAI-AYAN) TABS Take 1 tablet by mouth dailyHistorical Med      lactulose (CONSTULOSE) 10 GM/15ML solution Take 30 mLs by mouth daily as neededHistorical Med      chlorhexidine (HIBICLENS) 4 % external liquid Apply topically daily As directed, Topical, DAILY, Historical Med      hydrALAZINE (APRESOLINE) 100 MG tablet Take 100 mg by mouth 3 times dailyHistorical Med      Umeclidinium Bromide (INCRUSE ELLIPTA) 62.5 MCG/INH AEPB Inhale 1 puff into the lungs dailyHistorical Med      sodium bicarbonate 650 MG tablet Take 1,300 mg by mouth 3 times dailyHistorical Med      amLODIPine (NORVASC) 10 MG tablet Take 10 mg by mouth dailyHistorical Med      loratadine (CLARITIN) 10 MG tablet Take 10 mg by mouth dailyHistorical Med      ethyl chloride spray Apply topically as needed (to arm prior to dialysis), Topical, PRN, Historical Med      omeprazole (PRILOSEC) 20 MG delayed release capsule TAKE 1 CAPSULE BY MOUTH DAILY, Disp-30 capsule, R-5Normal      Incontinence Supply Disposable (INCONTINENCE BRIEF MEDIUM) MISC 2 TIMES DAILY PRN Starting Tue injection 4 mg    DISCONTD: ondansetron (ZOFRAN) injection 4 mg    DISCONTD: ondansetron (ZOFRAN-ODT) disintegrating tablet 4 mg    DISCONTD: ondansetron (ZOFRAN) injection 4 mg    DISCONTD: tiotropium (SPIRIVA) inhalation capsule 18 mcg    DISCONTD: nicotine (NICODERM CQ) 14 MG/24HR 1 patch     CONSULTS:  IP CONSULT TO INTERNAL MEDICINE    FINAL IMPRESSION      1.  Low hemoglobin          DISPOSITION/PLAN   DISPOSITION        PATIENT REFERRED TO:  Dr. Divya Rojas  [address] [place] [address]  Go on 5/29/2019  Appointment 5-29-19 at 1:30pm    DISCHARGE MEDICATIONS:  Discharge Medication List as of 5/24/2019 10:03 AM        Malia Mcfarlane MD  Attending Emergency Physician                    Brendon Champion MD  05/24/19 7218

## 2019-05-23 NOTE — H&P
lactulose (CONSTULOSE) 10 GM/15ML solution Take 30 mLs by mouth daily as needed   Yes Historical Provider, MD   chlorhexidine (HIBICLENS) 4 % external liquid Apply topically daily As directed   Yes Historical Provider, MD   hydrALAZINE (APRESOLINE) 100 MG tablet Take 100 mg by mouth 3 times daily   Yes Historical Provider, MD   Umeclidinium Bromide (INCRUSE ELLIPTA) 62.5 MCG/INH AEPB Inhale 1 puff into the lungs daily   Yes Historical Provider, MD   sodium bicarbonate 650 MG tablet Take 1,300 mg by mouth 3 times daily   Yes Historical Provider, MD   amLODIPine (NORVASC) 10 MG tablet Take 10 mg by mouth daily   Yes Historical Provider, MD   loratadine (CLARITIN) 10 MG tablet Take 10 mg by mouth daily   Yes Historical Provider, MD   ethyl chloride spray Apply topically as needed (to arm prior to dialysis)   Yes Historical Provider, MD   omeprazole (PRILOSEC) 20 MG delayed release capsule TAKE 1 CAPSULE BY MOUTH DAILY 2/20/19  Yes Romeo Matute MD   Incontinence Supply Disposable (INCONTINENCE BRIEF MEDIUM) MISC 1 each by Does not apply route 2 times daily as needed (incotinenace) 1/8/19  Yes Eileen Alexis MD   losartan (COZAAR) 50 MG tablet Take 1 tablet by mouth 2 times daily 10/7/16  Yes Dipika Tracey MD   SENSIPAR 60 MG tablet Take 120 mg by mouth daily  12/3/15  Yes Historical Provider, MD   albuterol sulfate  (90 Base) MCG/ACT inhaler Inhale 2 puffs into the lungs every 6 hours as needed for Wheezing or Shortness of Breath 9/5/18 5/9/19  Nick Gonzalez DO        Allergies:     Pcn [penicillins] and Sulfa antibiotics    Social History:     Tobacco:    reports that she quit smoking about 2 weeks ago. Her smoking use included cigarettes. She has a 13.75 pack-year smoking history. She has never used smokeless tobacco.  Alcohol:      reports that she does not drink alcohol. Drug Use:  reports that she does not use drugs.     Family History:     Family History   Problem Relation Age of Onset    Cancer Father

## 2019-05-24 ENCOUNTER — TELEPHONE (OUTPATIENT)
Dept: ONCOLOGY | Age: 74
End: 2019-05-24

## 2019-05-24 VITALS
HEIGHT: 66 IN | BODY MASS INDEX: 25.47 KG/M2 | WEIGHT: 158.5 LBS | HEART RATE: 77 BPM | RESPIRATION RATE: 16 BRPM | TEMPERATURE: 98.4 F | SYSTOLIC BLOOD PRESSURE: 110 MMHG | DIASTOLIC BLOOD PRESSURE: 33 MMHG | OXYGEN SATURATION: 98 %

## 2019-05-24 PROBLEM — E44.0 MODERATE MALNUTRITION (HCC): Status: ACTIVE | Noted: 2019-05-24

## 2019-05-24 LAB
ANION GAP SERPL CALCULATED.3IONS-SCNC: 15 MMOL/L (ref 9–17)
BUN BLDV-MCNC: 32 MG/DL (ref 8–23)
BUN/CREAT BLD: 5 (ref 9–20)
CALCIUM SERPL-MCNC: 8 MG/DL (ref 8.6–10.4)
CHLORIDE BLD-SCNC: 94 MMOL/L (ref 98–107)
CO2: 28 MMOL/L (ref 20–31)
CREAT SERPL-MCNC: 5.86 MG/DL (ref 0.5–0.9)
EKG ATRIAL RATE: 97 BPM
EKG P AXIS: 81 DEGREES
EKG P-R INTERVAL: 158 MS
EKG Q-T INTERVAL: 392 MS
EKG QRS DURATION: 88 MS
EKG QTC CALCULATION (BAZETT): 497 MS
EKG R AXIS: 64 DEGREES
EKG T AXIS: 99 DEGREES
EKG VENTRICULAR RATE: 97 BPM
GFR AFRICAN AMERICAN: 9 ML/MIN
GFR NON-AFRICAN AMERICAN: 7 ML/MIN
GFR SERPL CREATININE-BSD FRML MDRD: ABNORMAL ML/MIN/{1.73_M2}
GFR SERPL CREATININE-BSD FRML MDRD: ABNORMAL ML/MIN/{1.73_M2}
GLUCOSE BLD-MCNC: 88 MG/DL (ref 70–99)
HCT VFR BLD CALC: 21.3 % (ref 36.3–47.1)
HCT VFR BLD CALC: 25.7 % (ref 36.3–47.1)
HEMOGLOBIN: 6.6 G/DL (ref 11.9–15.1)
HEMOGLOBIN: 8.2 G/DL (ref 11.9–15.1)
MCH RBC QN AUTO: 29.6 PG (ref 25.2–33.5)
MCHC RBC AUTO-ENTMCNC: 31.9 G/DL (ref 28.4–34.8)
MCV RBC AUTO: 92.8 FL (ref 82.6–102.9)
NRBC AUTOMATED: 0 PER 100 WBC
PDW BLD-RTO: 17.2 % (ref 11.8–14.4)
PLATELET # BLD: 266 K/UL (ref 138–453)
PMV BLD AUTO: 10.7 FL (ref 8.1–13.5)
POTASSIUM SERPL-SCNC: 4.3 MMOL/L (ref 3.7–5.3)
RBC # BLD: 2.77 M/UL (ref 3.95–5.11)
SODIUM BLD-SCNC: 137 MMOL/L (ref 135–144)
WBC # BLD: 7 K/UL (ref 3.5–11.3)

## 2019-05-24 PROCEDURE — 85027 COMPLETE CBC AUTOMATED: CPT

## 2019-05-24 PROCEDURE — 86900 BLOOD TYPING SEROLOGIC ABO: CPT

## 2019-05-24 PROCEDURE — G0378 HOSPITAL OBSERVATION PER HR: HCPCS

## 2019-05-24 PROCEDURE — 80048 BASIC METABOLIC PNL TOTAL CA: CPT

## 2019-05-24 PROCEDURE — 94640 AIRWAY INHALATION TREATMENT: CPT

## 2019-05-24 PROCEDURE — 36430 TRANSFUSION BLD/BLD COMPNT: CPT

## 2019-05-24 PROCEDURE — 85018 HEMOGLOBIN: CPT

## 2019-05-24 PROCEDURE — 99238 HOSP IP/OBS DSCHRG MGMT 30/<: CPT | Performed by: INTERNAL MEDICINE

## 2019-05-24 PROCEDURE — 85014 HEMATOCRIT: CPT

## 2019-05-24 PROCEDURE — 36415 COLL VENOUS BLD VENIPUNCTURE: CPT

## 2019-05-24 PROCEDURE — APPSS45 APP SPLIT SHARED TIME 31-45 MINUTES: Performed by: NURSE PRACTITIONER

## 2019-05-24 PROCEDURE — 6370000000 HC RX 637 (ALT 250 FOR IP): Performed by: NURSE PRACTITIONER

## 2019-05-24 PROCEDURE — P9016 RBC LEUKOCYTES REDUCED: HCPCS

## 2019-05-24 PROCEDURE — 94760 N-INVAS EAR/PLS OXIMETRY 1: CPT

## 2019-05-24 RX ORDER — NICOTINE 21 MG/24HR
1 PATCH, TRANSDERMAL 24 HOURS TRANSDERMAL DAILY
Status: DISCONTINUED | OUTPATIENT
Start: 2019-05-24 | End: 2019-05-24 | Stop reason: HOSPADM

## 2019-05-24 RX ADMIN — ATORVASTATIN CALCIUM 20 MG: 20 TABLET, FILM COATED ORAL at 07:55

## 2019-05-24 RX ADMIN — HYDROCODONE BITARTRATE AND ACETAMINOPHEN 2 TABLET: 5; 325 TABLET ORAL at 08:33

## 2019-05-24 RX ADMIN — HYDRALAZINE HYDROCHLORIDE 100 MG: 25 TABLET, FILM COATED ORAL at 07:55

## 2019-05-24 RX ADMIN — SODIUM BICARBONATE 1300 MG: 650 TABLET ORAL at 08:34

## 2019-05-24 RX ADMIN — CINACALCET HYDROCHLORIDE 120 MG: 30 TABLET, COATED ORAL at 07:55

## 2019-05-24 RX ADMIN — METOPROLOL TARTRATE 25 MG: 25 TABLET, FILM COATED ORAL at 07:55

## 2019-05-24 RX ADMIN — TIOTROPIUM BROMIDE 18 MCG: 18 CAPSULE ORAL; RESPIRATORY (INHALATION) at 07:34

## 2019-05-24 RX ADMIN — PANTOPRAZOLE SODIUM 40 MG: 40 TABLET, DELAYED RELEASE ORAL at 07:00

## 2019-05-24 RX ADMIN — AMLODIPINE BESYLATE 10 MG: 5 TABLET ORAL at 08:02

## 2019-05-24 RX ADMIN — CLONIDINE HYDROCHLORIDE 0.2 MG: 0.1 TABLET ORAL at 07:55

## 2019-05-24 RX ADMIN — CETIRIZINE HYDROCHLORIDE 10 MG: 10 TABLET, FILM COATED ORAL at 07:55

## 2019-05-24 RX ADMIN — LOSARTAN POTASSIUM 50 MG: 50 TABLET, FILM COATED ORAL at 07:55

## 2019-05-24 ASSESSMENT — PAIN SCALES - GENERAL: PAINLEVEL_OUTOF10: 9

## 2019-05-24 NOTE — PLAN OF CARE
Problem: Falls - Risk of:  Goal: Will remain free from falls  Description  Will remain free from falls  5/24/2019 0040 by Jorden Cortés RN  Outcome: Ongoing  Note:   Bed alarm prn, call bell in reach - uses appropriately, up in room - steady to BR, encouraged to call prn     Problem: Pain:  Goal: Pain level will decrease  Description  Pain level will decrease  5/24/2019 0040 by Jorden Cortés RN  Outcome: Ongoing     Problem: Fluid Volume - Imbalance:  Goal: Absence of imbalanced fluid volume signs and symptoms  Description  Absence of imbalanced fluid volume signs and symptoms  5/24/2019 0040 by Jorden Cortés RN  Outcome: Ongoing  Note:   1 unit PRBCs given tonite, monitoring H&H, updating OC CNP prn

## 2019-05-24 NOTE — DISCHARGE SUMMARY
Diagnostic Studies:   Labs / Micro:  CBC:   Lab Results   Component Value Date    WBC 7.0 05/24/2019    RBC 2.77 05/24/2019    HGB 8.2 05/24/2019    HCT 25.7 05/24/2019    MCV 92.8 05/24/2019    MCH 29.6 05/24/2019    MCHC 31.9 05/24/2019    RDW 17.2 05/24/2019     05/24/2019     BMP:    Lab Results   Component Value Date    GLUCOSE 88 05/24/2019     05/24/2019    K 4.3 05/24/2019    K 5.3 09/05/2018    CL 94 05/24/2019    CO2 28 05/24/2019    ANIONGAP 15 05/24/2019    BUN 32 05/24/2019    CREATININE 5.86 05/24/2019    BUNCRER 5 05/24/2019    CALCIUM 8.0 05/24/2019    LABGLOM 7 05/24/2019    GFRAA 9 05/24/2019    GFR      05/24/2019    GFR NOT REPORTED 05/24/2019        Radiology:    No results found. Consultations:    Consults:     Final Specialist Recommendations/Findings:   IP CONSULT TO INTERNAL MEDICINE      The patient was seen and examined on day of discharge and this discharge summary is in conjunction with any daily progress note from day of discharge. Discharge plan:     Disposition: Home    Physician Follow Up:     Grace Hardin MD  [facility] [place] [id number]    In 1 week         Requiring Further Evaluation/Follow Up POST HOSPITALIZATION/Incidental Findings: Anemia of chronic disease. Mildly elevated troponin without cardiac symptoms that trended down after admishion, likely related to chronic kidney disease. Increasing back pain superficial of the likely site of the lung tumor. Diet: renal diet    Activity: As tolerated    Instructions to Patient: See your family doctor at your scheduled appointment. Keep your cancer treatment team appointments as scheduled. Keep your dialysis treatment tomorrow as scheduled.     Discharge Medications:      Medication List      CONTINUE taking these medications    albuterol sulfate  (90 Base) MCG/ACT inhaler  Inhale 2 puffs into the lungs every 6 hours as needed for Wheezing or Shortness of Breath     amLODIPine 10 MG tablet  Commonly known as:  NORVASC     atorvastatin 20 MG tablet  Commonly known as:  LIPITOR     cloNIDine 0.2 MG tablet  Commonly known as:  CATAPRES  Take 1 tablet by mouth 3 times daily     CONSTULOSE 10 GM/15ML solution  Generic drug:  lactulose     ethyl chloride spray     HIBICLENS 4 % external liquid  Generic drug:  chlorhexidine     hydrALAZINE 100 MG tablet  Commonly known as:  APRESOLINE     Incontinence Brief Medium Misc  1 each by Does not apply route 2 times daily as needed (incotinenace)     INCRUSE ELLIPTA 62.5 MCG/INH Aepb  Generic drug:  Umeclidinium Bromide     loratadine 10 MG tablet  Commonly known as:  CLARITIN     losartan 50 MG tablet  Commonly known as:  COZAAR  Take 1 tablet by mouth 2 times daily     metoprolol tartrate 25 MG tablet  Commonly known as:  LOPRESSOR  TAKE 1 TABLET BY MOUTH TWICE DAILY     omeprazole 20 MG delayed release capsule  Commonly known as:  PRILOSEC  TAKE 1 CAPSULE BY MOUTH DAILY     edwina-anya Tabs     SENSIPAR 60 MG tablet  Generic drug:  cinacalcet     sodium bicarbonate 650 MG tablet            Time Spent on discharge is  20 mins in patient examination, evaluation, counseling as well as medication reconciliation, prescriptions for required medications, discharge plan and follow up. Electronically signed by   JAMES Larson NP  5/24/2019  9:06 AM      Thank you Dr. Robbie Christy MD for the opportunity to be involved in this patient's care.

## 2019-05-24 NOTE — TELEPHONE ENCOUNTER
Name: Alethea Loja  : 1945  MRN: [id number]    Oncology Navigation Follow-Up Note    Contact Type:  Telephone    Subjective:     Objective:     Assistance Needed:     Receptive to Advanced Care Planning / Palliative Care:      Referrals:     Education:     Notes: navigator left message offering assistance, pt. In hospital recently for transfusion and pt. To see PCP today.     Electronically signed by Genaro Beltre RN on 2019 at 12:08 PM

## 2019-05-24 NOTE — PROGRESS NOTES
Pt discharged to home per wc. No distress noted. Daughter at bedside.
Pt resting in bed.  Awaiting blood from blood bank
Transitions of Care Pharmacy Service   Medication Review    The patient's list of current home medications has been reviewed. Pt is known from previous encounters. Source(s) of information: patient, [facility] Epic    Based on information provided by the above source(s), no changes to the patient's home medication list were necessary. Please feel free to call me with any questions about this encounter. Thank you.     Chelsea Tejeda [facility]   Transitions of Care Pharmacy Service  Phone:  [phone]  Fax: [phone]      Electronically signed by Chelsea Tejeda [facility] on 5/23/2019 at 7:21 PM           Medications Prior to Admission:   cloNIDine (CATAPRES) 0.2 MG tablet, Take 1 tablet by mouth 3 times daily  metoprolol tartrate (LOPRESSOR) 25 MG tablet, TAKE 1 TABLET BY MOUTH TWICE DAILY  atorvastatin (LIPITOR) 20 MG tablet, Take 20 mg by mouth daily  B Complex-C-Folic Acid (MADAI-AYAN) TABS, Take 1 tablet by mouth daily  lactulose (CONSTULOSE) 10 GM/15ML solution, Take 30 mLs by mouth daily as needed  chlorhexidine (HIBICLENS) 4 % external liquid, Apply topically daily As directed  hydrALAZINE (APRESOLINE) 100 MG tablet, Take 100 mg by mouth 3 times daily  Umeclidinium Bromide (INCRUSE ELLIPTA) 62.5 MCG/INH AEPB, Inhale 1 puff into the lungs daily  sodium bicarbonate 650 MG tablet, Take 1,300 mg by mouth 3 times daily  amLODIPine (NORVASC) 10 MG tablet, Take 10 mg by mouth daily  loratadine (CLARITIN) 10 MG tablet, Take 10 mg by mouth daily  ethyl chloride spray, Apply topically as needed (to arm prior to dialysis)  omeprazole (PRILOSEC) 20 MG delayed release capsule, TAKE 1 CAPSULE BY MOUTH DAILY  albuterol sulfate  (90 Base) MCG/ACT inhaler, Inhale 2 puffs into the lungs every 6 hours as needed for Wheezing or Shortness of Breath  losartan (COZAAR) 50 MG tablet, Take 1 tablet by mouth 2 times daily  SENSIPAR 60 MG tablet, Take 120 mg by mouth daily
has a 13.75 pack-year smoking history. She has never used smokeless tobacco. She reports that she does not drink alcohol or use drugs. Family History:   Family History   Problem Relation Age of Onset    Cancer Father     Diabetes Sister        Vitals:  BP (!) 110/33   Pulse 77   Temp 98.4 °F (36.9 °C) (Oral)   Resp 16   Ht 5' 6\" (1.676 m)   Wt 158 lb 8 oz (71.9 kg)   SpO2 98%   BMI 25.58 kg/m²   Temp (24hrs), Av.6 °F (37 °C), Min:97.9 °F (36.6 °C), Max:99.2 °F (37.3 °C)    No results for input(s): POCGLU in the last 72 hours. I/O (24Hr): Intake/Output Summary (Last 24 hours) at 2019 0805  Last data filed at 2019 0353  Gross per 24 hour   Intake 603.49 ml   Output 25 ml   Net 578.49 ml       Labs:    Hematology:  Recent Labs     19  1255 19  0059 19  0518   WBC 9.1  --  7.0   RBC 2.39*  --  2.77*   HGB 6.9* 6.6* 8.2*   HCT 22.5* 21.3* 25.7*   MCV 94.1  --  92.8   MCH 28.9  --  29.6   MCHC 30.7  --  31.9   RDW 18.7*  --  17.2*     --  266   MPV 11.0  --  10.7     Chemistry:  Recent Labs     19  1255 19  1847 19  0518     --  137   K 3.6*  --  4.3   CL 93*  --  94*   CO2 26  --  28   GLUCOSE 115*  --  88   BUN 25*  --  32*   CREATININE 4.53*  --  5.86*   ANIONGAP 19*  --  15   LABGLOM 10*  --  7*   GFRAA 12*  --  9*   CALCIUM 8.8  --  8.0*   TROPHS 95* 89*  --      No results for input(s): PROT, LABALBU, LABA1C, B7XDZMX, A7CLLOM, FT4, TSH, AST, ALT, LDH, GGT, ALKPHOS, LABGGT, BILITOT, BILIDIR, AMMONIA, AMYLASE, LIPASE, LACTATE, CHOL, HDL, LDLCHOLESTEROL, CHOLHDLRATIO, TRIG, VLDL, FGQ44NV, PHENYTOIN, PHENYF, URICACID, POCGLU in the last 72 hours.       Lab Results   Component Value Date/Time    SPECIAL NOT REPORTED 2017 10:05 PM     Lab Results   Component Value Date/Time    CULTURE NO SIGNIFICANT GROWTH 2017 10:05 PM    CULTURE  2017 10:05 PM     Performed at [facility] [address] [place], [address] [place] [address]

## 2019-05-27 LAB
ABO/RH: NORMAL
ANTIBODY SCREEN: NEGATIVE
ARM BAND NUMBER: NORMAL
BLD PROD TYP BPU: NORMAL
CROSSMATCH RESULT: NORMAL
DISPENSE STATUS BLOOD BANK: NORMAL
EXPIRATION DATE: NORMAL
TRANSFUSION STATUS: NORMAL
UNIT DIVISION: 0
UNIT NUMBER: NORMAL

## 2019-05-29 ENCOUNTER — OFFICE VISIT (OUTPATIENT)
Dept: FAMILY MEDICINE CLINIC | Age: 74
End: 2019-05-29
Payer: COMMERCIAL

## 2019-05-29 ENCOUNTER — TELEPHONE (OUTPATIENT)
Dept: CASE MANAGEMENT | Age: 74
End: 2019-05-29

## 2019-05-29 VITALS
HEIGHT: 66 IN | BODY MASS INDEX: 24.91 KG/M2 | DIASTOLIC BLOOD PRESSURE: 54 MMHG | OXYGEN SATURATION: 99 % | WEIGHT: 155 LBS | SYSTOLIC BLOOD PRESSURE: 88 MMHG | TEMPERATURE: 97.4 F | HEART RATE: 66 BPM

## 2019-05-29 DIAGNOSIS — G89.3 PAIN DUE TO MALIGNANT NEOPLASM METASTATIC TO BONE (HCC): Primary | ICD-10-CM

## 2019-05-29 DIAGNOSIS — C79.51 PAIN DUE TO MALIGNANT NEOPLASM METASTATIC TO BONE (HCC): Primary | ICD-10-CM

## 2019-05-29 PROCEDURE — 99213 OFFICE O/P EST LOW 20 MIN: CPT | Performed by: FAMILY MEDICINE

## 2019-05-29 PROCEDURE — 1111F DSCHRG MED/CURRENT MED MERGE: CPT | Performed by: FAMILY MEDICINE

## 2019-05-29 RX ORDER — OXYCODONE HYDROCHLORIDE AND ACETAMINOPHEN 5; 325 MG/1; MG/1
1 TABLET ORAL EVERY 6 HOURS PRN
Qty: 28 TABLET | Refills: 0 | Status: SHIPPED | OUTPATIENT
Start: 2019-05-29 | End: 2019-06-05

## 2019-05-29 ASSESSMENT — ENCOUNTER SYMPTOMS
COUGH: 0
WHEEZING: 0
SHORTNESS OF BREATH: 0

## 2019-05-29 ASSESSMENT — PATIENT HEALTH QUESTIONNAIRE - PHQ9
SUM OF ALL RESPONSES TO PHQ QUESTIONS 1-9: 0
SUM OF ALL RESPONSES TO PHQ9 QUESTIONS 1 & 2: 0
1. LITTLE INTEREST OR PLEASURE IN DOING THINGS: 0
SUM OF ALL RESPONSES TO PHQ QUESTIONS 1-9: 0
2. FEELING DOWN, DEPRESSED OR HOPELESS: 0

## 2019-05-29 NOTE — PROGRESS NOTES
Chronic Disease Visit Information    BP Readings from Last 3 Encounters:   05/29/19 (!) 101/51   05/24/19 (!) 110/33   05/17/19 (!) 126/47          Hemoglobin A1C (%)   Date Value   09/03/2018 4.3 (L)   08/07/2016 4.6   03/30/2015 4.6     LDL Cholesterol (mg/dL)   Date Value   03/30/2015 94     HDL (mg/dL)   Date Value   03/30/2015 59     BUN (mg/dL)   Date Value   05/24/2019 32 (H)     CREATININE (mg/dL)   Date Value   05/24/2019 5.86 (HH)     Glucose (mg/dL)   Date Value   05/24/2019 88            Have you changed or started any medications since your last visit including any over-the-counter medicines, vitamins, or herbal medicines? yes    Are you having any side effects from any of your medications? -  no  Have you stopped taking any of your medications? Is so, why? -  no    Have you seen any other physician or provider since your last visit? Yes - Records Obtained  Have you had any other diagnostic tests since your last visit? Yes - Records Obtained  Have you been seen in the emergency room and/or had an admission to a hospital since we last saw you? Yes - Records Obtained  Have you had your annual diabetic retinal (eye) exam? No  Have you had your routine dental cleaning in the past 6 months? no    Have you activated your [employer] account? If not, what are your barriers?  Yes     Patient Care Team:  Jose Patton MD as PCP - General (Family Medicine)  Yarelis Diallo MD as Consulting Physician (Nephrology)  Kam Mendez MD (Inactive) (Vascular Surgery)  Luz West MD as Consulting Physician (Gastroenterology)  Larisa Dhillon MD as Consulting Physician (Pulmonology)  Zehra Mercedes RN as Nurse Navigator (Oncology)         Medical History Review  Past Medical, Family, and Social History reviewed and does contribute to the patient presenting condition    Health Maintenance   Topic Date Due    Hepatitis B Vaccine (1 of 3 - Risk Recombivax 3-dose series) 07/15/1966    Shingles Vaccine (1 of 2) 07/15/1997    Breast cancer screen  06/22/2019    DTaP/Tdap/Td vaccine (1 - Tdap) 02/18/2020 (Originally 7/15/1966)    Flu vaccine (Season Ended) 02/19/2020 (Originally 9/1/2019)    Hepatitis C screen  08/15/2022 (Originally 1945)    Pneumococcal 65+ years Vaccine (2 of 2 - PPSV23) 01/14/2020    Lipid screen  03/30/2020    Potassium monitoring  05/24/2020    Creatinine monitoring  05/24/2020    Colon cancer screen colonoscopy  02/22/2027    DEXA (modify frequency per FRAX score)  Completed    HPV vaccine  Aged Out

## 2019-05-29 NOTE — PROGRESS NOTES
needed Yes Historical Provider, MD   chlorhexidine (HIBICLENS) 4 % external liquid Apply topically daily As directed Yes Historical Provider, MD   hydrALAZINE (APRESOLINE) 100 MG tablet Take 100 mg by mouth 3 times daily Yes Historical Provider, MD   Umeclidinium Bromide (INCRUSE ELLIPTA) 62.5 MCG/INH AEPB Inhale 1 puff into the lungs daily Yes Historical Provider, MD   sodium bicarbonate 650 MG tablet Take 1,300 mg by mouth 3 times daily Yes Historical Provider, MD   amLODIPine (NORVASC) 10 MG tablet Take 10 mg by mouth daily Yes Historical Provider, MD   loratadine (CLARITIN) 10 MG tablet Take 10 mg by mouth daily Yes Historical Provider, MD   ethyl chloride spray Apply topically as needed (to arm prior to dialysis) Yes Historical Provider, MD   omeprazole (PRILOSEC) 20 MG delayed release capsule TAKE 1 CAPSULE BY MOUTH DAILY Yes Caron Boone MD   Incontinence Supply Disposable (INCONTINENCE BRIEF MEDIUM) MISC 1 each by Does not apply route 2 times daily as needed (incotinenace) Yes Jaylen Valenzuela MD   losartan (COZAAR) 50 MG tablet Take 1 tablet by mouth 2 times daily Yes Priya Alford MD   SENSIPAR 60 MG tablet Take 120 mg by mouth daily  Yes Historical Provider, MD   albuterol sulfate  (90 Base) MCG/ACT inhaler Inhale 2 puffs into the lungs every 6 hours as needed for Wheezing or Shortness of Breath  Alexandria Dilling, DO        Social History     Tobacco Use    Smoking status: Former Smoker     Packs/day: 0.25     Years: 55.00     Pack years: 13.75     Types: Cigarettes     Last attempt to quit: 2019     Years since quittin.0    Smokeless tobacco: Never Used   Substance Use Topics    Alcohol use: No     Alcohol/week: 0.0 oz        Vitals:    19 1351 19 1356   BP: (!) 101/51 (!) 88/54   Site: Right Upper Arm Right Upper Arm   Position: Sitting Sitting   Cuff Size: Large Adult Large Adult   Pulse: 65 66   Temp: 97.4 °F (36.3 °C)    TempSrc: Oral    SpO2: 99%    Weight: 155 lb (70.3 kg) Height: 5' 6\" (1.676 m)      Estimated body mass index is 25.02 kg/m² as calculated from the following:    Height as of this encounter: 5' 6\" (1.676 m). Weight as of this encounter: 155 lb (70.3 kg). Physical Exam   Constitutional: She is oriented to person, place, and time. She appears well-developed and well-nourished. HENT:   Head: Normocephalic and atraumatic. Eyes: Conjunctivae and EOM are normal.   Cardiovascular: Normal rate and regular rhythm. Murmur heard. Pulmonary/Chest: Effort normal. She has wheezes. Occasional wheezes on right side   Neurological: She is alert and oriented to person, place, and time. Psychiatric: She has a normal mood and affect. ASSESSMENT/PLAN:  1. Pain due to malignant neoplasm metastatic to bone [facility])  I've advised him to take the the oxycodone orally once every 8-12 hoursprn  although the prescription  Says every 6 hours when necessary   - oxyCODONE-acetaminophen (ENDOCET) 5-325 MG per tablet; Take 1 tablet by mouth every 6 hours as needed for Pain for up to 7 days. Intended supply: 7 days. Take lowest dose possible to manage pain  Dispense: 28 tablet; Refill: 0  -   Due to have an appointment with the oncologist coming up and then it'll be decided that that she needs long-term pain management or the oncologist will be able to manage her pain medications    Return in 3 months (on 8/29/2019). An electronic signature was used to authenticate this note.     --Sintia Galicia MD on 5/29/2019 at 2:23 PM

## 2019-05-29 NOTE — TELEPHONE ENCOUNTER
Name: Alethea Loja  : 1945  MRN: [id number]    Oncology Navigation Follow-Up Note    Contact Type:  Telephone    Subjective:     Objective:     Assistance Needed:     Receptive to Advanced Care Planning / Palliative Care:      Referrals:     Education:     Notes: navigator left message for pt. Offering assistance, shortly after message left, daughter returning call. Navigator itroduced self as part of her mothers careteam and offering assistance along with contact information.                                                                     Electronically signed by Genaro Beltre RN on 2019 at 1:09 PM

## 2019-05-31 ENCOUNTER — HOSPITAL ENCOUNTER (OUTPATIENT)
Dept: RADIATION ONCOLOGY | Facility: MEDICAL CENTER | Age: 74
Discharge: HOME | End: 2019-05-31
Payer: COMMERCIAL

## 2019-05-31 VITALS
WEIGHT: 157.13 LBS | TEMPERATURE: 99 F | HEIGHT: 66 IN | RESPIRATION RATE: 15 BRPM | HEART RATE: 70 BPM | BODY MASS INDEX: 25.25 KG/M2 | DIASTOLIC BLOOD PRESSURE: 47 MMHG | SYSTOLIC BLOOD PRESSURE: 106 MMHG | OXYGEN SATURATION: 97 %

## 2019-05-31 PROCEDURE — 99213 OFFICE O/P EST LOW 20 MIN: CPT | Performed by: RADIOLOGY

## 2019-05-31 ASSESSMENT — PAIN DESCRIPTION - DESCRIPTORS: DESCRIPTORS: ACHING

## 2019-05-31 ASSESSMENT — PAIN SCALES - GENERAL: PAINLEVEL_OUTOF10: 6

## 2019-05-31 ASSESSMENT — PAIN DESCRIPTION - FREQUENCY: FREQUENCY: INTERMITTENT

## 2019-05-31 ASSESSMENT — PAIN DESCRIPTION - ONSET: ONSET: ON-GOING

## 2019-05-31 ASSESSMENT — PAIN DESCRIPTION - PAIN TYPE: TYPE: ACUTE PAIN

## 2019-05-31 ASSESSMENT — PAIN DESCRIPTION - PROGRESSION: CLINICAL_PROGRESSION: NOT CHANGED

## 2019-05-31 ASSESSMENT — PAIN DESCRIPTION - LOCATION: LOCATION: SHOULDER;LEG

## 2019-05-31 NOTE — PROGRESS NOTES
SUDHEER Olivares. Damien Soni, Ph.D., M.D., Coleta Phoenix, M.D. Smitha Reeves, Ph.D., M.MITCH. Sarahi Hall M.D.       2019    Patient: Duy Diana   YOB: 1945       Dear Dr Val Shirley: Thank you for referring Duy Diana to me for evaluation. Below are the relevant portions of my assessment and plan of care. I believe she would benefit from palliative radiation therapy to her upper thoracic spine and left upper lobe lung malignancy. With your permission, we will engage the radiation treatment planning process and initiate therapy expeditiously. I plan a nominal dose delivery of 30 Gy in ten daily fractions. If you have questions, please do not hesitate to call me. I look forward to following this patient along with you. Sincerely,      Electronically signed by Heber Pearce MD on 19 at 1:51 PM      CC: Patient Care Team:  Jimenez Escamilla MD as PCP - General (Family Medicine)  Otilia Cadena MD as Consulting Physician (Nephrology)  Ernie Calvo MD (Inactive) (Vascular Surgery)  Gracie Joe MD as Consulting Physician (Gastroenterology)  Brook Guerra MD as Consulting Physician (Pulmonology)  Beba German RN as Nurse Navigator (Oncology)          RADIATION ONCOLOGY NEW PATIENT ASSESSMENT    Date of Service: 2019    Location:  [facility],   [address] [place] [address]   [phone]     Patient ID:   Duy Diana  :    MRN: [id number]    Duy Diana is being seen today for an oncologic opinion at the request of Dr. Val Shirley. Chief Complaint:  Cancer Staging  No matching staging information was found for the patient.   Patient Active Problem List   Diagnosis Code    End stage renal disease ([address] N18.6    Anemia D64.9    Malignant neoplasm of female breast ([address] C50.919    Tobacco abuse Z72.0    Non-rheumatic mitral regurgitation I34.0    has annual mammograms only, no heme/onc    Bursitis     bilateral    Carpal tunnel syndrome     Chronic obstructive pulmonary disease (COPD) [facility]     Dialysis patient ([facility]     T/TH[facility] at [facility]     Disease of blood and blood forming organ     History of blood transfusion     Hypertension     Irregular heart beat     Osteoarthritis     knee and back    Pneumonia     Primary lung adenocarcinoma (HCC)     Pulmonary emphysema ([facility] 6/9/2017    Renal failure      end stage renal failure on dialysis    Stenosis of left carotid artery 6/9/2017    Tobacco abuse        Past Surgical History:   Procedure Laterality Date    ABDOMEN SURGERY      BREAST SURGERY      left;  breast cancer    CARPAL TUNNEL RELEASE      right    COLONOSCOPY  02/22/2017    DR Adolfo Johnson - adenomatous polyp    ECTOPIC PREGNANCY SURGERY      HC DIALYSIS CATHETER N/A 5/13/2019    CATHETER INSERTION HEMODIALYSIS -DURAMAX CATHETER #[id number] performed by Griselda Reeds, MD at [address] [facility]      left    MS COLON CA SCRN NOT  [address]  IND N/A 2/22/2017    COLONOSCOPY performed by Tania Rogers DO at [facility]    MS ESOPHAGOGASTRODUODENOSCOPY TRANSORAL DIAGNOSTIC N/A 2/22/2017    EGD ESOPHAGOGASTRODUODENOSCOPY performed by Tania Rogers DO at  [address] TUNNELED VENOUS PORT PLACEMENT Right 05/13/2019    UPPER GASTROINTESTINAL ENDOSCOPY  09/30/2016         Current Outpatient Medications:     HYDROcodone-Acetaminophen (NORCO PO), Take by mouth, Disp: , Rfl:     cloNIDine (CATAPRES) 0.2 MG tablet, Take 1 tablet by mouth 3 times daily, Disp: 60 tablet, Rfl: 3    metoprolol tartrate (LOPRESSOR) 25 MG tablet, TAKE 1 TABLET BY MOUTH TWICE DAILY, Disp: 60 tablet, Rfl: 5    atorvastatin (LIPITOR) 20 MG tablet, Take 20 mg by mouth daily, Disp: , Rfl:     B Complex-C-Folic Acid (MADAI-AYAN) TABS, Take 1 tablet by mouth daily, Disp: , Rfl:     lactulose (CONSTULOSE) 10 GM/15ML solution, Take 30 mLs by mouth daily as needed, Disp: , Rfl:     chlorhexidine (HIBICLENS) 4 % external liquid, Apply topically daily As directed, Disp: , Rfl:     hydrALAZINE (APRESOLINE) 100 MG tablet, Take 100 mg by mouth 3 times daily, Disp: , Rfl:     Umeclidinium Bromide (INCRUSE ELLIPTA) 62.5 MCG/INH AEPB, Inhale 1 puff into the lungs daily, Disp: , Rfl:     sodium bicarbonate 650 MG tablet, Take 1,300 mg by mouth 3 times daily, Disp: , Rfl:     amLODIPine (NORVASC) 10 MG tablet, Take 10 mg by mouth daily, Disp: , Rfl:     loratadine (CLARITIN) 10 MG tablet, Take 10 mg by mouth as needed , Disp: , Rfl:     ethyl chloride spray, Apply topically as needed (to arm prior to dialysis), Disp: , Rfl:     omeprazole (PRILOSEC) 20 MG delayed release capsule, TAKE 1 CAPSULE BY MOUTH DAILY, Disp: 30 capsule, Rfl: 5    Incontinence Supply Disposable (INCONTINENCE BRIEF MEDIUM) MISC, 1 each by Does not apply route 2 times daily as needed (incotinenace), Disp: 22 each, Rfl: 5    losartan (COZAAR) 50 MG tablet, Take 1 tablet by mouth 2 times daily, Disp: 30 tablet, Rfl: 3    SENSIPAR 60 MG tablet, Take 120 mg by mouth daily , Disp: , Rfl:     oxyCODONE-acetaminophen (ENDOCET) 5-325 MG per tablet, Take 1 tablet by mouth every 6 hours as needed for Pain for up to 7 days. Intended supply: 7 days.  Take lowest dose possible to manage pain, Disp: 28 tablet, Rfl: 0    albuterol sulfate  (90 Base) MCG/ACT inhaler, Inhale 2 puffs into the lungs every 6 hours as needed for Wheezing or Shortness of Breath, Disp: 1 Inhaler, Rfl: 11    ALLERGIES:   Allergies   Allergen Reactions    Pcn [Penicillins] Hives and Itching    Sulfa Antibiotics      difficulty moving???       Social History     Socioeconomic History    Marital status:      Spouse name: None    Number of children: None    Years of education: None    Highest education level: None   Occupational History    None   Social Needs    Financial resource strain: None   [place] insecurity:     Worry: None     Inability: None    Transportation needs:     Medical: None     Non-medical: None   Tobacco Use    Smoking status: Former Smoker     Packs/day: 0.25     Years: 55.00     Pack years: 13.75     Types: Cigarettes     Last attempt to quit: 2019     Years since quittin.0    Smokeless tobacco: Never Used   Substance and Sexual Activity    Alcohol use: No     Alcohol/week: 0.0 oz    Drug use: No    Sexual activity: None   Lifestyle    Physical activity:     Days per week: None     Minutes per session: None    Stress: None   Relationships    Social connections:     Talks on phone: None     Gets together: None     Attends Scientologist service: None     Active member of club or organization: None     Attends meetings of clubs or organizations: None     Relationship status: None    Intimate partner violence:     Fear of current or ex partner: None     Emotionally abused: None     Physically abused: None     Forced sexual activity: None   Other Topics Concern    None   Social History Narrative    None       Family History   Problem Relation Age of Onset    Cancer Father     Diabetes Sister        Labs:  WBC   Date Value Ref Range Status   2019 7.0 3.5 - 11.3 k/uL Final     Segs Absolute   Date Value Ref Range Status   2019 7.15 1.50 - 8.10 k/uL Final     Hemoglobin   Date Value Ref Range Status   2019 8.2 (L) 11.9 - 15.1 g/dL Final     Platelets   Date Value Ref Range Status   2019 266 138 - 453 k/uL Final     LD   Date Value Ref Range Status   02/15/2017 313 (H) 135 - 214 U/L Final     Comment:     [facility] [address] [facility], [address] [place] [address] [phone]     Review of Systems notable for very her back pain radiating to mid thoracic region, shortness of breath at rest not requiring oxygen. She has 2 pillow orthopnea, no productive cough or hemoptysis.   The remaining oncologic review of systems was noted and entered into the medical record after my review. PHYSICAL EXAMINATION:  CHAPERONE: Family/friend/companieon Present  ECO Symptomatic but completely ambulatory  Vital Signs: BP (!) 106/47 Comment: pt feeling dizzy. dr mason notified. Pulse 70   Temp 99 °F (37.2 °C) (Oral)   Resp 15   Ht 5' 6\" (1.676 m)   Wt 157 lb 2 oz (71.3 kg)   SpO2 97%   BMI 25.36 kg/m²   Physical Exam shows her to be alert and oriented with reasonable insight into the status of her disease process accompanied by her daughter. She appears chronically ill. There is no palpable lymphadenopathy in the cervical, supraclavicular, infraclavicular nor axillary regions. Her lungs have distant breath sounds though are clear. She has modest tenderness to palpation over the posterior approximating the scapula. She is not tender over the thoracic cervical or lumbar spine nor long bones or heart has a regular rate and rhythm, abdomen is soft with active bowel sounds, the liver edge is just palpable below the right costal margin. Genitourinary, pelvic and rectal examinations were not done. She was seen to ambulate with the assistance of a four point walker. ASSESSMENT AND PLAN:   Dylan Bourgeois is a 72 y.o. female with biopsy proven poorly differentiated carcinoma of lung origin, stage T3N2 M1 disease. She has multiple comorbid illnesses as well, including stage V renal disease. During our 40 minute evaluation period of face-to-face time I discussed with Mrs. Ritika Ernst and daughter the aforementioned diagnosis of metastatic lung cancer,  reviewing with them available treatment options per NCCN guidelines. We discussed best supportive care as well as palliative radiation treatment as an attempt to slow the local progression of her disease and help relieve her symptoms. The rationale of palliative radiation treatment, risks despite precautions as well as reasonable expectations of outcome were described in detail.   Mrs. Ritika Ernst and daughter appear to have their questions answered to their satisfaction and provided individualized informed consent to proceed with treatment planning. With your permission we will engage the radiation treatment planning process and expedited palliative treatment. I plan a nominal dose delivery of 3000 cGy in 10 daily fractions using a 3-D conformal technique. This optimizes the dose homogeneity through the anticipated target volume and helps spare to a greater extent than conventional planning surrounding normal tissue structures. This is critically important in view of her multiple comorbid illnesses. Once radiation is completed she will of course be return to your respective clinics for ongoing care. I appreciate the opportunity to participate in the care of this delightful woman. Please let me know if you have any questions concerning the opinions of expressed in this evaluation report which I believe follows NCCN guidelines. A copy of this report will be made available to all requesting physicians to aid in the medical decision making process with the care of this patient. Best personal regards       Electronically signed by Rosabel Schilder, MD on 5/31/2019 at [facility]:  Patient Care Team:  Perry Gonsalez MD as PCP - General (Family Medicine)  Thuy Polanco MD as Consulting Physician (Nephrology)  Vinay Davis MD (Inactive) (Vascular Surgery)  Sofia Mckeon MD as Consulting Physician (Gastroenterology)  Shawna Mcdonnell MD as Consulting Physician (Pulmonology)  Eloise Sow RN as Nurse Navigator (Oncology)     Drugs Prescribed:  New Prescriptions    No medications on file       Orders Placed:   No orders of the defined types were placed in this encounter.

## 2019-05-31 NOTE — PROGRESS NOTES
Referring Physician: Mowat    Pain in her back started 1 month ago. Went to saw family doctor and he thought she had a sprain and she went to the ER 19. They did imaging of her back, she was there for 9 days. Patient currently on Norco for pain. Pt has not seen Dr. Luís Dias. Patient currently dizzy and still hypotensive. Dr Ratna Ureña to eval. T& S next available. No contrast. Consent signed. Vitals:    19 1051   BP: (!) 106/47   Pulse: 70   Resp: 15   Temp: 99 °F (37.2 °C)   SpO2: 97%    : Wt Readings from Last 1 Encounters:   19 157 lb 2 oz (71.3 kg)        Body mass index is 25.36 kg/m². Height: 5' 6\" (167.6 cm)          No chief complaint on file. Cancer Staging  No matching staging information was found for the patient. Prior Radiation Therapy? No   If yes, site treated:   Facility:                             Date:    Concurrent Chemo/radiation? No   If yes, start date:    Prior Chemotherapy? No   If yes, site treated:   Facility:                             Date:    Prior Hormonal Therapy? No   If yes, site treated:   Facility:                             Date:    Head and Neck Cancer? No   If yes, please remind physician to place swallow study order and speech therapy order.       Pacemaker/Defibulator/ICD:  No          Immunizations:    Influenza status:    [x]   Current   []   Patient declined    Pneumococcal status:  [x]   Current  []   Patient declined    Smoking Status:    [] Smoker - PPD:   [x] Nonsmoker - Quit Date:               [] Never a smoker                 LMP:late 44's    Age at first Menses:13    Para:2    :3      Current Outpatient Medications   Medication Sig Dispense Refill    HYDROcodone-Acetaminophen (NORCO PO) Take by mouth      cloNIDine (CATAPRES) 0.2 MG tablet Take 1 tablet by mouth 3 times daily 60 tablet 3    metoprolol tartrate (LOPRESSOR) 25 MG tablet TAKE 1 TABLET BY MOUTH TWICE DAILY 60 tablet 5    atorvastatin (LIPITOR) 20 MG tablet Take 20 mg by mouth daily      B Complex-C-Folic Acid (MADAI-AYAN) TABS Take 1 tablet by mouth daily      lactulose (CONSTULOSE) 10 GM/15ML solution Take 30 mLs by mouth daily as needed      chlorhexidine (HIBICLENS) 4 % external liquid Apply topically daily As directed      hydrALAZINE (APRESOLINE) 100 MG tablet Take 100 mg by mouth 3 times daily      Umeclidinium Bromide (INCRUSE ELLIPTA) 62.5 MCG/INH AEPB Inhale 1 puff into the lungs daily      sodium bicarbonate 650 MG tablet Take 1,300 mg by mouth 3 times daily      amLODIPine (NORVASC) 10 MG tablet Take 10 mg by mouth daily      loratadine (CLARITIN) 10 MG tablet Take 10 mg by mouth as needed       ethyl chloride spray Apply topically as needed (to arm prior to dialysis)      omeprazole (PRILOSEC) 20 MG delayed release capsule TAKE 1 CAPSULE BY MOUTH DAILY 30 capsule 5    Incontinence Supply Disposable (INCONTINENCE BRIEF MEDIUM) MISC 1 each by Does not apply route 2 times daily as needed (incotinenace) 22 each 5    losartan (COZAAR) 50 MG tablet Take 1 tablet by mouth 2 times daily 30 tablet 3    SENSIPAR 60 MG tablet Take 120 mg by mouth daily       oxyCODONE-acetaminophen (ENDOCET) 5-325 MG per tablet Take 1 tablet by mouth every 6 hours as needed for Pain for up to 7 days. Intended supply: 7 days. Take lowest dose possible to manage pain 28 tablet 0    albuterol sulfate  (90 Base) MCG/ACT inhaler Inhale 2 puffs into the lungs every 6 hours as needed for Wheezing or Shortness of Breath 1 Inhaler 11     No current facility-administered medications for this encounter. Past Medical History:   Diagnosis Date    Anemia     Asthma     Atrial fibrillation ([facility] 2/18/2017    Breast cancer ([facility] [address] [facility] 1988     has annual mammograms only, no heme/onc    Bursitis     bilateral    Carpal tunnel syndrome     Chronic obstructive pulmonary disease (COPD) ([facility] [address]     Dialysis patient ([facility] [address] [facility]     T/TH/Sat at [facility].     Disease of

## 2019-06-04 ENCOUNTER — HOSPITAL ENCOUNTER (OUTPATIENT)
Dept: RADIATION ONCOLOGY | Facility: MEDICAL CENTER | Age: 74
Discharge: HOME | End: 2019-06-04
Attending: RADIOLOGY
Payer: COMMERCIAL

## 2019-06-04 ENCOUNTER — TELEPHONE (OUTPATIENT)
Dept: RADIATION ONCOLOGY | Facility: MEDICAL CENTER | Age: 74
End: 2019-06-04

## 2019-06-04 ENCOUNTER — TELEPHONE (OUTPATIENT)
Dept: CASE MANAGEMENT | Age: 74
End: 2019-06-04

## 2019-06-04 PROCEDURE — 77290 THER RAD SIMULAJ FIELD CPLX: CPT | Performed by: RADIOLOGY

## 2019-06-04 PROCEDURE — 77334 RADIATION TREATMENT AID(S): CPT | Performed by: RADIOLOGY

## 2019-06-04 NOTE — PROGRESS NOTES
Pt here today for teach and simulation scan. Radiation and You information provided along with additional education regarding radiation therapy and what to expect. Pt verbalizes understanding and all questions answered to the best of my knowledge. Samples of aquaphor and community resource information provided. Pt escorted to CT room without any difficulty. Radiation Therapy Education  · A Simulation Scan is like having a CT scan. Your physician will use the images obtained to develop your radiation treatment. · May take 30 minutes to 1 hour to complete  · Marking will be applied to your skin to help insure accurate positioning for your future treatments. · A mold or a mask may also be needed to help aid in your positioning    · Schedule: You will have treatments Monday - Friday  · Treatments should take about 15 minutes from the time you enter the treatment room  · You will usually have the same appointment time each day  · You will see your doctor and nurse on Mondays while you are undergoing treatments. · Every effort will be make to start your treatment at the scheduled time. However, there may be occasional delays due to emergency patients, technical problems, or other difficulties. · Side Effects: Radiation is a local treatment so any side effects you may experience will be in your treatment area only. · If you experience side effects they will typically occur after the 2nd or 3rd week of treatments. · Many patients do not experience severe side effects and are able to continue working during their treatments.  If you feel you treatments are interfering with your job, please notify your nurse        Skin Care During Radiation Treatments          Start applying moisturizers to the skin two times a day when you start your radiation        treatments  · Suggested moisturizers include: Aquaphor, Eucerin, Exclair, Borion gel (may be purchased at [facility]) or Asher Rojas (to order call [phone] or go to [email or web address]. com)  · Do not use moisturizers within two hours before your radiation treatment  · Do not apply anything to your skin in your treatment area that is not recommended by your radiation nurse and/or doctor  Good general hygiene/bathing should be performed daily  · Use moisturizing soaps that do not contain perfume or fragrances. Recommended soaps include Dove or Dial  · Use warm water, not hot water when bathing  · After bathing, pat the skin dry rather than rubbing it, especially at the treatment site  · Do not shave the treatment area. If you must shave, such as a beard, use an electric shaver. Don't use pre/after shave creams on treatment area. Avoid friction on and around your treatment area  · Do not use tape, bandages, or medicated patches in the treatment area  · Avoid tight fitting clothing  · No massaging or scratching    Avoid extremes of temperature on the treatment area such as heating pads, ice packs, hot tubs, or saunas    If the area being treated is exposed to the sun, apply sunscreen routinely to the treatment site whenever you are outdoors. A sunscreen with a minimum of SFF30 should be used. Since the area being treated will always be more sensitive than the rest of your skin, continue to protect the area from sun exposure after your treatment ends    If your armpit is in the treatment area, do not use antiperspirants. An aluminum-free, non-metallic deodorant may be used. Tell your nurse or doctor if you have any of the following symptoms:  · Blistered, open, swollen or tender areas of the skin in and around the treatment area  · Pain or itching that is not helped by prescribed medications or recommended ointments      Questions and Answers about Radiation Therapy  1. What is radiation therapy? Radiation therapy (also called radiotherapy) is a cancer treatment that uses high  doses of radiation to kill cancer cells and shrink tumors.  At low doses, radiation is used as an x-ray to see inside your body and take pictures, such as x-rays of  your teeth or broken bones. 2.  How is radiation therapy given? Radiation therapy can be external beam or internal. External beam involves a  machine outside your body that aims radiaition of cancer cells. Internal radiation  therapy involves placing radiation inside your body, in or near the cancer. Sometimes ppeople get both forms of radiation therapy. To learn more about  external beam radiation therapy. 3.  What does radiation therapy do to cancer cells? Given in high doses, radiation kills or slows the growth of cancer cells. Radiaton  therapy is used to:  · Treat Cancer. Radiation can be used to cure cancer, to prevent it from returning, or to stop or slow its growth  · Reduce symptoms. When a cure is not possible, radiation may be used to treat pain and other problems caused by the cancer tumor. Or, it can prevent problems that may be caused by a growing tumor, such as blindness or loss of bowel and bladder control. 4.  How long does radiation therapy take to work? Radiation therapy does not kill cancer cells right away. It takes days or weeks of  treatments before cancer cells start to die. Then, cancer cells keep dying for  weeks or months after radiation therapy ends. 5.  What does radiation therapy do to healthy cells? Radiation not only dills or slows the growth of cancer cells, it can also affect  nearby healthy cells. The healthy cells almost always recover after treatment is  over. But sometimes people may have side effects that are severe or do not get  better. Other side effects may how up months or years after radiation therapy is  over. These are called late side effects. Doctors try to protect healthy cells during treatment by:  · Using as low a does of radiation as possible.  The radiation dose is balanced between being high enough to kill cancer cells, yet low enough to limit damage to healthy cells.  · Spreading out treatment over time. You may get radiation therapy once a day, or in smaller doses twice a day for several weeks. Spreading out the radiation dose allows normal cells to recover while cancer cells die. · Aimimng radiation at a precise part of your body. Some types of radiation therapy allow your doctor to aim high doses of radiaiton at your cancer while reducing radiation to nearby healthy tissue. These techniques use a computer to deliver precise radiation doses to a cancer tumor or to specific areas within the tumor.     Mtia Moon

## 2019-06-04 NOTE — TELEPHONE ENCOUNTER
Name: Freddy Suazo  :   MRN: [id number]    Oncology Navigation- Initial Note:    Navigator met with pt. Face to face while in RO. Pt. With jakub and pt. Very fatigued due to Dialysis in AM . Pt. With head down on table, but answering questions appropriately. Assistance offered, Navigation packe t given to pt. Along with business card.    Electronically signed by Kranthi Cantu RN on 2019 at 1:29 PM

## 2019-06-04 NOTE — TELEPHONE ENCOUNTER
Agus Bang office called wanting to know if patient on dialysis can have Mircera while on radiation therapy? Dr Savi Marcus advised RN yes. Dr. Maxime Solano office called back and updated it was okay to give medication.

## 2019-06-05 ENCOUNTER — TELEPHONE (OUTPATIENT)
Dept: ONCOLOGY | Age: 74
End: 2019-06-05

## 2019-06-05 NOTE — TELEPHONE ENCOUNTER
JENNIFER reviewed patient's psychosocial form and she has no needs at this time. Patient has [employer] coverage. She has ESRD and is in dialysis treatment currently. Arnaldo Ny.  Aaron Oshea

## 2019-06-07 ENCOUNTER — HOSPITAL ENCOUNTER (OUTPATIENT)
Dept: RADIATION ONCOLOGY | Facility: MEDICAL CENTER | Age: 74
Discharge: HOME | End: 2019-06-07
Attending: RADIOLOGY
Payer: COMMERCIAL

## 2019-06-07 PROCEDURE — 77334 RADIATION TREATMENT AID(S): CPT | Performed by: RADIOLOGY

## 2019-06-07 PROCEDURE — 77300 RADIATION THERAPY DOSE PLAN: CPT | Performed by: RADIOLOGY

## 2019-06-07 PROCEDURE — 77295 3-D RADIOTHERAPY PLAN: CPT | Performed by: RADIOLOGY

## 2019-06-09 PROBLEM — C77.1 METASTASIS TO MEDIASTINAL LYMPH NODE (HCC): Status: ACTIVE | Noted: 2019-06-09

## 2019-06-09 PROBLEM — C34.12 PRIMARY ADENOCARCINOMA OF UPPER LOBE OF LEFT LUNG (HCC): Status: ACTIVE | Noted: 2019-06-09

## 2019-06-09 PROBLEM — C79.51 BONE METASTASIS: Status: ACTIVE | Noted: 2019-06-09

## 2019-06-10 ENCOUNTER — HOSPITAL ENCOUNTER (OUTPATIENT)
Dept: RADIATION ONCOLOGY | Facility: MEDICAL CENTER | Age: 74
End: 2019-06-10
Attending: RADIOLOGY
Payer: COMMERCIAL

## 2019-06-10 ENCOUNTER — HOSPITAL ENCOUNTER (OUTPATIENT)
Dept: RADIATION ONCOLOGY | Facility: MEDICAL CENTER | Age: 74
Discharge: HOME | End: 2019-06-10
Attending: RADIOLOGY
Payer: COMMERCIAL

## 2019-06-10 ENCOUNTER — HOSPITAL ENCOUNTER (INPATIENT)
Age: 74
LOS: 1 days | Discharge: NURSING, REHAB OR LONG-TERM CARE | DRG: 640 | End: 2019-06-13
Attending: EMERGENCY MEDICINE | Admitting: INTERNAL MEDICINE
Payer: COMMERCIAL

## 2019-06-10 VITALS
HEART RATE: 84 BPM | OXYGEN SATURATION: 99 % | TEMPERATURE: 97.7 F | SYSTOLIC BLOOD PRESSURE: 144 MMHG | RESPIRATION RATE: 16 BRPM | DIASTOLIC BLOOD PRESSURE: 58 MMHG

## 2019-06-10 DIAGNOSIS — R53.1 GENERAL WEAKNESS: ICD-10-CM

## 2019-06-10 DIAGNOSIS — Z99.2 ESRD (END STAGE RENAL DISEASE) ON DIALYSIS (HCC): ICD-10-CM

## 2019-06-10 DIAGNOSIS — C79.9 METASTATIC CANCER (HCC): Primary | ICD-10-CM

## 2019-06-10 DIAGNOSIS — C79.51 BONE METASTASIS (HCC): ICD-10-CM

## 2019-06-10 DIAGNOSIS — N18.6 ESRD (END STAGE RENAL DISEASE) ON DIALYSIS (HCC): ICD-10-CM

## 2019-06-10 DIAGNOSIS — C77.1 METASTASIS TO MEDIASTINAL LYMPH NODE (HCC): ICD-10-CM

## 2019-06-10 DIAGNOSIS — C34.12 PRIMARY ADENOCARCINOMA OF UPPER LOBE OF LEFT LUNG (HCC): Primary | ICD-10-CM

## 2019-06-10 DIAGNOSIS — E86.9 VOLUME DEPLETION: ICD-10-CM

## 2019-06-10 DIAGNOSIS — Z78.9 FAILURE OF OUTPATIENT TREATMENT: ICD-10-CM

## 2019-06-10 LAB
ABSOLUTE EOS #: 0 K/UL (ref 0–0.4)
ABSOLUTE IMMATURE GRANULOCYTE: 0.14 K/UL (ref 0–0.3)
ABSOLUTE LYMPH #: 0.71 K/UL (ref 1–4.8)
ABSOLUTE MONO #: 0.85 K/UL (ref 0.2–0.8)
ANION GAP SERPL CALCULATED.3IONS-SCNC: 21 MMOL/L (ref 9–17)
BASOPHILS # BLD: 0 %
BASOPHILS ABSOLUTE: 0 K/UL (ref 0–0.2)
BUN BLDV-MCNC: 62 MG/DL (ref 8–23)
BUN/CREAT BLD: 10 (ref 9–20)
CALCIUM SERPL-MCNC: 11.6 MG/DL (ref 8.6–10.4)
CHLORIDE BLD-SCNC: 94 MMOL/L (ref 98–107)
CO2: 23 MMOL/L (ref 20–31)
CREAT SERPL-MCNC: 6.3 MG/DL (ref 0.5–0.9)
DIFFERENTIAL TYPE: ABNORMAL
EOSINOPHILS RELATIVE PERCENT: 0 % (ref 1–4)
GFR AFRICAN AMERICAN: 8 ML/MIN
GFR NON-AFRICAN AMERICAN: 7 ML/MIN
GFR SERPL CREATININE-BSD FRML MDRD: ABNORMAL ML/MIN/{1.73_M2}
GFR SERPL CREATININE-BSD FRML MDRD: ABNORMAL ML/MIN/{1.73_M2}
GLUCOSE BLD-MCNC: 112 MG/DL (ref 70–99)
HCT VFR BLD CALC: 25.7 % (ref 36.3–47.1)
HEMOGLOBIN: 8.1 G/DL (ref 11.9–15.1)
IMMATURE GRANULOCYTES: 1 %
LACTIC ACID: 1.1 MMOL/L (ref 0.5–2.2)
LYMPHOCYTES # BLD: 5 % (ref 24–44)
MAGNESIUM: 2.6 MG/DL (ref 1.6–2.6)
MCH RBC QN AUTO: 31.3 PG (ref 25.2–33.5)
MCHC RBC AUTO-ENTMCNC: 31.5 G/DL (ref 28.4–34.8)
MCV RBC AUTO: 99.2 FL (ref 82.6–102.9)
MONOCYTES # BLD: 6 % (ref 1–7)
MORPHOLOGY: ABNORMAL
NRBC AUTOMATED: ABNORMAL PER 100 WBC
PDW BLD-RTO: 19.2 % (ref 11.8–14.4)
PLATELET # BLD: 631 K/UL (ref 138–453)
PLATELET ESTIMATE: ABNORMAL
PMV BLD AUTO: 10.9 FL (ref 8.1–13.5)
POTASSIUM SERPL-SCNC: 5.6 MMOL/L (ref 3.7–5.3)
RBC # BLD: 2.59 M/UL (ref 3.95–5.11)
RBC # BLD: ABNORMAL 10*6/UL
SEG NEUTROPHILS: 88 % (ref 36–66)
SEGMENTED NEUTROPHILS ABSOLUTE COUNT: 12.4 K/UL (ref 1.8–7.7)
SODIUM BLD-SCNC: 138 MMOL/L (ref 135–144)
WBC # BLD: 14.1 K/UL (ref 3.5–11.3)
WBC # BLD: ABNORMAL 10*3/UL

## 2019-06-10 PROCEDURE — 6360000002 HC RX W HCPCS: Performed by: NURSE PRACTITIONER

## 2019-06-10 PROCEDURE — 2580000003 HC RX 258: Performed by: NURSE PRACTITIONER

## 2019-06-10 PROCEDURE — 96374 THER/PROPH/DIAG INJ IV PUSH: CPT

## 2019-06-10 PROCEDURE — 36415 COLL VENOUS BLD VENIPUNCTURE: CPT

## 2019-06-10 PROCEDURE — 96361 HYDRATE IV INFUSION ADD-ON: CPT

## 2019-06-10 PROCEDURE — G0378 HOSPITAL OBSERVATION PER HR: HCPCS

## 2019-06-10 PROCEDURE — 96375 TX/PRO/DX INJ NEW DRUG ADDON: CPT

## 2019-06-10 PROCEDURE — 6370000000 HC RX 637 (ALT 250 FOR IP): Performed by: NURSE PRACTITIONER

## 2019-06-10 PROCEDURE — 96372 THER/PROPH/DIAG INJ SC/IM: CPT

## 2019-06-10 PROCEDURE — 80048 BASIC METABOLIC PNL TOTAL CA: CPT

## 2019-06-10 PROCEDURE — 99284 EMERGENCY DEPT VISIT MOD MDM: CPT

## 2019-06-10 PROCEDURE — 83735 ASSAY OF MAGNESIUM: CPT

## 2019-06-10 PROCEDURE — 85025 COMPLETE CBC W/AUTO DIFF WBC: CPT

## 2019-06-10 PROCEDURE — 83605 ASSAY OF LACTIC ACID: CPT

## 2019-06-10 RX ORDER — MORPHINE SULFATE 4 MG/ML
4 INJECTION, SOLUTION INTRAMUSCULAR; INTRAVENOUS
Status: DISCONTINUED | OUTPATIENT
Start: 2019-06-10 | End: 2019-06-13 | Stop reason: HOSPADM

## 2019-06-10 RX ORDER — ACETAMINOPHEN 325 MG/1
650 TABLET ORAL EVERY 4 HOURS PRN
Status: DISCONTINUED | OUTPATIENT
Start: 2019-06-10 | End: 2019-06-13 | Stop reason: HOSPADM

## 2019-06-10 RX ORDER — ONDANSETRON 4 MG/1
4 TABLET, ORALLY DISINTEGRATING ORAL EVERY 6 HOURS PRN
Status: DISCONTINUED | OUTPATIENT
Start: 2019-06-10 | End: 2019-06-13 | Stop reason: HOSPADM

## 2019-06-10 RX ORDER — CETIRIZINE HYDROCHLORIDE 5 MG/1
5 TABLET ORAL DAILY
Status: DISCONTINUED | OUTPATIENT
Start: 2019-06-11 | End: 2019-06-13 | Stop reason: HOSPADM

## 2019-06-10 RX ORDER — AMLODIPINE BESYLATE 10 MG/1
10 TABLET ORAL DAILY
Status: DISCONTINUED | OUTPATIENT
Start: 2019-06-11 | End: 2019-06-12

## 2019-06-10 RX ORDER — ATORVASTATIN CALCIUM 20 MG/1
20 TABLET, FILM COATED ORAL DAILY
Status: DISCONTINUED | OUTPATIENT
Start: 2019-06-11 | End: 2019-06-13 | Stop reason: HOSPADM

## 2019-06-10 RX ORDER — ONDANSETRON 2 MG/ML
4 INJECTION INTRAMUSCULAR; INTRAVENOUS EVERY 6 HOURS PRN
Status: DISCONTINUED | OUTPATIENT
Start: 2019-06-10 | End: 2019-06-10 | Stop reason: SDUPTHER

## 2019-06-10 RX ORDER — LOSARTAN POTASSIUM 50 MG/1
50 TABLET ORAL 2 TIMES DAILY
Status: DISCONTINUED | OUTPATIENT
Start: 2019-06-10 | End: 2019-06-12

## 2019-06-10 RX ORDER — CLONIDINE HYDROCHLORIDE 0.2 MG/1
0.2 TABLET ORAL 3 TIMES DAILY
Status: DISCONTINUED | OUTPATIENT
Start: 2019-06-10 | End: 2019-06-13 | Stop reason: HOSPADM

## 2019-06-10 RX ORDER — SODIUM BICARBONATE 650 MG/1
1300 TABLET ORAL 3 TIMES DAILY
Status: DISCONTINUED | OUTPATIENT
Start: 2019-06-10 | End: 2019-06-13

## 2019-06-10 RX ORDER — POTASSIUM CHLORIDE 7.45 MG/ML
10 INJECTION INTRAVENOUS PRN
Status: DISCONTINUED | OUTPATIENT
Start: 2019-06-10 | End: 2019-06-10

## 2019-06-10 RX ORDER — MAGNESIUM SULFATE 1 G/100ML
1 INJECTION INTRAVENOUS PRN
Status: DISCONTINUED | OUTPATIENT
Start: 2019-06-10 | End: 2019-06-10

## 2019-06-10 RX ORDER — HYDROMORPHONE HYDROCHLORIDE 1 MG/ML
1 INJECTION, SOLUTION INTRAMUSCULAR; INTRAVENOUS; SUBCUTANEOUS ONCE
Status: COMPLETED | OUTPATIENT
Start: 2019-06-10 | End: 2019-06-10

## 2019-06-10 RX ORDER — NICOTINE 21 MG/24HR
1 PATCH, TRANSDERMAL 24 HOURS TRANSDERMAL DAILY PRN
Status: DISCONTINUED | OUTPATIENT
Start: 2019-06-10 | End: 2019-06-13 | Stop reason: HOSPADM

## 2019-06-10 RX ORDER — SODIUM CHLORIDE 9 MG/ML
INJECTION, SOLUTION INTRAVENOUS CONTINUOUS
Status: DISCONTINUED | OUTPATIENT
Start: 2019-06-10 | End: 2019-06-13 | Stop reason: HOSPADM

## 2019-06-10 RX ORDER — ONDANSETRON 2 MG/ML
4 INJECTION INTRAMUSCULAR; INTRAVENOUS EVERY 6 HOURS PRN
Status: DISCONTINUED | OUTPATIENT
Start: 2019-06-10 | End: 2019-06-13 | Stop reason: HOSPADM

## 2019-06-10 RX ORDER — 0.9 % SODIUM CHLORIDE 0.9 %
500 INTRAVENOUS SOLUTION INTRAVENOUS ONCE
Status: COMPLETED | OUTPATIENT
Start: 2019-06-10 | End: 2019-06-10

## 2019-06-10 RX ORDER — POTASSIUM CHLORIDE 20 MEQ/1
40 TABLET, EXTENDED RELEASE ORAL PRN
Status: DISCONTINUED | OUTPATIENT
Start: 2019-06-10 | End: 2019-06-10

## 2019-06-10 RX ORDER — HEPARIN SODIUM 5000 [USP'U]/ML
5000 INJECTION, SOLUTION INTRAVENOUS; SUBCUTANEOUS EVERY 8 HOURS SCHEDULED
Status: DISCONTINUED | OUTPATIENT
Start: 2019-06-10 | End: 2019-06-13 | Stop reason: HOSPADM

## 2019-06-10 RX ORDER — MORPHINE SULFATE 2 MG/ML
2 INJECTION, SOLUTION INTRAMUSCULAR; INTRAVENOUS
Status: DISCONTINUED | OUTPATIENT
Start: 2019-06-10 | End: 2019-06-13 | Stop reason: HOSPADM

## 2019-06-10 RX ORDER — SODIUM CHLORIDE 0.9 % (FLUSH) 0.9 %
10 SYRINGE (ML) INJECTION EVERY 12 HOURS SCHEDULED
Status: DISCONTINUED | OUTPATIENT
Start: 2019-06-10 | End: 2019-06-13 | Stop reason: HOSPADM

## 2019-06-10 RX ORDER — FOLIC ACID/VIT B COMPLEX AND C 0.8 MG
1 TABLET ORAL DAILY
Status: DISCONTINUED | OUTPATIENT
Start: 2019-06-11 | End: 2019-06-13 | Stop reason: HOSPADM

## 2019-06-10 RX ORDER — HYDRALAZINE HYDROCHLORIDE 50 MG/1
100 TABLET, FILM COATED ORAL 3 TIMES DAILY
Status: DISCONTINUED | OUTPATIENT
Start: 2019-06-10 | End: 2019-06-12

## 2019-06-10 RX ORDER — CINACALCET 30 MG/1
120 TABLET, FILM COATED ORAL DAILY
Status: DISCONTINUED | OUTPATIENT
Start: 2019-06-11 | End: 2019-06-13 | Stop reason: HOSPADM

## 2019-06-10 RX ORDER — SODIUM CHLORIDE 0.9 % (FLUSH) 0.9 %
10 SYRINGE (ML) INJECTION PRN
Status: DISCONTINUED | OUTPATIENT
Start: 2019-06-10 | End: 2019-06-13 | Stop reason: HOSPADM

## 2019-06-10 RX ORDER — SODIUM POLYSTYRENE SULFONATE 4.1 MEQ/G
15 POWDER, FOR SUSPENSION ORAL; RECTAL ONCE
Status: COMPLETED | OUTPATIENT
Start: 2019-06-10 | End: 2019-06-10

## 2019-06-10 RX ADMIN — HEPARIN SODIUM 5000 UNITS: 5000 INJECTION INTRAVENOUS; SUBCUTANEOUS at 22:44

## 2019-06-10 RX ADMIN — METOPROLOL TARTRATE 25 MG: 25 TABLET ORAL at 22:44

## 2019-06-10 RX ADMIN — LOSARTAN POTASSIUM 50 MG: 50 TABLET, FILM COATED ORAL at 22:44

## 2019-06-10 RX ADMIN — SODIUM CHLORIDE 500 ML: 0.9 INJECTION, SOLUTION INTRAVENOUS at 18:12

## 2019-06-10 RX ADMIN — CLONIDINE HYDROCHLORIDE 0.2 MG: 0.2 TABLET ORAL at 22:43

## 2019-06-10 RX ADMIN — MORPHINE SULFATE 4 MG: 4 INJECTION INTRAVENOUS at 22:44

## 2019-06-10 RX ADMIN — SODIUM BICARBONATE 1300 MG: 650 TABLET ORAL at 22:43

## 2019-06-10 RX ADMIN — HYDRALAZINE HYDROCHLORIDE 100 MG: 50 TABLET, FILM COATED ORAL at 22:44

## 2019-06-10 RX ADMIN — SODIUM POLYSTYRENE SULFONATE 15 G: 1 POWDER ORAL; RECTAL at 23:13

## 2019-06-10 RX ADMIN — HYDROMORPHONE HYDROCHLORIDE 1 MG: 1 INJECTION, SOLUTION INTRAMUSCULAR; INTRAVENOUS; SUBCUTANEOUS at 18:26

## 2019-06-10 ASSESSMENT — ENCOUNTER SYMPTOMS
SORE THROAT: 0
SHORTNESS OF BREATH: 0
SINUS PRESSURE: 0
CONSTIPATION: 0
COLOR CHANGE: 0
ABDOMINAL PAIN: 0
WHEEZING: 0
VOMITING: 0
DIARRHEA: 0
RHINORRHEA: 0
NAUSEA: 0
COUGH: 0

## 2019-06-10 ASSESSMENT — PAIN DESCRIPTION - PAIN TYPE
TYPE: ACUTE PAIN
TYPE: ACUTE PAIN

## 2019-06-10 ASSESSMENT — PAIN DESCRIPTION - LOCATION
LOCATION: GENERALIZED
LOCATION: BACK;LEG

## 2019-06-10 ASSESSMENT — PAIN DESCRIPTION - FREQUENCY: FREQUENCY: CONTINUOUS

## 2019-06-10 ASSESSMENT — PAIN SCALES - GENERAL
PAINLEVEL_OUTOF10: 8
PAINLEVEL_OUTOF10: 10
PAINLEVEL_OUTOF10: 10
PAINLEVEL_OUTOF10: 9
PAINLEVEL_OUTOF10: 7
PAINLEVEL_OUTOF10: 8

## 2019-06-10 ASSESSMENT — PAIN DESCRIPTION - ORIENTATION: ORIENTATION: UPPER

## 2019-06-10 ASSESSMENT — PAIN DESCRIPTION - DESCRIPTORS: DESCRIPTORS: ACHING;SORE;CONSTANT

## 2019-06-10 NOTE — PROGRESS NOTES
John Kaur  6/10/2019  Wt Readings from Last 3 Encounters:   05/31/19 157 lb 2 oz (71.3 kg)   05/29/19 155 lb (70.3 kg)   05/24/19 158 lb 8 oz (71.9 kg)     There is no height or weight on file to calculate BMI. Pt here for first OTV. Patient did not get treatment. Patient has been vomiting off and on for about a month (pt not sure). Patient states 8/10 pain in her back and both legs. Denies SOB or chest pain. PT having diarrhea. Pt not eating a lot. [facility] Dr. Fly Vela updated and into eval. Pt sent to [facility] [address] by daughter. Dr. Fly Vela called ER at [facility] [address] to speak to attending. Patient assisted by wheelchair to car. Treatment Area:lung    Patient was seen today for weekly visit.       Comfort Alteration  Fatigue: Severe    Ventilation Alterations  Cough: No  Hemoptysis: No  Mucus Color: none  Dyspnea: No  O2 Sat: 99%    Nutritional Alteration  Anorexia: No  Nausea: Yes   Vomiting: Yes     Skin Alteration   Sensation:none    Radiation Dermatitis: none    Mucous Membrane Alteration  Voice Changes/ Stridor/Larynx: no  Pharynx & Esophagus: none    Elimination Alterations  Constipation: no  Diarrhea:  yes      Emotional  Coping: effective      Injury, potential bleeding or infection: none    Other:none    Lab Results   Component Value Date    WBC 7.0 05/24/2019     05/24/2019         BP (!) 144/58   Pulse 84   Temp 97.7 °F (36.5 °C) (Oral)   Resp 16   SpO2 99%             Reema Dominguez

## 2019-06-10 NOTE — ED NOTES
Pt. C/o lung pain, no appetite, and dehydration. Generalized fatigue. Sent by MD for admission.       David Gregory RN  06/10/19 7406

## 2019-06-10 NOTE — PROGRESS NOTES
Date of Service: 6/10/2019      Location:  [facility] Radiation Oncology,   [address], [place], [address]   [address] [facility] WEEKLY PROGRESS NOTE    Patient ID:   Freedom Nazario  : 1945   MRN: [id number]    Diagnosis:  Cancer Staging  Primary adenocarcinoma of upper lobe of left lung [facility])  Staging form: Lung, AJCC 8th Edition  - Clinical stage from 5/15/2019: Stage IV (cT3, cN2, cM1c) - Signed by Teressa Samayoa MD on 2019      Radiation Treatment Information:   Actual Dose: 0 cGy  Total Planned Dose: 3000 cGy  Treatment Site: Left upper lobe/thoracic spine    Chemotherapy update: No chemotherapy is being given at this time    Therapy imaging monitoring: Image match with port films    SUBJECTIVE: The patient is in today. She can not stand on her feet. She feels lightheaded. She has had nausea, vomiting, and diarrhea for 1 week. She has not taken Imodium. She also has had a decreased appetite. She has had a decreased oral intake. She denies urinary symptoms. He also has wheezing. She has a long history of smoking tobacco.  She denies headaches. She gets short of breath with increased activity. She denies irregular heart rate. She has pain in her chest region. She was taking Norco for pain control.     OBJECTIVE:     Labs:  WBC   Date Value Ref Range Status   2019 7.0 3.5 - 11.3 k/uL Final     Segs Absolute   Date Value Ref Range Status   2019 7.15 1.50 - 8.10 k/uL Final     Hemoglobin   Date Value Ref Range Status   2019 8.2 (L) 11.9 - 15.1 g/dL Final     Platelets   Date Value Ref Range Status   2019 266 138 - 453 k/uL Final     LD   Date Value Ref Range Status   02/15/2017 313 (H) 135 - 214 U/L Final     Comment:     [facility] [address] [facility], [address] [place] [address] [phone]   No results found for: PSA  Medications:    Current Outpatient Medications:     HYDROcodone-Acetaminophen (NORCO PO), Take by mouth, Disp: , Rfl:     cloNIDine (CATAPRES) 0.2 MG tablet, Take 1 tablet by mouth 3 times daily, Disp: 60 tablet, Rfl: 3    metoprolol tartrate (LOPRESSOR) 25 MG tablet, TAKE 1 TABLET BY MOUTH TWICE DAILY, Disp: 60 tablet, Rfl: 5    atorvastatin (LIPITOR) 20 MG tablet, Take 20 mg by mouth daily, Disp: , Rfl:     B Complex-C-Folic Acid (MADAI-AYAN) TABS, Take 1 tablet by mouth daily, Disp: , Rfl:     lactulose (CONSTULOSE) 10 GM/15ML solution, Take 30 mLs by mouth daily as needed, Disp: , Rfl:     chlorhexidine (HIBICLENS) 4 % external liquid, Apply topically daily As directed, Disp: , Rfl:     hydrALAZINE (APRESOLINE) 100 MG tablet, Take 100 mg by mouth 3 times daily, Disp: , Rfl:     Umeclidinium Bromide (INCRUSE ELLIPTA) 62.5 MCG/INH AEPB, Inhale 1 puff into the lungs daily, Disp: , Rfl:     sodium bicarbonate 650 MG tablet, Take 1,300 mg by mouth 3 times daily, Disp: , Rfl:     amLODIPine (NORVASC) 10 MG tablet, Take 10 mg by mouth daily, Disp: , Rfl:     loratadine (CLARITIN) 10 MG tablet, Take 10 mg by mouth as needed , Disp: , Rfl:     ethyl chloride spray, Apply topically as needed (to arm prior to dialysis), Disp: , Rfl:     omeprazole (PRILOSEC) 20 MG delayed release capsule, TAKE 1 CAPSULE BY MOUTH DAILY, Disp: 30 capsule, Rfl: 5    Incontinence Supply Disposable (INCONTINENCE BRIEF MEDIUM) MISC, 1 each by Does not apply route 2 times daily as needed (incotinenace), Disp: 22 each, Rfl: 5    albuterol sulfate  (90 Base) MCG/ACT inhaler, Inhale 2 puffs into the lungs every 6 hours as needed for Wheezing or Shortness of Breath, Disp: 1 Inhaler, Rfl: 11    losartan (COZAAR) 50 MG tablet, Take 1 tablet by mouth 2 times daily, Disp: 30 tablet, Rfl: 3    SENSIPAR 60 MG tablet, Take 120 mg by mouth daily , Disp: , Rfl:     Pain Plan:  Norco    Pain score: 8      Immunizations/Smoking Status:  Immunization History   Administered Date(s) Administered    Influenza Vaccine, unspecified [facility] emergency department. Electronically signed by Gaurang Nair MD on 6/10/2019 at 3:38 PM  [address]    Drugs Prescribed:  New Prescriptions    No medications on file       Other Orders Placed:  No orders of the defined types were placed in this encounter.

## 2019-06-11 ENCOUNTER — HOSPITAL ENCOUNTER (OUTPATIENT)
Dept: RADIATION ONCOLOGY | Facility: MEDICAL CENTER | Age: 74
End: 2019-06-11
Attending: RADIOLOGY
Payer: COMMERCIAL

## 2019-06-11 ENCOUNTER — TELEPHONE (OUTPATIENT)
Dept: CASE MANAGEMENT | Age: 74
End: 2019-06-11

## 2019-06-11 PROBLEM — C79.9 METASTATIC CANCER (HCC): Status: ACTIVE | Noted: 2019-06-09

## 2019-06-11 PROBLEM — E83.52 HYPERCALCEMIA OF MALIGNANCY: Status: ACTIVE | Noted: 2019-06-11

## 2019-06-11 PROBLEM — E43 SEVERE PROTEIN-ENERGY MALNUTRITION (HCC): Status: ACTIVE | Noted: 2019-05-15

## 2019-06-11 LAB
-: NORMAL
AMORPHOUS: NORMAL
ANION GAP SERPL CALCULATED.3IONS-SCNC: 21 MMOL/L (ref 9–17)
BACTERIA: NORMAL
BILIRUBIN URINE: NEGATIVE
BUN BLDV-MCNC: 66 MG/DL (ref 8–23)
BUN/CREAT BLD: 10 (ref 9–20)
CALCIUM IONIZED: 1.32 MMOL/L (ref 1.13–1.33)
CALCIUM SERPL-MCNC: 11.8 MG/DL (ref 8.6–10.4)
CASTS UA: NORMAL /LPF
CHLORIDE BLD-SCNC: 94 MMOL/L (ref 98–107)
CO2: 23 MMOL/L (ref 20–31)
COLOR: YELLOW
COMMENT UA: ABNORMAL
CREAT SERPL-MCNC: 6.77 MG/DL (ref 0.5–0.9)
CRYSTALS, UA: NORMAL /HPF
EPITHELIAL CELLS UA: NORMAL /HPF (ref 0–5)
GFR AFRICAN AMERICAN: 7 ML/MIN
GFR NON-AFRICAN AMERICAN: 6 ML/MIN
GFR SERPL CREATININE-BSD FRML MDRD: ABNORMAL ML/MIN/{1.73_M2}
GFR SERPL CREATININE-BSD FRML MDRD: ABNORMAL ML/MIN/{1.73_M2}
GLUCOSE BLD-MCNC: 85 MG/DL (ref 70–99)
GLUCOSE URINE: NEGATIVE
HCT VFR BLD CALC: 26.2 % (ref 36.3–47.1)
HEMOGLOBIN: 7.9 G/DL (ref 11.9–15.1)
INR BLD: 1
KETONES, URINE: NEGATIVE
LEUKOCYTE ESTERASE, URINE: ABNORMAL
MCH RBC QN AUTO: 30.2 PG (ref 25.2–33.5)
MCHC RBC AUTO-ENTMCNC: 30.2 G/DL (ref 28.4–34.8)
MCV RBC AUTO: 100 FL (ref 82.6–102.9)
MUCUS: NORMAL
NITRITE, URINE: NEGATIVE
NRBC AUTOMATED: 0.2 PER 100 WBC
OTHER OBSERVATIONS UA: NORMAL
PDW BLD-RTO: 19.7 % (ref 11.8–14.4)
PH UA: 8 (ref 5–8)
PLATELET # BLD: 647 K/UL (ref 138–453)
PMV BLD AUTO: 11.1 FL (ref 8.1–13.5)
POTASSIUM SERPL-SCNC: 5.3 MMOL/L (ref 3.7–5.3)
PROTEIN UA: ABNORMAL
PROTHROMBIN TIME: 10.7 SEC (ref 9.7–11.6)
PTH INTACT: 755.4 PG/ML (ref 15–65)
RBC # BLD: 2.62 M/UL (ref 3.95–5.11)
RBC UA: NORMAL /HPF (ref 0–2)
RENAL EPITHELIAL, UA: NORMAL /HPF
SODIUM BLD-SCNC: 138 MMOL/L (ref 135–144)
SPECIFIC GRAVITY UA: 1.01 (ref 1–1.03)
TRICHOMONAS: NORMAL
TURBIDITY: ABNORMAL
URINE HGB: NEGATIVE
UROBILINOGEN, URINE: NORMAL
WBC # BLD: 13.4 K/UL (ref 3.5–11.3)
WBC UA: NORMAL /HPF (ref 0–5)
YEAST: NORMAL

## 2019-06-11 PROCEDURE — 6370000000 HC RX 637 (ALT 250 FOR IP): Performed by: NURSE PRACTITIONER

## 2019-06-11 PROCEDURE — 97535 SELF CARE MNGMENT TRAINING: CPT

## 2019-06-11 PROCEDURE — 97167 OT EVAL HIGH COMPLEX 60 MIN: CPT

## 2019-06-11 PROCEDURE — 99219 PR INITIAL OBSERVATION CARE/DAY 50 MINUTES: CPT | Performed by: INTERNAL MEDICINE

## 2019-06-11 PROCEDURE — APPSS60 APP SPLIT SHARED TIME 46-60 MINUTES: Performed by: NURSE PRACTITIONER

## 2019-06-11 PROCEDURE — 6360000002 HC RX W HCPCS: Performed by: INTERNAL MEDICINE

## 2019-06-11 PROCEDURE — 85610 PROTHROMBIN TIME: CPT

## 2019-06-11 PROCEDURE — 6360000002 HC RX W HCPCS: Performed by: NURSE PRACTITIONER

## 2019-06-11 PROCEDURE — 85027 COMPLETE CBC AUTOMATED: CPT

## 2019-06-11 PROCEDURE — G0378 HOSPITAL OBSERVATION PER HR: HCPCS

## 2019-06-11 PROCEDURE — 87086 URINE CULTURE/COLONY COUNT: CPT

## 2019-06-11 PROCEDURE — 96375 TX/PRO/DX INJ NEW DRUG ADDON: CPT

## 2019-06-11 PROCEDURE — 96376 TX/PRO/DX INJ SAME DRUG ADON: CPT

## 2019-06-11 PROCEDURE — 94640 AIRWAY INHALATION TREATMENT: CPT

## 2019-06-11 PROCEDURE — 82330 ASSAY OF CALCIUM: CPT

## 2019-06-11 PROCEDURE — 36415 COLL VENOUS BLD VENIPUNCTURE: CPT

## 2019-06-11 PROCEDURE — 83970 ASSAY OF PARATHORMONE: CPT

## 2019-06-11 PROCEDURE — 83519 RIA NONANTIBODY: CPT

## 2019-06-11 PROCEDURE — 6370000000 HC RX 637 (ALT 250 FOR IP): Performed by: INTERNAL MEDICINE

## 2019-06-11 PROCEDURE — 96372 THER/PROPH/DIAG INJ SC/IM: CPT

## 2019-06-11 PROCEDURE — 5A1D70Z PERFORMANCE OF URINARY FILTRATION, INTERMITTENT, LESS THAN 6 HOURS PER DAY: ICD-10-PCS | Performed by: INTERNAL MEDICINE

## 2019-06-11 PROCEDURE — 80048 BASIC METABOLIC PNL TOTAL CA: CPT

## 2019-06-11 PROCEDURE — 96361 HYDRATE IV INFUSION ADD-ON: CPT

## 2019-06-11 PROCEDURE — 2580000003 HC RX 258: Performed by: NURSE PRACTITIONER

## 2019-06-11 PROCEDURE — 81001 URINALYSIS AUTO W/SCOPE: CPT

## 2019-06-11 PROCEDURE — 90935 HEMODIALYSIS ONE EVALUATION: CPT

## 2019-06-11 RX ORDER — CALCITONIN SALMON 200 [USP'U]/ML
100 INJECTION, SOLUTION INTRAMUSCULAR; SUBCUTANEOUS 2 TIMES DAILY
Status: COMPLETED | OUTPATIENT
Start: 2019-06-11 | End: 2019-06-11

## 2019-06-11 RX ORDER — OXYCODONE HYDROCHLORIDE AND ACETAMINOPHEN 5; 325 MG/1; MG/1
2 TABLET ORAL EVERY 4 HOURS PRN
Status: DISCONTINUED | OUTPATIENT
Start: 2019-06-11 | End: 2019-06-13 | Stop reason: HOSPADM

## 2019-06-11 RX ORDER — MEGESTROL ACETATE 40 MG/1
40 TABLET ORAL DAILY
Status: DISCONTINUED | OUTPATIENT
Start: 2019-06-11 | End: 2019-06-13 | Stop reason: HOSPADM

## 2019-06-11 RX ORDER — OXYCODONE HYDROCHLORIDE AND ACETAMINOPHEN 5; 325 MG/1; MG/1
1 TABLET ORAL EVERY 4 HOURS PRN
Status: DISCONTINUED | OUTPATIENT
Start: 2019-06-11 | End: 2019-06-13 | Stop reason: HOSPADM

## 2019-06-11 RX ORDER — HYDROCODONE BITARTRATE AND ACETAMINOPHEN 5; 325 MG/1; MG/1
1 TABLET ORAL EVERY 4 HOURS PRN
Status: DISCONTINUED | OUTPATIENT
Start: 2019-06-11 | End: 2019-06-11

## 2019-06-11 RX ADMIN — ONDANSETRON 4 MG: 2 INJECTION INTRAMUSCULAR; INTRAVENOUS at 20:42

## 2019-06-11 RX ADMIN — MORPHINE SULFATE 4 MG: 4 INJECTION INTRAVENOUS at 03:54

## 2019-06-11 RX ADMIN — OXYCODONE AND ACETAMINOPHEN 2 TABLET: 5; 325 TABLET ORAL at 22:36

## 2019-06-11 RX ADMIN — MEGESTROL ACETATE 40 MG: 40 TABLET ORAL at 15:37

## 2019-06-11 RX ADMIN — MORPHINE SULFATE 4 MG: 4 INJECTION INTRAVENOUS at 15:36

## 2019-06-11 RX ADMIN — CETIRIZINE HYDROCHLORIDE 5 MG: 5 TABLET ORAL at 09:01

## 2019-06-11 RX ADMIN — ATORVASTATIN CALCIUM 20 MG: 20 TABLET, FILM COATED ORAL at 09:02

## 2019-06-11 RX ADMIN — SODIUM CHLORIDE: 9 INJECTION, SOLUTION INTRAVENOUS at 10:54

## 2019-06-11 RX ADMIN — HEPARIN SODIUM 5000 UNITS: 5000 INJECTION INTRAVENOUS; SUBCUTANEOUS at 21:47

## 2019-06-11 RX ADMIN — CALCITONIN SALMON 100 UNITS: 200 INJECTION, SOLUTION INTRAMUSCULAR; SUBCUTANEOUS at 21:47

## 2019-06-11 RX ADMIN — TIOTROPIUM BROMIDE 18 MCG: 18 CAPSULE ORAL; RESPIRATORY (INHALATION) at 09:17

## 2019-06-11 RX ADMIN — SODIUM BICARBONATE 1300 MG: 650 TABLET ORAL at 09:01

## 2019-06-11 RX ADMIN — MORPHINE SULFATE 4 MG: 4 INJECTION INTRAVENOUS at 06:45

## 2019-06-11 RX ADMIN — SODIUM BICARBONATE 1300 MG: 650 TABLET ORAL at 15:36

## 2019-06-11 RX ADMIN — MORPHINE SULFATE 4 MG: 4 INJECTION INTRAVENOUS at 20:42

## 2019-06-11 RX ADMIN — CLONIDINE HYDROCHLORIDE 0.2 MG: 0.2 TABLET ORAL at 15:37

## 2019-06-11 RX ADMIN — CLONIDINE HYDROCHLORIDE 0.2 MG: 0.2 TABLET ORAL at 20:42

## 2019-06-11 RX ADMIN — HEPARIN SODIUM 5000 UNITS: 5000 INJECTION INTRAVENOUS; SUBCUTANEOUS at 15:38

## 2019-06-11 RX ADMIN — CALCITONIN SALMON 100 UNITS: 200 INJECTION, SOLUTION INTRAMUSCULAR; SUBCUTANEOUS at 15:37

## 2019-06-11 RX ADMIN — Medication 1 TABLET: at 09:01

## 2019-06-11 RX ADMIN — HYDRALAZINE HYDROCHLORIDE 100 MG: 50 TABLET, FILM COATED ORAL at 20:43

## 2019-06-11 RX ADMIN — METOPROLOL TARTRATE 25 MG: 25 TABLET ORAL at 20:43

## 2019-06-11 RX ADMIN — HYDRALAZINE HYDROCHLORIDE 100 MG: 50 TABLET, FILM COATED ORAL at 15:36

## 2019-06-11 RX ADMIN — SODIUM BICARBONATE 1300 MG: 650 TABLET ORAL at 20:42

## 2019-06-11 RX ADMIN — LOSARTAN POTASSIUM 50 MG: 50 TABLET, FILM COATED ORAL at 20:43

## 2019-06-11 RX ADMIN — CINACALCET HYDROCHLORIDE 120 MG: 30 TABLET, COATED ORAL at 09:01

## 2019-06-11 RX ADMIN — HEPARIN SODIUM 5000 UNITS: 5000 INJECTION INTRAVENOUS; SUBCUTANEOUS at 06:45

## 2019-06-11 RX ADMIN — MORPHINE SULFATE 4 MG: 4 INJECTION INTRAVENOUS at 09:06

## 2019-06-11 ASSESSMENT — PAIN DESCRIPTION - LOCATION
LOCATION: BACK;LEG
LOCATION: GENERALIZED

## 2019-06-11 ASSESSMENT — PAIN DESCRIPTION - ONSET
ONSET: ON-GOING

## 2019-06-11 ASSESSMENT — PAIN SCALES - GENERAL
PAINLEVEL_OUTOF10: 10
PAINLEVEL_OUTOF10: 10
PAINLEVEL_OUTOF10: 9
PAINLEVEL_OUTOF10: 8

## 2019-06-11 ASSESSMENT — PAIN DESCRIPTION - ORIENTATION
ORIENTATION: UPPER
ORIENTATION: UPPER

## 2019-06-11 ASSESSMENT — PAIN DESCRIPTION - DESCRIPTORS
DESCRIPTORS: ACHING

## 2019-06-11 ASSESSMENT — PAIN DESCRIPTION - PAIN TYPE
TYPE: ACUTE PAIN
TYPE: CHRONIC PAIN
TYPE: CHRONIC PAIN
TYPE: ACUTE PAIN
TYPE: CHRONIC PAIN

## 2019-06-11 ASSESSMENT — PAIN DESCRIPTION - PROGRESSION
CLINICAL_PROGRESSION: NOT CHANGED

## 2019-06-11 ASSESSMENT — PAIN DESCRIPTION - FREQUENCY
FREQUENCY: CONTINUOUS

## 2019-06-11 NOTE — H&P
[facility]    HISTORY AND PHYSICAL EXAMINATION            Date:   6/11/2019  Patient name:  Niko Rizo  Date of admission:  6/10/2019  4:07 PM  MRN:   [id number]  Account:  [de-identified]  YOB: 1945  PCP:    Luz Chawla MD  Room:   2004/2004-02  Code Status:    Full Code    Chief Complaint:     Chief Complaint   Patient presents with    Generalized Body Aches    Anorexia    Weight Loss    Dehydration       History Obtained From:     patient    History of Present Illness: The patient is a 72 y.o. Non-/non  female who presents with Generalized Body Aches; Anorexia; Weight Loss; and Dehydration   and she is admitted to the hospital for the management of severe generalized weakness due to lung cancer with bone mets. She was sent for evaluation from Dr. Tushar Mata office today. He was scheduled to get her first round of palliative radiation but appeared dehydrated and emaciated. She was mildly hypotensive as well. Per ER notes the daughter is concerned because she has gotten progressively weaker over the past several weeks and she lives at home alone. She admits to a poor appetite/intake and is not very conversive during my interview. Due to her multiple comorbidities she is not a candidate for surgical intervention or chemotherapy  When I attempted to discuss possibility of hospice care with her she deferred all decisions to her daughter. Past Medical History:     Past Medical History:   Diagnosis Date    Anemia     Asthma     Atrial fibrillation ([address] 2/18/2017    Breast cancer ([facility] 1988     has annual mammograms only, no heme/onc    Bursitis     bilateral    Carpal tunnel syndrome     Chronic obstructive pulmonary disease (COPD) ([facility]     Dialysis patient ([facility]     T/TH[facility] at [facility]     Disease of blood and blood forming organ     History of blood transfusion     Hypertension     Irregular heart beat     Osteoarthritis     knee and back    Pneumonia     Primary lung adenocarcinoma (HCC)     Pulmonary emphysema ([facility] 6/9/2017    Renal failure      end stage renal failure on dialysis    Stenosis of left carotid artery 6/9/2017    Tobacco abuse         Past Surgical History:     Past Surgical History:   Procedure Laterality Date    ABDOMEN SURGERY      BREAST SURGERY      left;  breast cancer    CARPAL TUNNEL RELEASE      right    COLONOSCOPY  02/22/2017    DR Jone Samuels - adenomatous polyp    ECTOPIC PREGNANCY SURGERY      HC DIALYSIS CATHETER N/A 5/13/2019    CATHETER INSERTION HEMODIALYSIS -DURAMAX CATHETER #86,12,20 performed by Lino Mcnamara MD at [address] [facility]      left    LA [facility] N/A 2/22/2017    COLONOSCOPY performed by Noam Earl DO at [facility]    LA ESOPHAGOGASTRODUODENOSCOPY TRANSORAL DIAGNOSTIC N/A 2/22/2017    EGD ESOPHAGOGASTRODUODENOSCOPY performed by Noam Earl DO at [address] [place] [address] Right 05/13/2019    UPPER GASTROINTESTINAL ENDOSCOPY  09/30/2016        Medications Prior to Admission:     Prior to Admission medications    Medication Sig Start Date End Date Taking?  Authorizing Provider   cloNIDine (CATAPRES) 0.2 MG tablet Take 1 tablet by mouth 3 times daily 5/17/19  Yes Leanna Mckeon MD   metoprolol tartrate (LOPRESSOR) 25 MG tablet TAKE 1 TABLET BY MOUTH TWICE DAILY 5/14/19  Yes Osmar Rojas MD   atorvastatin (LIPITOR) 20 MG tablet Take 20 mg by mouth daily   Yes Historical Provider, MD   B Complex-C-Folic Acid (MADAI-AYAN) TABS Take 1 tablet by mouth daily   Yes Historical Provider, MD   lactulose (CONSTULOSE) 10 GM/15ML solution Take 30 mLs by mouth daily as needed   Yes Historical Provider, MD   chlorhexidine (HIBICLENS) 4 % external liquid Apply topically daily As directed   Yes Historical Provider, MD   hydrALAZINE (APRESOLINE) 100 MG tablet Take 100 mg by mouth 3 times daily   Yes Historical Provider, MD   Umeclidinium Bromide (INCRUSE ELLIPTA) 62.5 MCG/INH AEPB Inhale 1 puff into the lungs daily   Yes Historical Provider, MD   sodium bicarbonate 650 MG tablet Take 1,300 mg by mouth 3 times daily   Yes Historical Provider, MD   amLODIPine (NORVASC) 10 MG tablet Take 10 mg by mouth daily   Yes Historical Provider, MD   omeprazole (PRILOSEC) 20 MG delayed release capsule TAKE 1 CAPSULE BY MOUTH DAILY 2/20/19  Yes Lia Andujar MD   Incontinence Supply Disposable (INCONTINENCE BRIEF MEDIUM) MISC 1 each by Does not apply route 2 times daily as needed (incotinenace) 1/8/19  Yes Florecita Wilcox MD   losartan (COZAAR) 50 MG tablet Take 1 tablet by mouth 2 times daily 10/7/16  Yes Ysabel Amaya MD   SENSIPAR 60 MG tablet Take 120 mg by mouth daily  12/3/15  Yes Historical Provider, MD   HYDROcodone-Acetaminophen (Rossi Copier PO) Take by mouth    Historical Provider, MD   loratadine (CLARITIN) 10 MG tablet Take 10 mg by mouth as needed     Historical Provider, MD   ethyl chloride spray Apply topically as needed (to arm prior to dialysis)    Historical Provider, MD   albuterol sulfate  (90 Base) MCG/ACT inhaler Inhale 2 puffs into the lungs every 6 hours as needed for Wheezing or Shortness of Breath 9/5/18 5/23/19  Jasmin Gonzalez DO        Allergies:     Pcn [penicillins] and Sulfa antibiotics    Social History:     Tobacco:    reports that she quit smoking about 4 weeks ago. Her smoking use included cigarettes. She has a 13.75 pack-year smoking history. She has never used smokeless tobacco.  Alcohol:      reports that she does not drink alcohol. Drug Use:  reports that she does not use drugs. Family History:     Family History   Problem Relation Age of Onset   Bulmaro Agustín Cancer Father     Diabetes Sister        Review of Systems:     Positive and Negative as described in HPI. CONSTITUTIONAL: Positive for fevers,fatigue, weight loss.   Negative for fever or chills  HEENT:  negative for vision, hearing changes, runny nose, throat pain  RESPIRATORY:  negative for shortness of breath, cough, congestion, wheezing. CARDIOVASCULAR:  negative for chest pain, palpitations. GASTROINTESTINAL:  negative for nausea, vomiting, diarrhea, constipation, change in bowel habits, abdominal pain   GENITOURINARY:  negative for difficulty of urination, burning with urination, frequency   INTEGUMENT:  negative for rash, skin lesions, easy bruising   HEMATOLOGIC/LYMPHATIC:  negative for swelling/edema   ALLERGIC/IMMUNOLOGIC:  negative for urticaria , itching  ENDOCRINE:  negative increase in drinking, increase in urination, hot or cold intolerance  MUSCULOSKELETAL:  negative joint pains, muscle aches, swelling of joints  NEUROLOGICAL:  negative for headaches, dizziness, lightheadedness, numbness, pain, tingling extremities  BEHAVIOR/PSYCH: Positive for depression, negative for anxiety    Physical Exam:   BP (!) 154/58   Pulse 109   Temp 97.7 °F (36.5 °C) (Oral)   Resp 16   Ht 5' 6\" (1.676 m)   Wt 156 lb 1.4 oz (70.8 kg)   SpO2 97%   BMI 25.19 kg/m²   Temp (24hrs), Av.8 °F (36.6 °C), Min:97.6 °F (36.4 °C), Max:97.9 °F (36.6 °C)    No results for input(s): POCGLU in the last 72 hours. Intake/Output Summary (Last 24 hours) at 2019 1431  Last data filed at 2019 1418  Gross per 24 hour   Intake --   Output 1000 ml   Net -1000 ml       General Appearance: Drowsy but awakes easily, appears ill, emaciated but in no acute distress  Mental status: oriented to person, place, and time with flat affect  Head:  normocephalic, atraumatic.   Eye: no icterus, redness, pupils equal and reactive, extraocular eye movements intact, conjunctiva clear  Ear: normal external ear, no discharge, hearing intact  Nose:  no drainage noted  Mouth: mucous membranes moist  Neck: supple, no carotid bruits, thyroid not palpable  Lungs: Bilateral equal air entry, clear to auscultation, no wheezing, rales or rhonchi, normal effort  Cardiovascular: normal rate, regular rhythm, no murmur, gallop, rub. Abdomen: Soft, nontender, nondistended, normal bowel sounds, no hepatomegaly or splenomegaly  Neurologic: There are no new focal motor or sensory deficits, generalized weakness and bulk, no abnormal sensation, normal speech, cranial nerves II through XII grossly intact.    Skin: No gross lesions, rashes, bruising or bleeding on exposed skin area  Extremities:  peripheral pulses palpable, no pedal edema or calf pain with palpation  Psych: flat affect, uninterested in conversation    Investigations:      Laboratory Testing:  Recent Results (from the past 24 hour(s))   CBC Auto Differential    Collection Time: 06/10/19  6:05 PM   Result Value Ref Range    WBC 14.1 (H) 3.5 - 11.3 k/uL    RBC 2.59 (L) 3.95 - 5.11 m/uL    Hemoglobin 8.1 (L) 11.9 - 15.1 g/dL    Hematocrit 25.7 (L) 36.3 - 47.1 %    MCV 99.2 82.6 - 102.9 fL    MCH 31.3 25.2 - 33.5 pg    MCHC 31.5 28.4 - 34.8 g/dL    RDW 19.2 (H) 11.8 - 14.4 %    Platelets 390 (H) 487 - 453 k/uL    MPV 10.9 8.1 - 13.5 fL    NRBC Automated NOT REPORTED 0.0 per 100 WBC    Differential Type NOT REPORTED     WBC Morphology NOT REPORTED     RBC Morphology NOT REPORTED     Platelet Estimate NOT REPORTED     Seg Neutrophils 88 (H) 36 - 66 %    Lymphocytes 5 (L) 24 - 44 %    Monocytes 6 1 - 7 %    Eosinophils % 0 (L) 1 - 4 %    Basophils 0 %    Immature Granulocytes 1 (H) 0 %    Segs Absolute 12.40 (H) 1.8 - 7.7 k/uL    Absolute Lymph # 0.71 (L) 1.0 - 4.8 k/uL    Absolute Mono # 0.85 (H) 0.2 - 0.8 k/uL    Absolute Eos # 0.00 0.0 - 0.4 k/uL    Basophils # 0.00 0.0 - 0.2 k/uL    Absolute Immature Granulocyte 0.14 0.00 - 0.30 k/uL    Morphology LARGE PLATELETS PRESENT    Basic Metabolic Panel    Collection Time: 06/10/19  6:05 PM   Result Value Ref Range    Glucose 112 (H) 70 - 99 mg/dL    BUN 62 (H) 8 - 23 mg/dL    CREATININE 6.30 (HH) 0.50 - 0.90 mg/dL    Bun/Cre Ratio 10 9 - 20    Calcium 11.6 (H) 8.6 - 10.4 mg/dL    Sodium 138 135 - 144 mmol/L    Potassium 5.6 (H) 3.7 - 5.3 mmol/L    Chloride 94 (L) 98 - 107 mmol/L    CO2 23 20 - 31 mmol/L    Anion Gap 21 (H) 9 - 17 mmol/L    GFR Non-African American 7 (L) >60 mL/min    GFR  8 (L) >60 mL/min    GFR Comment          GFR Staging NOT REPORTED    Magnesium    Collection Time: 06/10/19  6:05 PM   Result Value Ref Range    Magnesium 2.6 1.6 - 2.6 mg/dL   Lactic Acid    Collection Time: 06/10/19  9:49 PM   Result Value Ref Range    Lactic Acid 1.1 0.5 - 2.2 mmol/L   Basic Metabolic Panel w/ Reflex to MG    Collection Time: 06/11/19  6:28 AM   Result Value Ref Range    Glucose 85 70 - 99 mg/dL    BUN 66 (H) 8 - 23 mg/dL    CREATININE 6.77 (HH) 0.50 - 0.90 mg/dL    Bun/Cre Ratio 10 9 - 20    Calcium 11.8 (H) 8.6 - 10.4 mg/dL    Sodium 138 135 - 144 mmol/L    Potassium 5.3 3.7 - 5.3 mmol/L    Chloride 94 (L) 98 - 107 mmol/L    CO2 23 20 - 31 mmol/L    Anion Gap 21 (H) 9 - 17 mmol/L    GFR Non-African American 6 (L) >60 mL/min    GFR  7 (L) >60 mL/min    GFR Comment          GFR Staging NOT REPORTED    CBC    Collection Time: 06/11/19  6:28 AM   Result Value Ref Range    WBC 13.4 (H) 3.5 - 11.3 k/uL    RBC 2.62 (L) 3.95 - 5.11 m/uL    Hemoglobin 7.9 (L) 11.9 - 15.1 g/dL    Hematocrit 26.2 (L) 36.3 - 47.1 %    .0 82.6 - 102.9 fL    MCH 30.2 25.2 - 33.5 pg    MCHC 30.2 28.4 - 34.8 g/dL    RDW 19.7 (H) 11.8 - 14.4 %    Platelets 411 (H) 276 - 453 k/uL    MPV 11.1 8.1 - 13.5 fL    NRBC Automated 0.2 (H) 0.0 per 100 WBC   Protime-INR    Collection Time: 06/11/19  6:28 AM   Result Value Ref Range    Protime 10.7 9.7 - 11.6 sec    INR 1.0    Urinalysis Reflex to Culture    Collection Time: 06/11/19  6:50 AM   Result Value Ref Range    Color, UA YELLOW YELLOW    Turbidity UA SLIGHTLY CLOUDY (A) CLEAR    Glucose, Ur NEGATIVE NEGATIVE    Bilirubin Urine NEGATIVE NEGATIVE    Ketones, Urine NEGATIVE NEGATIVE    Specific Gravity, UA 1.015 1.005 - 1.030 Urine Hgb NEGATIVE NEGATIVE    pH, UA 8.0 5.0 - 8.0    Protein, UA 2+ (A) NEGATIVE    Urobilinogen, Urine Normal Normal    Nitrite, Urine NEGATIVE NEGATIVE    Leukocyte Esterase, Urine TRACE (A) NEGATIVE    Urinalysis Comments NOT REPORTED    Microscopic Urinalysis    Collection Time: 06/11/19  6:50 AM   Result Value Ref Range    -          WBC, UA 2 TO 5 0 - 5 /HPF    RBC, UA 0 TO 2 0 - 2 /HPF    Casts UA NOT REPORTED /LPF    Crystals UA NOT REPORTED None /HPF    Epithelial Cells UA 50  0 - 5 /HPF    Renal Epithelial, Urine NOT REPORTED 0 /HPF    Bacteria, UA NOT REPORTED None    Mucus, UA NOT REPORTED None    Trichomonas, UA NOT REPORTED None    Amorphous, UA NOT REPORTED None    Other Observations UA NOT REPORTED NOT REQ. Yeast, UA NOT REPORTED None       Imaging/Diagnostics:    No results found. Assessment :      Primary Problem  Primary adenocarcinoma of upper lobe of left lung [facility])    Active Hospital Problems    Diagnosis Date Noted    Severe protein[facility] malnutrition ([facility] [E43] 05/15/2019     Priority: Medium    Hypercalcemia of malignancy [E83.52] 06/11/2019    Weakness [R53.1] 06/10/2019    Primary adenocarcinoma of upper lobe of left lung ([facility] [C34.12] 06/09/2019    Bone metastasis ([facility] [facility] [C79.51] 06/09/2019    Anemia of chronic renal failure, stage 5 ([facility] [facility] [N18.5, D63.1] 07/08/2018    Hypertension [I10]     End stage renal disease ([facility] [N18.6] 11/04/2011       Plan:     Patient status Admit as observation in the  Med/Surge    1. Continue IV fluids for gentle hydration  2. Resume home meds  3. Nephrology consult, ESRD on hemodialysis Tuesday/Thursday/Saturday. She will be dialyzed today  4. Hypercalcemia related to malignancy, will attempt to correct during dialysis. Recheck labs this afternoon  5. Monitor and control blood pressures  6. Monitor and control pain, morphine as needed  7. PT/OT evaluation and treatment  8. Heparin for DVT prophylaxis  9.  Start Megace to stimulate

## 2019-06-11 NOTE — PROGRESS NOTES
The potassium/magnesium sliding scale has been automatically discontinued per [facility] approved policy because the patient has decreased renal function (CrCl<30 ml/min). The patient's current K/Mag levels are currently:    Recent Labs     06/10/19  1805   K 5.6*   MG 2.6       Estimated Creatinine Clearance: 8 mL/min (A) (based on SCr of 6.3 mg/dL [facility])). For patients with decreased renal function (below 30ml/min) needing potassium/magnesium supplementation, please order individual bolus doses with appropriate monitoring. Please contact the inpatient pharmacy at [phone] with any concerns. Thank you.     Brian Bernal  6/10/2019  9:23 PM

## 2019-06-11 NOTE — PROGRESS NOTES
Pharmacy review of anticoagulation for patients with [id number] score of 2 or greater    A report is generated listing patients with a risk score of 2 or greater with no current anticoagulation orders. (Disclaimer: Risk Score calculation is dependent on accuracy of patient problem list and past encounter diagnosis). [id number] Score: 3    Pharmacist review determined the following information (indicated with an \"X\")     Documentation Follow up    Patient's risk score is less than 2. No anticoagulation is warranted. No follow up needed at this time. Patient refuses anticoagulation therapy. Patient has a watchman device in place (note patients need warfarin for the first 45 days the device is in place)                Patient has documented active bleeding. Daily review to determine change in clinical status. Anticoagulation is on hold for procedure and will restart          Fall risk/Documentation of frequent falls Patient specific:  Benefit of anticoagulation may outweigh bleeding risk in this patient population. Aspirin is listed as \"anticoagulant\" Guidelines recommend anticoagulation with DOAC or VKA for [id number] score of 2 or greater    No reason documented for lack of anticoagulation. x Other: patient is on heparin 5000 units TID (prophylaxis dose)             PLAN:  No \"active\" anticoagulation for this patient and no reason to only give prophylactic dosing seen during my review. Patient has CKD5 which is problematic for enoxaparin option. Patient does not have any home parenteral anticoagulation (also has lung CA). Asking rounding physician to consider these pieces of information.

## 2019-06-11 NOTE — PROGRESS NOTES
Patient noted to have a 9 beat run of v-tach at this time, dialysis nurse called, Maty Hurtado and she states that patient is alert and asymptomatic. Dr. Makayla Mohamud also notified at this time, no new orders.

## 2019-06-11 NOTE — PROGRESS NOTES
Dialysis Safety Checks:    Patient ID Verified (Y/N)      -Hepatitis Test                   Date      Result  Hepatitis B Surface Antigen   2018 neg     Hepatitis B Surface Antibody 2019 65     Hepatitis B Core Antibody  17 neg     -Treatment Initiation  Blood Vol Processed Goal (Liters)  Pump Speed x Treatment Hours x 60 Minutes 84   Target Fluid Removal 1400     * Intra-treatment documented Safety Checks include the followin) Access and face visible at all times. 2) All connections and blood lines are secure with no kinks. 3) NVL alarm engaged. 4) Hemosafe device applied (if applicable). 5) No collapse of Arterial or Venous blood chambers. 6) All blood lines / pump segments in the air detectors.   --------------------------------------------------------------------------------    Report form Ofe Saavedra

## 2019-06-11 NOTE — PROGRESS NOTES
Occupational Therapy   Occupational Therapy Initial Assessment  Date: 2019   Patient Name: Dash Scanlon  MRN: [id number]     : 1945    Date of Service: 2019    Discharge Recommendations:  [address]      RN reports patient is medically stable for therapy treatment this date. Chart reviewed prior to treatment and patient is agreeable for therapy. All lines intact and patient positioned comfortably at end of treatment. All patient needs addressed prior to ending therapy session. Pt here for first OTV. Patient did not get treatment. Patient has been vomiting off and on for about a month (pt not sure). Patient states 8/10 pain in her back and both legs. Denies SOB or chest pain. PT having diarrhea. Pt not eating a lot. Patient has been out of [place] for a few days. Dr. Cathe Cogan updated and into eval. Pt sent to [phone] by daughter. Dr. Cathe Cogan called ER at [address] to speak to attending. Patient assisted by wheelchair to car. Assessment   Performance deficits / Impairments: Decreased functional mobility ; Decreased strength;Decreased endurance;Decreased ADL status; Decreased safe awareness;Decreased balance  Assessment: Skilled OT needed to address functional deficits and increase activity tolerance for improved safety/indep in the home  Prognosis: Fair  Decision Making: High Complexity  Patient Education: OT POC, d/c recommendation (SNF), importance of being OOB, pain management breathing techs  Barriers to Learning: pain, fatigue  REQUIRES OT FOLLOW UP: Yes  Activity Tolerance  Activity Tolerance: Patient limited by pain; Patient limited by fatigue  Safety Devices  Safety Devices in place: Yes  Type of devices: All fall risk precautions in place;Gait belt;Bed alarm in place; Left in bed;Nurse notified           Patient Diagnosis(es): The primary encounter diagnosis was Metastatic cancer ([facility]  Diagnoses of ESRD (end stage renal disease) on dialysis ([facility] Volume depletion, General weakness, and Failure of outpatient treatment were also pertinent to this visit. has a past medical history of Anemia, Asthma, Atrial fibrillation ([facility] Breast cancer ([facility] Bursitis, Carpal tunnel syndrome, Chronic obstructive pulmonary disease (COPD) ([facility] Dialysis patient ([facility] Disease of blood and blood forming organ, History of blood transfusion, Hypertension, Irregular heart beat, Osteoarthritis, Pneumonia, Primary lung adenocarcinoma ([facility] Pulmonary emphysema ([facility] Renal failure, Stenosis of left carotid artery, and Tobacco abuse.   has a past surgical history that includes Carpal tunnel release; knee surgery; Breast surgery; Ectopic pregnancy surgery; Upper gastrointestinal endoscopy (09/30/2016); pr esophagogastroduodenoscopy transoral diagnostic (N/A, 2/22/2017); pr colon ca scrn not hi rsk ind (N/A, 2/22/2017); Abdomen surgery; Colonoscopy (02/22/2017); Tunneled venous port placement (Right, 05/13/2019); and hc dialysis catheter (N/A, 5/13/2019). Restrictions  Position Activity Restriction  Other position/activity restrictions:  Up with assist    Subjective   General  Chart Reviewed: Yes  Patient assessed for rehabilitation services?: Yes  Family / Caregiver Present: No  Pain Assessment  Response to Pain Intervention: Asleep with RR greater than 10  Social/Functional History  Social/Functional History  Lives With: Alone(daughter and niece check in often, another family member is staying with her temporarily)  Type of Home: Apartment(skilled nursing community)  Home Layout: One level  Home Access: Level entry  Bathroom Shower/Tub: Shower chair with back, Walk-in shower  Bathroom Toilet: Standard  Bathroom Equipment: Grab bars in shower, Grab bars around toilet  Bathroom Accessibility: Walker accessible  Home Equipment: 4 wheeled walker, [address] Help From: Family  ADL Assistance: Needs assistance(HHA 2x/week, assist as needed with bathing, dressing, chores, shopping)  Homemaking Assistance: Needs assistance  Ambulation Assistance: Independent(Uses 4ww or cane dep on energy level)  Transfer Assistance: Independent  Active : No  Mode of Transportation: Family, Friends  Additional Comments: Pt with some difficulty answering questions d/t pain/fatigue       Objective   Vision: Impaired  Vision Exceptions: Wears glasses for reading  Hearing: Within functional limits    Orientation  Overall Orientation Status: Within Functional Limits  Observation/Palpation  Posture: Fair  Observation: IV RUE  Body Mechanics: Pt guarded with pain, moves slowly  Balance  Sitting Balance: Contact guard assistance  Standing Balance: Contact guard assistance  Standing Balance  Time: 2-3 min  Activity: prepare lines for ambulation  Functional Mobility  Functional - Mobility Device: Rolling Walker  Activity: Other  Assist Level: Contact guard assistance  Functional Mobility Comments: Pt took 5-6 steps away from bed, unwilling to ambulate further. Vc needed for upright posture, breathing, and keeping eyes open  ADL  Feeding: Setup; Increased time to complete;Supervision(Pt reports very little appetite, she typically drinks nutrition shakes at home)  Grooming: Moderate assistance;Setup  UE Bathing: Moderate assistance;Setup  LE Bathing: Setup;Maximum assistance  UE Dressing: Setup; Moderate assistance  LE Dressing: Setup;Maximum assistance(Pt reports she could don socks indep if she wanted, but unable to demo)  Toileting: Maximum assistance  Additional Comments: Pt with reduced endurance for completing ADLs  Tone RUE  RUE Tone: Normotonic  Tone LUE  LUE Tone: Normotonic     Bed mobility  Rolling to Left: Contact guard assistance  Rolling to Right: Contact guard assistance  Supine to Sit: Contact guard assistance  Sit to Supine: Contact guard assistance  Scooting: Contact guard assistance  Comment: vc for use of bedrail  Transfers  Sit to stand: Minimal assistance  Stand to sit: Minimal assistance  Transfer Comments: vc for use of UE on bed to assist with lift-off, good follow-through from pt     Cognition  Overall Cognitive Status: Exceptions  Arousal/Alertness: Delayed responses to stimuli(Pt sleepy, vc to keep eyes open)  Following Commands:  Follows multistep commands with increased time  Attention Span: Appears intact  Memory: Decreased recall of biographical Information  Safety Judgement: Decreased awareness of need for safety  Problem Solving: Decreased awareness of errors  Insights: Decreased awareness of deficits  Initiation: Requires cues for some  Sequencing: Requires cues for some  Perception  Overall Perceptual Status: WFL     Sensation  Overall Sensation Status: WFL        LUE AROM (degrees)  LUE AROM : WFL  RUE AROM (degrees)  RUE AROM : WFL  LUE Strength  Gross LUE Strength: Exceptions to [facility]  LUE Strength Comment: 3+/5  RUE Strength  Gross RUE Strength: Exceptions to [facility]  RUE Strength Comment: 3+/5                   Plan   Plan  Times per week: 3-5x/week  Times per day: Daily  Current Treatment Recommendations: Strengthening, Functional Mobility Training, Patient/Caregiver Education & Training, Endurance Training, ROM, Balance Training, Safety Education & Training, Self-Care / ADL    G-Code     OutComes Score                                                  AM-PAC Score             Goals  Short term goals  Time Frame for Short term goals: By d/c, pt will   Short term goal 1: increase activity tolerance to 10 min to complete grooming tasks at sink  Short term goal 2: demo SBA ADL transfers with safe technique  Short term goal 3: demo SBA functional mob with use of RW with good safety and pacing  Short term goal 4: demo Min A for full body ADLs with use of EC/WS strategies and DME/AE as needed  Short term goal 5: demo and verbalize good understanding of fall prevention and EC/WS strategies for use in the home  Patient Goals   Patient goals : Pt would like to be left alone to sleep and

## 2019-06-11 NOTE — DISCHARGE INSTR - COC
Continuity of Care Form    Patient Name: Sudha Dillard   :    MRN:  [id number]    Admit date:  6/10/2019  Discharge date:  2019    Code Status Order: Full Code   Advance Directives:   [facility] Documentation     Date/Time Healthcare Directive Type of Healthcare Directive Copy in [address] Agent's Name Healthcare Agent's Phone Number    06/10/19 5959  Yes, patient has an advance directive for healthcare treatment  Living will;Durable power of  for health care  No, copy requested from family  Adult [address]  --          Admitting Physician:  Maximiliano Rey MD  PCP: Mirela Goins MD    Discharging Nurse: [facility] Unit/Room#:   Discharging Unit Phone Number: [phone]    Emergency Contact:   Extended Emergency Contact Information  Primary Emergency Contact: Bhupinder Arango  Address: [address] [place] [address],[address]  [address] [facility], [address] [facility] Marie Howard  [address]  Phone: [phone]  Work Phone: [phone]  Mobile Phone: [phone]  Relation: Child  Secondary Emergency Contact: Rhys Bueno [facility] of [address] Phone: [phone]  Work Phone: [phone]  Mobile Phone: [phone]  Relation: Niece/Nephew    Past Surgical History:  Past Surgical History:   Procedure Laterality Date    ABDOMEN SURGERY      BREAST SURGERY      left;  breast cancer    CARPAL TUNNEL RELEASE      right    COLONOSCOPY  2017    DR Shanon Eric - adenomatous polyp    ECTOPIC PREGNANCY SURGERY      [facility] DIALYSIS CATHETER N/A 2019    CATHETER INSERTION HEMODIALYSIS -[id number] performed by Deven Nix MD at [address]      left    MA COLON CA SCRN NOT  [address] IND N/A 2017    COLONOSCOPY performed by Tyler Armstrong DO at [facility]    MA ESOPHAGOGASTRODUODENOSCOPY TRANSORAL DIAGNOSTIC N/A 2017    EGD ESOPHAGOGASTRODUODENOSCOPY performed by Severo Grime SOFI Burk DO at [facility]    TUNNELED VENOUS PORT PLACEMENT Right 05/13/2019    UPPER GASTROINTESTINAL ENDOSCOPY  09/30/2016       Immunization History:   Immunization History   Administered Date(s) Administered    Influenza Vaccine, unspecified formulation 10/03/2015, 10/01/2016    Influenza Virus Vaccine 10/31/2018    PPD Test 07/21/2010    Pneumococcal 13-valent Conjugate ([id number]) 01/14/2019    Pneumococcal Conjugate 7-valent 03/21/2011    Pneumococcal Polysaccharide ([id number]) 12/01/2011       Active Problems:  Patient Active Problem List   Diagnosis Code    End stage renal disease ([facility] N18.6    Anemia D64.9    Malignant neoplasm of female breast ([facility] C50.919    Tobacco abuse Z72.0    Non-rheumatic mitral regurgitation I34.0    Gastrointestinal hemorrhage K92.2    NSAID long-term use Z79.1    Anemia of chronic disease D63.8    Erosive gastritis K29.60    Iron deficiency anemia D50.9    Paroxysmal atrial fibrillation (HCC) I48.0    Hypertension I10    Severe anemia D64.9    Fatigue R53.83    Colon polyp K63.5    Pulmonary emphysema (HCC) J43.9    Stenosis of left carotid artery I65.22    Anemia of chronic renal failure, stage 5 (HCC) N18.5, D63.1    COPD exacerbation (HCC) J44.1    Need for vaccination Z23    Primary malignant neoplasm of left lung metastatic to other site 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    Weakness R53.1       Isolation/Infection:   Isolation          No Isolation            Nurse Assessment:  Last Vital Signs: BP Darryl Pavy ) (P) 157/79   Pulse (P) 101   Temp 97.7 °F (36.5 °C) (Oral)   Resp (P) 16   Ht 5' 6\" (1.676 m)   Wt (P) 156 lb 1.4 oz (70.8 kg)   SpO2 97%   BMI (P) 25.19 kg/m²     Last documented pain score (0-10 scale): Pain Level: 8  Last Weight:   Wt Readings from Last 1 Encounters:   06/11/19 (P) 156 lb 1.4 oz (70.8 kg)     Mental Status:  oriented, alert, coherent, logical, thought processes intact and able to concentrate and follow conversation    IV Access:  - None  - Dialysis Catheter  - site  right and femoral, insertion date: 5/13/2019, dressing last changed on 6/11/2019    Nursing Mobility/ADLs:  Walking   Assisted  Transfer  Assisted  Bathing  Assisted  Dressing  Assisted  Toileting  Assisted  Feeding  Independent  Med Admin  Independent  Med Delivery   whole    Wound Care Documentation and Therapy:        Elimination:  Continence:   · Bowel: Yes  · Bladder: Yes  Urinary Catheter: None   Colostomy/Ileostomy/Ileal Conduit: No       Date of Last BM: 6/11/2019  No intake or output data in the 24 hours ending 06/11/19 1303  No intake/output data recorded. Safety Concerns: At Risk for Falls    Impairments/Disabilities:      None    Nutrition Therapy:  Current Nutrition Therapy:   - Oral Diet:  Renal    Routes of Feeding: Oral  Liquids: No Restrictions  Daily Fluid Restriction: no  Last Modified Barium Swallow with Video (Video Swallowing Test): not done    Treatments at the Time of Hospital Discharge:   Respiratory Treatments: spiriva  Oxygen Therapy:  is not on home oxygen therapy. Ventilator:    - No ventilator support    Rehab Therapies: Physical Therapy and Occupational Therapy  Weight Bearing Status/Restrictions: No weight bearing restirctions  Other Medical Equipment (for information only, NOT a DME order):  walker  Other Treatments: IS, skilled assessment, dialysis Tues/Thurs/Sat  Follow up with Dr. Erich Severs in 3-4 weeks.   Follow up with Dr. Cait Bassett  Follow up Dr. Colletta Baumgarten 6/20 at 1430      Patient's personal belongings (please select all that are sent with patient):  None    RN SIGNATURE:  Electronically signed by Jose E Desouza RN on 6/13/19 at 4:02 PM    CASE MANAGEMENT/SOCIAL WORK SECTION    Inpatient Status Date: ***    Readmission Risk Assessment Score:  Readmission Risk              Risk of Unplanned Readmission:        34           Discharging to Facility/ Agency   · [facility]  · Address:  · Phone: [phone]  · Fax: [phone]    Dialysis Facility (if applicable)   · Name: [facility]  · Address: [address] [place] [address]. [place], [place]   · Dialysis Schedule: T/TH/SA 6:40AM  · Phone: [phone]  · Fax: [phone]    / signature: Electronically signed by Chaney Ganser, LSW on 6/11/19 at 1:04 PM    PHYSICIAN SECTION    Prognosis: Poor    Condition at Discharge: Stable    Rehab Potential (if transferring to Rehab): Fair    Recommended Labs or Other Treatments After Discharge:     Physician Certification: I certify the above information and transfer of Freedom Nazario  is necessary for the continuing treatment of the diagnosis listed and that she requires [place] for less 30 days.      Update Admission H&P: No change in H&P    PHYSICIAN SIGNATURE:  Electronically signed by Brandy Patel MD on 6/13/19 at 11:32 AM

## 2019-06-11 NOTE — CARE COORDINATION
Case Management Initial Discharge Plan  Sudha Dillard,         Readmission Risk              Risk of Unplanned Readmission:        29         a    Met with:patient or family member patient and daughter to discuss discharge plans. Information verified: address, contacts, phone number, , insurance Yes  PCP: Mirela Goins MD  Date of last visit: 19    Insurance Provider: [address] [place] [address]    Discharge Planning  Current Residence:  One story   Living Arrangements:  ELEANOR Caballero has 1 stories/ stairs to climb  Support Systems:  Children, Family Members, Friends/Neighbors  Current Services PTA:  SN/PT/OT Agency: [facility] and  [facility]  Patient able to perform ADL's:Assisted  DME in home:  Rollator. Has 2ww, cane, shower seat and grab bars  DME used to aid ambulation prior to admission:     DME used during admission:      Potential Assistance Needed:  [address]:  [address] [place] [address] Medications:  No  Does patient want to participate in local refill/ meds to beds program?  No    Patient agreeable to home care: Yes  Freedom of choice provided:  yes      Type of Home Care Services:  Nursing Services, Aide Services  Patient expects to be discharged to:  Home    Prior SNF/Rehab Placement and Facility: none  Agreeable to SNF/Rehab: Yes  [place] of choice provided: yes   Evaluation: yes    Expected Discharge date:  19  Follow Up Appointment: Best Day/ Time: Thursday PM    Transportation provider: family  Transportation arrangements needed for discharge: Yes    Discharge Plan:    Met with patient at bedside to complete dc assessment and discuss dc plans. Patient asked I contact her daughter, Sheryle Heaps, for plans. Spoke with Sheryle Heaps, [facility] MD feels patient needs SNF placement at discharge. Gave choices of [facility] and [facility]. Referral sent to both, will need precert.      Pt is current with  [facility], spoke with intake, states they have reached out to patient but haven't made contact with her. Pt also current with [facility]. Dialysis: [facility] [address] t/th/sa 6:40AM  PH: [phone]  Fax: [phone]  Call placed to [facility] and made them aware of admission.    Electronically signed by TILA Calvillo on 6/11/19 at 12:19 PM

## 2019-06-11 NOTE — PROGRESS NOTES
Dialysis Post Treatment Report:     -Access Assessment       -Ultrafiltration   UF Goal 1400   Prime (-) 250   NS Flush (-) 250       Total UF Removed  Net removed 1000  500     -Medications / Blood Administration  Medications Given (Y/N) n   Blood Products (Y/N)      Narrative:   Patient off after 2:15 Hours. Was ordered to run 3:30 hours  She refused to stay on tx. BP stable, c/o pain all over. Has to have BM. .. Offered bed pan and she refused.    Report to jazzy

## 2019-06-11 NOTE — CARE COORDINATION
Social work: Call received from [facility], states she will need H&P and PT recommendations to review before making decision regarding acceptance. Additionally, [facility] is bed-tight and are unsure if they will have bed available. Also spoke with Joselyn/Zia, states will need PT to review.

## 2019-06-11 NOTE — PROGRESS NOTES
Pt admitted to room 2004 from ER. Oriented to room and call light/tv controls. Bed in lowest position, wheels locked, 2/4 side rails up  Call light in reach, room free of clutter, adequate lighting provided. Fall Precautions in place. Patient is a Dialysis patient. She goes on Tuesdays, Thursdays and Saturdays. Dr. Emil Mccullough is her Renal doctor.

## 2019-06-11 NOTE — PROGRESS NOTES
Nutrition Assessment    Type and Reason for Visit: Positive Nutrition Screen    Nutrition Recommendations: 1. Suggest continuing current renal diet. 2. Consider starting renal oral nutritional supplement (Nepro) 1x day. Nutrition Assessment: Pt nutritionally compromised as evidenced by N/V and diarrhea x 7 days, 10% wt loss x 9 months, and PO intake 0-25% x 7 days. At risk for further nutrition compromise related to generalized mild fluid accumulation and continued low PO intake. Pt was unavialable for interview x2 due to exhaustion and dialysis. Suggest continuing current renal diet and starting a renal oral nutritional supplement (Nepro) 1x day. Malnutrition Assessment:  · Malnutrition Status: At risk for malnutrition  · Context: Chronic illness  · Findings of the 6 clinical characteristics of malnutrition (Minimum of 2 out of 6 clinical characteristics is required to make the diagnosis of moderate or severe Protein Calorie Malnutrition based on AND/ASPEN Guidelines):  1. Energy Intake-Less than or equal to 50% of estimated energy requirement, Greater than or equal to 7 days    2. Weight Loss-10% loss or greater, (in 9 months)  3. Fat Loss-Unable to assess  4. Muscle Loss-Unable to assess  5. Fluid Accumulation-Mild fluid accumulation, Generalized  6.  Strength-Not measured    Nutrition Risk Level: High    Nutrient Needs:  · Estimated Daily Total Kcal: 1330-1592 kcals  · Estimated Daily Protein (g): 85-92g (1.2-1.3g/kg)  · Estimated Daily Total Fluid (ml/day): 1200-1400ml (UO results pending)    Nutrition Diagnosis:   · Problem: Inadequate oral intake  · Etiology: related to Catabolic illness     Signs and symptoms:  as evidenced by Nausea, Vomiting, Diarrhea, Weight loss, Localized or generalized fluid accumulation    Objective Information:  · Nutrition-Focused Physical Findings: GI: soft, active bowel sounds. PV: +1 generalized, RLE/LLE trace.   · Wound Type: None  · Current Nutrition Therapies:  · Oral Diet Orders: Renal   · Oral Diet intake: 0%, 1-25%  · Anthropometric Measures:  · Ht: 5' 6\" (167.6 cm)   · Current Body Wt: 156 lb 4.9 oz (70.9 kg)  · Admission Body Wt: 154 lb 15.7 oz (70.3 kg)  · Usual Body Wt: 171 lb 15.3 oz (78 kg)(10/22/18)  · % Weight Change:  ,  16lb wt loss (10.3%) x 9 months  · Ideal Body Wt: 130 lb (59 kg), % Ideal Body 120%  · BMI Classification: BMI 25.0 - 29.9 Overweight    Nutrition Interventions:   Continue current diet, Start ONS  Continued Inpatient Monitoring, Coordination of Care    Nutrition Evaluation:   · Evaluation: Goals set   · Goals: PO intake to meet >75% of estimated nutrition needs    · Monitoring: Meal Intake, Supplement Intake, I&O, Weight, Pertinent Labs, Nausea or Vomiting, Diarrhea, Monitor Bowel Function    Jose R Nesbitt, Dietetic Intern  Electronically signed by Sandy Christianson, NELDA, ASHLEY on 6/11/19 at 12:20 PM    Contact Number: [phone]

## 2019-06-11 NOTE — PLAN OF CARE
Pain level assessment complete.    Patient educated on pain scale and control interventions  PRN pain medication given per patient request  Patient instructed to call out with new onset of pain or unrelieved pain

## 2019-06-11 NOTE — FLOWSHEET NOTE
Patient and bed was out of the room.  shares in silent prayer.    leaves prayer card for possible follow up.     06/11/19 3028   Encounter Summary   Services provided to: Patient   Referral/Consult From: Rounding   Continue Visiting   (6-11-19 PT: + bed out of the room)   Complexity of Encounter Low   Length of Encounter 15 minutes   Spiritual Assessment Completed Yes   Routine   Type Initial   Assessment Unable to respond   Intervention Prayer

## 2019-06-11 NOTE — PROGRESS NOTES
Physical Therapy  DATE: 2019    NAME: Gisselle Salgado  MRN: [id number]   : 1945    Patient not seen this date for Physical Therapy due to:  [] Blood transfusion in progress  [x] Hemodialysis  []  Patient Declined  [] Spine Precautions   [] Strict Bedrest  [] Surgery/ Procedure  [] Testing      [] Other        [] PT being discontinued at this time. Patient independent. No further needs. [] PT being discontinued at this time as the patient has been transferred to palliative care. No further needs.     Loc Ibarra, PT

## 2019-06-11 NOTE — CONSULTS
Consults           REASON FOR  NEPHROLOGY CONSULT     Fluid and BP management in ESRD     ACCESS   Groin catheter    DRY WEIGHT   Unknown    NEPHROLOGIST   Dr. Dionisio Vu               This is a 72 y.o. female was brought to the ER when she started complaining of feeling weak/loss of appetite and generalized body pain. This is been going on last 2-3 weeks according to her. She was recently diagnosed with adenocarcinoma of lung and to my understanding has received 1 cycle of chemotherapy and was about to see radiation oncologist for bone pain. During the visit she mentioned about above complaints along with left shoulder pain. It is recommended that she was also hypotensive which prompted a transfer to the ER. Initial assessment in the ER showed stable vitals. Investigations showed elevated WBC count suspected leukemoid reaction and hypercalcemia. Hemoglobin was stable. There been consulted for dialysis management. Her regular dialysis days are Tuesday Thursday Saturday. Her access is clotted off. Currently she is a groin catheter. PAST MEDICAL HISTORY         Diagnosis Date    Anemia     Asthma     Atrial fibrillation ([address] 2/18/2017    Breast cancer ([address] 1988     has annual mammograms only, no heme/onc    Bursitis     bilateral    Carpal tunnel syndrome     Chronic obstructive pulmonary disease (COPD) ([address]     Dialysis patient ([address]     [facility]     Disease of blood and blood forming organ     History of blood transfusion     Hypertension     Irregular heart beat     Osteoarthritis     knee and back    Pneumonia     Primary lung adenocarcinoma (HCC)     Pulmonary emphysema ([address] 6/9/2017    Renal failure      end stage renal failure on dialysis    Stenosis of left carotid artery 6/9/2017    Tobacco abuse          PAST SURGICAL HISTORY         Procedure Laterality Date    ABDOMEN SURGERY      BREAST SURGERY      left;  breast cancer    CARPAL TUNNEL RELEASE      right    COLONOSCOPY  02/22/2017    DR Jd Cortes - adenomatous polyp    ECTOPIC PREGNANCY SURGERY      HC DIALYSIS CATHETER N/A 5/13/2019    CATHETER INSERTION HEMODIALYSIS -DURAMAX CATHETER #71,03,23 performed by Vinay Davis MD at [address] [facility]      left    [place] COLON CA SCRN NOT  [address] IND N/A 2/22/2017    COLONOSCOPY performed by Carollynn Riedel, DO at [facility]    ID ESOPHAGOGASTRODUODENOSCOPY TRANSORAL DIAGNOSTIC N/A 2/22/2017    EGD ESOPHAGOGASTRODUODENOSCOPY performed by Carollynn Riedel, DO at  [facility] [address] TUNNELED VENOUS PORT PLACEMENT Right 05/13/2019    UPPER GASTROINTESTINAL ENDOSCOPY  09/30/2016       MEDICATIONS     Home Meds:                Medications Prior to Admission: cloNIDine (CATAPRES) 0.2 MG tablet, Take 1 tablet by mouth 3 times daily  metoprolol tartrate (LOPRESSOR) 25 MG tablet, TAKE 1 TABLET BY MOUTH TWICE DAILY  atorvastatin (LIPITOR) 20 MG tablet, Take 20 mg by mouth daily  B Complex-C-Folic Acid (MADAI-AYAN) TABS, Take 1 tablet by mouth daily  lactulose (CONSTULOSE) 10 GM/15ML solution, Take 30 mLs by mouth daily as needed  chlorhexidine (HIBICLENS) 4 % external liquid, Apply topically daily As directed  hydrALAZINE (APRESOLINE) 100 MG tablet, Take 100 mg by mouth 3 times daily  Umeclidinium Bromide (INCRUSE ELLIPTA) 62.5 MCG/INH AEPB, Inhale 1 puff into the lungs daily  sodium bicarbonate 650 MG tablet, Take 1,300 mg by mouth 3 times daily  amLODIPine (NORVASC) 10 MG tablet, Take 10 mg by mouth daily  omeprazole (PRILOSEC) 20 MG delayed release capsule, TAKE 1 CAPSULE BY MOUTH DAILY  Incontinence Supply Disposable (INCONTINENCE BRIEF MEDIUM) MISC, 1 each by Does not apply route 2 times daily as needed (incotinenace)  losartan (COZAAR) 50 MG tablet, Take 1 tablet by mouth 2 times daily  SENSIPAR 60 MG tablet, Take 120 mg by mouth daily   HYDROcodone-Acetaminophen (NORCO PO), Take by mouth  loratadine (CLARITIN) 10 MG tablet, Take 10 mg by mouth as needed   ethyl chloride spray, Apply topically as needed (to arm prior to dialysis)  albuterol sulfate  (90 Base) MCG/ACT inhaler, Inhale 2 puffs into the lungs every 6 hours as needed for Wheezing or Shortness of Breath  Scheduled Meds:    amLODIPine  10 mg Oral Daily    atorvastatin  20 mg Oral Daily    edwina-anya  1 tablet Oral Daily    cloNIDine  0.2 mg Oral TID    hydrALAZINE  100 mg Oral TID    cetirizine  5 mg Oral Daily    losartan  50 mg Oral BID    metoprolol tartrate  25 mg Oral BID    cinacalcet  120 mg Oral Daily    sodium bicarbonate  1,300 mg Oral TID    sodium chloride flush  10 mL Intravenous 2 times per day    tiotropium  18 mcg Inhalation Daily    heparin (porcine)  5,000 Units Subcutaneous 3 times per day     Continuous Infusions:    sodium chloride 25 mL/hr at 19 1054     PRN Meds:  sodium chloride flush, nicotine, acetaminophen, ondansetron **OR** ondansetron, morphine **OR** morphine    ALLERGY     Pcn [penicillins] and Sulfa antibiotics    SOCIAL HISTORY     Social History     Socioeconomic History    Marital status:      Spouse name: Not on file    Number of children: Not on file    Years of education: Not on file    Highest education level: Not on file   Occupational History    Not on file   Social Needs    Financial resource strain: Not on file    Food insecurity:     Worry: Not on file     Inability: Not on file    Transportation needs:     Medical: Not on file     Non-medical: Not on file   Tobacco Use    Smoking status: Former Smoker     Packs/day: 0.25     Years: 55.00     Pack years: 13.75     Types: Cigarettes     Last attempt to quit: 2019     Years since quittin.0    Smokeless tobacco: Never Used   Substance and Sexual Activity    Alcohol use: No     Alcohol/week: 0.0 oz    Drug use: No    Sexual activity: Not on file   Lifestyle    Physical activity:     Days per week: Not on file     Minutes per session: Not on file    Stress: Not on file   Relationships    Social connections:     Talks on phone: Not on file     Gets together: Not on file     Attends Confucianism service: Not on file     Active member of club or organization: Not on file     Attends meetings of clubs or organizations: Not on file     Relationship status: Not on file    Intimate partner violence:     Fear of current or ex partner: Not on file     Emotionally abused: Not on file     Physically abused: Not on file     Forced sexual activity: Not on file   Other Topics Concern    Not on file   Social History Narrative    Not on file       FAMILY HISTORY      Family History   Problem Relation Age of Onset    Cancer Father     Diabetes Sister           REVIEW OF SYSTEM      Constitutional: PRESENT asthenia/weight loss/anorexia    HEENT : No epistaxis/visual blurriness/rhinorrhoea/sorethroat/trauma  Cardiovascular:No chest pain/palpitation/SOB  Respiratory: No cough/fever/SOB/Wheezing    Gastrointestinal: No abdominal pain/nausea/vomiting/diarrhoea/constipation  Genitourinary: No dysuria/pyuria/hematuria/incomplete emptying of bladder  Musculoskeletal:  No gait disturbance/weakness or joint complaints  Integumentary: No rash or pruritis. Neurological: No headache/diplopia/change in muscle strength/numbness or tingling. No change in gait, balance, coordination, mood, affect, memory, mentation, behavior. Psychiatric: No anxiety/depression. Endocrine: No temperature intolerance. No excessive thirst, fluid intake, or urination. No tremor. Hematologic/Lymphatic: No abnormal bruising or bleeding, blood clots or swolle lymph nodes.   Allergic/Immunologic: No nasal congestion or hives    EXAMINATION       Vitals:    06/10/19 1816 06/10/19 2203 06/11/19 0430 06/11/19 0735   BP: (!) 134/53 (!) 150/55  (!) 152/53   Pulse: 78 83  91   Resp:  18  17   Temp:  97.9 °F (36.6 °C)  97.9 °F (36.6 °C)   TempSrc:    Oral   SpO2:  98%  94% Weight:   156 lb 3.2 oz (70.9 kg)    Height:         24HR INTAKE/OUTPUT:  No intake or output data in the 24 hours ending 06/11/19 1100    General appearance:Awake, alert, in no acute distress  Skin: warm and dry, no rash or erythema  Eyes: conjunctivae normal and sclera anicteric  ENT: no thrush no pharyngeal congestion  orodental hygiene   Neck: No JVD, Lymphadenopathty or thyromegaly  Respiratory: vesicular breath sounds,no wheeze/crackles  Cardiovascular: S1 S2 normal,no gallop or organic murmur. No carotid bruit  Abdomen:Non tender/non distended. Bowel sounds present  Extremities: No Cyanosis or Clubbing,Lower extremity edema  Neurological:Alert and oriented. No abnormalities of mood, affect, memory, mentation, or behavior are noted    INVESTIGATIONS     PTH:    Lab Results   Component Value Date    PTH 1,035.0 09/05/2018     abs:   CBC:   Recent Labs     06/10/19  1805 06/11/19  0628   WBC 14.1* 13.4*   RBC 2.59* 2.62*   HGB 8.1* 7.9*   HCT 25.7* 26.2*   MCV 99.2 100.0   MCH 31.3 30.2   MCHC 31.5 30.2   RDW 19.2* 19.7*   * 647*   MPV 10.9 11.1      BMP:   Recent Labs     06/10/19  1805 06/11/19  0628    138   K 5.6* 5.3   CL 94* 94*   CO2 23 23   BUN 62* 66*   CREATININE 6.30* 6.77*   GLUCOSE 112* 85   CALCIUM 11.6* 11.8*        Phosphorus:  No results for input(s): PHOS in the last 72 hours. Magnesium:   Recent Labs     06/10/19  1805   MG 2.6     Albumin: No results for input(s): LABALBU in the last 72 hours. ASSESSMENT     1. ESRD on intermittent maintenance hemodialysis Tuesday Thursday Saturday using a right groin catheter. [facility] up with Dr. Sarah Hillman  2. Hospitalized with failure to thrive/generalized asthenia likely related to malignancy  3. Hypercalcemia suspected malignancy related  4. Adenocarcinoma of lung recently diagnosed status post chemotherapy and currently getting radiation for metastatic bone disease  5. Essential hypertension  6. COPD  7.   Anemia of chronic disease    PLAN     1. HD today. Will use low calcium bath  2. PTH/PTHrP ordered  3. Will give 2 doses of calcitonin  4. Hem Onco review for hypercalcemia and long term goals   5. Will follow      Thank you for the consultation. Please do not hesitate to call with questions.     This note is created with the assistance of a speech-recognition program. While intending to generate a document that actually reflects the content of the visit, no guarantees can be provided that every mistake has been identified and corrected by editing      Edward Hill MD, [facility] Adriana Brasher), [address]   6/11/2019 11:00 AM  [facility]

## 2019-06-11 NOTE — PROGRESS NOTES
Patient returns to room from dialysis, RN informed writer that patient was refusing to remain on dialysis.

## 2019-06-11 NOTE — PLAN OF CARE
Problem: Falls - Risk of:  Goal: Will remain free from falls  Description  Will remain free from falls  Outcome: Ongoing  Note:   Siderails up x 2  Hourly rounding  Call light in reach  Instructed to call for assist before attempting out of bed. Remains free from falls and accidental injury at this time   Floor free from obstacles  Bed is locked and in lowest position  Adequate lighting provided  Bed alarm on, [place] star and Stay with Me signs posted         Problem: Pain:  Goal: Pain level will decrease  Description  Pain level will decrease  Outcome: Ongoing  Note:   Pain level assessment complete.    Patient educated on pain scale and control interventions  PRN pain medication given per patient request  Patient instructed to call out with new onset of pain or unrelieved pain

## 2019-06-11 NOTE — CARE COORDINATION
Social Work:  Met with Kassidy/daughter, advised her await decision from [facility] and [facility] regarding acceptance. Brittni Ramirez discussed her concerns that patient has been in and out of the hospital and become progressively weaker over the last few weeks. Brittni Ramirez stated pt lives alone, but her aunt has been staying in the home with pt. Brittni Ramirez is also actively involved in her care and checks on her often. Brittni Ramirez stated [facility] was setup last admission but d/t being in the hospital, they have not been out to see patient. Patient is also current with [facility] for PT/OT/SN. Brittni Ramirez stated pt gets transportation to dialysis through the [address] [place] [address], typically black and white cab. SW left voicemail for Abe/JENNIFER at [employer] to see if transport is available if pt goes to SNF. SW asked if hospice had ever been brought up, Brittni Ramirez stated it had, but she wants to speak with physician before making a decision regarding hospice. JENNIFER explained to Brittni Ramirez different options with hospice (home vs snf vs inpatient). Zaida/PADMINI updated.

## 2019-06-11 NOTE — ED PROVIDER NOTES
Today via private vehicle    [facility]  eMERGENCY dEPARTMENT eNCOUnter      Pt Name: Lj Siddiqui  MRN: [id number]  [place] 1945  Date of evaluation: 6/10/2019  Provider: [address] [place] [address] SHANI, JAMES Hopkins 4123       Chief Complaint   Patient presents with    Generalized Body Aches    Anorexia    Weight Loss    Dehydration         HISTORY OF PRESENT ILLNESS  (Location/Symptom, Timing/Onset, Context/Setting, Quality, Duration, Modifying Factors, Severity.)   Lj Siddiqui is a 72 y.o. female who presents to the emergency department today by private vehicle for evaluation of generalized weakness and fatigue. The patient has a history of lung cancer with metastasis to the bone. She was at radiation oncology today to have her first round of radiation when he thought that she looked very dehydrated. He was concerned about her nutritional status because she looked emaciated. He states that her blood pressure was low so he sent her to the emergency room for evaluation. When she was admitted to the hospital last month they wanted her to go to rehabilitation but she declined. The daughter is concerned because she HAS GOTTEN progressively weaker and she lives at home alone      Nursing Notes were reviewed.     ALLERGIES     Pcn [penicillins] and Sulfa antibiotics    CURRENT MEDICATIONS       Current Discharge Medication List      CONTINUE these medications which have NOT CHANGED    Details   cloNIDine (CATAPRES) 0.2 MG tablet Take 1 tablet by mouth 3 times daily  Qty: 60 tablet, Refills: 3      metoprolol tartrate (LOPRESSOR) 25 MG tablet TAKE 1 TABLET BY MOUTH TWICE DAILY  Qty: 60 tablet, Refills: 5      atorvastatin (LIPITOR) 20 MG tablet Take 20 mg by mouth daily      B Complex-C-Folic Acid (MADAI-AYAN) TABS Take 1 tablet by mouth daily      lactulose (CONSTULOSE) 10 GM/15ML solution Take 30 mLs by mouth daily as needed      chlorhexidine (HIBICLENS) 4 % external liquid Apply topically daily As directed      hydrALAZINE (APRESOLINE) 100 MG tablet Take 100 mg by mouth 3 times daily      Umeclidinium Bromide (INCRUSE ELLIPTA) 62.5 MCG/INH AEPB Inhale 1 puff into the lungs daily      sodium bicarbonate 650 MG tablet Take 1,300 mg by mouth 3 times daily      amLODIPine (NORVASC) 10 MG tablet Take 10 mg by mouth daily      omeprazole (PRILOSEC) 20 MG delayed release capsule TAKE 1 CAPSULE BY MOUTH DAILY  Qty: 30 capsule, Refills: 5      Incontinence Supply Disposable (INCONTINENCE BRIEF MEDIUM) MISC 1 each by Does not apply route 2 times daily as needed (incotinenace)  Qty: 22 each, Refills: 5    Associated Diagnoses: Urinary incontinence, unspecified type      losartan (COZAAR) 50 MG tablet Take 1 tablet by mouth 2 times daily  Qty: 30 tablet, Refills: 3    Associated Diagnoses: Hypertension, isolated systolic      SENSIPAR 60 MG tablet Take 120 mg by mouth daily       HYDROcodone-Acetaminophen (NORCO PO) Take by mouth      loratadine (CLARITIN) 10 MG tablet Take 10 mg by mouth as needed       ethyl chloride spray Apply topically as needed (to arm prior to dialysis)      albuterol sulfate  (90 Base) MCG/ACT inhaler Inhale 2 puffs into the lungs every 6 hours as needed for Wheezing or Shortness of Breath  Qty: 1 Inhaler, Refills: 11             PAST MEDICAL HISTORY         Diagnosis Date    Anemia     Asthma     Atrial fibrillation (HCC) 2/18/2017    Breast cancer ([facility] 1988     has annual mammograms only, no heme/onc    Bursitis     bilateral    Carpal tunnel syndrome     Chronic obstructive pulmonary disease (COPD) ([address]     Dialysis patient ([address]     [facility]     Disease of blood and blood forming organ     History of blood transfusion     Hypertension     Irregular heart beat     Osteoarthritis     knee and back    Pneumonia     Primary lung adenocarcinoma ([address]     Pulmonary emphysema ([address] 6/9/2017    Renal failure      end stage renal failure on dialysis    Stenosis of left carotid artery 2017    Tobacco abuse        SURGICAL HISTORY           Procedure Laterality Date    ABDOMEN SURGERY      BREAST SURGERY      left;  breast cancer    CARPAL TUNNEL RELEASE      right    COLONOSCOPY  2017    DR Jil Guadarrama - adenomatous polyp    ECTOPIC PREGNANCY SURGERY      HC DIALYSIS CATHETER N/A 2019    CATHETER INSERTION HEMODIALYSIS -DURAMAX CATHETER #48,84,11 performed by Collis Nyhan, MD at [address]      left    [place] COLON CA SCRN NOT  [address] IND N/A 2017    COLONOSCOPY performed by Maddie Mancia DO at [facility]    [place] ESOPHAGOGASTRODUODENOSCOPY TRANSORAL DIAGNOSTIC N/A 2017    EGD ESOPHAGOGASTRODUODENOSCOPY performed by Maddie Mancia DO at [facility]    TUNNELED VENOUS PORT PLACEMENT Right 2019    UPPER GASTROINTESTINAL ENDOSCOPY  2016         FAMILY HISTORY           Problem Relation Age of Onset    Cancer Father     Diabetes Sister      Family Status   Relation Name Status    Father      Sister          SOCIAL HISTORY      reports that she quit smoking about 4 weeks ago. Her smoking use included cigarettes. She has a 13.75 pack-year smoking history. She has never used smokeless tobacco. She reports that she does not drink alcohol or use drugs. REVIEW OF SYSTEMS    (2-9 systems for level 4, 10 or more for level 5)     Except as noted above the remainder of the review of systems was reviewed and negative. Review of Systems   Constitutional: Negative for chills, fever and unexpected weight change. HENT: Negative for congestion, rhinorrhea, sinus pressure and sore throat. Respiratory: Negative for cough, shortness of breath and wheezing. Cardiovascular: Negative for chest pain and palpitations. Gastrointestinal: Negative for abdominal pain, constipation, diarrhea, nausea and vomiting. Genitourinary: Negative for dysuria and hematuria.    Musculoskeletal: Negative for arthralgias and myalgias. Skin: Negative for color change and rash. Neurological: Negative for dizziness, weakness and headaches. Hematological: Negative for adenopathy. PHYSICAL EXAM    (up to 7 for level 4, 8 or more for level 5)     ED Triage Vitals [06/10/19 1605]   BP Temp Temp Source Pulse Resp SpO2 Height Weight   (!) 115/45 97.6 °F (36.4 °C) Oral 77 16 99 % 5' 6\" (1.676 m) 155 lb (70.3 kg)       Physical Exam   Constitutional: She is oriented to person, place, and time. She appears well-developed and well-nourished. HENT:   Head: Normocephalic and atraumatic. Mouth/Throat: Oropharynx is clear and moist. Mucous membranes are dry. Eyes: Pupils are equal, round, and reactive to light. Conjunctivae are normal.   Neck: Normal range of motion. Neck supple. Cardiovascular: Normal rate and regular rhythm. Pulmonary/Chest: Effort normal and breath sounds normal. No stridor. No respiratory distress. Abdominal: Soft. Bowel sounds are normal.   Musculoskeletal: Normal range of motion. Lymphadenopathy:     She has no cervical adenopathy. Neurological: She is alert and oriented to person, place, and time. Skin: Skin is warm and dry. No rash noted. Psychiatric: She has a normal mood and affect. Vitals reviewed.         LABS:  Labs Reviewed   CBC WITH AUTO DIFFERENTIAL - Abnormal; Notable for the following components:       Result Value    WBC 14.1 (*)     RBC 2.59 (*)     Hemoglobin 8.1 (*)     Hematocrit 25.7 (*)     RDW 19.2 (*)     Platelets 398 (*)     Seg Neutrophils 88 (*)     Lymphocytes 5 (*)     Eosinophils % 0 (*)     Immature Granulocytes 1 (*)     Segs Absolute 12.40 (*)     Absolute Lymph # 0.71 (*)     Absolute Mono # 0.85 (*)     All other components within normal limits   BASIC METABOLIC PANEL - Abnormal; Notable for the following components:    Glucose 112 (*)     BUN 62 (*)     CREATININE 6.30 (*)     Calcium 11.6 (*)     Potassium 5.6 (*)     Chloride 94 (*) Anion Gap 21 (*)     GFR Non- 7 (*)     GFR  8 (*)     All other components within normal limits   MAGNESIUM   LACTIC ACID   BASIC METABOLIC PANEL W/ REFLEX TO MG FOR LOW K   CBC   PROTIME-INR   URINE RT REFLEX TO CULTURE       All other labs were within normal range or not returned as of this dictation. EMERGENCY DEPARTMENT COURSE and DIFFERENTIAL DIAGNOSIS/MDM:   Vitals:    Vitals:    06/10/19 1605 06/10/19 1816 06/10/19 2203   BP: (!) 115/45 (!) 134/53 (!) 150/55   Pulse: 77 78 83   Resp: 16  18   Temp: 97.6 °F (36.4 °C)  97.9 °F (36.6 °C)   TempSrc: Oral     SpO2: 99%  98%   Weight: 155 lb (70.3 kg)     Height: 5' 6\" (1.676 m)         Medical Decision Making: Patient is due to have dialysis tomorrow. Her labs are abnormal.  There is concern about the patient's safety at home. She will be admitted to the hospital.    CONSULTS:  IP CONSULT TO INTERNAL MEDICINE  IP CONSULT TO NEPHROLOGY  IP CONSULT TO NEPHROLOGY      FINAL IMPRESSION      1. Metastatic cancer ([facility]    2. ESRD (end stage renal disease) on dialysis ([facility]    3. Volume depletion    4. General weakness    5.  Failure of outpatient treatment          DISPOSITION/PLAN   DISPOSITION Admitted 06/10/2019 07:36:35 PM      PATIENT REFERRED TO:   Óscar Chiu MD  [address] [place] [address] [phone]            DISCHARGE MEDICATIONS:     Current Discharge Medication List              (Please note that portions of this note were completed with a voice recognition program.  Efforts were made to edit the dictations but occasionally words are mis-transcribed.)    [address] [place] [address] NP, APRN - CNP  Certified Nurse Practitioner         JAMES Avelar CNP  06/10/19 0223

## 2019-06-12 ENCOUNTER — APPOINTMENT (OUTPATIENT)
Dept: RADIATION ONCOLOGY | Facility: MEDICAL CENTER | Age: 74
End: 2019-06-12
Attending: RADIOLOGY
Payer: COMMERCIAL

## 2019-06-12 ENCOUNTER — TELEPHONE (OUTPATIENT)
Dept: RADIATION ONCOLOGY | Facility: MEDICAL CENTER | Age: 74
End: 2019-06-12

## 2019-06-12 PROBLEM — C34.90 LUNG CANCER METASTATIC TO BONE (HCC): Status: ACTIVE | Noted: 2019-06-12

## 2019-06-12 PROBLEM — R62.7 FAILURE TO THRIVE IN ADULT: Status: ACTIVE | Noted: 2019-06-12

## 2019-06-12 PROBLEM — C79.51 LUNG CANCER METASTATIC TO BONE (HCC): Status: ACTIVE | Noted: 2019-06-12

## 2019-06-12 LAB
ANION GAP SERPL CALCULATED.3IONS-SCNC: 18 MMOL/L (ref 9–17)
BUN BLDV-MCNC: 49 MG/DL (ref 8–23)
BUN/CREAT BLD: 9 (ref 9–20)
CALCIUM SERPL-MCNC: 10.3 MG/DL (ref 8.6–10.4)
CHLORIDE BLD-SCNC: 95 MMOL/L (ref 98–107)
CO2: 25 MMOL/L (ref 20–31)
CREAT SERPL-MCNC: 5.42 MG/DL (ref 0.5–0.9)
CULTURE: NORMAL
GFR AFRICAN AMERICAN: 9 ML/MIN
GFR NON-AFRICAN AMERICAN: 8 ML/MIN
GFR SERPL CREATININE-BSD FRML MDRD: ABNORMAL ML/MIN/{1.73_M2}
GFR SERPL CREATININE-BSD FRML MDRD: ABNORMAL ML/MIN/{1.73_M2}
GLUCOSE BLD-MCNC: 86 MG/DL (ref 70–99)
LV EF: 70 %
LVEF MODALITY: NORMAL
Lab: NORMAL
POTASSIUM SERPL-SCNC: 5.1 MMOL/L (ref 3.7–5.3)
SODIUM BLD-SCNC: 138 MMOL/L (ref 135–144)
SPECIMEN DESCRIPTION: NORMAL
TROPONIN INTERP: ABNORMAL
TROPONIN T: ABNORMAL NG/ML
TROPONIN, HIGH SENSITIVITY: 83 NG/L (ref 0–14)
TROPONIN, HIGH SENSITIVITY: 83 NG/L (ref 0–14)
TROPONIN, HIGH SENSITIVITY: 87 NG/L (ref 0–14)

## 2019-06-12 PROCEDURE — 97116 GAIT TRAINING THERAPY: CPT

## 2019-06-12 PROCEDURE — 97161 PT EVAL LOW COMPLEX 20 MIN: CPT

## 2019-06-12 PROCEDURE — APPSS45 APP SPLIT SHARED TIME 31-45 MINUTES: Performed by: NURSE PRACTITIONER

## 2019-06-12 PROCEDURE — 6360000002 HC RX W HCPCS: Performed by: NURSE PRACTITIONER

## 2019-06-12 PROCEDURE — 80048 BASIC METABOLIC PNL TOTAL CA: CPT

## 2019-06-12 PROCEDURE — 96361 HYDRATE IV INFUSION ADD-ON: CPT

## 2019-06-12 PROCEDURE — 6370000000 HC RX 637 (ALT 250 FOR IP): Performed by: NURSE PRACTITIONER

## 2019-06-12 PROCEDURE — 1200000000 HC SEMI PRIVATE

## 2019-06-12 PROCEDURE — 96372 THER/PROPH/DIAG INJ SC/IM: CPT

## 2019-06-12 PROCEDURE — 93306 TTE W/DOPPLER COMPLETE: CPT

## 2019-06-12 PROCEDURE — 99232 SBSQ HOSP IP/OBS MODERATE 35: CPT | Performed by: INTERNAL MEDICINE

## 2019-06-12 PROCEDURE — 2580000003 HC RX 258: Performed by: NURSE PRACTITIONER

## 2019-06-12 PROCEDURE — 36415 COLL VENOUS BLD VENIPUNCTURE: CPT

## 2019-06-12 PROCEDURE — 6370000000 HC RX 637 (ALT 250 FOR IP): Performed by: INTERNAL MEDICINE

## 2019-06-12 PROCEDURE — 84484 ASSAY OF TROPONIN QUANT: CPT

## 2019-06-12 RX ORDER — HYDRALAZINE HYDROCHLORIDE 50 MG/1
50 TABLET, FILM COATED ORAL 3 TIMES DAILY PRN
Status: DISCONTINUED | OUTPATIENT
Start: 2019-06-12 | End: 2019-06-13 | Stop reason: HOSPADM

## 2019-06-12 RX ADMIN — SODIUM BICARBONATE 1300 MG: 650 TABLET ORAL at 08:09

## 2019-06-12 RX ADMIN — CLONIDINE HYDROCHLORIDE 0.2 MG: 0.2 TABLET ORAL at 15:17

## 2019-06-12 RX ADMIN — SODIUM CHLORIDE: 9 INJECTION, SOLUTION INTRAVENOUS at 18:04

## 2019-06-12 RX ADMIN — CETIRIZINE HYDROCHLORIDE 5 MG: 5 TABLET ORAL at 08:09

## 2019-06-12 RX ADMIN — SODIUM BICARBONATE 1300 MG: 650 TABLET ORAL at 22:00

## 2019-06-12 RX ADMIN — MORPHINE SULFATE 4 MG: 4 INJECTION INTRAVENOUS at 15:23

## 2019-06-12 RX ADMIN — HEPARIN SODIUM 5000 UNITS: 5000 INJECTION INTRAVENOUS; SUBCUTANEOUS at 21:59

## 2019-06-12 RX ADMIN — SODIUM BICARBONATE 1300 MG: 650 TABLET ORAL at 15:17

## 2019-06-12 RX ADMIN — MEGESTROL ACETATE 40 MG: 40 TABLET ORAL at 08:17

## 2019-06-12 RX ADMIN — HEPARIN SODIUM 5000 UNITS: 5000 INJECTION INTRAVENOUS; SUBCUTANEOUS at 06:02

## 2019-06-12 RX ADMIN — CLONIDINE HYDROCHLORIDE 0.2 MG: 0.2 TABLET ORAL at 08:09

## 2019-06-12 RX ADMIN — OXYCODONE AND ACETAMINOPHEN 1 TABLET: 5; 325 TABLET ORAL at 06:21

## 2019-06-12 RX ADMIN — CINACALCET HYDROCHLORIDE 120 MG: 30 TABLET, COATED ORAL at 08:17

## 2019-06-12 RX ADMIN — HEPARIN SODIUM 5000 UNITS: 5000 INJECTION INTRAVENOUS; SUBCUTANEOUS at 15:16

## 2019-06-12 RX ADMIN — METOPROLOL TARTRATE 25 MG: 25 TABLET ORAL at 08:09

## 2019-06-12 RX ADMIN — Medication 1 TABLET: at 08:09

## 2019-06-12 RX ADMIN — ATORVASTATIN CALCIUM 20 MG: 20 TABLET, FILM COATED ORAL at 08:09

## 2019-06-12 ASSESSMENT — PAIN DESCRIPTION - LOCATION
LOCATION: GENERALIZED

## 2019-06-12 ASSESSMENT — PAIN DESCRIPTION - PROGRESSION
CLINICAL_PROGRESSION: NOT CHANGED

## 2019-06-12 ASSESSMENT — PAIN DESCRIPTION - PAIN TYPE
TYPE: CHRONIC PAIN

## 2019-06-12 ASSESSMENT — PAIN SCALES - GENERAL
PAINLEVEL_OUTOF10: 3
PAINLEVEL_OUTOF10: 8
PAINLEVEL_OUTOF10: 6
PAINLEVEL_OUTOF10: 2

## 2019-06-12 ASSESSMENT — PAIN - FUNCTIONAL ASSESSMENT
PAIN_FUNCTIONAL_ASSESSMENT: PREVENTS OR INTERFERES SOME ACTIVE ACTIVITIES AND ADLS
PAIN_FUNCTIONAL_ASSESSMENT: PREVENTS OR INTERFERES SOME ACTIVE ACTIVITIES AND ADLS

## 2019-06-12 ASSESSMENT — PAIN DESCRIPTION - ONSET
ONSET: ON-GOING

## 2019-06-12 ASSESSMENT — PAIN DESCRIPTION - DESCRIPTORS
DESCRIPTORS: ACHING

## 2019-06-12 ASSESSMENT — PAIN DESCRIPTION - FREQUENCY
FREQUENCY: CONTINUOUS

## 2019-06-12 ASSESSMENT — PAIN DESCRIPTION - ORIENTATION
ORIENTATION: UPPER
ORIENTATION: UPPER

## 2019-06-12 NOTE — PROGRESS NOTES
Pt's daughter Nilson Sarmiento arrived to the hospital and we had a long discussion about patient's condition while patient slept. Pt did wake up a few times during our conversation but immediately fell back to sleep. Nilson Sarmiento understands her mother's condition and prognosis. She understands patient seems to be deteriorating as of recent. We had a long discussion about palliative care and hospice. Nilson Sarmiento wants to respect her mom's wishes and is opting for no hospice at this time but is willing to have palliative care come out and see patient at [facility]. Lucie Cohen was notified and plans to visit patient on Monday at [facility] once a place is confirmed. I also gave Nilson Sarmiento some information on s/s of progressive disease and signs to look for at months, weeks and days before end of life. I explained that if patient does not do well at [facility] then she can always call hospice in at that time and they will come to see patient. Emotional support offered to daughter who is appreciative of the support. Waiting on precert for ECF. Luz Bauer [address] [place] [address], RN  [phone]

## 2019-06-12 NOTE — CONSULTS
..    Palliative Care Inpatient Consult    NAME:  [address] [facility] [address] RECORD NUMBER:  [id number]  AGE: 72 y.o. GENDER: female  : 1945  TODAY'S DATE:  2019    Reasons for Consultation:    Provision of information regarding PC and/or hospice philosophies  Complex, time-intensive communication and interdisciplinary psychosocial support  Clarification of goals of care and/or assistance with difficult decision-making  Guidance in regards to resources and transition(s)    Members of PC team contributing to this consultation are :  Yolanda Monzon RN    History of Present Illness     The patient is a 72 y.o. Non-/non  female who presents with Generalized Body Aches; Anorexia; Weight Loss; and Dehydration    Referred to Palliative Care by   [] Physician   [] Nursing  [] Family Request   [] Other:       She was admitted to the primary service for Weakness [R53.1]  Lung cancer metastatic to bone ([address] [C34.90, C79.51]. Her hospital course has been associated with Primary adenocarcinoma of upper lobe of left lung ([address]  The patient has a complicated medical history and has been hospitalized since 6/10/2019  4:07 PM.    Active Hospital Problems    Diagnosis Date Noted    Severe protein-energy malnutrition ([address] [E43] 05/15/2019     Priority: Medium    Lung cancer metastatic to bone ([address] [C34.90, C79.51] 2019    Failure to thrive in adult [R62.7] 2019    Hypercalcemia of malignancy [E83.52] 2019    Failure of outpatient treatment [Z78.9]     Weakness [R53.1] 06/10/2019    Primary adenocarcinoma of upper lobe of left lung ([address] [C34.12] 2019    Metastatic cancer ([address] [C79.9] 2019    Anemia of chronic renal failure, stage 5 ([address] [N18.5, D63.1] 2018    Hypertension [I10]     End stage renal disease ([address] [N18.6] 2011       Data        Code Status: Full Code     ADVANCED CARE PLANNING:  Patient has capacity for medical decisions: yes  Health Care Power of : yes  Living Will: yes     Personal, Social, and Family History  Marital Status: single  Living situation:with family:  sister  Delia/Spiritual History:   unsure  Psychological Distress: mild  Does patient understand diagnosis/treatment? yes  Does family/caregiver understand diagnosis/treatment? not asked    Assessment        Palliative Performance Scale:    ___100% Full ambulation; normal activity and work; no evidence of disease; able to do own self care; normal intake; fully conscious  ___90% Full ambulation; normal activity and work; some evidence of disease; able to do own self care; normal intake; fully conscious  ___80% Full ambulation; normal activity with effort; some evidence of disease; able to do own self care; normal or reduced intake; fully conscious  ___70% Ambulation reduced; unable to perform normal job/work; significant disease; able to do own self care; normal or reduced intake; fully conscious  ___60%  Ambulation reduced; cannot do hobbies/housework; significant disease; occasional assist; intake normal or reduced; fully conscious/some confusion  __x_50%  Mainly sit/lie; can't do any work; extensive disease; considerable assist; intake normal or reduced; fully conscious/some confusion  ___40%  Mainly in bed; extensive disease; mainly assist; intake normal or reduced; fully conscious/ some confusion   ___30%  Bed bound; extensive disease; total care; intake reduced; fully conscious/some confusion  ___20%  Bed bound; extensive disease; total care; intake minimal; drowsy/coma  ___10%  Bed bound; extensive disease; total care; mouth care only; drowsy/coma  ___0       Death       Risk Assessments:    Oli Risk Score: 24    Readmission Risk Score: 34        1 Year Mortality Risk Score: 24     Plan        Palliative Interaction: Talked with patient as she lay in bed. She is irritable that so many people keep \"bothering\" her.  We discussed her condition and I explained that I had recommendations:  Please call with any palliative questions or concerns. Palliative Care Team is available via perfect serve or via phone - [phone]. Palliative Care will continue to follow Ms. Carney's care as needed. Thank you for allowing Palliative Care to participate in the care of Ms. Lynnda Olszewski .     Electronically signed by   Britney Sainz RN  Palliative Care Team  on 6/12/2019 at 11:51 AM    Palliative care office: [phone]

## 2019-06-12 NOTE — TELEPHONE ENCOUNTER
Called [facility] to get update on patient and spoke with Marylen Savage RN for patient. Patient is feeling fatigued, trying to control her pain with medication. Patient had partial dialysis yesterday, pt asked for them to stop. Patient had a run of ventricular tachycardia yesterday, so awaiting cardiology consult today Patient is also waiting on ECF placement. 1) [facility] and 2) [facility]. Will continue to follow.

## 2019-06-12 NOTE — CONSULTS
 cinacalcet  120 mg Oral Daily    sodium bicarbonate  1,300 mg Oral TID    sodium chloride flush  10 mL Intravenous 2 times per day    tiotropium  18 mcg Inhalation Daily    heparin (porcine)  5,000 Units Subcutaneous 3 times per day     Continuous Infusions:   sodium chloride 25 mL/hr at 06/11/19 1054     PRN Meds:.hydrALAZINE, oxyCODONE-acetaminophen **OR** oxyCODONE-acetaminophen, sodium chloride flush, nicotine, acetaminophen, ondansetron **OR** ondansetron, morphine **OR** morphine     Allergies:  Pcn [penicillins] and Sulfa antibiotics    Social History:   reports that she quit smoking about 4 weeks ago. Her smoking use included cigarettes. She has a 13.75 pack-year smoking history. She has never used smokeless tobacco. She reports that she does not drink alcohol or use drugs. Family History: family history includes Cancer in her father; Diabetes in her sister. Review of Systems UNABLE TO GET   CONSTITUTIONAL:     EYES:     HEENT:     RESPIRATORY:     CARDIOVASCULAR:     GASTROINTESTINAL:     GENITOURINARY:     MUSCULOSKELETAL:     NEUROLOGICAL:     PSYCHIATRIC:                PHYSICAL EXAM:    Blood pressure (!) 118/53, pulse 84, temperature 98.1 °F (36.7 °C), temperature source Oral, resp. rate 16, height 5' 6\" (1.676 m), weight 151 lb 4.8 oz (68.6 kg), SpO2 94 %, not currently breastfeeding. CONSTITUTIONAL: looks weak ,   HEAD: normocephalic, atraumatic   EYES: PERRLA, EOMI   ENT: moist mucous membranes, uvula midline   NECK:  symmetric, no midline tenderness to palpation   LUNGS: reduced  to auscultation bilaterally   CARDIOVASCULAR: regular rate and rhythm, no murmurs, rubs or gallops   ABDOMEN: Soft, non-tender, non-distended with normal active bowel sounds   SKIN: no rash       DATA:    ECG:  Echo:ordered   Stress:2/2017     Impression   Probably normal study, with findings suggestive of inferior wall attenuation artifact. No convincing evidence for ischemia or infarction.  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)    Weakness    Hypercalcemia of malignancy    Failure of outpatient treatment    Lung cancer metastatic to bone ([address]    Failure to thrive in adult           RECOMMENDATIONS:  NSVT WHILE DIALYSIS , ASYMPTOMATIC   WILL GET TROP , IT MICHAEL BE NICKOLAS DUE TO CKD , BUT WILL SEE THE TREND   WATCH ELECTROLYTES , KEEP MG >2 , k 4-5   WILL CONTINUE BB   IF REOCCUR NSVT THEN START AMIODARONE DRIP   HTN , WELL CONTROLLED   H/O CATH IN PAST 50% LCX DISEASE   WILL GET ECHO FOR STRUCTURAL HEART , LVEF   AS PER LAST DISCHARGE SUMMARY PT DNR-CCA   WILL TREAT CONSERVATIVELY     Discussed with patient and nursing.     Vee Chang [facility]        [phone]

## 2019-06-12 NOTE — PROGRESS NOTES
Occupational Therapy  DATE: 2019    NAME: Silvia Simpson  MRN: [id number]   : 1945   [facility]  Occupational Therapy Not Seen Note    Patient not available for Occupational Therapy due to:    [] Testing:    [] Hemodialysis    [] Cancelled by RN:    [x]Refusal by Patient: Pt declining EOB and OOB activity. Edu on importance of participation.  Continues to decline, stating she just wants to rest.    [] Surgery:     [] Intubation:     [] Pain Medication:    [] Sedation:     [] Spine Precautions :    [] Medical Instability:    [] Other:      Bradley PEREIRA/SOFI

## 2019-06-12 NOTE — PROGRESS NOTES
Dr. Maciel Grounds regarding multiple antihypertensives patient is ordered to take this morning with a blood pressure of 125/43 and a HR of 108. Dr. Lucila Kennedy gives telephone order to only give clonidine and metoprolol.

## 2019-06-12 NOTE — PROGRESS NOTES
Physical Therapy    Facility/Department: [facility]  Initial Assessment    NAME: Tracy Cuevas  :   MRN: [id number]    Date of Service: 2019    Discharge Recommendations:  Subacute/Skilled Nursing Facility        Assessment   Body structures, Functions, Activity limitations: Decreased functional mobility ; Decreased strength;Decreased endurance;Decreased balance    Patient tolerated session well with moderate deficits noted in bed mobility, transfers, ambulation, balance, and endurance this session. Patient's generalized weakness is limiting factor with mobility at this time and patient demos significant deficits as compared to prior level of function. At current level of function, patient will benefit from continued inpatient PT services and will likely require a SNF to promote improved safety and IND with all functional mobility after discharge. Prognosis: Fair  Decision Making: Low Complexity  Patient Education: PT POC, safety with mobility  REQUIRES PT FOLLOW UP: Yes  Activity Tolerance  Activity Tolerance: Patient Tolerated treatment well;Patient limited by fatigue;Patient limited by pain; Patient limited by endurance       Patient Diagnosis(es): The primary encounter diagnosis was Metastatic cancer ([facility] Diagnoses of ESRD (end stage renal disease) on dialysis ([address] Volume depletion, General weakness, and Failure of outpatient treatment were also pertinent to this visit.      has a past medical history of Anemia, Asthma, Atrial fibrillation ([address] Breast cancer ([address] Bursitis, Carpal tunnel syndrome, Chronic obstructive pulmonary disease (COPD) ([address] Dialysis patient ([address] Disease of blood and blood forming organ, History of blood transfusion, Hypertension, Irregular heart beat, Osteoarthritis, Pneumonia, Primary lung adenocarcinoma ([address] Pulmonary emphysema ([address] Renal failure, Stenosis of left carotid artery, and Tobacco abuse.   has a past surgical history that includes Carpal tunnel release; knee surgery; Breast surgery; Ectopic pregnancy surgery; Upper gastrointestinal endoscopy (09/30/2016); pr esophagogastroduodenoscopy transoral diagnostic (N/A, 2/22/2017); pr colon ca scrn not hi rsk ind (N/A, 2/22/2017); Abdomen surgery; Colonoscopy (02/22/2017); Tunneled venous port placement (Right, 05/13/2019); and hc dialysis catheter (N/A, 5/13/2019).     Restrictions  Restrictions/Precautions  Restrictions/Precautions: General Precautions, Fall Risk  Position Activity Restriction  Other position/activity restrictions: Per RN, patient is ok to be up with assist   Vision/Hearing  Vision: Impaired  Vision Exceptions: Wears glasses for reading  Hearing: Within functional limits     Subjective  General  Patient assessed for rehabilitation services?: Yes  Response To Previous Treatment: Not applicable  Family / Caregiver Present: No  Follows Commands: Within Functional Limits  Subjective  Subjective: Patient states that she is extremely tired today  Pain Screening  Patient Currently in Pain: Yes  Vital Signs  Patient Currently in Pain: Yes  Pre Treatment Pain Screening  Pain at present: 5  Scale Used: Numeric Score  Intervention List: Patient able to continue with treatment  Comments / Details: Patient reports pain in her belly today    Orientation  Orientation  Overall Orientation Status: Within Functional Limits  Social/Functional History  Social/Functional History  Lives With: Alone(daughter and niece check in often, another family member is staying with her temporarily)  Type of Home: Apartment(nursing home community)  Home Layout: One level  Home Access: Level entry  Bathroom Shower/Tub: Shower chair with back, Walk-in shower  Bathroom Toilet: Standard  Bathroom Equipment: Grab bars in shower, Grab bars around toilet  Bathroom Accessibility: Walker accessible  Home Equipment: 4 wheeled walker, [employer] Help From: Family  ADL Assistance: Needs assistance(HHA 2x/week, assist as needed with bathing, dressing, chores, shopping)  Homemaking Assistance: Needs assistance  Ambulation Assistance: Independent(Uses 4ww or cane dep on energy level)  Transfer Assistance: Independent  Active : No  Mode of Transportation: Family, Friends  Additional Comments: Pt with some difficulty answering questions d/t pain/fatigue       Objective     Observation/Palpation  Observation: IV RUE    AROM RLE (degrees)  RLE AROM: WFL  AROM LLE (degrees)  LLE AROM : WFL  AROM RUE (degrees)  RUE AROM : WFL  AROM LUE (degrees)  LUE AROM : WFL  Strength RLE  Strength RLE: WFL  Comment: Grossly 4/5 throughout  Strength LLE  Strength LLE: WFL  Comment: Grossly 4/5 throughout  Strength RUE  Strength RUE: WFL  Comment: Grossly 4/5 throughout  Strength LUE  Strength LUE: WFL  Comment: Grossly 4/5 throughout  Tone RLE  RLE Tone: Normotonic  Tone LLE  LLE Tone: Normotonic  Motor Control  Gross Motor?: WFL  Sensation  Overall Sensation Status: WFL  Bed mobility  Rolling to Right: Contact guard assistance  Supine to Sit: Contact guard assistance  Sit to Supine: Contact guard assistance  Scooting: Contact guard assistance  Transfers  Sit to Stand: Minimal Assistance  Stand to sit: Minimal Assistance  Ambulation  Ambulation?: Yes  Ambulation 1  Surface: level tile  Device: Rolling Walker  Assistance: Contact guard assistance  Quality of Gait: Patient demos very slow, forward flexed gait with mild unsteadiness requiring use of RW  Distance: 4 feet laterally to [facility]  Stairs/Curb  Stairs?: No     Balance  Posture: Fair  Sitting - Static: Good  Sitting - Dynamic: Fair;+  Standing - Static: Fair;+  Standing - Dynamic: [address]  Times per week: 1-2 times a day, 5-6 days a week  Current Treatment Recommendations: Strengthening, Balance Training, Functional Mobility Training, Transfer Training, Endurance Training, Gait Training, Stair training, Safety Education & Training  Safety Devices  Type of devices:  All fall risk precautions in

## 2019-06-12 NOTE — PLAN OF CARE
Problem: Falls - Risk of:  Goal: Will remain free from falls  Description  Will remain free from falls  Outcome: Ongoing  Note:   Siderails up x 2  Hourly rounding  Call light in reach  Instructed to call for assist before attempting out of bed. Remains free from falls and accidental injury at this time   Floor free from obstacles  Bed is locked and in lowest position  Adequate lighting provided  Bed alarm on, [place] star and Stay with Me signs posted         Problem: Pain:  Goal: Pain level will decrease  Description  Pain level will decrease  Outcome: Ongoing  Note:   Pain level assessment complete. Patient educated on pain scale and control interventions  PRN pain medication given per patient request  Patient instructed to call out with new onset of pain or unrelieved pain       Problem: Risk for Impaired Skin Integrity  Goal: Tissue integrity - skin and mucous membranes  Description  Structural intactness and normal physiological function of skin and  mucous membranes.   Outcome: Ongoing  Note:   Skin assessment completed and documented  David scale updated  Relieve pressure to bony prominences  Avoid shearing  Assess s/sx of infection   Air mattress in place  Turns side to side with assistance  Heels elevated  Structural intactness and normal physiological function of skin and mucous membranes

## 2019-06-12 NOTE — PROGRESS NOTES
Writer attempted to give patient a bed bath. Writer noticed that patient's skin was very dry. Writer applied lotion to patient's skin. Patient only complained of being cold. Patient did not have any pain. Pain medication was offered to patient and she said she was ok. Placed warm blankets on patient. Patient resting comfortably.

## 2019-06-12 NOTE — CARE COORDINATION
Social work: Precert pending for [facility]. Kassidy/daughter, updated. HYUN garcia.

## 2019-06-12 NOTE — CARE COORDINATION
Social work: spoke with Madelyn/[facility], no beds available. Spoke with [facility], they are able to accept and will start precert once pt is seen by physical therapy.

## 2019-06-12 NOTE — PROGRESS NOTES
[facility]    Progress Note    6/12/2019    8:29 AM    Name:   Jose De Jesus Cornejo  MRN:     [id number]     [place]:      [de-identified]   Room:   2004/2004-02  IP Day:  0  Admit Date:  6/10/2019  4:07 PM    PCP:   Nicole Ayers MD  Code Status:  Full Code    Subjective:     C/C:   Chief Complaint   Patient presents with    Generalized Body Aches    Anorexia    Weight Loss    Dehydration     Interval History Status: not changed. Patient is still drowsy, appears uncomfortable, not engaging in much conversation. NO acute distress noted  Code status discussed with daughter, she wishes to remain a full code. Palliative care consulted    Brief History:     The patient is a 72 y.o. Non-/non  female who presents with Generalized Body Aches; Anorexia; Weight Loss; and Dehydration and she is admitted to the hospital for the management of severe generalized weakness due to lung cancer with bone mets. She was sent for evaluation from Dr. Sarika Arredondo office today. He was scheduled to get her first round of palliative radiation but appeared dehydrated and emaciated. She was mildly hypotensive as well. Per ER notes the daughter is concerned because she has gotten progressively weaker over the past several weeks and she lives at home alone. She admits to a poor appetite/intake and is not very conversive during my interview.     Due to her multiple comorbidities she is not a candidate for surgical intervention or chemotherapy  When I attempted to discuss possibility of hospice care with her she deferred all decisions to her daughter. Review of Systems:     Constitutional:  negative for chills, fevers, sweats.  Positive for fatigue, poor appetite  Respiratory:  negative for cough, dyspnea on exertion, shortness of breath, wheezing  Cardiovascular:  negative for chest pain, chest pressure/discomfort, lower extremity edema, palpitations  Gastrointestinal: negative for abdominal pain, constipation, diarrhea, nausea, vomiting. Positive for back pain  Neurological:  negative for dizziness, headache    Medications: Allergies: Allergies   Allergen Reactions    Pcn [Penicillins] Hives and Itching    Sulfa Antibiotics      difficulty moving???       Current Meds:   Scheduled Meds:    megestrol  40 mg Oral Daily    amLODIPine  10 mg Oral Daily    atorvastatin  20 mg Oral Daily    edwina-anya  1 tablet Oral Daily    cloNIDine  0.2 mg Oral TID    hydrALAZINE  100 mg Oral TID    cetirizine  5 mg Oral Daily    losartan  50 mg Oral BID    metoprolol tartrate  25 mg Oral BID    cinacalcet  120 mg Oral Daily    sodium bicarbonate  1,300 mg Oral TID    sodium chloride flush  10 mL Intravenous 2 times per day    tiotropium  18 mcg Inhalation Daily    heparin (porcine)  5,000 Units Subcutaneous 3 times per day     Continuous Infusions:    sodium chloride 25 mL/hr at 06/11/19 1054     PRN Meds: oxyCODONE-acetaminophen **OR** oxyCODONE-acetaminophen, sodium chloride flush, nicotine, acetaminophen, ondansetron **OR** ondansetron, morphine **OR** morphine    Data:     Past Medical History:   has a past medical history of Anemia, Asthma, Atrial fibrillation ([facility] [address] [facility] cancer ([email or web address] Bursitis, Carpal tunnel syndrome, Chronic obstructive pulmonary disease (COPD) ([email or web address] Dialysis patient ([email or web address] Disease of blood and blood forming organ, History of blood transfusion, Hypertension, Irregular heart beat, Osteoarthritis, Pneumonia, Primary lung adenocarcinoma ([email or web address] Pulmonary emphysema ([email or web address] Renal failure, Stenosis of left carotid artery, and Tobacco abuse. Social History:   reports that she quit smoking about 4 weeks ago. Her smoking use included cigarettes. She has a 13.75 pack-year smoking history. She has never used smokeless tobacco. She reports that she does not drink alcohol or use drugs.      Family History:   Family History   Problem Relation Age of Onset    Cancer Father     Diabetes Sister        Vitals:  BP (!) 125/43   Pulse 108   Temp 98.2 °F (36.8 °C) (Oral)   Resp 18   Ht 5' 6\" (1.676 m)   Wt 151 lb 4.8 oz (68.6 kg)   SpO2 91%   BMI 24.42 kg/m²   Temp (24hrs), Av.1 °F (36.7 °C), Min:97.7 °F (36.5 °C), Max:98.6 °F (37 °C)    No results for input(s): POCGLU in the last 72 hours. I/O (24Hr): Intake/Output Summary (Last 24 hours) at 2019 0829  Last data filed at 2019 1418  Gross per 24 hour   Intake --   Output 1000 ml   Net -1000 ml       Labs:    Hematology:  Recent Labs     06/10/19  18019   WBC 14.1* 13.4*   RBC 2.59* 2.62*   HGB 8.1* 7.9*   HCT 25.7* 26.2*   MCV 99.2 100.0   MCH 31.3 30.2   MCHC 31.5 30.2   RDW 19.2* 19.7*   * 647*   MPV 10.9 11.1   INR  --  1.0     Chemistry:  Recent Labs     06/10/19  [id number] 19  1817 19  0606    138  --  138   K 5.6* 5.3  --  5.1   CL 94* 94*  --  95*   CO2 23 23  --  25   GLUCOSE 112* 85  --  86   BUN 62* 66*  --  49*   CREATININE 6.30* 6.77*  --  5.42*   MG 2.6  --   --   --    ANIONGAP 21* 21*  --  18*   LABGLOM 7* 6*  --  8*   GFRAA 8* 7*  --  9*   CALCIUM 11.6* 11.8*  --  10.3   CAION  --   --  1.32  --      No results for input(s): PROT, LABALBU, LABA1C, D3RKKYM, X8YCTMS, FT4, TSH, AST, ALT, LDH, GGT, ALKPHOS, LABGGT, BILITOT, BILIDIR, AMMONIA, AMYLASE, LIPASE, LACTATE, CHOL, HDL, LDLCHOLESTEROL, CHOLHDLRATIO, TRIG, VLDL, IMU00LA, PHENYTOIN, PHENYF, URICACID, POCGLU in the last 72 hours.       Lab Results   Component Value Date/Time    SPECIAL NOT REPORTED 2019 06:50 AM     Lab Results   Component Value Date/Time    CULTURE CULTURE IN PROGRESS 2019 06:50 AM       Lab Results   Component Value Date    POCPH 7.43 2019    PHART 7.472 2018    POCPCO2 33 2019    CRS0OYD 35 2018    POCPO2 103 2019    PO2ART 120 2018    POCHCO3 21.8 2019    MZW3VVP 25.9 2018    NBEA 3 2019    PBEA NOT REPORTED 05/16/2019    BEART 2 09/04/2018    JRZ4DUI 23 05/16/2019    HOBX0UBJ 98 05/16/2019    N3XLIPFW 99 09/04/2018    FIO2 50.0 05/16/2019       Radiology:    No results found. Physical Examination:        General appearance:  Drowsy but awakes easily, appears ill and emaciated, cooperative and no distress  Mental Status:  oriented to person, place and time and flat affect  Lungs:  clear to auscultation bilaterally, normal effort  Heart:  regular rate and rhythm, no murmur  Abdomen:  soft, nontender, nondistended, normal bowel sounds, no masses, hepatomegaly, splenomegaly, c/o generalized back pain  Extremities:  no edema, redness, tenderness in the calves  Skin:  no gross lesions, rashes, induration    Assessment:        Primary Problem  Primary adenocarcinoma of upper lobe of left lung [facility])    Active Hospital Problems    Diagnosis Date Noted    Severe protein-energy malnutrition ([facility] [address] [E43] 05/15/2019     Priority: Medium    Hypercalcemia of malignancy [E83.52] 06/11/2019    Failure of outpatient treatment [Z78.9]     Weakness [R53.1] 06/10/2019    Primary adenocarcinoma of upper lobe of left lung ([facility] [address] [C34.12] 06/09/2019    Metastatic cancer ([facility] [address] [C79.9] 06/09/2019    Anemia of chronic renal failure, stage 5 ([facility] [address] [N18.5, D63.1] 07/08/2018    Hypertension [I10]     End stage renal disease ([facility] [address] [N18.6] 11/04/2011       Plan:        1. Continue IV fluids, encourage oral intake, continue Megace   2. BP low normal, discontinue losartan, norvasc and change scheduled hydralazine to PRN with parameters  3. Change to inpatient  4. Palliative care consulted, await evaluation  5. Patient had short self-sustained run of [facility],, sh was asymptomatic, cardiology consulted, await further recommendations  6. ESRD on HD managed per nephrology  7. Monitor labs, Hypercalcemia improved with HD  8. Monitor and control blood pressure  9. Discharge planning  10.  Plan discussed with patient and RN    CARMEN Rod Paez NP  6/12/2019  8:29 AM

## 2019-06-13 ENCOUNTER — APPOINTMENT (OUTPATIENT)
Dept: RADIATION ONCOLOGY | Facility: MEDICAL CENTER | Age: 74
End: 2019-06-13
Attending: RADIOLOGY
Payer: COMMERCIAL

## 2019-06-13 ENCOUNTER — TELEPHONE (OUTPATIENT)
Dept: CASE MANAGEMENT | Age: 74
End: 2019-06-13

## 2019-06-13 ENCOUNTER — TELEPHONE (OUTPATIENT)
Dept: RADIATION ONCOLOGY | Facility: MEDICAL CENTER | Age: 74
End: 2019-06-13

## 2019-06-13 VITALS
WEIGHT: 156.6 LBS | OXYGEN SATURATION: 100 % | HEIGHT: 66 IN | SYSTOLIC BLOOD PRESSURE: 121 MMHG | RESPIRATION RATE: 16 BRPM | HEART RATE: 106 BPM | BODY MASS INDEX: 25.17 KG/M2 | DIASTOLIC BLOOD PRESSURE: 42 MMHG | TEMPERATURE: 97.7 F

## 2019-06-13 PROBLEM — C79.51 BONE METASTASIS: Status: ACTIVE | Noted: 2019-06-13

## 2019-06-13 LAB
ANION GAP SERPL CALCULATED.3IONS-SCNC: 16 MMOL/L (ref 9–17)
BUN BLDV-MCNC: 67 MG/DL (ref 8–23)
BUN/CREAT BLD: 10 (ref 9–20)
CALCIUM SERPL-MCNC: 11.3 MG/DL (ref 8.6–10.4)
CHLORIDE BLD-SCNC: 94 MMOL/L (ref 98–107)
CO2: 26 MMOL/L (ref 20–31)
CREAT SERPL-MCNC: 6.65 MG/DL (ref 0.5–0.9)
GFR AFRICAN AMERICAN: 7 ML/MIN
GFR NON-AFRICAN AMERICAN: 6 ML/MIN
GFR SERPL CREATININE-BSD FRML MDRD: ABNORMAL ML/MIN/{1.73_M2}
GFR SERPL CREATININE-BSD FRML MDRD: ABNORMAL ML/MIN/{1.73_M2}
GLUCOSE BLD-MCNC: 72 MG/DL (ref 70–99)
POTASSIUM SERPL-SCNC: 5.5 MMOL/L (ref 3.7–5.3)
SODIUM BLD-SCNC: 136 MMOL/L (ref 135–144)

## 2019-06-13 PROCEDURE — 90935 HEMODIALYSIS ONE EVALUATION: CPT

## 2019-06-13 PROCEDURE — 6370000000 HC RX 637 (ALT 250 FOR IP): Performed by: NURSE PRACTITIONER

## 2019-06-13 PROCEDURE — 6360000002 HC RX W HCPCS: Performed by: INTERNAL MEDICINE

## 2019-06-13 PROCEDURE — 80048 BASIC METABOLIC PNL TOTAL CA: CPT

## 2019-06-13 PROCEDURE — 2700000000 HC OXYGEN THERAPY PER DAY

## 2019-06-13 PROCEDURE — 99239 HOSP IP/OBS DSCHRG MGMT >30: CPT | Performed by: NURSE PRACTITIONER

## 2019-06-13 PROCEDURE — 36415 COLL VENOUS BLD VENIPUNCTURE: CPT

## 2019-06-13 PROCEDURE — 94640 AIRWAY INHALATION TREATMENT: CPT

## 2019-06-13 PROCEDURE — 6370000000 HC RX 637 (ALT 250 FOR IP): Performed by: INTERNAL MEDICINE

## 2019-06-13 PROCEDURE — 6360000002 HC RX W HCPCS: Performed by: NURSE PRACTITIONER

## 2019-06-13 PROCEDURE — 2500000003 HC RX 250 WO HCPCS: Performed by: INTERNAL MEDICINE

## 2019-06-13 RX ORDER — SODIUM BICARBONATE 650 MG/1
650 TABLET ORAL 3 TIMES DAILY
Status: DISCONTINUED | OUTPATIENT
Start: 2019-06-13 | End: 2019-06-13

## 2019-06-13 RX ORDER — SODIUM CITRATE 4 % (5 ML)
2.4 SYRINGE (ML) MISCELLANEOUS ONCE
Status: COMPLETED | OUTPATIENT
Start: 2019-06-13 | End: 2019-06-13

## 2019-06-13 RX ORDER — MEGESTROL ACETATE 40 MG/1
40 TABLET ORAL DAILY
Qty: 30 TABLET | Refills: 3 | Status: SHIPPED | OUTPATIENT
Start: 2019-06-14

## 2019-06-13 RX ORDER — SODIUM CITRATE 4 % (5 ML)
2.5 SYRINGE (ML) MISCELLANEOUS ONCE
Status: COMPLETED | OUTPATIENT
Start: 2019-06-13 | End: 2019-06-13

## 2019-06-13 RX ORDER — OXYCODONE HYDROCHLORIDE AND ACETAMINOPHEN 5; 325 MG/1; MG/1
2 TABLET ORAL EVERY 4 HOURS PRN
Qty: 36 TABLET | Refills: 0 | Status: SHIPPED | OUTPATIENT
Start: 2019-06-13 | End: 2019-06-16

## 2019-06-13 RX ORDER — UMECLIDINIUM 62.5 UG/1
AEROSOL, POWDER ORAL
Qty: 1 EACH | Refills: 1 | Status: SHIPPED | OUTPATIENT
Start: 2019-06-13

## 2019-06-13 RX ORDER — HYDRALAZINE HYDROCHLORIDE 50 MG/1
50 TABLET, FILM COATED ORAL 3 TIMES DAILY PRN
Qty: 90 TABLET | Refills: 3 | DISCHARGE
Start: 2019-06-13

## 2019-06-13 RX ORDER — CALCITONIN SALMON 200 [USP'U]/ML
100 INJECTION, SOLUTION INTRAMUSCULAR; SUBCUTANEOUS 2 TIMES DAILY
Status: DISCONTINUED | OUTPATIENT
Start: 2019-06-13 | End: 2019-06-13 | Stop reason: HOSPADM

## 2019-06-13 RX ADMIN — MEGESTROL ACETATE 40 MG: 40 TABLET ORAL at 09:44

## 2019-06-13 RX ADMIN — OXYCODONE AND ACETAMINOPHEN 1 TABLET: 5; 325 TABLET ORAL at 16:07

## 2019-06-13 RX ADMIN — Medication 0.1 G: at 15:11

## 2019-06-13 RX ADMIN — ATORVASTATIN CALCIUM 20 MG: 20 TABLET, FILM COATED ORAL at 09:44

## 2019-06-13 RX ADMIN — CALCITONIN SALMON 100 UNITS: 200 INJECTION, SOLUTION INTRAMUSCULAR; SUBCUTANEOUS at 11:46

## 2019-06-13 RX ADMIN — HEPARIN SODIUM 5000 UNITS: 5000 INJECTION INTRAVENOUS; SUBCUTANEOUS at 05:49

## 2019-06-13 RX ADMIN — OXYCODONE AND ACETAMINOPHEN 1 TABLET: 5; 325 TABLET ORAL at 04:53

## 2019-06-13 RX ADMIN — CINACALCET HYDROCHLORIDE 120 MG: 30 TABLET, COATED ORAL at 09:44

## 2019-06-13 RX ADMIN — ONDANSETRON 4 MG: 4 TABLET, ORALLY DISINTEGRATING ORAL at 14:02

## 2019-06-13 RX ADMIN — MORPHINE SULFATE 4 MG: 4 INJECTION INTRAVENOUS at 11:45

## 2019-06-13 RX ADMIN — SODIUM BICARBONATE 1300 MG: 650 TABLET ORAL at 09:44

## 2019-06-13 RX ADMIN — Medication 1 TABLET: at 09:44

## 2019-06-13 RX ADMIN — CETIRIZINE HYDROCHLORIDE 5 MG: 5 TABLET ORAL at 09:44

## 2019-06-13 RX ADMIN — TIOTROPIUM BROMIDE 18 MCG: 18 CAPSULE ORAL; RESPIRATORY (INHALATION) at 09:17

## 2019-06-13 ASSESSMENT — PAIN DESCRIPTION - PROGRESSION
CLINICAL_PROGRESSION: NOT CHANGED
CLINICAL_PROGRESSION: NOT CHANGED

## 2019-06-13 ASSESSMENT — PAIN DESCRIPTION - PAIN TYPE
TYPE: CHRONIC PAIN
TYPE: CHRONIC PAIN

## 2019-06-13 ASSESSMENT — PAIN SCALES - GENERAL
PAINLEVEL_OUTOF10: 6
PAINLEVEL_OUTOF10: 6
PAINLEVEL_OUTOF10: 0
PAINLEVEL_OUTOF10: 0
PAINLEVEL_OUTOF10: 8

## 2019-06-13 ASSESSMENT — PAIN DESCRIPTION - LOCATION
LOCATION: GENERALIZED
LOCATION: GENERALIZED

## 2019-06-13 ASSESSMENT — PAIN DESCRIPTION - ONSET
ONSET: ON-GOING
ONSET: ON-GOING

## 2019-06-13 ASSESSMENT — PAIN DESCRIPTION - FREQUENCY
FREQUENCY: CONTINUOUS
FREQUENCY: CONTINUOUS

## 2019-06-13 ASSESSMENT — PAIN DESCRIPTION - DESCRIPTORS
DESCRIPTORS: ACHING
DESCRIPTORS: ACHING

## 2019-06-13 ASSESSMENT — PAIN DESCRIPTION - ORIENTATION: ORIENTATION: UPPER

## 2019-06-13 NOTE — FLOWSHEET NOTE
Patient is reclining in a darkened room. Patient wishes to sleep and declines visit. Spiritual Care will continue to follow as needed.        06/12/19 2040   Encounter Summary   Services provided to: Patient   Referral/Consult From: [address] [place] [address] Children;Family members   Continue Visiting   (6/12/19)   Complexity of Encounter Low   Length of Encounter 15 minutes   Routine   Type Follow up   Assessment Approachable   Intervention Active listening   Outcome Refused/declined

## 2019-06-13 NOTE — PROGRESS NOTES
NEPHROLOGY PROGRESS NOTE      SUBJECTIVE     Cardiology referral was made for dysrhythmia. Note reviewed. Echo essentially unremarkable. Continues to feel tired and fatigued. Appetite is suboptimal.  Has been consuming a lot of milk according to her. Calcium elevated today again. Scheduled for dialysis today. OBJECTIVE     Vitals:    06/12/19 2338 06/13/19 0411 06/13/19 0450 06/13/19 0720   BP: (!) 102/37 (!) 128/42  (!) 127/44   Pulse: 86 86  100   Resp:  18  18   Temp:  99.7 °F (37.6 °C)  98.3 °F (36.8 °C)   TempSrc:  Oral  Oral   SpO2:  97%  96%   Weight:   156 lb 9.6 oz (71 kg)    Height:         24HR INTAKE/OUTPUT:      Intake/Output Summary (Last 24 hours) at 6/13/2019 1029  Last data filed at 6/12/2019 1728  Gross per 24 hour   Intake 587 ml   Output --   Net 587 ml       General appearance:Awake, alert, in no acute distress  HEENT: PERRLA  Respiratory::vesicular breath sounds,no wheeze/crackles  Cardiovascular:S1 S2 normal,no gallop or organic murmur. Abdomen:Non tender/non distended. Bowel sounds present  Extremities: No Cyanosis or Clubbing,Lower extremity edema  Neurological:Alert and oriented. No abnormalities of mood, affect, memory, mentation, or behavior are noted      MEDICATIONS     Scheduled Meds:    megestrol  40 mg Oral Daily    atorvastatin  20 mg Oral Daily    edwina-anya  1 tablet Oral Daily    cloNIDine  0.2 mg Oral TID    cetirizine  5 mg Oral Daily    metoprolol tartrate  25 mg Oral BID    cinacalcet  120 mg Oral Daily    sodium bicarbonate  1,300 mg Oral TID    sodium chloride flush  10 mL Intravenous 2 times per day    tiotropium  18 mcg Inhalation Daily    heparin (porcine)  5,000 Units Subcutaneous 3 times per day     Continuous Infusions:    sodium chloride 25 mL/hr at 06/12/19 1804     PRN Meds:  hydrALAZINE, oxyCODONE-acetaminophen **OR** oxyCODONE-acetaminophen, sodium chloride flush, nicotine, acetaminophen, ondansetron **OR** ondansetron, morphine **OR** morphine  Home Meds:                Medications Prior to Admission: cloNIDine (CATAPRES) 0.2 MG tablet, Take 1 tablet by mouth 3 times daily  metoprolol tartrate (LOPRESSOR) 25 MG tablet, TAKE 1 TABLET BY MOUTH TWICE DAILY  atorvastatin (LIPITOR) 20 MG tablet, Take 20 mg by mouth daily  B Complex-C-Folic Acid (MADAI-AYAN) TABS, Take 1 tablet by mouth daily  lactulose (CONSTULOSE) 10 GM/15ML solution, Take 30 mLs by mouth daily as needed  chlorhexidine (HIBICLENS) 4 % external liquid, Apply topically daily As directed  hydrALAZINE (APRESOLINE) 100 MG tablet, Take 100 mg by mouth 3 times daily  Umeclidinium Bromide (INCRUSE ELLIPTA) 62.5 MCG/INH AEPB, Inhale 1 puff into the lungs daily  sodium bicarbonate 650 MG tablet, Take 1,300 mg by mouth 3 times daily  amLODIPine (NORVASC) 10 MG tablet, Take 10 mg by mouth daily  omeprazole (PRILOSEC) 20 MG delayed release capsule, TAKE 1 CAPSULE BY MOUTH DAILY  Incontinence Supply Disposable (INCONTINENCE BRIEF MEDIUM) MISC, 1 each by Does not apply route 2 times daily as needed (incotinenace)  losartan (COZAAR) 50 MG tablet, Take 1 tablet by mouth 2 times daily  SENSIPAR 60 MG tablet, Take 120 mg by mouth daily   HYDROcodone-Acetaminophen (NORCO PO), Take by mouth  loratadine (CLARITIN) 10 MG tablet, Take 10 mg by mouth as needed   ethyl chloride spray, Apply topically as needed (to arm prior to dialysis)  albuterol sulfate  (90 Base) MCG/ACT inhaler, Inhale 2 puffs into the lungs every 6 hours as needed for Wheezing or Shortness of Breath    INVESTIGATIONS     Last 3 CMP:    Recent Labs     06/11/19  0628 06/12/19  0606 06/13/19  0443    138 136   K 5.3 5.1 5.5*   CL 94* 95* 94*   CO2 23 25 26   BUN 66* 49* 67*   CREATININE 6.77* 5.42* 6.65*   CALCIUM 11.8* 10.3 11.3*       Last 3 CBC:  Recent Labs     06/10/19  1805 06/11/19  0628   WBC 14.1* 13.4*   RBC 2.59* 2.62*   HGB 8.1* 7.9*   HCT 25.7* 26.2*   MCV 99.2 100.0   MCH 31.3 30.2   MCHC 31.5 30.2   RDW 19.2*

## 2019-06-13 NOTE — PROGRESS NOTES
Nutrition Assessment    Type and Reason for Visit: Reassess    Nutrition Recommendations: 1. Suggest continuing current Renal Diet. 2. Consider continuing renal oral nutrition supplement 1x/day. Nutrition Assessment: Patient unavailable for interview due to exhaustion. Patient nutritionally compromised as evidenced by 10% wt loss x 9 months and PO intake 0-25% x 9 days. Per Bentley Lino RN, pt is still not eating and has been placed on an appetite stimulant. Notes indicate possible future palliative care. Suggest continuing current renal diet and renal oral nutrition supplement (Nepro) 1x day. Malnutrition Assessment:  · Malnutrition Status: At risk for malnutrition  · Context: Chronic illness  · Findings of the 6 clinical characteristics of malnutrition (Minimum of 2 out of 6 clinical characteristics is required to make the diagnosis of moderate or severe Protein Calorie Malnutrition based on AND/ASPEN Guidelines):  1. Energy Intake-Less than or equal to 50% of estimated energy requirement, Greater than or equal to 7 days    2. Weight Loss-10% loss or greater, (in 9 months)  3. Fat Loss-Unable to assess,    4. Muscle Loss-Unable to assess,    5. Fluid Accumulation-Mild fluid accumulation, Generalized  6.  Strength-Not measured    Nutrition Risk Level: High    Nutrient Needs:  · Estimated Daily Total Kcal: 4398-3284 kcals  · Estimated Daily Protein (g): 85-92g (1.2-1.3g/kg)  · Estimated Daily Total Fluid (ml/day): 1200-1400ml (UO results pending)    Nutrition Diagnosis:   · Problem: Inadequate oral intake  · Etiology: related to Catabolic illness     Signs and symptoms:  as evidenced by Intake 0-25%, Nausea, Vomiting, Weight loss, Localized or generalized fluid accumulation    Objective Information:  · Nutrition-Focused Physical Findings: GI: soft, non-tender, active bowel sounds, constipation. PV: +1 generalized, RLE/LLE trace.  Skin: excoration on coccyx  · Wound Type: None  · Current Nutrition

## 2019-06-13 NOTE — PLAN OF CARE
Problem: Falls - Risk of:  Goal: Will remain free from falls  Description  Will remain free from falls  6/13/2019 0157 by Annette Romero RN  Outcome: Ongoing  Note:   Siderails up x 2  Hourly rounding  Call light in reach  Instructed to call for assist before attempting out of bed. Remains free from falls and accidental injury at this time   Floor free from obstacles  Bed is locked and in lowest position  Adequate lighting provided  Bed alarm on, Red Falling star and Stay with Me signs posted         Problem: Activity:  Goal: Fatigue will decrease  Description  Fatigue will decrease  Outcome: Ongoing     Problem: Pain:  Goal: Pain level will decrease  Description  Pain level will decrease  6/13/2019 0157 by Annette Romero RN  Outcome: Ongoing  Note:   Pain level assessment complete. Patient educated on pain scale and control interventions  PRN pain medication given per patient request  Patient instructed to call out with new onset of pain or unrelieved pain       Problem: Risk for Impaired Skin Integrity  Goal: Tissue integrity - skin and mucous membranes  Description  Structural intactness and normal physiological function of skin and  mucous membranes.   6/13/2019 0157 by Annette Romero RN  Outcome: Ongoing     Problem: Nutrition  Goal: Optimal nutrition therapy  Description  Nutrition Problem: Inadequate oral intake  Intervention: Food and/or Nutrient Delivery: Continue current diet, Start ONS  Nutritional Goals: PO intake to meet >75% of estimated nutrition needs   Outcome: Ongoing

## 2019-06-13 NOTE — PROGRESS NOTES
Patient to dialysis Detail Level: Simple Instructions (Optional): Acne surgery Scheduled For Follow Up In (Optional): 2 weeks

## 2019-06-13 NOTE — CARE COORDINATION
Social Work-Spoke with [facility]. There was an error made and they are not going to deny pt. Deshaun Oliver will admit today at 630. [facility] [address] to transport at 6. HENS completed. Discussed with pt and dtr-agreeable. Orders faxed to [facility]. Nurse to call report to [phone].  Mariaelena Baxter

## 2019-06-13 NOTE — PROGRESS NOTES
Pt started on tx without incident, HD cath care per policy and procedure. No s/s of distress or discomfort noted. Safety checks completed.

## 2019-06-13 NOTE — PROGRESS NOTES
Pt completed tx without incident, no s/s of distress or discomfort noted. Pt bld return, tolerated tx well.

## 2019-06-13 NOTE — PROGRESS NOTES
for abdominal pain, constipation, diarrhea, nausea, vomiting. Positive for back pain  Neurological:  negative for dizziness, headache    Medications: Allergies: Allergies   Allergen Reactions    Pcn [Penicillins] Hives and Itching    Sulfa Antibiotics      difficulty moving???       Current Meds:   Scheduled Meds:    calcitonin  100 Units Subcutaneous BID    megestrol  40 mg Oral Daily    atorvastatin  20 mg Oral Daily    edwina-anya  1 tablet Oral Daily    cloNIDine  0.2 mg Oral TID    cetirizine  5 mg Oral Daily    metoprolol tartrate  25 mg Oral BID    cinacalcet  120 mg Oral Daily    sodium chloride flush  10 mL Intravenous 2 times per day    tiotropium  18 mcg Inhalation Daily    heparin (porcine)  5,000 Units Subcutaneous 3 times per day     Continuous Infusions:    sodium chloride 25 mL/hr at 06/12/19 1804     PRN Meds: hydrALAZINE, oxyCODONE-acetaminophen **OR** oxyCODONE-acetaminophen, sodium chloride flush, nicotine, acetaminophen, ondansetron **OR** ondansetron, morphine **OR** morphine    Data:     Past Medical History:   has a past medical history of Anemia, Asthma, Atrial fibrillation ([facility] [address] Breast cancer ([facility] [address] Bursitis, Carpal tunnel syndrome, Chronic obstructive pulmonary disease (COPD) ([facility] [address] [facility] Disease of blood and blood forming organ, History of blood transfusion, Hypertension, Irregular heart beat, Osteoarthritis, Pneumonia, Primary lung adenocarcinoma ([facility] [address] Pul[facility]sema [facility] Renal failure, Stenosis of left carotid artery, and Tobacco abuse. Social History:   reports that she quit smoking about 5 weeks ago. Her smoking use included cigarettes. She has a 13.75 pack-year smoking history. She has never used smokeless tobacco. She reports that she does not drink alcohol or use drugs.      Family History:   Family History   Problem Relation Age of Onset    Cancer Father     Diabetes Sister        Vitals:  BP (!) 127/44   Pulse 100   Temp 98.3 °F (36.8 °C) (Oral)   Resp 18   Ht 5' 6\" (1.676 m)   Wt 156 lb 9.6 oz (71 kg)   SpO2 96%   BMI 25.28 kg/m²   Temp (24hrs), Av.5 °F (36.9 °C), Min:97.8 °F (36.6 °C), Max:99.7 °F (37.6 °C)    No results for input(s): POCGLU in the last 72 hours. I/O (24Hr): Intake/Output Summary (Last 24 hours) at 2019 1038  Last data filed at 2019 1728  Gross per 24 hour   Intake 587 ml   Output --   Net 587 ml       Labs:    Hematology:  Recent Labs     06/10/19  18019   WBC 14.1* 13.4*   RBC 2.59* 2.62*   HGB 8.1* 7.9*   HCT 25.7* 26.2*   MCV 99.2 100.0   MCH 31.3 30.2   MCHC 31.5 30.2   RDW 19.2* 19.7*   * 647*   MPV 10.9 11.1   INR  --  1.0     Chemistry:  Recent Labs     06/10/19  18019  0628 19  1817 19  0606 19  1037 19  1640 19  2217 19  0443    138  --  138  --   --   --  136   K 5.6* 5.3  --  5.1  --   --   --  5.5*   CL 94* 94*  --  95*  --   --   --  94*   CO2 23 23  --  25  --   --   --  26   GLUCOSE 112* 85  --  86  --   --   --  72   BUN 62* 66*  --  49*  --   --   --  67*   CREATININE 6.30* 6.77*  --  5.42*  --   --   --  6.65*   MG 2.6  --   --   --   --   --   --   --    ANIONGAP 21* 21*  --  18*  --   --   --  16   LABGLOM 7* 6*  --  8*  --   --   --  6*   GFRAA 8* 7*  --  9*  --   --   --  7*   CALCIUM 11.6* 11.8*  --  10.3  --   --   --  11.3*   CAION  --   --  1.32  --   --   --   --   --    TROPHS  --   --   --   --  83* 83* 87*  --      No results for input(s): PROT, LABALBU, LABA1C, N2PQORH, C9AYVSW, FT4, TSH, AST, ALT, LDH, GGT, ALKPHOS, LABGGT, BILITOT, BILIDIR, AMMONIA, AMYLASE, LIPASE, LACTATE, CHOL, HDL, LDLCHOLESTEROL, CHOLHDLRATIO, TRIG, VLDL, WAE61QL, PHENYTOIN, PHENYF, URICACID, POCGLU in the last 72 hours.       Lab Results   Component Value Date/Time    SPECIAL NOT REPORTED 2019 06:50 AM     Lab Results   Component Value Date/Time    CULTURE NO SIGNIFICANT GROWTH 2019 06:50 AM       Lab Results Hypercalcemia related to malignancy, calcitonin and low calcium diet ordered per nephrology  5. Monitor and control blood pressure, currently stable   6. Discharge to SNF after HD this afternoon  7.  Plan discussed with patient and RN    JAMES Spence NP  6/13/2019  10:38 AM

## 2019-06-13 NOTE — TELEPHONE ENCOUNTER
Writer called [facility] to see how patient is doing. Spoke to Alexander-Kiersten. She states patient is having Dialysis today, she is still fatigued. Still Waiting on ECF pre-certification. Dr. Crispin Arita updated at chart rounds. Dr. Crispin Arita would like patient feeling better before resuming treatment.

## 2019-06-13 NOTE — TELEPHONE ENCOUNTER
Name: [facility]  : 1945  MRN: [id number]    Oncology Navigation Follow-Up Note    Contact Type:  Telephone    Subjective:     Objective:     Assistance Needed:     Receptive to Advanced Care Planning / Palliative Care:      Referrals:     Education:     Notes: navigator spoke with Careteam members and pt. Will be going to [facility] post discharge.     Electronically signed by Mason Gil RN on 2019 at 9:23 AM

## 2019-06-13 NOTE — DISCHARGE SUMMARY
[facility]    Discharge Summary     Patient ID: Aldo Hernandez  :     MRN: [id number]     ACCOUNT:  [de-identified]   Patient's PCP: Ni Hernández MD  Admit Date: 6/10/2019   Discharge Date: 2019  Length of Stay: 1  Code Status:  Full Code  Admitting Physician: Oziel Nichols MD  Discharge Physician: Raz Alegria APRN - NP     Active Discharge Diagnoses:     Hospital Problem Lists:  Principal Problem:    Primary adenocarcinoma of upper lobe of left lung ([address]  Active Problems:    Severe protein-energy malnutrition ([address]    End stage renal disease ([address]    Hypertension    Anemia of chronic renal failure, stage 5 ([address]    Metastatic cancer ([address]    Weakness    Hypercalcemia of malignancy    Failure of outpatient treatment    Lung cancer metastatic to bone ([address]    Failure to thrive in adult  Resolved Problems:    * No resolved hospital problems. *      Admission Condition:  poor     Discharged Condition: stable    Hospital Stay:     Hospital Course: Aldo Hernandez is a 72 y.o. female who was admitted for the management of  Primary adenocarcinoma of upper lobe of left lung ([address] , presented to ER with  Generalized Body Aches; Anorexia; Weight Loss; and Dehydration  She was sent for evaluation from Dr. Chuckie Coyne office. she was scheduled to get her first round of palliative radiation but appeared dehydrated and emaciated. She was mildly hypotensive as well. Her daughter is concerned because she has gotten progressively weaker over the past several weeks and she lives at home alone. She admits to a poor appetite/intake.  She has a flat affecta nd appears uninterested in conversation   Due to her multiple comorbidities she is not a candidate for surgical intervention or chemotherapy  When I attempted to discuss possibility of hospice care and code status with her and her daughter she declined and would like to remain a full prescribed, follow up with hem/onc as an outpatient, maintain adequate oral intake    Discharge Medications:      Medication List      ASK your doctor about these medications    albuterol sulfate  (90 Base) MCG/ACT inhaler  Inhale 2 puffs into the lungs every 6 hours as needed for Wheezing or Shortness of Breath     amLODIPine 10 MG tablet  Commonly known as:  NORVASC     atorvastatin 20 MG tablet  Commonly known as:  LIPITOR     cloNIDine 0.2 MG tablet  Commonly known as:  CATAPRES  Take 1 tablet by mouth 3 times daily     CONSTULOSE 10 GM/15ML solution  Generic drug:  lactulose     ethyl chloride spray     HIBICLENS 4 % external liquid  Generic drug:  chlorhexidine     hydrALAZINE 100 MG tablet  Commonly known as:  APRESOLINE     Incontinence Brief Medium Misc  1 each by Does not apply route 2 times daily as needed (incotinenace)     * INCRUSE ELLIPTA 62.5 MCG/INH Aepb  Generic drug:  Umeclidinium Bromide  INHALE 1 PUFF BY MOUTH AND INTO THE LUNGS ONCE DAILY     * INCRUSE ELLIPTA 62.5 MCG/INH Aepb  Generic drug:  Umeclidinium Bromide     loratadine 10 MG tablet  Commonly known as:  CLARITIN     losartan 50 MG tablet  Commonly known as:  COZAAR  Take 1 tablet by mouth 2 times daily     metoprolol tartrate 25 MG tablet  Commonly known as:  LOPRESSOR  TAKE 1 TABLET BY MOUTH TWICE DAILY     NORCO PO     omeprazole 20 MG delayed release capsule  Commonly known as:  PRILOSEC  TAKE 1 CAPSULE BY MOUTH DAILY     edwina-anya Tabs     SENSIPAR 60 MG tablet  Generic drug:  cinacalcet     sodium bicarbonate 650 MG tablet         * This list has 2 medication(s) that are the same as other medications prescribed for you. Read the directions carefully, and ask your doctor or other care provider to review them with you. Where to Get Your Medications      These medications were sent to [facility], [address] [place] [address]. Garrett Ulloa [phone] - F [phone]  [facility].  Radha Fajardo

## 2019-06-13 NOTE — PROGRESS NOTES
Occupational Therapy  DATE: 2019    NAME: Grant Duke  MRN: [id number]   : 1945  [facility]  Occupational Therapy Not Seen Note    Patient not available for Occupational Therapy due to:    [] Testing:    [x] Hemodialysis    [] Cancelled by RN:    []Refusal by Patient:    [] Surgery:     [] Intubation:     [] Pain Medication:    [] Sedation:     [] Spine Precautions :    [] Medical Instability:    [] Other:        Steffi PEREIRA/L

## 2019-06-14 ENCOUNTER — APPOINTMENT (OUTPATIENT)
Dept: RADIATION ONCOLOGY | Facility: MEDICAL CENTER | Age: 74
End: 2019-06-14
Attending: RADIOLOGY
Payer: COMMERCIAL

## 2019-06-14 ENCOUNTER — TELEPHONE (OUTPATIENT)
Dept: FAMILY MEDICINE CLINIC | Age: 74
End: 2019-06-14

## 2019-06-17 ENCOUNTER — HOSPITAL ENCOUNTER (OUTPATIENT)
Dept: RADIATION ONCOLOGY | Facility: MEDICAL CENTER | Age: 74
End: 2019-06-17
Attending: RADIOLOGY
Payer: COMMERCIAL

## 2019-06-17 LAB — PTH RELATED PEPTIDE: 6.9 PMOL/L (ref 0–3.4)

## 2019-06-18 ENCOUNTER — APPOINTMENT (OUTPATIENT)
Dept: RADIATION ONCOLOGY | Facility: MEDICAL CENTER | Age: 74
End: 2019-06-18
Attending: RADIOLOGY
Payer: COMMERCIAL

## 2019-06-19 ENCOUNTER — APPOINTMENT (OUTPATIENT)
Dept: RADIATION ONCOLOGY | Facility: MEDICAL CENTER | Age: 74
End: 2019-06-19
Attending: RADIOLOGY
Payer: COMMERCIAL

## 2019-06-20 ENCOUNTER — APPOINTMENT (OUTPATIENT)
Dept: RADIATION ONCOLOGY | Facility: MEDICAL CENTER | Age: 74
End: 2019-06-20
Attending: RADIOLOGY
Payer: COMMERCIAL

## 2019-06-21 ENCOUNTER — APPOINTMENT (OUTPATIENT)
Dept: RADIATION ONCOLOGY | Facility: MEDICAL CENTER | Age: 74
End: 2019-06-21
Attending: RADIOLOGY
Payer: COMMERCIAL

## 2019-06-24 ENCOUNTER — APPOINTMENT (OUTPATIENT)
Dept: RADIATION ONCOLOGY | Facility: MEDICAL CENTER | Age: 74
End: 2019-06-24
Attending: RADIOLOGY
Payer: COMMERCIAL

## 2019-06-25 ENCOUNTER — APPOINTMENT (OUTPATIENT)
Dept: RADIATION ONCOLOGY | Facility: MEDICAL CENTER | Age: 74
End: 2019-06-25
Attending: RADIOLOGY
Payer: COMMERCIAL

## 2019-06-26 ENCOUNTER — APPOINTMENT (OUTPATIENT)
Dept: RADIATION ONCOLOGY | Facility: MEDICAL CENTER | Age: 74
End: 2019-06-26
Attending: RADIOLOGY
Payer: COMMERCIAL

## 2019-06-27 ENCOUNTER — APPOINTMENT (OUTPATIENT)
Dept: RADIATION ONCOLOGY | Facility: MEDICAL CENTER | Age: 74
End: 2019-06-27
Attending: RADIOLOGY
Payer: COMMERCIAL

## 2019-06-28 ENCOUNTER — APPOINTMENT (OUTPATIENT)
Dept: RADIATION ONCOLOGY | Facility: MEDICAL CENTER | Age: 74
End: 2019-06-28
Attending: RADIOLOGY
Payer: COMMERCIAL

## 2022-06-15 NOTE — PROGRESS NOTES
Transported per cart to room. No personal belonigns with patient. Report to aleshia rn. Family called in waiting area and not there.  message left at dest to let family know pt returned to her room Post-Care Instructions: I reviewed with the patient in detail post-care instructions. Patient is to wear sunprotection, and avoid picking at any of the treated lesions. Pt may apply Vaseline to crusted or scabbing areas. Duration Of Freeze Thaw-Cycle (Seconds): 10 Render Note In Bullet Format When Appropriate: No Show Applicator Variable?: Yes Consent: The patient's consent was obtained including but not limited to risks of crusting, scabbing, blistering, scarring, darker or lighter pigmentary change, recurrence, incomplete removal and infection. Number Of Freeze-Thaw Cycles: 2 freeze-thaw cycles Detail Level: Detailed

## (undated) DEVICE — SURGICAL PROCEDURE KIT BASIN MAJ SET UP

## (undated) DEVICE — PROBE COVER KIT: Brand: MEDLINE INDUSTRIES, INC.

## (undated) DEVICE — BLADE CLIPPER GEN PURP NS

## (undated) DEVICE — PAD N ADH W3XL4IN POLY COT SFT PERF FLM EASILY CUT ABSRB

## (undated) DEVICE — GOWN,SIRUS,NON REINFRCD,LARGE,SET IN SL: Brand: MEDLINE

## (undated) DEVICE — 3M™ MICROPORE™ TAPE, 1530-2: Brand: 3M™ MICROPORE™

## (undated) DEVICE — TRAY PREP DRY W/ PREM GLV 2 APPL 6 SPNG 2 UNDPD 1 OVERWRAP

## (undated) DEVICE — SNARE ENDOSCP M L240CM LOOP W27MM SHTH DIA2.4MM OVL FLX

## (undated) DEVICE — 3M™ STERI-STRIP™ COMPOUND BENZOIN TINCTURE 40 BAGS/CARTON 4 CARTONS/CASE C1544: Brand: 3M™ STERI-STRIP™

## (undated) DEVICE — ELECTRODE PT RET AD L9FT HI MOIST COND ADH HYDRGEL CORDED

## (undated) DEVICE — INTENDED FOR TISSUE SEPARATION, AND OTHER PROCEDURES THAT REQUIRE A SHARP SURGICAL BLADE TO PUNCTURE OR CUT.: Brand: BARD-PARKER ® CARBON RIB-BACK BLADES

## (undated) DEVICE — SOLUTION PREP POVIDONE IOD FOR SKIN MUCOUS MEM PRIOR TO

## (undated) DEVICE — SINGLE-USE BIOPSY FORCEPS: Brand: RADIAL JAW 4

## (undated) DEVICE — Device

## (undated) DEVICE — 3M™ WARMING BLANKET, UPPER BODY, 10 PER CASE, 42268: Brand: BAIR HUGGER™

## (undated) DEVICE — YANKAUER,FLEXIBLE HANDLE,REGLR CAPACITY: Brand: MEDLINE INDUSTRIES, INC.

## (undated) DEVICE — STRIP,CLOSURE,WOUND,MEDI-STRIP,1/2X4: Brand: MEDLINE

## (undated) DEVICE — GLOVE SURG SZ 7 L12IN FNGR THK87MIL WHT LTX FREE

## (undated) DEVICE — CLIPVAC PRESURG HAIR REMOVAL

## (undated) DEVICE — DECANTER FLD 9IN ST BG FOR ASEP TRNSF OF FLD

## (undated) DEVICE — GARMENT,MEDLINE,DVT,INT,CALF,MED, GEN2: Brand: MEDLINE

## (undated) DEVICE — DRAPE C ARM UNIV W41XL74IN CLR PLAS XR VELC CLSR POLY STRP

## (undated) DEVICE — TOWEL,OR,DSP,ST,BLUE,STD,4/PK,20PK/CS: Brand: MEDLINE

## (undated) DEVICE — GAUZE,SPONGE,FLUFF,6"X6.75",STRL,5/TRAY: Brand: MEDLINE

## (undated) DEVICE — SYRINGE, LUER LOCK, 10ML: Brand: MEDLINE

## (undated) DEVICE — DRAPE THYROID

## (undated) DEVICE — GLOVE SURG SZ 75 L12IN FNGR THK87MIL WHT LTX FREE

## (undated) DEVICE — PREP-RESISTANT MARKER W/ RULER: Brand: MEDLINE INDUSTRIES, INC.

## (undated) DEVICE — 9165 UNIVERSAL PATIENT PLATE: Brand: 3M™

## (undated) DEVICE — TAPE,CLOTH/SILK,CURAD,3"X10YD,LF,40/CS: Brand: CURAD

## (undated) DEVICE — DRESSING TRNSPAR W5XL4.5IN FLM SHT SEMIPERMEABLE WIND